# Patient Record
Sex: FEMALE | Race: WHITE | Employment: FULL TIME | URBAN - METROPOLITAN AREA
[De-identification: names, ages, dates, MRNs, and addresses within clinical notes are randomized per-mention and may not be internally consistent; named-entity substitution may affect disease eponyms.]

---

## 2017-02-03 ENCOUNTER — HOSPITAL ENCOUNTER (EMERGENCY)
Age: 22
Discharge: HOME | End: 2017-02-03
Payer: COMMERCIAL

## 2017-02-03 VITALS
TEMPERATURE: 98.2 F | OXYGEN SATURATION: 100 % | RESPIRATION RATE: 14 BRPM | DIASTOLIC BLOOD PRESSURE: 73 MMHG | HEIGHT: 63 IN | HEART RATE: 101 BPM | SYSTOLIC BLOOD PRESSURE: 112 MMHG | BODY MASS INDEX: 23.92 KG/M2 | WEIGHT: 135 LBS

## 2017-02-03 DIAGNOSIS — R00.2 PALPITATIONS: ICD-10-CM

## 2017-02-03 DIAGNOSIS — J10.1 INFLUENZA A: Primary | ICD-10-CM

## 2017-02-03 LAB
BACTERIA: NORMAL /HPF
BILIRUBIN URINE: ABNORMAL
BLOOD, URINE: NEGATIVE
CLARITY: ABNORMAL
COLOR: ABNORMAL
EPITHELIAL CELLS, UA: NORMAL /HPF
GLUCOSE URINE: NEGATIVE MG/DL
KETONES, URINE: NEGATIVE MG/DL
LEUKOCYTE ESTERASE, URINE: NEGATIVE
MUCUS: PRESENT
NITRITE, URINE: NEGATIVE
PH UA: 5.5 (ref 5–9)
PROTEIN UA: 30 MG/DL
RAPID INFLUENZA  B AGN: NEGATIVE
RAPID INFLUENZA A AGN: POSITIVE
RBC UA: NORMAL /HPF (ref 0–2)
SPECIFIC GRAVITY UA: 1.04 (ref 1–1.03)
URINE REFLEX TO CULTURE: YES
UROBILINOGEN, URINE: 0.2 E.U./DL
WBC UA: NORMAL /HPF (ref 0–5)

## 2017-02-03 PROCEDURE — 81001 URINALYSIS AUTO W/SCOPE: CPT

## 2017-02-03 PROCEDURE — 99284 EMERGENCY DEPT VISIT MOD MDM: CPT

## 2017-02-03 PROCEDURE — 87077 CULTURE AEROBIC IDENTIFY: CPT

## 2017-02-03 PROCEDURE — 93005 ELECTROCARDIOGRAM TRACING: CPT

## 2017-02-03 PROCEDURE — 87086 URINE CULTURE/COLONY COUNT: CPT

## 2017-02-03 PROCEDURE — 86403 PARTICLE AGGLUT ANTBDY SCRN: CPT

## 2017-02-03 ASSESSMENT — ENCOUNTER SYMPTOMS
VOMITING: 0
TROUBLE SWALLOWING: 0
DIARRHEA: 0
COUGH: 0
PHOTOPHOBIA: 0
NAUSEA: 1
BACK PAIN: 0
SHORTNESS OF BREATH: 0
SORE THROAT: 0
ABDOMINAL PAIN: 0

## 2017-02-05 LAB
ORGANISM: ABNORMAL
URINE CULTURE, ROUTINE: ABNORMAL

## 2017-02-09 LAB
EKG ATRIAL RATE: 82 BPM
EKG P AXIS: 60 DEGREES
EKG P-R INTERVAL: 158 MS
EKG Q-T INTERVAL: 368 MS
EKG QRS DURATION: 100 MS
EKG QTC CALCULATION (BAZETT): 429 MS
EKG R AXIS: 70 DEGREES
EKG T AXIS: 27 DEGREES
EKG VENTRICULAR RATE: 82 BPM

## 2017-06-01 ENCOUNTER — HOSPITAL ENCOUNTER (OUTPATIENT)
Age: 22
Setting detail: OBSERVATION
Discharge: HOME | End: 2017-06-02
Attending: OBSTETRICS & GYNECOLOGY | Admitting: OBSTETRICS & GYNECOLOGY
Payer: COMMERCIAL

## 2017-06-01 DIAGNOSIS — O03.4 INCOMPLETE ABORTION: Primary | ICD-10-CM

## 2017-06-01 LAB
ABO/RH: NORMAL
ALBUMIN SERPL-MCNC: 3.9 G/DL (ref 3.9–4.9)
ALP BLD-CCNC: 59 U/L (ref 40–130)
ALT SERPL-CCNC: 9 U/L (ref 0–33)
ANION GAP SERPL CALCULATED.3IONS-SCNC: 14 MEQ/L (ref 7–13)
AST SERPL-CCNC: 15 U/L (ref 0–35)
BASOPHILS ABSOLUTE: 0 K/UL (ref 0–0.2)
BASOPHILS RELATIVE PERCENT: 0.5 %
BILIRUB SERPL-MCNC: 0.2 MG/DL (ref 0–1.2)
BUN BLDV-MCNC: 8 MG/DL (ref 6–20)
CALCIUM SERPL-MCNC: 8 MG/DL (ref 8.6–10.2)
CHLORIDE BLD-SCNC: 103 MEQ/L (ref 98–107)
CO2: 21 MEQ/L (ref 22–29)
CREAT SERPL-MCNC: 0.44 MG/DL (ref 0.5–0.9)
EOSINOPHILS ABSOLUTE: 0.1 K/UL (ref 0–0.7)
EOSINOPHILS RELATIVE PERCENT: 1.3 %
GFR AFRICAN AMERICAN: >60
GFR NON-AFRICAN AMERICAN: >60
GLOBULIN: 2.1 G/DL (ref 2.3–3.5)
GLUCOSE BLD-MCNC: 94 MG/DL (ref 74–109)
GONADOTROPIN, CHORIONIC (HCG) QUANT: 2613 MIU/ML
HCT VFR BLD CALC: 33.3 % (ref 37–47)
HEMOGLOBIN: 10.6 G/DL (ref 12–16)
LYMPHOCYTES ABSOLUTE: 1.2 K/UL (ref 1–4.8)
LYMPHOCYTES RELATIVE PERCENT: 14.7 %
MCH RBC QN AUTO: 25.6 PG (ref 27–31.3)
MCHC RBC AUTO-ENTMCNC: 32 % (ref 33–37)
MCV RBC AUTO: 80.2 FL (ref 82–100)
MONOCYTES ABSOLUTE: 0.5 K/UL (ref 0.2–0.8)
MONOCYTES RELATIVE PERCENT: 5.6 %
NEUTROPHILS ABSOLUTE: 6.3 K/UL (ref 1.4–6.5)
NEUTROPHILS RELATIVE PERCENT: 77.9 %
PDW BLD-RTO: 14.2 % (ref 11.5–14.5)
PLATELET # BLD: 175 K/UL (ref 130–400)
POTASSIUM SERPL-SCNC: 3.7 MEQ/L (ref 3.5–5.1)
RBC # BLD: 4.15 M/UL (ref 4.2–5.4)
SODIUM BLD-SCNC: 138 MEQ/L (ref 132–144)
TOTAL PROTEIN: 6 G/DL (ref 6.4–8.1)
WBC # BLD: 8 K/UL (ref 4.8–10.8)

## 2017-06-01 PROCEDURE — 80053 COMPREHEN METABOLIC PANEL: CPT

## 2017-06-01 PROCEDURE — 86900 BLOOD TYPING SEROLOGIC ABO: CPT

## 2017-06-01 PROCEDURE — 84702 CHORIONIC GONADOTROPIN TEST: CPT

## 2017-06-01 PROCEDURE — 36415 COLL VENOUS BLD VENIPUNCTURE: CPT

## 2017-06-01 PROCEDURE — 86901 BLOOD TYPING SEROLOGIC RH(D): CPT

## 2017-06-01 PROCEDURE — 85025 COMPLETE CBC W/AUTO DIFF WBC: CPT

## 2017-06-01 PROCEDURE — 99284 EMERGENCY DEPT VISIT MOD MDM: CPT

## 2017-06-01 PROCEDURE — 2580000003 HC RX 258: Performed by: PHYSICIAN ASSISTANT

## 2017-06-01 PROCEDURE — G0378 HOSPITAL OBSERVATION PER HR: HCPCS

## 2017-06-01 RX ORDER — NALBUPHINE HCL 10 MG/ML
10 AMPUL (ML) INJECTION EVERY 4 HOURS PRN
Status: DISCONTINUED | OUTPATIENT
Start: 2017-06-01 | End: 2017-06-02 | Stop reason: HOSPADM

## 2017-06-01 RX ORDER — ACETAMINOPHEN 500 MG
1000 TABLET ORAL EVERY 6 HOURS PRN
Status: DISCONTINUED | OUTPATIENT
Start: 2017-06-01 | End: 2017-06-02 | Stop reason: HOSPADM

## 2017-06-01 RX ORDER — OXYCODONE HYDROCHLORIDE AND ACETAMINOPHEN 5; 325 MG/1; MG/1
2 TABLET ORAL EVERY 4 HOURS PRN
Status: DISCONTINUED | OUTPATIENT
Start: 2017-06-01 | End: 2017-06-02 | Stop reason: HOSPADM

## 2017-06-01 RX ORDER — ZOLPIDEM TARTRATE 5 MG/1
5 TABLET ORAL NIGHTLY PRN
Status: DISCONTINUED | OUTPATIENT
Start: 2017-06-02 | End: 2017-06-02 | Stop reason: HOSPADM

## 2017-06-01 RX ORDER — 0.9 % SODIUM CHLORIDE 0.9 %
1000 INTRAVENOUS SOLUTION INTRAVENOUS ONCE
Status: COMPLETED | OUTPATIENT
Start: 2017-06-01 | End: 2017-06-01

## 2017-06-01 RX ORDER — SODIUM CHLORIDE, SODIUM LACTATE, POTASSIUM CHLORIDE, AND CALCIUM CHLORIDE .6; .31; .03; .02 G/100ML; G/100ML; G/100ML; G/100ML
1000 INJECTION, SOLUTION INTRAVENOUS ONCE
Status: DISCONTINUED | OUTPATIENT
Start: 2017-06-01 | End: 2017-06-02 | Stop reason: HOSPADM

## 2017-06-01 RX ORDER — MISOPROSTOL 200 UG/1
400 TABLET ORAL ONCE
Status: COMPLETED | OUTPATIENT
Start: 2017-06-02 | End: 2017-06-02

## 2017-06-01 RX ORDER — MISOPROSTOL 200 UG/1
800 TABLET ORAL ONCE
Status: DISCONTINUED | OUTPATIENT
Start: 2017-06-01 | End: 2017-06-01

## 2017-06-01 RX ORDER — SODIUM CHLORIDE, SODIUM LACTATE, POTASSIUM CHLORIDE, CALCIUM CHLORIDE 600; 310; 30; 20 MG/100ML; MG/100ML; MG/100ML; MG/100ML
INJECTION, SOLUTION INTRAVENOUS CONTINUOUS
Status: DISCONTINUED | OUTPATIENT
Start: 2017-06-01 | End: 2017-06-02 | Stop reason: HOSPADM

## 2017-06-01 RX ADMIN — SODIUM CHLORIDE 1000 ML: 9 INJECTION, SOLUTION INTRAVENOUS at 21:36

## 2017-06-01 ASSESSMENT — ENCOUNTER SYMPTOMS
ABDOMINAL PAIN: 0
BACK PAIN: 0
DIARRHEA: 0
NAUSEA: 0
SHORTNESS OF BREATH: 0
VOMITING: 0
SORE THROAT: 0
TROUBLE SWALLOWING: 0
COUGH: 0
PHOTOPHOBIA: 0

## 2017-06-02 ENCOUNTER — TELEPHONE (OUTPATIENT)
Dept: OBGYN | Age: 22
End: 2017-06-02

## 2017-06-02 VITALS
DIASTOLIC BLOOD PRESSURE: 58 MMHG | TEMPERATURE: 98.2 F | RESPIRATION RATE: 18 BRPM | OXYGEN SATURATION: 98 % | BODY MASS INDEX: 23.91 KG/M2 | WEIGHT: 135 LBS | HEART RATE: 78 BPM | SYSTOLIC BLOOD PRESSURE: 113 MMHG

## 2017-06-02 PROCEDURE — 99282 EMERGENCY DEPT VISIT SF MDM: CPT

## 2017-06-02 PROCEDURE — 99217 PR OBSERVATION CARE DISCHARGE MANAGEMENT: CPT | Performed by: OBSTETRICS & GYNECOLOGY

## 2017-06-02 PROCEDURE — 6370000000 HC RX 637 (ALT 250 FOR IP): Performed by: OBSTETRICS & GYNECOLOGY

## 2017-06-02 RX ADMIN — ACETAMINOPHEN 1000 MG: 500 TABLET ORAL at 00:01

## 2017-06-02 RX ADMIN — MISOPROSTOL 400 MCG: 200 TABLET ORAL at 00:00

## 2017-06-02 ASSESSMENT — PAIN SCALES - GENERAL: PAINLEVEL_OUTOF10: 5

## 2017-06-05 RX ORDER — IBUPROFEN 800 MG/1
800 TABLET ORAL EVERY 8 HOURS PRN
Qty: 60 TABLET | Refills: 0 | Status: SHIPPED | OUTPATIENT
Start: 2017-06-05 | End: 2020-04-06

## 2017-12-03 ENCOUNTER — APPOINTMENT (OUTPATIENT)
Dept: GENERAL RADIOLOGY | Age: 22
End: 2017-12-03
Payer: MEDICAID

## 2017-12-03 ENCOUNTER — HOSPITAL ENCOUNTER (EMERGENCY)
Age: 22
Discharge: HOME | End: 2017-12-04
Payer: MEDICAID

## 2017-12-03 VITALS
HEIGHT: 63 IN | RESPIRATION RATE: 20 BRPM | OXYGEN SATURATION: 100 % | DIASTOLIC BLOOD PRESSURE: 68 MMHG | WEIGHT: 135 LBS | TEMPERATURE: 98.2 F | SYSTOLIC BLOOD PRESSURE: 133 MMHG | BODY MASS INDEX: 23.92 KG/M2 | HEART RATE: 93 BPM

## 2017-12-03 DIAGNOSIS — R00.2 PALPITATIONS: Primary | ICD-10-CM

## 2017-12-03 LAB
ANION GAP SERPL CALCULATED.3IONS-SCNC: 12 MEQ/L (ref 7–13)
BUN BLDV-MCNC: 15 MG/DL (ref 6–20)
CALCIUM SERPL-MCNC: 9.2 MG/DL (ref 8.6–10.2)
CHLORIDE BLD-SCNC: 102 MEQ/L (ref 98–107)
CO2: 23 MEQ/L (ref 22–29)
CREAT SERPL-MCNC: 0.58 MG/DL (ref 0.5–0.9)
EKG ATRIAL RATE: 87 BPM
EKG P AXIS: 64 DEGREES
EKG P-R INTERVAL: 144 MS
EKG Q-T INTERVAL: 372 MS
EKG QRS DURATION: 98 MS
EKG QTC CALCULATION (BAZETT): 447 MS
EKG R AXIS: 74 DEGREES
EKG T AXIS: 38 DEGREES
EKG VENTRICULAR RATE: 87 BPM
GFR AFRICAN AMERICAN: >60
GFR NON-AFRICAN AMERICAN: >60
GLUCOSE BLD-MCNC: 102 MG/DL (ref 74–109)
MAGNESIUM: 2.1 MG/DL (ref 1.7–2.3)
POTASSIUM SERPL-SCNC: 3.8 MEQ/L (ref 3.5–5.1)
SODIUM BLD-SCNC: 137 MEQ/L (ref 132–144)
TROPONIN: <0.01 NG/ML (ref 0–0.01)

## 2017-12-03 PROCEDURE — 80048 BASIC METABOLIC PNL TOTAL CA: CPT

## 2017-12-03 PROCEDURE — 85025 COMPLETE CBC W/AUTO DIFF WBC: CPT

## 2017-12-03 PROCEDURE — 2580000003 HC RX 258: Performed by: PERSONAL EMERGENCY RESPONSE ATTENDANT

## 2017-12-03 PROCEDURE — 99285 EMERGENCY DEPT VISIT HI MDM: CPT

## 2017-12-03 PROCEDURE — 71010 XR CHEST PORTABLE: CPT

## 2017-12-03 PROCEDURE — 36415 COLL VENOUS BLD VENIPUNCTURE: CPT

## 2017-12-03 PROCEDURE — 84484 ASSAY OF TROPONIN QUANT: CPT

## 2017-12-03 PROCEDURE — 83735 ASSAY OF MAGNESIUM: CPT

## 2017-12-03 PROCEDURE — 93005 ELECTROCARDIOGRAM TRACING: CPT

## 2017-12-03 RX ORDER — 0.9 % SODIUM CHLORIDE 0.9 %
500 INTRAVENOUS SOLUTION INTRAVENOUS ONCE
Status: COMPLETED | OUTPATIENT
Start: 2017-12-03 | End: 2017-12-04

## 2017-12-03 RX ORDER — ALPRAZOLAM 0.25 MG/1
0.25 TABLET ORAL NIGHTLY PRN
COMMUNITY

## 2017-12-03 RX ADMIN — SODIUM CHLORIDE 500 ML: 9 INJECTION, SOLUTION INTRAVENOUS at 23:25

## 2017-12-03 ASSESSMENT — ENCOUNTER SYMPTOMS
COUGH: 0
SHORTNESS OF BREATH: 1
ABDOMINAL PAIN: 0
RHINORRHEA: 0
BLOOD IN STOOL: 0
DIARRHEA: 0
NAUSEA: 0
COLOR CHANGE: 0
VOMITING: 0

## 2017-12-03 ASSESSMENT — PAIN DESCRIPTION - PAIN TYPE: TYPE: ACUTE PAIN

## 2017-12-03 ASSESSMENT — PAIN SCALES - GENERAL: PAINLEVEL_OUTOF10: 4

## 2017-12-03 ASSESSMENT — PAIN DESCRIPTION - LOCATION: LOCATION: NECK

## 2017-12-03 ASSESSMENT — PAIN DESCRIPTION - DESCRIPTORS: DESCRIPTORS: ACHING

## 2017-12-04 LAB
ANISOCYTOSIS: ABNORMAL
BANDED NEUTROPHILS RELATIVE PERCENT: 6 % (ref 5–11)
BASOPHILS ABSOLUTE: 0.1 K/UL (ref 0–0.2)
BASOPHILS RELATIVE PERCENT: 1 %
EOSINOPHILS ABSOLUTE: 0.1 K/UL (ref 0–0.7)
EOSINOPHILS RELATIVE PERCENT: 1 %
HCT VFR BLD CALC: 34.3 % (ref 37–47)
HEMOGLOBIN: 10.4 G/DL (ref 12–16)
HYPOCHROMIA: ABNORMAL
LYMPHOCYTES ABSOLUTE: 1.6 K/UL (ref 1–4.8)
LYMPHOCYTES RELATIVE PERCENT: 19 %
MCH RBC QN AUTO: 20.6 PG (ref 27–31.3)
MCHC RBC AUTO-ENTMCNC: 30.3 % (ref 33–37)
MCV RBC AUTO: 68 FL (ref 82–100)
MICROCYTES: ABNORMAL
MONOCYTES ABSOLUTE: 0.6 K/UL (ref 0.2–0.8)
MONOCYTES RELATIVE PERCENT: 6.7 %
NEUTROPHILS ABSOLUTE: 6 K/UL (ref 1.4–6.5)
NEUTROPHILS RELATIVE PERCENT: 66 %
PDW BLD-RTO: 17.5 % (ref 11.5–14.5)
PLATELET # BLD: 234 K/UL (ref 130–400)
PLATELET SLIDE REVIEW: NORMAL
POIKILOCYTES: ABNORMAL
RBC # BLD: 5.04 M/UL (ref 4.2–5.4)
WBC # BLD: 8.4 K/UL (ref 4.8–10.8)

## 2017-12-04 NOTE — ED NOTES
Patient discharge instructions reviewed with patient at this time. Patient verbalizes understanding of the discharge instructions, need for further follow up, and when to return to the ER for worsening symptoms. No distress noted upon discharge home with significant other. Patient stable and ambulatory.      Kurt Price RN  12/04/17 7038

## 2017-12-04 NOTE — ED PROVIDER NOTES
Negative for color change and rash. Neurological: Negative for dizziness, syncope, light-headedness, numbness and headaches. PAST MEDICAL HISTORY     Past Medical History:   Diagnosis Date    Anxiety          SURGICAL HISTORY       Past Surgical History:   Procedure Laterality Date    DILATION AND CURETTAGE OF UTERUS      5/2016         CURRENT MEDICATIONS       Previous Medications    ALBUTEROL SULFATE HFA (PROAIR HFA) 108 (90 BASE) MCG/ACT INHALER    2 times daily as needed    ALPRAZOLAM (XANAX) 0.25 MG TABLET    Take 0.25 mg by mouth nightly as needed for Sleep or Anxiety . IBUPROFEN (ADVIL;MOTRIN) 800 MG TABLET    Take 1 tablet by mouth every 8 hours as needed for Pain       ALLERGIES     Review of patient's allergies indicates no known allergies. FAMILY HISTORY       Family History   Problem Relation Age of Onset    Breast Cancer Neg Hx     Cervical Cancer Neg Hx     Uterine Cancer Neg Hx     Ovarian Cancer Neg Hx           SOCIAL HISTORY       Social History     Social History    Marital status: Single     Spouse name: N/A    Number of children: N/A    Years of education: N/A     Social History Main Topics    Smoking status: Current Every Day Smoker     Packs/day: 1.00     Years: 5.00     Types: Cigarettes    Smokeless tobacco: Never Used    Alcohol use 0.0 oz/week      Comment: 2-3 per week    Drug use: No    Sexual activity: Not Currently     Other Topics Concern    None     Social History Narrative    None         PHYSICAL EXAM         ED Triage Vitals [12/03/17 2249]   BP Temp Temp Source Pulse Resp SpO2 Height Weight   133/68 98.2 °F (36.8 °C) Oral 93 20 100 % 5' 3\" (1.6 m) 135 lb (61.2 kg)       Physical Exam   Constitutional: She is oriented to person, place, and time. She appears well-developed and well-nourished. HENT:   Head: Normocephalic and atraumatic.    Mouth/Throat: Oropharynx is clear and moist.   Eyes: Conjunctivae and EOM are normal. Pupils are equal, 133/68   Pulse: 93   Resp: 20   Temp: 98.2 °F (36.8 °C)   TempSrc: Oral   SpO2: 100%   Weight: 135 lb (61.2 kg)   Height: 5' 3\" (1.6 m)         MDM    Blood work is unremarkable. Chest x-ray shows no acute process. EKG at today's visit show no PVCs or PACs or significant arrhythmias. Patient states her palpitations have improved since arrival she is minimally experiencing them. She does state she was on medications 6+ months ago via  but no longer takes it. She is unsure what the medication is. Patient appears nontoxic and is smiling and laughing with significant other at bedside. Pt appears nontoxic. Vital signs are not concerning at this time. Patient was informed to follow-up with Dr. Karen Muhammad in the next few days. Standard anticipatory guidance given to patient upon discharge. Have given them a specific time frame in which to follow-up and who to follow-up with. I have also advised them that they should return to the emergency department if they get worse, or not getting better or develop any new or concerning symptoms. Patient demonstrates understanding. CRITICAL CARE TIME   Total Critical Care time was 0 minutes, excluding separately reportable procedures. There was a high probability of clinically significant/life threatening deterioration in the patient's condition which required my urgent intervention. Procedures    FINAL IMPRESSION      1. Palpitations          DISPOSITION/PLAN   DISPOSITION Decision to Discharge    PATIENT REFERRED TO:  Corbett Lennox, MD  [address] [place] [address]  [phone]    In 2 days        DISCHARGE MEDICATIONS:  New Prescriptions    No medications on file          (Please note that portions of this note were completed with a voice recognition program.  Efforts were made to edit the dictations but occasionally words are mis-transcribed. )    ISRRAEL Palmer (electronically signed)  Emergency Physician Assistant          Quang Isbell PA  12/04/17 0006

## 2018-04-29 ENCOUNTER — HOSPITAL ENCOUNTER (EMERGENCY)
Age: 23
Discharge: HOME | End: 2018-04-29
Payer: COMMERCIAL

## 2018-04-29 VITALS
WEIGHT: 150 LBS | DIASTOLIC BLOOD PRESSURE: 89 MMHG | HEART RATE: 106 BPM | HEIGHT: 63 IN | OXYGEN SATURATION: 100 % | RESPIRATION RATE: 20 BRPM | TEMPERATURE: 99 F | BODY MASS INDEX: 26.58 KG/M2 | SYSTOLIC BLOOD PRESSURE: 145 MMHG

## 2018-04-29 DIAGNOSIS — R11.0 NAUSEA: ICD-10-CM

## 2018-04-29 DIAGNOSIS — N91.2 AMENORRHEA, UNSPECIFIED: Primary | ICD-10-CM

## 2018-04-29 LAB
ALBUMIN SERPL-MCNC: 4.5 G/DL (ref 3.9–4.9)
ALP BLD-CCNC: 83 U/L (ref 40–130)
ALT SERPL-CCNC: 11 U/L (ref 0–33)
ANION GAP SERPL CALCULATED.3IONS-SCNC: 13 MEQ/L (ref 7–13)
ANISOCYTOSIS: ABNORMAL
AST SERPL-CCNC: 17 U/L (ref 0–35)
BASOPHILS ABSOLUTE: 0.1 K/UL (ref 0–0.2)
BASOPHILS RELATIVE PERCENT: 1.1 %
BILIRUB SERPL-MCNC: 0.5 MG/DL (ref 0–1.2)
BILIRUBIN URINE: NEGATIVE
BLOOD, URINE: NEGATIVE
BUN BLDV-MCNC: 11 MG/DL (ref 6–20)
CALCIUM SERPL-MCNC: 8.3 MG/DL (ref 8.6–10.2)
CHLORIDE BLD-SCNC: 103 MEQ/L (ref 98–107)
CHP ED QC CHECK: YES
CLARITY: ABNORMAL
CO2: 22 MEQ/L (ref 22–29)
COLOR: YELLOW
CREAT SERPL-MCNC: 0.64 MG/DL (ref 0.5–0.9)
EOSINOPHILS ABSOLUTE: 0.1 K/UL (ref 0–0.7)
EOSINOPHILS RELATIVE PERCENT: 2.8 %
GFR AFRICAN AMERICAN: >60
GFR NON-AFRICAN AMERICAN: >60
GLOBULIN: 2.6 G/DL (ref 2.3–3.5)
GLUCOSE BLD-MCNC: 77 MG/DL (ref 74–109)
GLUCOSE URINE: NEGATIVE MG/DL
GONADOTROPIN, CHORIONIC (HCG) QUANT: <0.1 MIU/ML
HCT VFR BLD CALC: 34.1 % (ref 37–47)
HEMOGLOBIN: 10.7 G/DL (ref 12–16)
KETONES, URINE: NEGATIVE MG/DL
LEUKOCYTE ESTERASE, URINE: NEGATIVE
LYMPHOCYTES ABSOLUTE: 1 K/UL (ref 1–4.8)
LYMPHOCYTES RELATIVE PERCENT: 19.9 %
MCH RBC QN AUTO: 21 PG (ref 27–31.3)
MCHC RBC AUTO-ENTMCNC: 31.4 % (ref 33–37)
MCV RBC AUTO: 66.7 FL (ref 82–100)
MICROCYTES: ABNORMAL
MONOCYTES ABSOLUTE: 0.4 K/UL (ref 0.2–0.8)
MONOCYTES RELATIVE PERCENT: 7.9 %
NEUTROPHILS ABSOLUTE: 3.5 K/UL (ref 1.4–6.5)
NEUTROPHILS RELATIVE PERCENT: 68.3 %
NITRITE, URINE: NEGATIVE
PDW BLD-RTO: 16.9 % (ref 11.5–14.5)
PH UA: 5 (ref 5–9)
PLATELET # BLD: 287 K/UL (ref 130–400)
PLATELET SLIDE REVIEW: NORMAL
POIKILOCYTES: ABNORMAL
POTASSIUM SERPL-SCNC: 3.9 MEQ/L (ref 3.5–5.1)
PREGNANCY TEST URINE, POC: NEGATIVE
PROTEIN UA: NEGATIVE MG/DL
RBC # BLD: 5.11 M/UL (ref 4.2–5.4)
SODIUM BLD-SCNC: 138 MEQ/L (ref 132–144)
SPECIFIC GRAVITY UA: 1.02 (ref 1–1.03)
TOTAL PROTEIN: 7.1 G/DL (ref 6.4–8.1)
TSH SERPL DL<=0.05 MIU/L-ACNC: 0.87 UIU/ML (ref 0.27–4.2)
URINE REFLEX TO CULTURE: ABNORMAL
UROBILINOGEN, URINE: 0.2 E.U./DL
WBC # BLD: 5.2 K/UL (ref 4.8–10.8)

## 2018-04-29 PROCEDURE — 81003 URINALYSIS AUTO W/O SCOPE: CPT

## 2018-04-29 PROCEDURE — 36415 COLL VENOUS BLD VENIPUNCTURE: CPT

## 2018-04-29 PROCEDURE — 85025 COMPLETE CBC W/AUTO DIFF WBC: CPT

## 2018-04-29 PROCEDURE — 80053 COMPREHEN METABOLIC PANEL: CPT

## 2018-04-29 PROCEDURE — 84443 ASSAY THYROID STIM HORMONE: CPT

## 2018-04-29 PROCEDURE — 99283 EMERGENCY DEPT VISIT LOW MDM: CPT

## 2018-04-29 PROCEDURE — 84702 CHORIONIC GONADOTROPIN TEST: CPT

## 2018-04-29 RX ORDER — ONDANSETRON 4 MG/1
4 TABLET, ORALLY DISINTEGRATING ORAL EVERY 8 HOURS PRN
Qty: 10 TABLET | Refills: 0 | Status: SHIPPED | OUTPATIENT
Start: 2018-04-29

## 2018-04-29 ASSESSMENT — ENCOUNTER SYMPTOMS
COLOR CHANGE: 0
SHORTNESS OF BREATH: 0
NAUSEA: 1
EYE PAIN: 0
VOMITING: 1
ALLERGIC/IMMUNOLOGIC NEGATIVE: 1
DIARRHEA: 0
APNEA: 0
ABDOMINAL PAIN: 0
TROUBLE SWALLOWING: 0

## 2018-08-06 ENCOUNTER — HOSPITAL ENCOUNTER (EMERGENCY)
Age: 23
Discharge: HOME | End: 2018-08-06
Attending: EMERGENCY MEDICINE

## 2018-08-06 VITALS
DIASTOLIC BLOOD PRESSURE: 89 MMHG | HEART RATE: 84 BPM | HEIGHT: 63 IN | RESPIRATION RATE: 18 BRPM | WEIGHT: 145 LBS | BODY MASS INDEX: 25.69 KG/M2 | SYSTOLIC BLOOD PRESSURE: 128 MMHG | TEMPERATURE: 99.1 F | OXYGEN SATURATION: 97 %

## 2018-08-06 DIAGNOSIS — L30.9 DERMATITIS: ICD-10-CM

## 2018-08-06 DIAGNOSIS — Z71.6 ENCOUNTER FOR SMOKING CESSATION COUNSELING: ICD-10-CM

## 2018-08-06 DIAGNOSIS — R11.0 NAUSEA: ICD-10-CM

## 2018-08-06 DIAGNOSIS — J40 BRONCHITIS: Primary | ICD-10-CM

## 2018-08-06 LAB
ALBUMIN SERPL-MCNC: 4.7 G/DL (ref 3.9–4.9)
ALP BLD-CCNC: 91 U/L (ref 40–130)
ALT SERPL-CCNC: 10 U/L (ref 0–33)
ANION GAP SERPL CALCULATED.3IONS-SCNC: 15 MEQ/L (ref 7–13)
ANISOCYTOSIS: ABNORMAL
AST SERPL-CCNC: 17 U/L (ref 0–35)
BACTERIA: NORMAL /HPF
BASOPHILS ABSOLUTE: 0 K/UL (ref 0–0.2)
BASOPHILS RELATIVE PERCENT: 0.7 %
BILIRUB SERPL-MCNC: 0.8 MG/DL (ref 0–1.2)
BILIRUBIN URINE: NEGATIVE
BLOOD, URINE: NEGATIVE
BUN BLDV-MCNC: 6 MG/DL (ref 6–20)
CALCIUM SERPL-MCNC: 9.3 MG/DL (ref 8.6–10.2)
CHLORIDE BLD-SCNC: 100 MEQ/L (ref 98–107)
CHP ED QC CHECK: NORMAL
CLARITY: ABNORMAL
CO2: 22 MEQ/L (ref 22–29)
COLOR: YELLOW
CREAT SERPL-MCNC: 0.6 MG/DL (ref 0.5–0.9)
EOSINOPHILS ABSOLUTE: 0 K/UL (ref 0–0.7)
EOSINOPHILS RELATIVE PERCENT: 0.6 %
EPITHELIAL CELLS, UA: NORMAL /HPF
GFR AFRICAN AMERICAN: >60
GFR NON-AFRICAN AMERICAN: >60
GLOBULIN: 3.2 G/DL (ref 2.3–3.5)
GLUCOSE BLD-MCNC: 90 MG/DL (ref 74–109)
GLUCOSE URINE: NEGATIVE MG/DL
HCG QUALITATIVE: NEGATIVE
HCT VFR BLD CALC: 38 % (ref 37–47)
HEMOGLOBIN: 12.3 G/DL (ref 12–16)
HYPOCHROMIA: ABNORMAL
KETONES, URINE: NEGATIVE MG/DL
LEUKOCYTE ESTERASE, URINE: ABNORMAL
LYMPHOCYTES ABSOLUTE: 1.4 K/UL (ref 1–4.8)
LYMPHOCYTES RELATIVE PERCENT: 21.4 %
MCH RBC QN AUTO: 23.1 PG (ref 27–31.3)
MCHC RBC AUTO-ENTMCNC: 32.4 % (ref 33–37)
MCV RBC AUTO: 71.2 FL (ref 82–100)
MICROCYTES: ABNORMAL
MONOCYTES ABSOLUTE: 0.5 K/UL (ref 0.2–0.8)
MONOCYTES RELATIVE PERCENT: 7.9 %
NEUTROPHILS ABSOLUTE: 4.6 K/UL (ref 1.4–6.5)
NEUTROPHILS RELATIVE PERCENT: 69.4 %
NITRITE, URINE: NEGATIVE
PDW BLD-RTO: 19.1 % (ref 11.5–14.5)
PH UA: 5.5 (ref 5–9)
PLATELET # BLD: 321 K/UL (ref 130–400)
POIKILOCYTES: ABNORMAL
POTASSIUM SERPL-SCNC: 4 MEQ/L (ref 3.5–5.1)
PREGNANCY TEST URINE, POC: NEGATIVE
PROTEIN UA: NEGATIVE MG/DL
RBC # BLD: 5.34 M/UL (ref 4.2–5.4)
RBC UA: NORMAL /HPF (ref 0–2)
SODIUM BLD-SCNC: 137 MEQ/L (ref 132–144)
SPECIFIC GRAVITY UA: 1.01 (ref 1–1.03)
TARGET CELLS: ABNORMAL
TOTAL PROTEIN: 7.9 G/DL (ref 6.4–8.1)
URINE REFLEX TO CULTURE: YES
UROBILINOGEN, URINE: 0.2 E.U./DL
WBC # BLD: 6.6 K/UL (ref 4.8–10.8)
WBC UA: NORMAL /HPF (ref 0–5)

## 2018-08-06 PROCEDURE — 81001 URINALYSIS AUTO W/SCOPE: CPT

## 2018-08-06 PROCEDURE — 84703 CHORIONIC GONADOTROPIN ASSAY: CPT

## 2018-08-06 PROCEDURE — 85025 COMPLETE CBC W/AUTO DIFF WBC: CPT

## 2018-08-06 PROCEDURE — 87086 URINE CULTURE/COLONY COUNT: CPT

## 2018-08-06 PROCEDURE — 99283 EMERGENCY DEPT VISIT LOW MDM: CPT

## 2018-08-06 PROCEDURE — 80053 COMPREHEN METABOLIC PANEL: CPT

## 2018-08-06 PROCEDURE — 6370000000 HC RX 637 (ALT 250 FOR IP): Performed by: PHYSICIAN ASSISTANT

## 2018-08-06 PROCEDURE — 36415 COLL VENOUS BLD VENIPUNCTURE: CPT

## 2018-08-06 RX ORDER — AMOXICILLIN 500 MG/1
500 CAPSULE ORAL ONCE
Status: COMPLETED | OUTPATIENT
Start: 2018-08-06 | End: 2018-08-06

## 2018-08-06 RX ORDER — AMOXICILLIN 500 MG/1
500 CAPSULE ORAL 3 TIMES DAILY
Qty: 30 CAPSULE | Refills: 0 | Status: SHIPPED | OUTPATIENT
Start: 2018-08-06 | End: 2018-08-16

## 2018-08-06 RX ORDER — PROMETHAZINE HYDROCHLORIDE 25 MG/1
25 TABLET ORAL EVERY 6 HOURS PRN
Qty: 20 TABLET | Refills: 0 | Status: SHIPPED | OUTPATIENT
Start: 2018-08-06 | End: 2018-08-13

## 2018-08-06 RX ADMIN — AMOXICILLIN 500 MG: 500 CAPSULE ORAL at 20:38

## 2018-08-06 ASSESSMENT — PAIN DESCRIPTION - FREQUENCY: FREQUENCY: INTERMITTENT

## 2018-08-06 ASSESSMENT — ENCOUNTER SYMPTOMS
ANAL BLEEDING: 0
NAUSEA: 1
ABDOMINAL PAIN: 1
COUGH: 1
ABDOMINAL DISTENTION: 0
VOICE CHANGE: 0
BACK PAIN: 0
PHOTOPHOBIA: 0
APNEA: 0
EYE DISCHARGE: 0

## 2018-08-06 ASSESSMENT — PAIN DESCRIPTION - PAIN TYPE: TYPE: ACUTE PAIN

## 2018-08-06 ASSESSMENT — PAIN DESCRIPTION - LOCATION: LOCATION: ABDOMEN

## 2018-08-06 ASSESSMENT — PAIN SCALES - GENERAL: PAINLEVEL_OUTOF10: 5

## 2018-08-06 NOTE — ED NOTES
Third call to lab. Two attempts by this RN to straight draw this patient and no blood achieved. Scott Howard, NICKI  08/06/18 0606

## 2018-08-07 NOTE — ED PROVIDER NOTES
abdominal distention and anal bleeding. Endocrine: Negative for cold intolerance, heat intolerance and polyphagia. Genitourinary: Negative for genital sores. Musculoskeletal: Negative for back pain, joint swelling and neck pain. Skin: Negative for pallor. Allergic/Immunologic: Negative for immunocompromised state. Neurological: Negative for seizures and facial asymmetry. Hematological: Does not bruise/bleed easily. Psychiatric/Behavioral: Negative for behavioral problems, self-injury and sleep disturbance. All other systems reviewed and are negative. Except as noted above the remainder of the review of systems was reviewed and negative. PAST MEDICAL HISTORY     Past Medical History:   Diagnosis Date    Anxiety          SURGICAL HISTORY       Past Surgical History:   Procedure Laterality Date    DILATION AND CURETTAGE OF UTERUS      5/2016         CURRENT MEDICATIONS       Previous Medications    ALBUTEROL SULFATE HFA (PROAIR HFA) 108 (90 BASE) MCG/ACT INHALER    2 times daily as needed    ALPRAZOLAM (XANAX) 0.25 MG TABLET    Take 0.25 mg by mouth nightly as needed for Sleep or Anxiety . IBUPROFEN (ADVIL;MOTRIN) 800 MG TABLET    Take 1 tablet by mouth every 8 hours as needed for Pain    ONDANSETRON (ZOFRAN ODT) 4 MG DISINTEGRATING TABLET    Take 1 tablet by mouth every 8 hours as needed for Nausea       ALLERGIES     Patient has no known allergies.     FAMILY HISTORY       Family History   Problem Relation Age of Onset    Breast Cancer Neg Hx     Cervical Cancer Neg Hx     Uterine Cancer Neg Hx     Ovarian Cancer Neg Hx           SOCIAL HISTORY       Social History     Social History    Marital status:      Spouse name: N/A    Number of children: N/A    Years of education: N/A     Social History Main Topics    Smoking status: Current Some Day Smoker     Packs/day: 1.00     Years: 5.00     Types: Cigarettes    Smokeless tobacco: Never Used    Alcohol use 0.0 oz/week Abnormal; Notable for the following:        Result Value    Clarity, UA CLOUDY (*)     Leukocyte Esterase, Urine SMALL (*)     All other components within normal limits   CBC WITH AUTO DIFFERENTIAL - Abnormal; Notable for the following:     MCV 71.2 (*)     MCH 23.1 (*)     MCHC 32.4 (*)     RDW 19.1 (*)     All other components within normal limits   POCT URINE PREGNANCY - Normal   URINE CULTURE   MICROSCOPIC URINALYSIS   COMPREHENSIVE METABOLIC PANEL   HCG, SERUM, QUALITATIVE       All other labs were within normal range or not returned as of this dictation. EMERGENCY DEPARTMENT COURSE and DIFFERENTIAL DIAGNOSIS/MDM:   Vitals:    Vitals:    08/06/18 1819 08/06/18 1931   BP: 128/89    Pulse: 86 84   Resp: 18 18   Temp: 99.1 °F (37.3 °C)    TempSrc: Oral    SpO2: 94% 97%   Weight: 145 lb (65.8 kg)    Height: 5' 3\" (1.6 m)             MDM  Number of Diagnoses or Management Options  Bronchitis:   Dermatitis:   Encounter for smoking cessation counseling:   Nausea:   Diagnosis management comments: Labs pending nursing has contacted lab labs need to be drawn stat. Discharge pending laboratory workup ×1 hour. As with patient we can review pregnancy test she may wait for follow-up with primary care physician she'll need to follow-up for the bouncer labs including a urine culture as well. Patient to return to ER if any symptoms worsen or new symptoms develop. Amount and/or Complexity of Data Reviewed  Clinical lab tests: reviewed and ordered  Discuss the patient with other providers: yes    Attestation I discussed this patient with the PA I concur with his history and physical above patient will be discharged with the medications and plan above. CRITICAL CARE TIME        CONSULTS:  None    PROCEDURES:  Unless otherwise noted below, none     Procedures    FINAL IMPRESSION      1. Bronchitis    2. Nausea    3. Encounter for smoking cessation counseling    4.  Dermatitis          DISPOSITION/PLAN   DISPOSITION

## 2018-08-08 LAB — URINE CULTURE, ROUTINE: NORMAL

## 2018-08-27 ENCOUNTER — HOSPITAL ENCOUNTER (EMERGENCY)
Age: 23
Discharge: HOME | End: 2018-08-27
Attending: EMERGENCY MEDICINE

## 2018-08-27 ENCOUNTER — APPOINTMENT (OUTPATIENT)
Dept: GENERAL RADIOLOGY | Age: 23
End: 2018-08-27

## 2018-08-27 VITALS
TEMPERATURE: 98.4 F | SYSTOLIC BLOOD PRESSURE: 120 MMHG | RESPIRATION RATE: 20 BRPM | OXYGEN SATURATION: 95 % | HEART RATE: 92 BPM | BODY MASS INDEX: 23.92 KG/M2 | WEIGHT: 135 LBS | DIASTOLIC BLOOD PRESSURE: 75 MMHG | HEIGHT: 63 IN

## 2018-08-27 DIAGNOSIS — R06.00 DYSPNEA, UNSPECIFIED TYPE: Primary | ICD-10-CM

## 2018-08-27 PROCEDURE — 99284 EMERGENCY DEPT VISIT MOD MDM: CPT

## 2018-08-27 PROCEDURE — 71046 X-RAY EXAM CHEST 2 VIEWS: CPT

## 2018-08-27 RX ORDER — PREDNISONE 20 MG/1
40 TABLET ORAL DAILY
Qty: 10 TABLET | Refills: 0 | Status: SHIPPED | OUTPATIENT
Start: 2018-08-27 | End: 2018-09-01

## 2018-08-27 ASSESSMENT — ENCOUNTER SYMPTOMS
COUGH: 0
SORE THROAT: 0
SHORTNESS OF BREATH: 1
ABDOMINAL DISTENTION: 0
WHEEZING: 0
CHEST TIGHTNESS: 0
EYE DISCHARGE: 0
ABDOMINAL PAIN: 0
VOMITING: 0
PHOTOPHOBIA: 0

## 2019-04-03 ENCOUNTER — OFFICE VISIT (OUTPATIENT)
Dept: OBGYN CLINIC | Age: 24
End: 2019-04-03

## 2019-04-03 VITALS
SYSTOLIC BLOOD PRESSURE: 98 MMHG | BODY MASS INDEX: 28.17 KG/M2 | HEIGHT: 63 IN | DIASTOLIC BLOOD PRESSURE: 66 MMHG | WEIGHT: 159 LBS | HEART RATE: 84 BPM

## 2019-04-03 DIAGNOSIS — N91.2 AMENORRHEA: ICD-10-CM

## 2019-04-03 DIAGNOSIS — Z34.82 ENCOUNTER FOR SUPERVISION OF OTHER NORMAL PREGNANCY IN SECOND TRIMESTER: ICD-10-CM

## 2019-04-03 DIAGNOSIS — N91.2 AMENORRHEA: Primary | ICD-10-CM

## 2019-04-03 LAB
ABO/RH: NORMAL
AMPHETAMINE SCREEN, URINE: NORMAL
ANTIBODY SCREEN: NORMAL
BARBITURATE SCREEN URINE: NORMAL
BASOPHILS ABSOLUTE: 0 K/UL (ref 0–0.2)
BASOPHILS RELATIVE PERCENT: 0.5 %
BENZODIAZEPINE SCREEN, URINE: NORMAL
BILIRUBIN URINE: NEGATIVE
BLOOD, URINE: NEGATIVE
CANNABINOID SCREEN URINE: NORMAL
CLARITY: CLEAR
COCAINE METABOLITE SCREEN URINE: NORMAL
COLOR: YELLOW
EOSINOPHILS ABSOLUTE: 0.1 K/UL (ref 0–0.7)
EOSINOPHILS RELATIVE PERCENT: 1.1 %
GLUCOSE URINE: NEGATIVE MG/DL
GONADOTROPIN, CHORIONIC (HCG) QUANT: NORMAL MIU/ML
HCT VFR BLD CALC: 35.7 % (ref 37–47)
HEMOGLOBIN: 11.9 G/DL (ref 12–16)
HEPATITIS B SURFACE ANTIGEN INTERPRETATION: NORMAL
KETONES, URINE: NEGATIVE MG/DL
LEUKOCYTE ESTERASE, URINE: NEGATIVE
LYMPHOCYTES ABSOLUTE: 1.2 K/UL (ref 1–4.8)
LYMPHOCYTES RELATIVE PERCENT: 19.3 %
Lab: NORMAL
MCH RBC QN AUTO: 26.9 PG (ref 27–31.3)
MCHC RBC AUTO-ENTMCNC: 33.4 % (ref 33–37)
MCV RBC AUTO: 80.6 FL (ref 82–100)
MONOCYTES ABSOLUTE: 0.4 K/UL (ref 0.2–0.8)
MONOCYTES RELATIVE PERCENT: 6.3 %
NEUTROPHILS ABSOLUTE: 4.4 K/UL (ref 1.4–6.5)
NEUTROPHILS RELATIVE PERCENT: 72.8 %
NITRITE, URINE: NEGATIVE
OPIATE SCREEN URINE: NORMAL
PDW BLD-RTO: 15.7 % (ref 11.5–14.5)
PH UA: 6 (ref 5–9)
PHENCYCLIDINE SCREEN URINE: NORMAL
PLATELET # BLD: 229 K/UL (ref 130–400)
PROTEIN UA: NEGATIVE MG/DL
RBC # BLD: 4.43 M/UL (ref 4.2–5.4)
RUBELLA ANTIBODY IGG: 40.4 IU/ML
SPECIFIC GRAVITY UA: 1.01 (ref 1–1.03)
UROBILINOGEN, URINE: 0.2 E.U./DL
WBC # BLD: 6 K/UL (ref 4.8–10.8)

## 2019-04-03 PROCEDURE — 99203 OFFICE O/P NEW LOW 30 MIN: CPT | Performed by: OBSTETRICS & GYNECOLOGY

## 2019-04-03 PROCEDURE — 81025 URINE PREGNANCY TEST: CPT | Performed by: OBSTETRICS & GYNECOLOGY

## 2019-04-03 NOTE — PROGRESS NOTES
Initial OB visit      Alexa De Jesus is a 21y.o. year old female  @ L 2018, 16.5 wks , CHARLIE 2019 confirmed by a 6 wk US at [address] [place] [address]. Complaints : denies. POB: FTSVD @ 38 wks , female [id number].              SAB x 1     PGYN: chlamydia treated this year . PMH: denies     PSH: denies     MEDS: PNV     Drug Allergies: NKDA    SOCHX: denies x 3     FH: Mother or Father , DM No , HTN No, Other No    BP 98/66 (Site: Left Upper Arm, Position: Sitting, Cuff Size: Medium Adult)   Pulse 84   Ht 5' 3\" (1.6 m)   Wt 159 lb (72.1 kg)   LMP 2018 (Approximate)   BMI 28.17 kg/m²   Past Medical History:   Diagnosis Date    Anxiety      Past Surgical History:   Procedure Laterality Date    DILATION AND CURETTAGE OF UTERUS      2016         Review of Systems  Constitutional: negative  Genitourinary:see above  Integument/breast: negative  Behavioral/Psych: negative  Endocrine: negative     All other systems reviewed and are negative. Physical Exam:  BP 98/66 (Site: Left Upper Arm, Position: Sitting, Cuff Size: Medium Adult)   Pulse 84   Ht 5' 3\" (1.6 m)   Wt 159 lb (72.1 kg)   LMP 2018 (Approximate)   BMI 28.17 kg/m²   Skin: Warm, dry, no lesions or rashes  Extremities: Without clubbing, cyanosis and edema. Palms and nails are normal. Ambulates without difficulty  Neurological: No gross sensory or motor deficits. Abdomen: Soft, non-tender without masses or organomegaly  bowel sounds normoactive  External Genitalia: Normal anatomy, no lesions or masses  Pubic Hair Distribution: Normal pattern and distribution  Pelvic Floor: Normal pelvic support, no significant cystocele or rectocele  Perineum: No fissures, lesions or leukoplakia  Urethra: Normal  Vagina: Moist, pink, supple, no lesions, no abnormal discharge  Cervix: Firm, nontender, no lesions  Uterus: firm, mobile, nontender, no masses or irregularities    FHT : 148 bpm     HOF : subumbilical .     Assessment:   1.  Amenorrhea 2. Encounter for supervision of other normal pregnancy in second trimester        Plan:   Orders Placed This Encounter   Procedures    Urine Culture     Standing Status:   Future     Number of Occurrences:   1     Standing Expiration Date:   4/2/2020     Order Specific Question:   Specify (ex-cath, midstream, cysto, etc)? Answer:   midstream    US OB 14 PLUS WEEKS SINGLE OR FIRST GESTATION     Standing Status:   Future     Standing Expiration Date:   4/3/2020     Order Specific Question:   Reason for exam:     Answer:   4 weeks. for fetal anatomy .     CBC Auto Differential     Standing Status:   Future     Number of Occurrences:   1     Standing Expiration Date:   4/2/2020    HCG, Quantitative, Pregnancy     Standing Status:   Future     Number of Occurrences:   1     Standing Expiration Date:   4/2/2020    Hemoglobinopathy Eval (Electrophoresis)     Standing Status:   Future     Number of Occurrences:   1     Standing Expiration Date:   4/2/2020    Hepatitis B Surface Antigen     Standing Status:   Future     Number of Occurrences:   1     Standing Expiration Date:   4/2/2020    RPR Reflex to Titer and TPPA     Standing Status:   Future     Number of Occurrences:   1     Standing Expiration Date:   4/2/2020    Rubella antibody, IgG     Standing Status:   Future     Number of Occurrences:   1     Standing Expiration Date:   4/2/2020    Urinalysis     Standing Status:   Future     Number of Occurrences:   1     Standing Expiration Date:   4/2/2020    Urine Drug Screen     Standing Status:   Future     Number of Occurrences:   1     Standing Expiration Date:   4/3/2020    Varicella Zoster Antibody, IgG     Standing Status:   Future     Number of Occurrences:   1     Standing Expiration Date:   4/2/2020    POCT urine pregnancy    Type and screen     Standing Status:   Future     Number of Occurrences:   1     Standing Expiration Date:   4/2/2020      No orders of the defined types were placed in this encounter. Plan:   Marj Malave MD    [facility]  4/3/2019  Patient's last menstrual period was 2018 (approximate). INITIAL OBSTETRICAL VISIT EVALUATION:  The patient was seen full history and physical was completed/reviewed. Cytology was collected for patients over 24years of age. Cultures were collected. The patient was counseled on office policies and she was counseled on termination of pregnancy in the state of [place]. The patient was counseled on Toxoplasmosis, HIV, Tobacco Abuse, Group Beta Strep Infections, Cystic Fibrosis,  Labor precautions and Sickle Cell disease. The patient was counseled on the risks of tobacco abuse. Both maternal and fetal. She was instructed to stop smoking if currently using tobacco. Morbidity, mortality, and cessation programs were reviewed. The risks include but are not limited to increased risks of  labor,  delivery, premature rupture of membranes, intrauterine growth restriction, intrauterine fetal demise and abruptio placenta. Secondary smoke risks were also reviewed. Increases in cancer, respiratory problems, and sudden infant death syndrome were reviewed as well. The patient was informed of a 2-4% risk of congenital anomalies in the general population. She was also informed that karyotyping is the only way to evaluate the fetus for genetic problems and genetic lethal anomalies. Chorionic villous sampling, amniocentesis and Maternal Genetic Blood Sampling-(NIPT Testing) were also discussed with morbidity rates in detail. She requested any of the options. Route of delivery and counseling on vaginal, operative vaginal, and  sections were completed with the risks of each to both the patient as well as her baby. The possibility of a blood transfusion was discussed as well. The patient was not opposed to receiving a transfusion if needed.     Nuchal translucency and MSAFP single marker testing was reviewed in detail with attention to timing of testing and their windows. For patients beyond the gestational age for Nuchal translucency evaluation Quad testing was recommended. Timing for the Quad test was reviewed. Benefits of the above testing was reviewed. A second trimester amniocentesis was also made available to the patient. Risks, Benefits and non-invasive alternative testing was reviewed. The literature regarding a questionable link to pitocin augmentation and induction of labor, the assistance of labor contractions and the initiation of contractions to help delivery, have been reviewed with the patient regarding the increased potential of having a  with Attention Deficit Hyperactivity Disorder and or Autism. These two disorders and the ramifications of their impact on a child and the family caring for that child has been reviewed with the patient in detail. She was given the risks, benefits and alternatives of the use of this medication. She has agreed to its use in the delivery of her unborn child if needed at the time of delivery, Yes. The patient was questioned in detail regarding any genetic misnomer history, chromosomal abnormalities, or learning disabilities in  herself, the father of the baby or their families. SHE DENIED ANY HISTORY AS STATED ABOVE: Yes    Upon completion of the visit all questions were answered and the patients follow-up and testing schedule were reviewed. Prenatal vitamins were given. Patient was seen with total face to face time of 30 minutes. More than 50% of this visit was counseling and education regarding The primary encounter diagnosis was Amenorrhea. A diagnosis of Encounter for supervision of other normal pregnancy in second trimester was also pertinent to this visit. and Amenorrhea (LMP approximately 18. Has been seeing [facility]. US done 3/8/19)   as well as  counseling on preventative health maintenance follow-up. Initial labs drawn. Prenatal vitamins.   Problem list reviewed and updated. Counseled about QUAD/ NIPT  Role of ultrasound in pregnancy discussed  Follow-up in 2 weeks  Early 1 hr OGTT - not indicated  Hep C screen indicated : no  FLU- not indicated   TDAP- in third trimester       Jeancarlos Braun M.D., F.NAPOLEON.C.O. G

## 2019-04-04 LAB
HEMOGLOBIN A-1 QUANTITATION: 96.9 % (ref 95–97.9)
HEMOGLOBIN A2 QUANTITATION: 2.8 % (ref 2–3.5)
HEMOGLOBIN C QUANTITATION: 0 % (ref 0–0)
HEMOGLOBIN E QUANTITATION: 0 % (ref 0–0)
HEMOGLOBIN ELECTROPHORESIS: NORMAL
HEMOGLOBIN EVALUATION: NORMAL
HEMOGLOBIN F QUANTITATION: 0.3 % (ref 0–2.1)
HEMOGLOBIN OTHER: 0 % (ref 0–0)
HEMOGLOBIN S QUANTITATION: 0 % (ref 0–0)
RPR: NORMAL
SICKLE CELL: NORMAL
VZV IGG SER QL IA: 32 IV

## 2019-04-05 LAB
HIV 1,2 COMBO ANTIGEN/ANTIBODY: NEGATIVE
URINE CULTURE, ROUTINE: NORMAL

## 2019-04-08 LAB
C. TRACHOMATIS DNA ,URINE: NEGATIVE
N. GONORRHOEAE DNA, URINE: NEGATIVE

## 2019-04-11 LAB
EER NON INVASIVE PRENATAL ANEUPLOIDY: NORMAL
FETAL FRACTION: 13.7
FETAL GENDER: NORMAL
GESTATIONAL AGE (DAYS): 0
GESTATIONAL AGE(WEEKS): 17
Lab: NORMAL
MATERNAL WEIGHT: 158
MONOSOMY X: NORMAL
REPORT FETUS GENDER: YES
TRIPLOIDY (VANISHING TWIN): NORMAL
TRISOMY 13 RISK: NORMAL
TRISOMY 18 RISK ASSESSMENT: NORMAL
TRISOMY 21 RISK: NORMAL

## 2019-04-13 LAB
CARRIER SCREEN, 4 CONDITIONS: NORMAL
EER CARRIER SCREEN, 4 CONDITIONS: NORMAL

## 2019-04-14 PROBLEM — N91.2 AMENORRHEA: Status: ACTIVE | Noted: 2019-04-14

## 2019-04-14 PROBLEM — Z34.90 SUPERVISION OF NORMAL PREGNANCY: Status: ACTIVE | Noted: 2019-04-14

## 2020-02-29 ENCOUNTER — HOSPITAL ENCOUNTER (EMERGENCY)
Age: 25
Discharge: ANOTHER HOSPITAL | End: 2020-02-29
Payer: COMMERCIAL

## 2020-02-29 ENCOUNTER — APPOINTMENT (OUTPATIENT)
Dept: GENERAL RADIOLOGY | Age: 25
End: 2020-02-29
Payer: COMMERCIAL

## 2020-02-29 VITALS
OXYGEN SATURATION: 97 % | BODY MASS INDEX: 28.17 KG/M2 | DIASTOLIC BLOOD PRESSURE: 73 MMHG | TEMPERATURE: 98.9 F | HEIGHT: 63 IN | WEIGHT: 159 LBS | SYSTOLIC BLOOD PRESSURE: 119 MMHG | RESPIRATION RATE: 20 BRPM | HEART RATE: 91 BPM

## 2020-02-29 LAB
ETHANOL PERCENT: 0.16 G/DL
ETHANOL: 187 MG/DL (ref 0–0.08)

## 2020-02-29 PROCEDURE — 6360000002 HC RX W HCPCS: Performed by: NURSE PRACTITIONER

## 2020-02-29 PROCEDURE — 70150 X-RAY EXAM OF FACIAL BONES: CPT

## 2020-02-29 PROCEDURE — 96374 THER/PROPH/DIAG INJ IV PUSH: CPT

## 2020-02-29 PROCEDURE — 99285 EMERGENCY DEPT VISIT HI MDM: CPT

## 2020-02-29 PROCEDURE — 36415 COLL VENOUS BLD VENIPUNCTURE: CPT

## 2020-02-29 PROCEDURE — G0480 DRUG TEST DEF 1-7 CLASSES: HCPCS

## 2020-02-29 PROCEDURE — 6830039000 HC L3 TRAUMA ALERT

## 2020-02-29 RX ORDER — LORAZEPAM 2 MG/ML
2 INJECTION INTRAMUSCULAR ONCE
Status: COMPLETED | OUTPATIENT
Start: 2020-02-29 | End: 2020-02-29

## 2020-02-29 RX ORDER — FENTANYL CITRATE 50 UG/ML
50 INJECTION, SOLUTION INTRAMUSCULAR; INTRAVENOUS ONCE
Status: DISCONTINUED | OUTPATIENT
Start: 2020-02-29 | End: 2020-02-29 | Stop reason: HOSPADM

## 2020-02-29 RX ADMIN — LORAZEPAM 2 MG: 2 INJECTION, SOLUTION INTRAMUSCULAR; INTRAVENOUS at 02:34

## 2020-02-29 ASSESSMENT — PAIN DESCRIPTION - DESCRIPTORS: DESCRIPTORS: ACHING

## 2020-02-29 ASSESSMENT — ENCOUNTER SYMPTOMS
BACK PAIN: 0
TROUBLE SWALLOWING: 0
EYE REDNESS: 0
CONSTIPATION: 0
DIARRHEA: 0
COUGH: 0
SHORTNESS OF BREATH: 0
COLOR CHANGE: 0
WHEEZING: 0
EYE DISCHARGE: 0
NAUSEA: 0
RHINORRHEA: 0
VOMITING: 0
EYE PAIN: 0
BLOOD IN STOOL: 0
SORE THROAT: 0
ABDOMINAL PAIN: 0

## 2020-02-29 ASSESSMENT — PAIN DESCRIPTION - LOCATION: LOCATION: FACE

## 2020-02-29 ASSESSMENT — PAIN DESCRIPTION - FREQUENCY: FREQUENCY: CONTINUOUS

## 2020-02-29 ASSESSMENT — PAIN DESCRIPTION - ORIENTATION: ORIENTATION: LEFT

## 2020-02-29 ASSESSMENT — PAIN SCALES - GENERAL: PAINLEVEL_OUTOF10: 10

## 2020-02-29 ASSESSMENT — PAIN DESCRIPTION - PAIN TYPE: TYPE: ACUTE PAIN

## 2020-02-29 ASSESSMENT — PAIN DESCRIPTION - ONSET: ONSET: ON-GOING
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
## 2020-02-29 NOTE — ED NOTES
Labs obtained by this RN, labeled and sent to lab via tube system.       Roger Brown RN  02/29/20 1210

## 2020-02-29 NOTE — ED NOTES
RN to bedside. Pt is resting peacefully. Resps are even and unlabored, skin p/w/d, and no acute distress at this time. Will continue to monitor.      Shivam Bailey RN  02/29/20 9065

## 2020-04-06 ENCOUNTER — APPOINTMENT (OUTPATIENT)
Dept: GENERAL RADIOLOGY | Age: 25
End: 2020-04-06
Payer: COMMERCIAL

## 2020-04-06 ENCOUNTER — HOSPITAL ENCOUNTER (EMERGENCY)
Age: 25
Discharge: HOME | End: 2020-04-06
Attending: STUDENT IN AN ORGANIZED HEALTH CARE EDUCATION/TRAINING PROGRAM
Payer: COMMERCIAL

## 2020-04-06 ENCOUNTER — APPOINTMENT (OUTPATIENT)
Dept: CT IMAGING | Age: 25
End: 2020-04-06
Payer: COMMERCIAL

## 2020-04-06 VITALS
RESPIRATION RATE: 16 BRPM | HEIGHT: 63 IN | HEART RATE: 84 BPM | DIASTOLIC BLOOD PRESSURE: 80 MMHG | TEMPERATURE: 98.6 F | WEIGHT: 145 LBS | OXYGEN SATURATION: 98 % | BODY MASS INDEX: 25.69 KG/M2 | SYSTOLIC BLOOD PRESSURE: 136 MMHG

## 2020-04-06 LAB
ALBUMIN SERPL-MCNC: 4.7 G/DL (ref 3.5–4.6)
ALP BLD-CCNC: 86 U/L (ref 40–130)
ALT SERPL-CCNC: 10 U/L (ref 0–33)
ANION GAP SERPL CALCULATED.3IONS-SCNC: 18 MEQ/L (ref 9–15)
ANISOCYTOSIS: ABNORMAL
APTT: 29.3 SEC (ref 24.4–36.8)
AST SERPL-CCNC: 24 U/L (ref 0–35)
BACTERIA: ABNORMAL /HPF
BASOPHILS ABSOLUTE: 0 K/UL (ref 0–0.2)
BASOPHILS RELATIVE PERCENT: 0.9 %
BILIRUB SERPL-MCNC: 0.8 MG/DL (ref 0.2–0.7)
BILIRUBIN URINE: NEGATIVE
BLOOD, URINE: ABNORMAL
BUN BLDV-MCNC: 8 MG/DL (ref 6–20)
CALCIUM SERPL-MCNC: 9.5 MG/DL (ref 8.5–9.9)
CHLORIDE BLD-SCNC: 99 MEQ/L (ref 95–107)
CLARITY: CLEAR
CO2: 21 MEQ/L (ref 20–31)
COLOR: YELLOW
CREAT SERPL-MCNC: 0.66 MG/DL (ref 0.5–0.9)
EKG ATRIAL RATE: 74 BPM
EKG P AXIS: 68 DEGREES
EKG P-R INTERVAL: 166 MS
EKG Q-T INTERVAL: 406 MS
EKG QRS DURATION: 96 MS
EKG QTC CALCULATION (BAZETT): 450 MS
EKG R AXIS: 82 DEGREES
EKG T AXIS: 59 DEGREES
EKG VENTRICULAR RATE: 74 BPM
EOSINOPHILS ABSOLUTE: 0.1 K/UL (ref 0–0.7)
EOSINOPHILS RELATIVE PERCENT: 1.4 %
EPITHELIAL CELLS, UA: ABNORMAL /HPF (ref 0–5)
ETHANOL PERCENT: NORMAL G/DL
ETHANOL: <10 MG/DL (ref 0–0.08)
GFR AFRICAN AMERICAN: >60
GFR NON-AFRICAN AMERICAN: >60
GLOBULIN: 3.2 G/DL (ref 2.3–3.5)
GLUCOSE BLD-MCNC: 92 MG/DL (ref 70–99)
GLUCOSE URINE: NEGATIVE MG/DL
HCG, URINE, POC: NEGATIVE
HCT VFR BLD CALC: 38.2 % (ref 37–47)
HEMOGLOBIN: 12.3 G/DL (ref 12–16)
HYALINE CASTS: ABNORMAL /HPF (ref 0–5)
HYPOCHROMIA: ABNORMAL
INR BLD: 1
KETONES, URINE: NEGATIVE MG/DL
LEUKOCYTE ESTERASE, URINE: ABNORMAL
LYMPHOCYTES ABSOLUTE: 0.8 K/UL (ref 1–4.8)
LYMPHOCYTES RELATIVE PERCENT: 16.3 %
Lab: NORMAL
MCH RBC QN AUTO: 24 PG (ref 27–31.3)
MCHC RBC AUTO-ENTMCNC: 32.3 % (ref 33–37)
MCV RBC AUTO: 74.2 FL (ref 82–100)
MICROCYTES: ABNORMAL
MONOCYTES ABSOLUTE: 0.5 K/UL (ref 0.2–0.8)
MONOCYTES RELATIVE PERCENT: 10 %
NEGATIVE QC PASS/FAIL: NORMAL
NEUTROPHILS ABSOLUTE: 3.7 K/UL (ref 1.4–6.5)
NEUTROPHILS RELATIVE PERCENT: 71.4 %
NITRITE, URINE: NEGATIVE
PDW BLD-RTO: 19.1 % (ref 11.5–14.5)
PH UA: 6.5 (ref 5–9)
PLATELET # BLD: 297 K/UL (ref 130–400)
PLATELET SLIDE REVIEW: ADEQUATE
POSITIVE QC PASS/FAIL: NORMAL
POTASSIUM SERPL-SCNC: 3.8 MEQ/L (ref 3.4–4.9)
PROTEIN UA: NEGATIVE MG/DL
PROTHROMBIN TIME: 13.1 SEC (ref 12.3–14.9)
RBC # BLD: 5.15 M/UL (ref 4.2–5.4)
RBC UA: ABNORMAL /HPF (ref 0–5)
SODIUM BLD-SCNC: 138 MEQ/L (ref 135–144)
SPECIFIC GRAVITY UA: 1.01 (ref 1–1.03)
TOTAL PROTEIN: 7.9 G/DL (ref 6.3–8)
URINE REFLEX TO CULTURE: YES
UROBILINOGEN, URINE: 0.2 E.U./DL
WBC # BLD: 5.2 K/UL (ref 4.8–10.8)
WBC UA: ABNORMAL /HPF (ref 0–5)

## 2020-04-06 PROCEDURE — 6360000004 HC RX CONTRAST MEDICATION: Performed by: PHYSICIAN ASSISTANT

## 2020-04-06 PROCEDURE — 85025 COMPLETE CBC W/AUTO DIFF WBC: CPT

## 2020-04-06 PROCEDURE — 99285 EMERGENCY DEPT VISIT HI MDM: CPT | Performed by: SURGERY

## 2020-04-06 PROCEDURE — 71260 CT THORAX DX C+: CPT

## 2020-04-06 PROCEDURE — 70450 CT HEAD/BRAIN W/O DYE: CPT

## 2020-04-06 PROCEDURE — 81001 URINALYSIS AUTO W/SCOPE: CPT

## 2020-04-06 PROCEDURE — 80053 COMPREHEN METABOLIC PANEL: CPT

## 2020-04-06 PROCEDURE — 36415 COLL VENOUS BLD VENIPUNCTURE: CPT

## 2020-04-06 PROCEDURE — 73030 X-RAY EXAM OF SHOULDER: CPT

## 2020-04-06 PROCEDURE — 73060 X-RAY EXAM OF HUMERUS: CPT

## 2020-04-06 PROCEDURE — 73080 X-RAY EXAM OF ELBOW: CPT

## 2020-04-06 PROCEDURE — 6370000000 HC RX 637 (ALT 250 FOR IP): Performed by: PHYSICIAN ASSISTANT

## 2020-04-06 PROCEDURE — 93005 ELECTROCARDIOGRAM TRACING: CPT

## 2020-04-06 PROCEDURE — 72125 CT NECK SPINE W/O DYE: CPT

## 2020-04-06 PROCEDURE — 85610 PROTHROMBIN TIME: CPT

## 2020-04-06 PROCEDURE — G0480 DRUG TEST DEF 1-7 CLASSES: HCPCS

## 2020-04-06 PROCEDURE — 99284 EMERGENCY DEPT VISIT MOD MDM: CPT

## 2020-04-06 PROCEDURE — 87086 URINE CULTURE/COLONY COUNT: CPT

## 2020-04-06 PROCEDURE — 74177 CT ABD & PELVIS W/CONTRAST: CPT

## 2020-04-06 PROCEDURE — 85730 THROMBOPLASTIN TIME PARTIAL: CPT

## 2020-04-06 RX ORDER — CAPSAICIN 0.025 %
CREAM (GRAM) TOPICAL
Qty: 1 TUBE | Refills: 1 | Status: SHIPPED | OUTPATIENT
Start: 2020-04-06 | End: 2020-05-06

## 2020-04-06 RX ORDER — IBUPROFEN 800 MG/1
800 TABLET ORAL EVERY 8 HOURS PRN
Qty: 15 TABLET | Refills: 0 | Status: SHIPPED | OUTPATIENT
Start: 2020-04-06

## 2020-04-06 RX ORDER — ACETAMINOPHEN 325 MG/1
650 TABLET ORAL ONCE
Status: COMPLETED | OUTPATIENT
Start: 2020-04-06 | End: 2020-04-06

## 2020-04-06 RX ORDER — CHLORZOXAZONE 500 MG/1
500 TABLET ORAL 3 TIMES DAILY PRN
Qty: 15 TABLET | Refills: 0 | Status: SHIPPED | OUTPATIENT
Start: 2020-04-06 | End: 2020-04-16

## 2020-04-06 RX ADMIN — IOPAMIDOL 100 ML: 612 INJECTION, SOLUTION INTRAVENOUS at 12:01

## 2020-04-06 RX ADMIN — ACETAMINOPHEN 650 MG: 325 TABLET ORAL at 12:47

## 2020-04-06 ASSESSMENT — ENCOUNTER SYMPTOMS
EYES NEGATIVE: 1
RESPIRATORY NEGATIVE: 1
ABDOMINAL PAIN: 1

## 2020-04-06 ASSESSMENT — PAIN DESCRIPTION - DESCRIPTORS: DESCRIPTORS: ACHING

## 2020-04-06 ASSESSMENT — PAIN DESCRIPTION - FREQUENCY: FREQUENCY: CONTINUOUS

## 2020-04-06 ASSESSMENT — PAIN SCALES - GENERAL: PAINLEVEL_OUTOF10: 5

## 2020-04-06 ASSESSMENT — PAIN DESCRIPTION - PAIN TYPE: TYPE: ACUTE PAIN

## 2020-04-06 ASSESSMENT — PAIN DESCRIPTION - LOCATION: LOCATION: RIB CAGE;SHOULDER

## 2020-04-06 ASSESSMENT — PAIN DESCRIPTION - ORIENTATION: ORIENTATION: RIGHT

## 2020-04-06 NOTE — ED PROVIDER NOTES
Family History   Problem Relation Age of Onset    Breast Cancer Neg Hx     Cervical Cancer Neg Hx     Uterine Cancer Neg Hx     Ovarian Cancer Neg Hx           SOCIAL HISTORY       Social History     Socioeconomic History    Marital status:      Spouse name: Not on file    Number of children: Not on file    Years of education: Not on file    Highest education level: Not on file   Occupational History    Not on file   Social Needs    Financial resource strain: Not on file    Food insecurity     Worry: Not on file     Inability: Not on file   [employer] needs     Medical: Not on file     Non-medical: Not on file   Tobacco Use    Smoking status: Current Some Day Smoker     Packs/day: 1.00     Years: 5.00     Pack years: 5.00     Types: Cigarettes    Smokeless tobacco: Never Used   Substance and Sexual Activity    Alcohol use:  Yes     Alcohol/week: 0.0 standard drinks     Comment: 3-4 drinks per day    Drug use: No    Sexual activity: Not Currently   Lifestyle    Physical activity     Days per week: Not on file     Minutes per session: Not on file    Stress: Not on file   Relationships    Social connections     Talks on phone: Not on file     Gets together: Not on file     Attends Hinduism service: Not on file     Active member of club or organization: Not on file     Attends meetings of clubs or organizations: Not on file     Relationship status: Not on file    Intimate partner violence     Fear of current or ex partner: Not on file     Emotionally abused: Not on file     Physically abused: Not on file     Forced sexual activity: Not on file   Other Topics Concern    Not on file   Social History Narrative    Not on file       SCREENINGS    [place] Coma Scale  Eye Opening: Spontaneous  Best Verbal Response: Oriented  Best Motor Response: Obeys commands  [place] Coma Scale Score: 15 @FLOW(33610187)@      PHYSICAL EXAM    (up to 7 for level 4, 8 or more for level 5)     ED Triage Vitals [04/06/20 0958]   BP Temp Temp Source Pulse Resp SpO2 Height Weight   130/84 98.4 °F (36.9 °C) Oral 87 16 97 % 5' 3\" (1.6 m) 145 lb (65.8 kg)       Physical Exam  Constitutional:       General: She is not in acute distress. Appearance: She is well-developed. HENT:      Head: Normocephalic and atraumatic. Eyes:      Conjunctiva/sclera: Conjunctivae normal.      Pupils: Pupils are equal, round, and reactive to light. Neck:      Musculoskeletal: Normal range of motion and neck supple. Spinous process tenderness and muscular tenderness present. Cardiovascular:      Rate and Rhythm: Normal rate and regular rhythm. Heart sounds: No murmur. Pulmonary:      Effort: No respiratory distress. Breath sounds: Normal breath sounds. No wheezing or rales. Chest:       Abdominal:      General: There is no distension. Palpations: Abdomen is soft. Tenderness: There is abdominal tenderness in the right upper quadrant. Musculoskeletal: Normal range of motion. Skin:     General: Skin is warm and dry. Findings: No erythema or rash. Neurological:      Mental Status: She is alert and oriented to person, place, and time. Cranial Nerves: No cranial nerve deficit. Psychiatric:         Judgment: Judgment normal.     EKG: NSR rate 74; no st elevation or t wave inversion. TX interval 166 ms. All other labs were within normal range or not returned as of this dictation. EMERGENCY DEPARTMENT COURSE and DIFFERENTIALDIAGNOSIS/MDM:   Vitals:    Vitals:    04/06/20 0958 04/06/20 1248   BP: 130/84 136/80   Pulse: 87 84   Resp: 16    Temp: 98.4 °F (36.9 °C) 98.6 °F (37 °C)   TempSrc: Oral    SpO2: 97% 98%   Weight: 145 lb (65.8 kg)    Height: 5' 3\" (1.6 m)           Patient seen and evaluated by Dr. Lucero Macdonald  And myself. Patient drove herself to the emergency room and she is driving herself home. Patient has small amount of blood in her urine; however, she has been on her menses.

## 2020-04-06 NOTE — DISCHARGE INSTR - COC
· Bowel: {YES / EO:48855}  · Bladder: {YES / VH:79308}  Urinary Catheter: {Urinary Catheter:399605204}   Colostomy/Ileostomy/Ileal Conduit: {YES / UQ:24696}       Date of Last BM: ***  No intake or output data in the 24 hours ending 20 1126  No intake/output data recorded.     Safety Concerns:     [address] [facility] Safety Concerns:465462431}    Impairments/Disabilities:      [address] [place] [address] Impairments/Disabilities:271137897}    Nutrition Therapy:  Current Nutrition Therapy:   [facility] Diet List:061013199}    Routes of Feeding: {CHP DME Other Feedings:865829706}  Liquids: {Slp liquid thickness:50703}  Daily Fluid Restriction: {CHP DME Yes amt example:018782693}  Last Modified Barium Swallow with Video (Video Swallowing Test): {Done Not Done ZYB}    Treatments at the Time of Hospital Discharge:   Respiratory Treatments: ***  Oxygen Therapy:  {Therapy; copd oxygen:50581}  Ventilator:    {[facility] Vent ROKR:320925647}    Rehab Therapies: {THERAPEUTIC INTERVENTION:6869936898}  Weight Bearing Status/Restrictions: [address] [place] [address] Weight Bearin}  Other Medical Equipment (for information only, NOT a DME order):  {EQUIPMENT:671742944}  Other Treatments: ***    Patient's personal belongings (please select all that are sent with patient):  {[facility] DME Belongings:850834026}    RN SIGNATURE:  {Esignature:967799733}    CASE MANAGEMENT/SOCIAL WORK SECTION    Inpatient Status Date: ***    Readmission Risk Assessment Score:  Readmission Risk              Risk of Unplanned Readmission:        0           Discharging to Facility/ Agency   · Name:   · Address:  · Phone:  · Fax:    Dialysis Facility (if applicable)   · Name:  · Address:  · Dialysis Schedule:  · Phone:  · Fax:    / signature: {Esignature:697995176}    PHYSICIAN SECTION    Prognosis: {Prognosis:8185834473}    Condition at Discharge: [address] Patient Condition:291888649}    Rehab Potential (if transferring to Rehab): {Prognosis:1917422560}    Recommended Labs or Other Treatments After Discharge: ***    Physician Certification: I certify the above information and transfer of Elmira Solorio  is necessary for the continuing treatment of the diagnosis listed and that she requires {Admit to Appropriate Level of Care:12288} for {GREATER/LESS:059532047} 30 days.      Update Admission H&P: {CHP DME Changes in YXEUB:467558088}    PHYSICIAN SIGNATURE:  {Esignature:536405922}

## 2020-04-07 LAB
GFR AFRICAN AMERICAN: >60
GFR NON-AFRICAN AMERICAN: >60
PERFORMED ON: NORMAL
POC CREATININE: 0.7 MG/DL (ref 0.6–1.1)
POC SAMPLE TYPE: NORMAL
URINE CULTURE, ROUTINE: NORMAL

## 2021-06-22 ENCOUNTER — HOSPITAL ENCOUNTER (EMERGENCY)
Age: 26
Discharge: HOME | End: 2021-06-22
Payer: COMMERCIAL

## 2021-06-22 VITALS
HEIGHT: 63 IN | TEMPERATURE: 98.3 F | DIASTOLIC BLOOD PRESSURE: 86 MMHG | HEART RATE: 96 BPM | BODY MASS INDEX: 27.46 KG/M2 | RESPIRATION RATE: 16 BRPM | OXYGEN SATURATION: 97 % | SYSTOLIC BLOOD PRESSURE: 129 MMHG | WEIGHT: 155 LBS

## 2021-06-22 DIAGNOSIS — H60.392 INFECTIVE OTITIS EXTERNA OF LEFT EAR: Primary | ICD-10-CM

## 2021-06-22 PROCEDURE — 6370000000 HC RX 637 (ALT 250 FOR IP): Performed by: STUDENT IN AN ORGANIZED HEALTH CARE EDUCATION/TRAINING PROGRAM

## 2021-06-22 PROCEDURE — 99283 EMERGENCY DEPT VISIT LOW MDM: CPT

## 2021-06-22 RX ORDER — ONDANSETRON 4 MG/1
4 TABLET, ORALLY DISINTEGRATING ORAL ONCE
Status: COMPLETED | OUTPATIENT
Start: 2021-06-22 | End: 2021-06-22

## 2021-06-22 RX ORDER — OXYCODONE HYDROCHLORIDE AND ACETAMINOPHEN 5; 325 MG/1; MG/1
1 TABLET ORAL ONCE
Status: COMPLETED | OUTPATIENT
Start: 2021-06-22 | End: 2021-06-22

## 2021-06-22 RX ADMIN — ONDANSETRON 4 MG: 4 TABLET, ORALLY DISINTEGRATING ORAL at 01:22

## 2021-06-22 RX ADMIN — OXYCODONE HYDROCHLORIDE AND ACETAMINOPHEN 1 TABLET: 5; 325 TABLET ORAL at 01:22

## 2021-06-22 RX ADMIN — CIPROFLOXACIN HYDROCHLORIDE AND HYDROCORTISONE 3 DROP: 2; 10 SUSPENSION AURICULAR (OTIC) at 01:22

## 2021-06-22 ASSESSMENT — ENCOUNTER SYMPTOMS
SINUS PAIN: 0
WHEEZING: 0
SORE THROAT: 0
TROUBLE SWALLOWING: 0
NAUSEA: 0
ABDOMINAL PAIN: 0
VOMITING: 0
SINUS PRESSURE: 0
PHOTOPHOBIA: 0
COUGH: 0
RHINORRHEA: 0
VOICE CHANGE: 0
SHORTNESS OF BREATH: 0

## 2021-06-22 ASSESSMENT — PAIN DESCRIPTION - LOCATION: LOCATION: EAR

## 2021-06-22 ASSESSMENT — PAIN DESCRIPTION - PAIN TYPE: TYPE: ACUTE PAIN

## 2021-06-22 ASSESSMENT — PAIN SCALES - GENERAL: PAINLEVEL_OUTOF10: 10

## 2021-06-22 ASSESSMENT — PAIN DESCRIPTION - ORIENTATION: ORIENTATION: LEFT

## 2021-06-22 NOTE — ED PROVIDER NOTES
are negative. Except as noted above the remainder of the review of systems was reviewed and negative. PAST MEDICAL HISTORY     Past Medical History:   Diagnosis Date    Anxiety          SURGICAL HISTORY       Past Surgical History:   Procedure Laterality Date    DILATION AND CURETTAGE OF UTERUS      5/2016         CURRENT MEDICATIONS       Previous Medications    ALBUTEROL SULFATE HFA (PROAIR HFA) 108 (90 BASE) MCG/ACT INHALER    2 times daily as needed    ALPRAZOLAM (XANAX) 0.25 MG TABLET    Take 0.25 mg by mouth nightly as needed for Sleep or Anxiety . IBUPROFEN (IBU) 800 MG TABLET    Take 1 tablet by mouth every 8 hours as needed for Pain    ONDANSETRON (ZOFRAN ODT) 4 MG DISINTEGRATING TABLET    Take 1 tablet by mouth every 8 hours as needed for Nausea       ALLERGIES     Patient has no known allergies. FAMILY HISTORY       Family History   Problem Relation Age of Onset    Breast Cancer Neg Hx     Cervical Cancer Neg Hx     Uterine Cancer Neg Hx     Ovarian Cancer Neg Hx           SOCIAL HISTORY       Social History     Socioeconomic History    Marital status:      Spouse name: None    Number of children: None    Years of education: None    Highest education level: None   Occupational History    None   Tobacco Use    Smoking status: Current Some Day Smoker     Packs/day: 1.00     Years: 5.00     Pack years: 5.00     Types: Cigarettes    Smokeless tobacco: Never Used   Substance and Sexual Activity    Alcohol use:  Yes     Alcohol/week: 0.0 standard drinks     Comment: 3-4 drinks per day    Drug use: No    Sexual activity: Not Currently   Other Topics Concern    None   Social History Narrative    None     Social Determinants of Health     Financial Resource Strain:     Difficulty of Paying Living Expenses:    Food Insecurity:     Worried About Running Out of Food in the Last Year:     Ran Out of Food in the Last Year:    Transportation Needs:     Lack of Transportation (Medical):  Lack of Transportation (Non-Medical):    Physical Activity:     Days of Exercise per Week:     Minutes of Exercise per Session:    Stress:     Feeling of Stress :    Social Connections:     Frequency of Communication with Friends and Family:     Frequency of Social Gatherings with Friends and Family:     Attends Yazidism Services:     Active Member of Clubs or Organizations:     Attends Club or Organization Meetings:     Marital Status:    Intimate Partner Violence:     Fear of Current or Ex-Partner:     Emotionally Abused:     Physically Abused:     Sexually Abused:        SCREENINGS                        PHYSICAL EXAM    (up to 7 for level 4, 8 or more for level 5)     ED Triage Vitals [06/22/21 0051]   BP Temp Temp Source Pulse Resp SpO2 Height Weight   129/86 98.3 °F (36.8 °C) Oral 96 16 97 % 5' 3\" (1.6 m) 155 lb (70.3 kg)       Physical Exam  Constitutional:       General: She is not in acute distress. Appearance: She is well-developed. She is not ill-appearing, toxic-appearing or diaphoretic. HENT:      Head: Normocephalic and atraumatic. Jaw: There is normal jaw occlusion. Right Ear: Tympanic membrane, ear canal and external ear normal.      Left Ear: Tympanic membrane normal. No decreased hearing noted. Drainage (clear) and tenderness (pain with manipulation of auricle) present. No swelling. No middle ear effusion. There is no impacted cerumen. No foreign body. No mastoid tenderness. No hemotympanum. Ears:      Comments: Diffuse ear canal edema and erythema   White, thick opaque material in ear canal      Nose: Nose normal.      Mouth/Throat:      Mouth: Mucous membranes are moist.   Eyes:      Pupils: Pupils are equal, round, and reactive to light. Cardiovascular:      Rate and Rhythm: Normal rate and regular rhythm. Heart sounds: No murmur heard. No friction rub. No gallop.     Pulmonary:      Effort: Pulmonary effort is normal.      Breath sounds: Normal breath sounds. Abdominal:      General: There is no distension. Tenderness: There is no abdominal tenderness. Musculoskeletal:         General: No swelling. Cervical back: Normal range of motion. Skin:     General: Skin is warm and dry. Neurological:      Mental Status: She is alert and oriented to person, place, and time. DIAGNOSTIC RESULTS     EKG: All EKG's are interpreted by the Emergency Department Physician who either signs or Co-signs this chart in the absence of a cardiologist.        RADIOLOGY:   Non-plain film images such as CT, Ultrasound and MRI are read by the radiologist. Plain radiographic images are visualized and preliminarily interpreted by the emergency physician with the below findings:        Interpretation per the Radiologist below, if available at the time of this note:    No orders to display         ED BEDSIDE ULTRASOUND:   Performed by ED Physician - none    LABS:  Labs Reviewed - No data to display    All other labs were within normal range or not returned as of this dictation. EMERGENCY DEPARTMENT COURSE and DIFFERENTIAL DIAGNOSIS/MDM:   Vitals:    Vitals:    06/22/21 0051   BP: 129/86   Pulse: 96   Resp: 16   Temp: 98.3 °F (36.8 °C)   TempSrc: Oral   SpO2: 97%   Weight: 155 lb (70.3 kg)   Height: 5' 3\" (1.6 m)       MDM    Patient is a 22 old female who presents to the ED for evaluation of left ear pain. She is afebrile and hemodynamically stable. She was given p.o. Percocet p.o. Zofran and cipro drops in the ED   Exam findings consistent with otitis externa. No evidence of tympanic membrane perforation. Patient to use drops two times daily for 10 days. F/u with her ENT at [facility] in 1 day. Return to the ED for worsening sx, given warning signs for which she should return. She is non toxic appearing with stable vitals, stable for discharge. Pt understands and agrees to plan.      REASSESSMENT          CRITICAL CARE TIME   Total Critical Care time was 0 minutes, excluding separately reportable procedures. There was a high probability of clinically significant/life threatening deterioration in the patient's condition which required my urgent intervention. CONSULTS:  None    PROCEDURES:  Unless otherwise noted below, none     Procedures        FINAL IMPRESSION      1. Infective otitis externa of left ear          DISPOSITION/PLAN   DISPOSITION        PATIENT REFERRED TO:  [facility]) ED  [address] [address]  [phone]  Go to   As needed, If symptoms worsen      DISCHARGE MEDICATIONS:  New Prescriptions    No medications on file     Controlled Substances Monitoring:     No flowsheet data found.     (Please note that portions of this note were completed with a voice recognition program.  Efforts were made to edit the dictations but occasionally words are mis-transcribed.)    Diana Olivera PA-C (electronically signed)             Diaan Olivera PA-C  06/22/21 6938

## 2021-07-08 ENCOUNTER — NURSE TRIAGE (OUTPATIENT)
Dept: OTHER | Facility: CLINIC | Age: 26
End: 2021-07-08

## 2021-07-08 NOTE — TELEPHONE ENCOUNTER
Reason for Disposition   Earache    Answer Assessment - Initial Assessment Questions  1. DESCRIPTION: \"What type of hearing problem are you having? Describe it for me. \" (e.g., complete hearing loss, partial loss)  Partial loss. 2. LOCATION: \"One or both ears? \" If one, ask: \"Which ear?\"  L.        3. SEVERITY: \"Can you hear anything? \" If so, ask: \"What can you hear? \" (e.g., ticking watch, whisper, talking)  Yes, she can hear writer over the phone, but has difficulty hearing if people are her left side. 4. ONSET: \"When did this begin? \" \"Did it start suddenly or come on gradually? \"      6/22, gradually getting worse. 5. PATTERN: \"Does this come and go, or has it been constant since it started? \"  Constant        6. PAIN: \"Is there any pain in your ear(s)? \"  (Scale 1-10; or mild, moderate, severe  L ear 6/10 and the ear is painful to touch. 7. CAUSE: \"What do you think is causing this hearing problem? \"  Pt was seen in the ED for concern of ear infection and it has progressed since ED visit. 8. OTHER SYMPTOMS: \"Do you have any other symptoms? \" (e.g., dizziness, ringing in ears)      Ear Drainage. Feeling unbalanced, not dizziness. 9. PREGNANCY: \"Is there any chance you are pregnant? \" \"When was your last menstrual period? \"      Denies, LMP 7/6    Protocols used: HEARING LOSS OR CHANGE-ADULT-[place]    Patient called [facility] with red flag complaint to establish care with any [facility] provider. Brief description of triage: hearing loss, see above. Triage indicates for patient to go to office today. If there are no available appointments, please go to [facility]/ER today. Care advice provided, patient verbalizes understanding; denies any other questions or concerns; instructed to call back for any new or worsening symptoms. Writer provided warm transfer to [employer] for appointment scheduling. Attention Provider: Thank you for allowing me to participate in the care of your patient. The patient was connected to triage in response to information provided to the ECC. Please do not respond through this encounter as the response is not directed to a shared pool.

## 2021-08-26 ENCOUNTER — HOSPITAL ENCOUNTER (EMERGENCY)
Age: 26
Discharge: HOME | End: 2021-08-26
Attending: STUDENT IN AN ORGANIZED HEALTH CARE EDUCATION/TRAINING PROGRAM
Payer: COMMERCIAL

## 2021-08-26 VITALS
BODY MASS INDEX: 27.6 KG/M2 | SYSTOLIC BLOOD PRESSURE: 133 MMHG | WEIGHT: 150 LBS | HEIGHT: 62 IN | TEMPERATURE: 98.6 F | HEART RATE: 90 BPM | RESPIRATION RATE: 18 BRPM | DIASTOLIC BLOOD PRESSURE: 91 MMHG | OXYGEN SATURATION: 99 %

## 2021-08-26 DIAGNOSIS — O20.0 THREATENED MISCARRIAGE IN EARLY PREGNANCY: Primary | ICD-10-CM

## 2021-08-26 DIAGNOSIS — N30.01 ACUTE CYSTITIS WITH HEMATURIA: ICD-10-CM

## 2021-08-26 LAB
ABO/RH: NORMAL
ALBUMIN SERPL-MCNC: 4.1 G/DL (ref 3.5–4.6)
ALP BLD-CCNC: 75 U/L (ref 40–130)
ALT SERPL-CCNC: 10 U/L (ref 0–33)
ANION GAP SERPL CALCULATED.3IONS-SCNC: 10 MEQ/L (ref 9–15)
APTT: 28.9 SEC (ref 24.4–36.8)
AST SERPL-CCNC: 18 U/L (ref 0–35)
BACTERIA: ABNORMAL /HPF
BASOPHILS ABSOLUTE: 0 K/UL (ref 0–0.2)
BASOPHILS RELATIVE PERCENT: 0.7 %
BILIRUB SERPL-MCNC: <0.2 MG/DL (ref 0.2–0.7)
BILIRUBIN URINE: NEGATIVE
BLOOD, URINE: ABNORMAL
BUN BLDV-MCNC: 6 MG/DL (ref 6–20)
CALCIUM SERPL-MCNC: 8.4 MG/DL (ref 8.5–9.9)
CHLORIDE BLD-SCNC: 106 MEQ/L (ref 95–107)
CLARITY: ABNORMAL
CO2: 23 MEQ/L (ref 20–31)
COLOR: ABNORMAL
CREAT SERPL-MCNC: 0.55 MG/DL (ref 0.5–0.9)
EOSINOPHILS ABSOLUTE: 0.1 K/UL (ref 0–0.7)
EOSINOPHILS RELATIVE PERCENT: 2.1 %
EPITHELIAL CELLS, UA: ABNORMAL /HPF
GFR AFRICAN AMERICAN: >60
GFR NON-AFRICAN AMERICAN: >60
GLOBULIN: 2.5 G/DL (ref 2.3–3.5)
GLUCOSE BLD-MCNC: 95 MG/DL (ref 70–99)
GLUCOSE URINE: NEGATIVE MG/DL
GONADOTROPIN, CHORIONIC (HCG) QUANT: 7.6 MIU/ML
HCG, URINE, POC: NEGATIVE
HCT VFR BLD CALC: 38.7 % (ref 37–47)
HEMOGLOBIN: 13 G/DL (ref 12–16)
INR BLD: 0.9
KETONES, URINE: NEGATIVE MG/DL
LEUKOCYTE ESTERASE, URINE: ABNORMAL
LYMPHOCYTES ABSOLUTE: 1.1 K/UL (ref 1–4.8)
LYMPHOCYTES RELATIVE PERCENT: 19.5 %
Lab: NORMAL
MCH RBC QN AUTO: 29 PG (ref 27–31.3)
MCHC RBC AUTO-ENTMCNC: 33.7 % (ref 33–37)
MCV RBC AUTO: 86 FL (ref 82–100)
MONOCYTES ABSOLUTE: 0.4 K/UL (ref 0.2–0.8)
MONOCYTES RELATIVE PERCENT: 8 %
NEGATIVE QC PASS/FAIL: NORMAL
NEUTROPHILS ABSOLUTE: 3.9 K/UL (ref 1.4–6.5)
NEUTROPHILS RELATIVE PERCENT: 69.7 %
NITRITE, URINE: NEGATIVE
PDW BLD-RTO: 14.4 % (ref 11.5–14.5)
PH UA: 6 (ref 5–9)
PLATELET # BLD: 258 K/UL (ref 130–400)
POSITIVE QC PASS/FAIL: NORMAL
POTASSIUM SERPL-SCNC: 3.6 MEQ/L (ref 3.4–4.9)
PROTEIN UA: 30 MG/DL
PROTHROMBIN TIME: 12 SEC (ref 12.3–14.9)
RBC # BLD: 4.5 M/UL (ref 4.2–5.4)
RBC UA: ABNORMAL /HPF (ref 0–2)
SODIUM BLD-SCNC: 139 MEQ/L (ref 135–144)
SPECIFIC GRAVITY UA: 1 (ref 1–1.03)
TOTAL PROTEIN: 6.6 G/DL (ref 6.3–8)
URINE REFLEX TO CULTURE: ABNORMAL
UROBILINOGEN, URINE: 0.2 E.U./DL
WBC # BLD: 5.6 K/UL (ref 4.8–10.8)
WBC UA: ABNORMAL /HPF (ref 0–5)

## 2021-08-26 PROCEDURE — 85610 PROTHROMBIN TIME: CPT

## 2021-08-26 PROCEDURE — 99284 EMERGENCY DEPT VISIT MOD MDM: CPT

## 2021-08-26 PROCEDURE — 84702 CHORIONIC GONADOTROPIN TEST: CPT

## 2021-08-26 PROCEDURE — 86900 BLOOD TYPING SEROLOGIC ABO: CPT

## 2021-08-26 PROCEDURE — 85025 COMPLETE CBC W/AUTO DIFF WBC: CPT

## 2021-08-26 PROCEDURE — 81001 URINALYSIS AUTO W/SCOPE: CPT

## 2021-08-26 PROCEDURE — 36415 COLL VENOUS BLD VENIPUNCTURE: CPT

## 2021-08-26 PROCEDURE — 86901 BLOOD TYPING SEROLOGIC RH(D): CPT

## 2021-08-26 PROCEDURE — 85730 THROMBOPLASTIN TIME PARTIAL: CPT

## 2021-08-26 PROCEDURE — 80053 COMPREHEN METABOLIC PANEL: CPT

## 2021-08-26 RX ORDER — ACETAMINOPHEN 500 MG
1000 TABLET ORAL EVERY 6 HOURS PRN
Qty: 30 TABLET | Refills: 3 | Status: SHIPPED | OUTPATIENT
Start: 2021-08-26

## 2021-08-26 RX ORDER — CEPHALEXIN 500 MG/1
500 CAPSULE ORAL 2 TIMES DAILY
Qty: 14 CAPSULE | Refills: 0 | Status: SHIPPED | OUTPATIENT
Start: 2021-08-26 | End: 2021-09-02

## 2021-08-26 ASSESSMENT — PAIN DESCRIPTION - FREQUENCY: FREQUENCY: CONTINUOUS

## 2021-08-26 ASSESSMENT — ENCOUNTER SYMPTOMS
CHEST TIGHTNESS: 0
SHORTNESS OF BREATH: 0
COUGH: 0
TROUBLE SWALLOWING: 0
ABDOMINAL PAIN: 0
VOMITING: 0
BACK PAIN: 0
DIARRHEA: 0
SINUS PRESSURE: 0

## 2021-08-26 ASSESSMENT — PAIN DESCRIPTION - LOCATION: LOCATION: BACK;ABDOMEN

## 2021-08-26 ASSESSMENT — PAIN DESCRIPTION - PAIN TYPE: TYPE: ACUTE PAIN

## 2021-08-26 ASSESSMENT — PAIN DESCRIPTION - DESCRIPTORS: DESCRIPTORS: CRAMPING

## 2021-08-26 ASSESSMENT — PAIN SCALES - GENERAL: PAINLEVEL_OUTOF10: 5

## 2021-08-26 NOTE — ED TRIAGE NOTES
Pt is approx five weeks pregnant, c/o ABD/back cramping and vaginal bleeding that started this AM, Pt is A&OX3, clam, ambulatory, afebrile, breathes are equal and unlabored,

## 2021-08-26 NOTE — ED PROVIDER NOTES
[address] [facility] ED  eMERGENCY dEPARTMENT eNCOUnter      Pt Name: Stephanie Bell  MRN: [id number]  [place] 1995  Date of evaluation: 8/26/2021  Provider: Alanis Doran, [address] [place] [address]       Chief Complaint   Patient presents with    Vaginal Bleeding     pt is approx five weeks pregnant, c/o vaginal bleeding and back pain that started this AM         HISTORY OF PRESENT ILLNESS   (Location/Symptom, Timing/Onset,Context/Setting, Quality, Duration, Modifying Factors, Severity)  Note limiting factors. Stephanie Bell is a 22 y.o. female who presents to the emergency department with c/o vaginal bleeding and had positive home pregnancy tests. Patient estimates she is 5 weeks pregnant. She has had no prenatal care. Patient's last menstrual period is 7/27/2021. Patient denies any fever, chills or cough. She states that she has cramps like a normal menses. Patient denies any heavy bleeding. She denies any clots or tissue coming from her vagina. Patient states that she suspects that she may have had a miscarriage. Symptoms started today at 6 AM.    The history is provided by the patient. NursingNotes were reviewed. REVIEW OF SYSTEMS    (2-9 systems for level 4, 10 or more for level 5)     Review of Systems   Constitutional: Negative for activity change, appetite change, chills, fever and unexpected weight change. HENT: Negative for drooling, ear pain, nosebleeds, sinus pressure and trouble swallowing. Respiratory: Negative for cough, chest tightness and shortness of breath. Cardiovascular: Negative for chest pain and leg swelling. Gastrointestinal: Negative for abdominal pain, diarrhea and vomiting. Endocrine: Negative for polydipsia and polyphagia. Genitourinary: Positive for pelvic pain and vaginal bleeding. Negative for dysuria, flank pain and frequency. Musculoskeletal: Negative for back pain and myalgias. Skin: Negative for pallor and rash.    Neurological: Negative for syncope, weakness and headaches. Hematological: Does not bruise/bleed easily. All other systems reviewed and are negative. Except as noted above the remainder of the review of systems was reviewed and negative. PAST MEDICAL HISTORY     Past Medical History:   Diagnosis Date    Anxiety          SURGICALHISTORY       Past Surgical History:   Procedure Laterality Date    DILATION AND CURETTAGE OF UTERUS      5/2016         CURRENT MEDICATIONS       Discharge Medication List as of 8/26/2021  9:44 AM      CONTINUE these medications which have NOT CHANGED    Details   ibuprofen (IBU) 800 MG tablet Take 1 tablet by mouth every 8 hours as needed for Pain, Disp-15 tablet, R-0Print      ondansetron (ZOFRAN ODT) 4 MG disintegrating tablet Take 1 tablet by mouth every 8 hours as needed for Nausea, Disp-10 tablet, R-0Print      ALPRAZolam (XANAX) 0.25 MG tablet Take 0.25 mg by mouth nightly as needed for Sleep or Anxiety . Historical Med      albuterol sulfate HFA (PROAIR HFA) 108 (90 BASE) MCG/ACT inhaler 2 times daily as needed             ALLERGIES     Patient has no known allergies. FAMILY HISTORY       Family History   Problem Relation Age of Onset    Breast Cancer Neg Hx     Cervical Cancer Neg Hx     Uterine Cancer Neg Hx     Ovarian Cancer Neg Hx           SOCIAL HISTORY       Social History     Socioeconomic History    Marital status:      Spouse name: None    Number of children: None    Years of education: None    Highest education level: None   Occupational History    None   Tobacco Use    Smoking status: Current Some Day Smoker     Packs/day: 1.00     Years: 5.00     Pack years: 5.00     Types: Cigarettes    Smokeless tobacco: Never Used   Substance and Sexual Activity    Alcohol use:  Yes     Alcohol/week: 0.0 standard drinks     Comment: 3-4 drinks per day    Drug use: No    Sexual activity: Not Currently   Other Topics Concern    None   Social History Narrative  None     Social Determinants of Health     Financial Resource Strain:     Difficulty of Paying Living Expenses:    Food Insecurity:     Worried About Running Out of Food in the Last Year:     [address] in the Last Year:    Transportation Needs:     Lack of Transportation (Medical):  Lack of Transportation (Non-Medical):    Physical Activity:     Days of Exercise per Week:     Minutes of Exercise per Session:    Stress:     Feeling of Stress :    Social Connections:     Frequency of Communication with Friends and Family:     Frequency of Social Gatherings with Friends and Family:     Attends Worship Services:     Active Member of Clubs or Organizations:     Attends Club or Organization Meetings:     Marital Status:    Intimate Partner Violence:     Fear of Current or Ex-Partner:     Emotionally Abused:     Physically Abused:     Sexually Abused:        SCREENINGS    Tequila Coma Scale  Eye Opening: Spontaneous  Best Verbal Response: Oriented  Best Motor Response: Obeys commands  Clio Coma Scale Score: 15 @FLOW([id number])@      PHYSICAL EXAM    (up to 7 for level 4, 8 or more for level 5)     ED Triage Vitals [08/26/21 0739]   BP Temp Temp Source Pulse Resp SpO2 Height Weight   (!) 133/94 98.6 °F (37 °C) Oral 91 16 100 % 5' 2\" (1.575 m) 150 lb (68 kg)       Physical Exam  Vitals and nursing note reviewed. Constitutional:       General: She is awake. She is not in acute distress. Appearance: Normal appearance. She is well-developed and normal weight. She is not ill-appearing, toxic-appearing or diaphoretic. Comments: No photophobia. No phonophobia. HENT:      Head: Normocephalic and atraumatic. No Harrison's sign. Right Ear: External ear normal.      Left Ear: External ear normal.      Nose: Nose normal. No congestion or rhinorrhea. Mouth/Throat:      Mouth: Mucous membranes are moist.      Pharynx: Oropharynx is clear.  No oropharyngeal exudate or posterior Status: She is alert and oriented to person, place, and time. Mental status is at baseline. Cranial Nerves: No cranial nerve deficit. Sensory: No sensory deficit. Motor: No weakness. Coordination: Coordination normal.      Deep Tendon Reflexes: Reflexes are normal and symmetric. Psychiatric:         Mood and Affect: Mood normal.         Behavior: Behavior normal. Behavior is cooperative. Thought Content:  Thought content normal.         Judgment: Judgment normal.         DIAGNOSTIC RESULTS     EKG: All EKG's are interpreted by the Emergency Department Physician who either signs or Co-signsthis chart in the absence of a cardiologist.        RADIOLOGY:   Ethlyn Hippo such as CT, Ultrasound and MRI are read by the radiologist. Plain radiographic images are visualized and preliminarily interpreted by the emergency physician with the below findings:        Interpretation per the Radiologist below, if available at the time ofthis note:    No orders to display         ED BEDSIDE ULTRASOUND:   Performed by ED Physician - none    LABS:  Labs Reviewed   COMPREHENSIVE METABOLIC PANEL - Abnormal; Notable for the following components:       Result Value    Calcium 8.4 (*)     All other components within normal limits   PROTIME-INR - Abnormal; Notable for the following components:    Protime 12.0 (*)     All other components within normal limits   URINE RT REFLEX TO CULTURE - Abnormal; Notable for the following components:    Color, UA RED (*)     Clarity, UA CLOUDY (*)     Blood, Urine LARGE (*)     Protein, UA 30 (*)     Leukocyte Esterase, Urine MODERATE (*)     All other components within normal limits   MICROSCOPIC URINALYSIS - Abnormal; Notable for the following components:    Bacteria, UA MANY (*)     All other components within normal limits   HCG, QUANTITATIVE, PREGNANCY   CBC WITH AUTO DIFFERENTIAL   APTT   POC PREGNANCY UR-QUAL   ABO/RH       All other labs were within normal range or not returned as of this dictation. EMERGENCY DEPARTMENT COURSE and DIFFERENTIAL DIAGNOSIS/MDM:   Vitals:    Vitals:    08/26/21 0739 08/26/21 0909   BP: (!) 133/94 (!) 133/91   Pulse: 91 90   Resp: 16 18   Temp: 98.6 °F (37 °C)    TempSrc: Oral    SpO2: 100% 99%   Weight: 150 lb (68 kg)    Height: 5' 2\" (1.575 m)            MDM  Patient most likely had a miscarriage. She states that for 5 weeks she has had positive pregnancy test.  hCG level 7.5 and the patient is either an early pregnancy, miscarriage, or had had a miscarriage and is pregnant again. These were discussed with the patient. She could have an ectopic pregnancy. Patient is to follow-up with her OB/GYN for repeat exam and repeat hCG level. Patient's blood type is O+ and she does not require RhoGam.  The patient is comfortable. Urinalysis indicates what may be a urinary tract infection is a written prescription for antibiotic. Patient was invited to return to the ER for worsening symptoms such as passing out, heavier bleeding, vomiting, not able to urinate, or fever. Patient advised for pelvic rest.  No vaginal or anal sex. No jumping on a trampoline. No riding a mechanical bull. No riding on a motorbike. No spin class. No riding on a bicycle. Do not stand or use a vibrating platform. Do not climb unnecessary heights such as a ladder, or unnecessary steps on a staircase. The findings were discussed with the patient. The patient was invited to return  to the ER if worse symptoms. The patient verbalized understanding of the care and they have no further questions. CONSULTS:  None    PROCEDURES:  Unless otherwise noted below, none     Procedures    FINAL IMPRESSION      1. Threatened miscarriage in early pregnancy    2.  Acute cystitis with hematuria          DISPOSITION/PLAN   DISPOSITION Decision To Discharge 08/26/2021 09:59:55 AM      PATIENT REFERRED TO:  [address] [phone]    Call today  To arrange a follow up visit.     [facility] ED  [facility] [address]  [address] [address]  [phone]  Go to   If symptoms worsen      DISCHARGE MEDICATIONS:  Discharge Medication List as of 8/26/2021  9:44 AM      START taking these medications    Details   cephALEXin (KEFLEX) 500 MG capsule Take 1 capsule by mouth 2 times daily for 7 days, Disp-14 capsule, R-0Print      acetaminophen (APAP EXTRA STRENGTH) 500 MG tablet Take 2 tablets by mouth every 6 hours as needed for Pain, Disp-30 tablet, R-3Print                (Please note that portions of this note were completed with a voice recognition program.  Efforts were made to edit the dictations but occasionally words are mis-transcribed.)    Melissa Hodge DO (electronically signed)  Attending Emergency Physician          Melissa Hodge DO  08/26/21 1024

## 2022-01-18 ENCOUNTER — APPOINTMENT (OUTPATIENT)
Dept: GENERAL RADIOLOGY | Age: 27
End: 2022-01-18
Payer: COMMERCIAL

## 2022-01-18 ENCOUNTER — HOSPITAL ENCOUNTER (EMERGENCY)
Age: 27
Discharge: HOME | End: 2022-01-18
Attending: STUDENT IN AN ORGANIZED HEALTH CARE EDUCATION/TRAINING PROGRAM
Payer: COMMERCIAL

## 2022-01-18 VITALS
HEART RATE: 90 BPM | RESPIRATION RATE: 20 BRPM | SYSTOLIC BLOOD PRESSURE: 124 MMHG | OXYGEN SATURATION: 99 % | TEMPERATURE: 97.6 F | WEIGHT: 140 LBS | HEIGHT: 63 IN | DIASTOLIC BLOOD PRESSURE: 77 MMHG | BODY MASS INDEX: 24.8 KG/M2

## 2022-01-18 DIAGNOSIS — F41.0 ANXIETY ATTACK: Primary | ICD-10-CM

## 2022-01-18 DIAGNOSIS — R07.9 CHEST PAIN, UNSPECIFIED TYPE: ICD-10-CM

## 2022-01-18 DIAGNOSIS — F41.1 GAD (GENERALIZED ANXIETY DISORDER): ICD-10-CM

## 2022-01-18 DIAGNOSIS — R06.02 SHORTNESS OF BREATH: ICD-10-CM

## 2022-01-18 LAB
CHP ED QC CHECK: NORMAL
EKG ATRIAL RATE: 89 BPM
EKG P AXIS: -3 DEGREES
EKG P-R INTERVAL: 142 MS
EKG Q-T INTERVAL: 394 MS
EKG QRS DURATION: 98 MS
EKG QTC CALCULATION (BAZETT): 479 MS
EKG R AXIS: 48 DEGREES
EKG T AXIS: 17 DEGREES
EKG VENTRICULAR RATE: 89 BPM
PREGNANCY TEST URINE, POC: NORMAL
SARS-COV-2, NAAT: NOT DETECTED

## 2022-01-18 PROCEDURE — 93005 ELECTROCARDIOGRAM TRACING: CPT | Performed by: STUDENT IN AN ORGANIZED HEALTH CARE EDUCATION/TRAINING PROGRAM

## 2022-01-18 PROCEDURE — 99285 EMERGENCY DEPT VISIT HI MDM: CPT

## 2022-01-18 PROCEDURE — 93010 ELECTROCARDIOGRAM REPORT: CPT | Performed by: INTERNAL MEDICINE

## 2022-01-18 PROCEDURE — 6370000000 HC RX 637 (ALT 250 FOR IP): Performed by: STUDENT IN AN ORGANIZED HEALTH CARE EDUCATION/TRAINING PROGRAM

## 2022-01-18 PROCEDURE — 87635 SARS-COV-2 COVID-19 AMP PRB: CPT

## 2022-01-18 PROCEDURE — 71046 X-RAY EXAM CHEST 2 VIEWS: CPT

## 2022-01-18 RX ORDER — HYDROXYZINE HYDROCHLORIDE 25 MG/1
50 TABLET, FILM COATED ORAL ONCE
Status: COMPLETED | OUTPATIENT
Start: 2022-01-18 | End: 2022-01-18

## 2022-01-18 RX ORDER — MAGNESIUM SULFATE IN WATER 40 MG/ML
2000 INJECTION, SOLUTION INTRAVENOUS ONCE
Status: DISCONTINUED | OUTPATIENT
Start: 2022-01-18 | End: 2022-01-18

## 2022-01-18 RX ORDER — LANOLIN ALCOHOL/MO/W.PET/CERES
800 CREAM (GRAM) TOPICAL ONCE
Status: COMPLETED | OUTPATIENT
Start: 2022-01-18 | End: 2022-01-18

## 2022-01-18 RX ORDER — LORAZEPAM 1 MG/1
1 TABLET ORAL ONCE
Status: COMPLETED | OUTPATIENT
Start: 2022-01-18 | End: 2022-01-18

## 2022-01-18 RX ADMIN — HYDROXYZINE HYDROCHLORIDE 50 MG: 25 TABLET, FILM COATED ORAL at 02:13

## 2022-01-18 RX ADMIN — Medication 800 MG: at 03:29

## 2022-01-18 RX ADMIN — LORAZEPAM 1 MG: 1 TABLET ORAL at 03:49

## 2022-01-18 ASSESSMENT — ENCOUNTER SYMPTOMS
ABDOMINAL PAIN: 0
CHEST TIGHTNESS: 1
COUGH: 0
EYE PAIN: 0
SHORTNESS OF BREATH: 1
RHINORRHEA: 0
VOMITING: 1
SORE THROAT: 0
BACK PAIN: 0
NAUSEA: 1
DIARRHEA: 0

## 2022-01-18 ASSESSMENT — PAIN SCALES - GENERAL: PAINLEVEL_OUTOF10: 0

## 2022-01-18 NOTE — ED PROVIDER NOTES
[address] [facility] ED  eMERGENCY dEPARTMENT eNCOUnter      Pt Name: Francisca Segura  MRN: [id number]  [place] 1995  Date of evaluation: 1/18/2022  Provider: Charles Gonzalez MD      HISTORY OF PRESENT ILLNESS      Chief Complaint   Patient presents with    Anxiety       The history is provided by the Patient and EMS. Francisca Segura is a 32 y.o. female with a PMH clinically significant for HANS, Migraine HA's and CHRISTY presenting to the ED via EMS c/o sudden onset shortness of breath, chest pain and severe anxiety associated with lightheadedness, dizziness, nausea and single episode of spitting up p.o. intake status post waking the patient up from sleeping. EMS reporting patient very anxious upon arrival. Noting no acute ischemic changes on prearrival EKG however. Vital signs stable with no tachycardia, normal blood pressure and satting 98% on room air. No interventions in route. Patient states she has been significantly stressed recently, stating she is constantly anxious. Has had panic attacks in the past that have been similar in regards to chest pain, shortness of breath, lightheadedness and dizziness. States that she felt so short of breath and dizzy that she ended up falling to the ground. Was then given a drink of water by her roommate and spit this up immediately because she was feeling nauseous and dry heaving. Thinks that she might of done this because she was so anxious. States increased stressors at home that have been severe. Stopped taking any medications for anxiety recently because she was doing better. Thinks that she needs to be on them again however. Admits to 1 white claw last night, but denies any other EtOH use. Also denies any illicit substance abuse. Is a regular tobacco smoker. Is not on OCPs. States she took an albuterol inhaler for her asthma which seemed to help her shortness of breath. States shortness of breath is significantly improved, but still mild.  States chest pain which was initially characterized as a rock sitting on her chest, has completely resolved. Still feels very anxious. Denies any associated hemoptysis, abdominal pain, BLE edema/pain. Denies any history of DVT/PE, arrhythmias or CAD. Denies any significant family history of these either. Denies any depression, appetite changes, suicidal ideations, homicidal ideations, AVH. REVIEW OF SYSTEMS       Review of Systems   Constitutional: Positive for fatigue. Negative for chills and fever. HENT: Negative for rhinorrhea and sore throat. Eyes: Negative for pain and visual disturbance. Respiratory: Positive for chest tightness and shortness of breath. Negative for cough. Cardiovascular: Positive for chest pain and palpitations. Negative for leg swelling. Gastrointestinal: Positive for nausea and vomiting. Negative for abdominal pain and diarrhea. Genitourinary: Negative for difficulty urinating and dysuria. Musculoskeletal: Negative for back pain and neck pain. Skin: Negative for rash. Neurological: Positive for dizziness, weakness (generalized) and light-headedness. Negative for syncope, numbness and headaches. Psychiatric/Behavioral: Positive for sleep disturbance. Negative for hallucinations and suicidal ideas. The patient is nervous/anxious and is hyperactive.         PAST MEDICAL HISTORY     Past Medical History:   Diagnosis Date    Anxiety        SURGICAL HISTORY       Past Surgical History:   Procedure Laterality Date    DILATION AND CURETTAGE OF UTERUS      5/2016       FAMILY HISTORY       Family History   Problem Relation Age of Onset    Breast Cancer Neg Hx     Cervical Cancer Neg Hx     Uterine Cancer Neg Hx     Ovarian Cancer Neg Hx        SOCIAL HISTORY       Social History     Socioeconomic History    Marital status:      Spouse name: None    Number of children: None    Years of education: None    Highest education level: None   Occupational History    None Tobacco Use    Smoking status: Current Some Day Smoker     Packs/day: 1.00     Years: 5.00     Pack years: 5.00     Types: Cigarettes    Smokeless tobacco: Never Used   Substance and Sexual Activity    Alcohol use: Yes     Alcohol/week: 0.0 standard drinks     Comment: occ    Drug use: No    Sexual activity: Not Currently   Other Topics Concern    None   Social History Narrative    None     Social Determinants of Health     Financial Resource Strain:     Difficulty of Paying Living Expenses: Not on file   Food Insecurity:     Worried About Running Out of Food in the Last Year: Not on file    Jose of Food in the Last Year: Not on file   Transportation Needs:     Lack of Transportation (Medical): Not on file    Lack of Transportation (Non-Medical):  Not on file   Physical Activity:     Days of Exercise per Week: Not on file    Minutes of Exercise per Session: Not on file   Stress:     Feeling of Stress : Not on file   Social Connections:     Frequency of Communication with Friends and Family: Not on file    Frequency of Social Gatherings with Friends and Family: Not on file    Attends Zoroastrian Services: Not on file    Active Member of [address] [place] [address] or Organizations: Not on file    Attends Club or Organization Meetings: Not on file    Marital Status: Not on file   Intimate Partner Violence:     Fear of Current or Ex-Partner: Not on file    Emotionally Abused: Not on file    Physically Abused: Not on file    Sexually Abused: Not on file   Housing Stability:     Unable to Pay for Housing in the Last Year: Not on file    Number of [place] in the Last Year: Not on file    Unstable Housing in the Last Year: Not on file       CURRENT MEDICATIONS       Previous Medications    ACETAMINOPHEN (APAP EXTRA STRENGTH) 500 MG TABLET    Take 2 tablets by mouth every 6 hours as needed for Pain    ALBUTEROL SULFATE HFA (PROAIR HFA) 108 (90 BASE) MCG/ACT INHALER    2 times daily as needed    ALPRAZOLAM (XANAX) 0.25 MG TABLET    Take 0.25 mg by mouth nightly as needed for Sleep or Anxiety . IBUPROFEN (IBU) 800 MG TABLET    Take 1 tablet by mouth every 8 hours as needed for Pain    ONDANSETRON (ZOFRAN ODT) 4 MG DISINTEGRATING TABLET    Take 1 tablet by mouth every 8 hours as needed for Nausea       ALLERGIES     Patient has no known allergies. PHYSICAL EXAM       ED Triage Vitals   BP Temp Temp src Pulse Resp SpO2 Height Weight   -- -- -- -- -- -- -- --       Physical Exam  Vitals and nursing note reviewed. Constitutional:       General: She is not in acute distress. HENT:      Head: Normocephalic and atraumatic. Mouth/Throat:      Mouth: Mucous membranes are moist.      Pharynx: Oropharynx is clear. Eyes:      Extraocular Movements: Extraocular movements intact. Pupils: Pupils are equal, round, and reactive to light. Cardiovascular:      Rate and Rhythm: Normal rate and regular rhythm. Pulses: Normal pulses. Heart sounds: Normal heart sounds. Pulmonary:      Effort: Pulmonary effort is normal. No respiratory distress. Breath sounds: Normal breath sounds. No wheezing or rales. Comments: No increased work of breathing. Chest:      Chest wall: No tenderness. Abdominal:      General: There is no distension. Palpations: Abdomen is soft. Tenderness: There is no abdominal tenderness. There is no guarding or rebound. Musculoskeletal:         General: No tenderness. Cervical back: Normal range of motion and neck supple. Right lower leg: No edema. Left lower leg: No edema. Skin:     General: Skin is warm and dry. Capillary Refill: Capillary refill takes less than 2 seconds. Neurological:      General: No focal deficit present. Mental Status: She is alert and oriented to person, place, and time. Psychiatric:         Mood and Affect: Mood is anxious. Behavior: Behavior is cooperative. Thought Content:  Thought content is not paranoid or delusional. Thought content does not include homicidal or suicidal ideation. Thought content does not include homicidal or suicidal plan. MDM:   Chart Reviewed: PMH and additional information as noted in HPI obtained from chart review    Vitals:    Vitals:    01/18/22 0205   BP: 124/77   Pulse: 90   Resp: 20   Temp: 97.6 °F (36.4 °C)   TempSrc: Oral   SpO2: 99%   Weight: 140 lb (63.5 kg)   Height: 5' 3\" (1.6 m)       PROCEDURES:  Unless otherwise noted below, none  Procedures    LABS:  Labs Reviewed   POCT URINE PREGNANCY - Normal   COVID-19, RAPID       XR CHEST (2 VW)    (Results Pending)       ED Course as of 01/18/22 0440   Tue Jan 18, 2022   0228 Pregnancy, Urine: NEG [NA]   0248 EKG 12 Lead  EKG showing normal sinus rhythm, rate of 89 bpm.  Normal axis, borderline prolonged QTC. No acute ST-T wave abnormalities. No evidence of acute arrhythmias [NA]   0248 XR CHEST (2 VW)  No evidence of acute cardiopulmonary abnormalities. [NA]   0431 SARS-CoV-2, NAAT: Not Detected [NA]      ED Course User Index  [NA] Savage Quispe MD       32 y.o. female with a PMH clinically significant for HANS, Migraine HA's and CHRISTY presenting to the ED via EMS c/o sudden onset shortness of breath, chest pain and severe anxiety associated with lightheadedness, dizziness, nausea and single episode of spitting up p.o. intake status post waking the patient up from sleeping. Upon initial evaluation, Pt anxious-appearing, but otherwise Afebrile, HDS and in NAD. PE as noted above. Labs, , EKG, and Imaging as noted above. Given findings, clinical presentation most likely consistent w/ panic attack and generalized anxiety disorder in the setting of medication noncompliance and increased stressors at home. Although considered, lower suspicion for DVT/PE given PERC negative. EKG without evidence of arrhythmias or acute ischemic changes. Chest x-ray unremarkable. COVID-negative.   EKG noted borderline prolonged QTC, so administered magnesium in the ED. Offered IV magnesium and fluids, but patient declining IV after multiple failed attempts. Administered Atarax without significant improvement in anxiety. Therefore administered Ativan which brought significant improvement in symptoms. Patient feeling stable for discharge home. Strongly encouraged follow-up with both PCP and psychiatry at the first available appointments. Patient stated understanding and was amenable to the POC. Also agreed to return to the ED if any new or worsening symptoms. Pt was administered   Medications   hydrOXYzine (ATARAX) tablet 50 mg (50 mg Oral Given 1/18/22 9177)   magnesium oxide (MAG-OX) tablet 800 mg (800 mg Oral Given 1/18/22 0329)   LORazepam (ATIVAN) tablet 1 mg (1 mg Oral Given 1/18/22 6107)       Plan: Discharge home in good condition with meds as noted below and instructions to follow up with PCP and Psychiatry. Pt stable and appropriate for further evaluation and management as an outpatient. and Patient understanding and amenable to the POC. CRITICAL CARE TIME   Total CriticalCare time was 0 minutes, excluding separately reportable procedures. There was a high probability of clinically significant/life threatening deterioration in the patient's condition which required my urgent intervention. FINAL IMPRESSION      1. Anxiety attack    2. CHRISTY (generalized anxiety disorder)    3. Chest pain, unspecified type    4.  Shortness of breath          DISPOSITION/PLAN   DISPOSITION Decision To Discharge 01/18/2022 04:38:06 AM      Current Discharge Medication List           MD Carmella Sutherland MD  01/18/22 9744

## 2022-01-18 NOTE — ED NOTES
Pt stable at time of DC. DC instructions provided. Follow-up care reviewed. Pt verbalized understanding.       Denia Polk RN  01/18/22 3141

## 2022-01-18 NOTE — ED NOTES
Pt presents to ED via EMS for c/o shortness of breath and chest pain that woke her from her sleep. States she stood up and collapsed to the ground, roommate brought her water that she drank and then threw up. Pt states she has history of anxiety and panic attacks. Took herself off her anxiety meds. Admits to feeling anxious due to episode and has had increased stress recently.       Dulce Arredondo RN  01/18/22 3207

## 2022-01-18 NOTE — ED NOTES
Bed: 09  Expected date: 1/18/22  Expected time: 1:37 AM  Means of arrival: Ambulance  Comments:  32year old female chest pain  Leg numbness and tingling  96  127/75  96%  [address], RN  01/18/22 0147

## 2022-01-18 NOTE — ED NOTES
Pt noted to be tearful and anxious during evaluation. States she is worried to fall asleep and wake up short of breath again.       Rock Chavis RN  01/18/22 2436

## 2022-02-11 ENCOUNTER — TELEPHONE (OUTPATIENT)
Dept: FAMILY MEDICINE CLINIC | Age: 27
End: 2022-02-11

## 2022-02-11 NOTE — TELEPHONE ENCOUNTER
----- Message from Humberto Wallertyrese sent at 2/10/2022  2:50 PM EST -----  Subject: Appointment Request    Reason for Call: New Patient Request Appointment    QUESTIONS  Type of Appointment? New Patient/New to Provider  Reason for appointment request? No appointments available during search  Additional Information for Provider? new pt to est care. referred by ER   Dr. Jose Doe. and needing a Psychiatrist. Was seen in the ER on   22. pt had an anxiety attack in sleep, chest pain and shortness of   breath.   ---------------------------------------------------------------------------  --------------  CALL BACK INFO  What is the best way for the office to contact you? OK to leave message on   voicemail  Preferred Call Back Phone Number? [phone]  ---------------------------------------------------------------------------  --------------  SCRIPT ANSWERS  Relationship to Patient? Self  Specialty Confirmation? Primary Care  Is this the first appointment to establish care for a ? No  Have you been diagnosed with, awaiting test results for, or told that you   are suspected of having COVID-19 (Coronavirus)? (If patient has tested   negative or was tested as a requirement for work, school, or travel and   not based on symptoms, answer no)? No  Within the past two weeks have you developed any of the following symptoms   (answer no if symptoms have been present longer than 2 weeks or began   more than 2 weeks ago)? Fever or Chills, Cough, Shortness of breath or   difficulty breathing, Loss of taste or smell, Sore throat, Nasal   congestion, Sneezing or runny nose, Fatigue or generalized body aches   (answer no if pain is specific to a body part e.g. back pain), Diarrhea,   Headache? No  Have you had close contact with someone with COVID-19 in the last 14 days?    Yes

## 2022-02-11 NOTE — TELEPHONE ENCOUNTER
I called pt to schedule she would like to establish care at the [facility]. I offered to transfer her and give her the phone number.  She said she would figure it out on her own and hung up

## 2022-03-03 ENCOUNTER — HOSPITAL ENCOUNTER (EMERGENCY)
Age: 27
Discharge: HOME | End: 2022-03-03
Payer: COMMERCIAL

## 2022-03-03 VITALS
OXYGEN SATURATION: 95 % | SYSTOLIC BLOOD PRESSURE: 95 MMHG | BODY MASS INDEX: 24.8 KG/M2 | DIASTOLIC BLOOD PRESSURE: 64 MMHG | TEMPERATURE: 97.4 F | WEIGHT: 140 LBS | HEIGHT: 63 IN | RESPIRATION RATE: 16 BRPM | HEART RATE: 88 BPM

## 2022-03-03 DIAGNOSIS — F41.0 PANIC ATTACK: ICD-10-CM

## 2022-03-03 DIAGNOSIS — F10.920 ACUTE ALCOHOLIC INTOXICATION WITHOUT COMPLICATION (HCC): Primary | ICD-10-CM

## 2022-03-03 PROCEDURE — 99283 EMERGENCY DEPT VISIT LOW MDM: CPT

## 2022-03-03 ASSESSMENT — PAIN DESCRIPTION - PAIN TYPE: TYPE: ACUTE PAIN

## 2022-03-03 ASSESSMENT — PAIN DESCRIPTION - LOCATION: LOCATION: HEAD

## 2022-03-03 ASSESSMENT — PAIN SCALES - GENERAL: PAINLEVEL_OUTOF10: 8

## 2022-03-03 ASSESSMENT — PAIN - FUNCTIONAL ASSESSMENT: PAIN_FUNCTIONAL_ASSESSMENT: 0-10

## 2022-03-03 NOTE — ED NOTES
ISRRAEL Junior in to see pt. Pt now admits to having \"6 or 7\" shots today.  Declines to speak with let's get real.     Annette Fraire RN  03/03/22 7977

## 2022-03-03 NOTE — ED TRIAGE NOTES
Patient resting,sleeps when undisturbed.    Pt to ED via EMS for c/o anxiety. Pt states unable to walk, feels \"woozy. \" Pt appears intox, states she had 2 and a half drinks today. Was released from ETOH detox 2 weeks ago ([facility]). Skin warm and dry. Pt calm, falling asleep during triage. Denies SI/HI. C/o HA.

## 2022-03-04 ASSESSMENT — ENCOUNTER SYMPTOMS
ABDOMINAL PAIN: 0
NAUSEA: 0
WHEEZING: 0
PHOTOPHOBIA: 0
VOMITING: 0
SHORTNESS OF BREATH: 0
COUGH: 0

## 2022-03-04 NOTE — ED PROVIDER NOTES
[address] [facility] ED  EMERGENCY DEPARTMENT ENCOUNTER      Pt Name: Stacey Guerra  MRN: [id number]  [place] 1995  Date of evaluation: 3/3/2022  Provider: [employer], [address] [place] [address]        Chief Complaint   Patient presents with    Anxiety         HISTORY OF PRESENT ILLNESS   (Location/Symptom, Timing/Onset, Context/Setting, Quality, Duration, Modifying Factors, Severity)  Note limiting factors. Stacey Guerra is a 32 y.o. female who presents to the emergency department for evaluation of anxiety. Patient states she was released from [facility] detox center 2 weeks ago and relapsed today. She states she drank approximately 6-7 shots of liquor. states she had a panic attack because she was upset that she relapsed. Now that she is in the emergency department she is feeling better. She states that she just wants to go home. She does not want to speak with lets get real today. She denies any complaints at this time. Denies SI HI AVH drug use. HPI    Nursing Notes were reviewed. REVIEW OF SYSTEMS    (2-9 systems for level 4, 10 or more for level 5)     Review of Systems   Constitutional: Negative for chills and fever. HENT: Negative for congestion. Eyes: Negative for photophobia. Respiratory: Negative for cough, shortness of breath and wheezing. Cardiovascular: Negative for chest pain and palpitations. Gastrointestinal: Negative for abdominal pain, nausea and vomiting. Genitourinary: Negative for dysuria, frequency and hematuria. Musculoskeletal: Negative for myalgias. Allergic/Immunologic: Negative for immunocompromised state. Neurological: Negative for dizziness, weakness and headaches. Psychiatric/Behavioral: Negative for suicidal ideas. The patient is nervous/anxious. All other systems reviewed and are negative. Except as noted above the remainder of the review of systems was reviewed and negative.        PAST MEDICAL HISTORY     Past Medical History:   Diagnosis Date    Anxiety          SURGICAL HISTORY       Past Surgical History:   Procedure Laterality Date    DILATION AND CURETTAGE OF UTERUS      5/2016         CURRENT MEDICATIONS       Discharge Medication List as of 3/3/2022  5:36 PM      CONTINUE these medications which have NOT CHANGED    Details   acetaminophen (APAP EXTRA STRENGTH) 500 MG tablet Take 2 tablets by mouth every 6 hours as needed for Pain, Disp-30 tablet, R-3Print      ibuprofen (IBU) 800 MG tablet Take 1 tablet by mouth every 8 hours as needed for Pain, Disp-15 tablet, R-0Print      ondansetron (ZOFRAN ODT) 4 MG disintegrating tablet Take 1 tablet by mouth every 8 hours as needed for Nausea, Disp-10 tablet, R-0Print      ALPRAZolam (XANAX) 0.25 MG tablet Take 0.25 mg by mouth nightly as needed for Sleep or Anxiety . Historical Med      albuterol sulfate HFA (PROAIR HFA) 108 (90 BASE) MCG/ACT inhaler 2 times daily as needed             ALLERGIES     Patient has no known allergies. FAMILY HISTORY       Family History   Problem Relation Age of Onset    Breast Cancer Neg Hx     Cervical Cancer Neg Hx     Uterine Cancer Neg Hx     Ovarian Cancer Neg Hx           SOCIAL HISTORY       Social History     Socioeconomic History    Marital status:      Spouse name: Not on file    Number of children: Not on file    Years of education: Not on file    Highest education level: Not on file   Occupational History    Not on file   Tobacco Use    Smoking status: Current Some Day Smoker     Packs/day: 1.00     Years: 5.00     Pack years: 5.00     Types: Cigarettes    Smokeless tobacco: Never Used   Substance and Sexual Activity    Alcohol use:  Yes     Alcohol/week: 0.0 standard drinks     Comment: occ    Drug use: No    Sexual activity: Not Currently   Other Topics Concern    Not on file   Social History Narrative    Not on file     Social Determinants of Health     Financial Resource Strain:     Difficulty of Paying Living Expenses: Not on file   Food Insecurity:     Worried About [address] in the Last Year: Not on file    Jose of Food in the Last Year: Not on file   Transportation Needs:     Lack of Transportation (Medical): Not on file    Lack of Transportation (Non-Medical): Not on file   Physical Activity:     Days of Exercise per Week: Not on file    Minutes of Exercise per Session: Not on file   Stress:     Feeling of Stress : Not on file   Social Connections:     Frequency of Communication with Friends and Family: Not on file    Frequency of Social Gatherings with Friends and Family: Not on file    Attends Worship Services: Not on file    Active Member of [address] [place] [address] or Organizations: Not on file    Attends Club or Organization Meetings: Not on file    Marital Status: Not on file   Intimate Partner Violence:     Fear of Current or Ex-Partner: Not on file    Emotionally Abused: Not on file    Physically Abused: Not on file    Sexually Abused: Not on file   Housing Stability:     Unable to Pay for Housing in the Last Year: Not on file    Number of [place] in the Last Year: Not on file    Unstable Housing in the Last Year: Not on file       SCREENINGS         [place] Coma Scale  Eye Opening: Spontaneous  Best Verbal Response: Oriented  Best Motor Response: Obeys commands  [place] Coma Scale Score: 15                     CIWA Assessment  BP: 95/64  Pulse: 88                 PHYSICAL EXAM    (up to 7 for level 4, 8 or more for level 5)     ED Triage Vitals [03/03/22 1725]   BP Temp Temp Source Pulse Resp SpO2 Height Weight   95/64 97.4 °F (36.3 °C) Temporal 88 16 95 % 5' 3\" (1.6 m) 140 lb (63.5 kg)       Physical Exam  Constitutional:       General: She is not in acute distress. Appearance: She is well-developed. She is not ill-appearing, toxic-appearing or diaphoretic. HENT:      Head: Normocephalic and atraumatic.       Nose: Nose normal.      Mouth/Throat:      Mouth: Mucous membranes are moist.   Eyes:      Pupils: Pupils are equal, round, and reactive to light. Cardiovascular:      Rate and Rhythm: Normal rate and regular rhythm. Heart sounds: No murmur heard. No friction rub. No gallop. Pulmonary:      Effort: Pulmonary effort is normal.      Breath sounds: Normal breath sounds. Abdominal:      General: There is no distension. Tenderness: There is no abdominal tenderness. Musculoskeletal:         General: No swelling. Cervical back: Normal range of motion. Skin:     General: Skin is warm and dry. Neurological:      Mental Status: She is alert and oriented to person, place, and time. Psychiatric:         Attention and Perception: Attention normal.         Mood and Affect: Mood normal.         Speech: Speech is slurred. Thought Content: Thought content does not include suicidal ideation. Comments: Patient is clearly intoxicated. Smells of EtOH. She has a steady ambulatory gait in the ED. DIAGNOSTIC RESULTS     EKG: All EKG's are interpreted by the Emergency Department Physician who either signs or Co-signs this chart in the absence of a cardiologist.      RADIOLOGY:   Non-plain film images such as CT, Ultrasound and MRI are read by the radiologist. Plain radiographic images are visualized and preliminarily interpreted by the emergency physician with the below findings:      Interpretation per the Radiologist below, if available at the time of this note:    No orders to display         ED BEDSIDE ULTRASOUND:   Performed by ED Physician - none    LABS:  Labs Reviewed - No data to display    All other labs were within normal range or not returned as of this dictation.     EMERGENCY DEPARTMENT COURSE and DIFFERENTIAL DIAGNOSIS/MDM:   Vitals:    Vitals:    03/03/22 1725   BP: 95/64   Pulse: 88   Resp: 16   Temp: 97.4 °F (36.3 °C)   TempSrc: Temporal   SpO2: 95%   Weight: 140 lb (63.5 kg)   Height: 5' 3\" (1.6 m)     MDM     Patient is a 20-year-old female presents the ED for evaluation of anxiety, alcohol intoxication. Afebrile hemodynamically stable. Patient states that her anxiety has completely improved since arriving to the ED. She has no complaints at this time. She is clearly intoxicated, states she drinks 6-7 shots of liquor today. She was recently released from detox however she states she does not want to go back today. She states she wants to leave the emergency department and go home. Patient had sober ride home from the emergency department from her mother. Follow-up with primary care as needed return to the ED for worsening symptoms given warning signs for which she should return. Patient understands agrees to plan. REASSESSMENT          CRITICAL CARE TIME   Total Critical Care time was 0 minutes, excluding separately reportable procedures. There was a high probability of clinically significant/life threatening deterioration in the patient's condition which required my urgent intervention. CONSULTS:  None    PROCEDURES:  Unless otherwise noted below, none     Procedures        FINAL IMPRESSION      1. Acute alcoholic intoxication without complication ([address]    2. Panic attack          DISPOSITION/PLAN   DISPOSITION Decision To Discharge 03/03/2022 05:55:50 PM      PATIENT REFERRED TO:  [facility]) ED  [address] [address]  [phone]  Go to   As needed, If symptoms worsen    [facility] and [address]  [phone]  Schedule an appointment as soon as possible for a visit in 1 day        DISCHARGE MEDICATIONS:  Discharge Medication List as of 3/3/2022  5:36 PM        Controlled Substances Monitoring:     No flowsheet data found.     (Please note that portions of this note were completed with a voice recognition program.  Efforts were made to edit the dictations but occasionally words are mis-transcribed.)    Live Connolly PA-C (electronically signed) Ruddy Schilling [place]  03/04/22 1450

## 2022-08-08 ENCOUNTER — NURSE TRIAGE (OUTPATIENT)
Dept: OTHER | Facility: CLINIC | Age: 27
End: 2022-08-08

## 2022-08-08 ENCOUNTER — OFFICE VISIT (OUTPATIENT)
Dept: PRIMARY CARE CLINIC | Age: 27
End: 2022-08-08
Payer: COMMERCIAL

## 2022-08-08 VITALS
DIASTOLIC BLOOD PRESSURE: 82 MMHG | BODY MASS INDEX: 29.06 KG/M2 | SYSTOLIC BLOOD PRESSURE: 122 MMHG | TEMPERATURE: 97.1 F | WEIGHT: 164 LBS | OXYGEN SATURATION: 100 % | HEART RATE: 91 BPM | HEIGHT: 63 IN

## 2022-08-08 DIAGNOSIS — Z76.89 ENCOUNTER TO ESTABLISH CARE: ICD-10-CM

## 2022-08-08 DIAGNOSIS — F17.200 SMOKING: ICD-10-CM

## 2022-08-08 DIAGNOSIS — J45.20 MILD INTERMITTENT ASTHMA WITHOUT COMPLICATION: ICD-10-CM

## 2022-08-08 DIAGNOSIS — N92.6 MENSTRUAL SYNDROME: Primary | ICD-10-CM

## 2022-08-08 DIAGNOSIS — R11.2 NON-INTRACTABLE VOMITING WITH NAUSEA, UNSPECIFIED VOMITING TYPE: ICD-10-CM

## 2022-08-08 PROBLEM — N91.2 AMENORRHEA: Status: RESOLVED | Noted: 2019-04-14 | Resolved: 2022-08-08

## 2022-08-08 PROBLEM — Z34.90 SUPERVISION OF NORMAL PREGNANCY: Status: RESOLVED | Noted: 2019-04-14 | Resolved: 2022-08-08

## 2022-08-08 LAB
INFLUENZA A ANTIBODY: NEGATIVE
INFLUENZA B ANTIBODY: NEGATIVE
Lab: NORMAL
PERFORMING INSTRUMENT: NORMAL
QC PASS/FAIL: NORMAL
SARS-COV-2, POC: NORMAL

## 2022-08-08 PROCEDURE — G8419 CALC BMI OUT NRM PARAM NOF/U: HCPCS | Performed by: INTERNAL MEDICINE

## 2022-08-08 PROCEDURE — 4004F PT TOBACCO SCREEN RCVD TLK: CPT | Performed by: INTERNAL MEDICINE

## 2022-08-08 PROCEDURE — 87804 INFLUENZA ASSAY W/OPTIC: CPT | Performed by: INTERNAL MEDICINE

## 2022-08-08 PROCEDURE — 99204 OFFICE O/P NEW MOD 45 MIN: CPT | Performed by: INTERNAL MEDICINE

## 2022-08-08 PROCEDURE — 87426 SARSCOV CORONAVIRUS AG IA: CPT | Performed by: INTERNAL MEDICINE

## 2022-08-08 PROCEDURE — G8427 DOCREV CUR MEDS BY ELIG CLIN: HCPCS | Performed by: INTERNAL MEDICINE

## 2022-08-08 RX ORDER — ONDANSETRON 4 MG/1
4 TABLET, FILM COATED ORAL 3 TIMES DAILY PRN
Qty: 30 TABLET | Refills: 0 | Status: SHIPPED | OUTPATIENT
Start: 2022-08-08

## 2022-08-08 RX ORDER — ALBUTEROL SULFATE 90 UG/1
2 AEROSOL, METERED RESPIRATORY (INHALATION) EVERY 4 HOURS PRN
Qty: 18 G | Refills: 1 | Status: SHIPPED | OUTPATIENT
Start: 2022-08-08

## 2022-08-08 SDOH — ECONOMIC STABILITY: FOOD INSECURITY: WITHIN THE PAST 12 MONTHS, YOU WORRIED THAT YOUR FOOD WOULD RUN OUT BEFORE YOU GOT MONEY TO BUY MORE.: NEVER TRUE

## 2022-08-08 SDOH — ECONOMIC STABILITY: FOOD INSECURITY: WITHIN THE PAST 12 MONTHS, THE FOOD YOU BOUGHT JUST DIDN'T LAST AND YOU DIDN'T HAVE MONEY TO GET MORE.: NEVER TRUE

## 2022-08-08 ASSESSMENT — PATIENT HEALTH QUESTIONNAIRE - PHQ9
2. FEELING DOWN, DEPRESSED OR HOPELESS: 0
SUM OF ALL RESPONSES TO PHQ QUESTIONS 1-9: 0
1. LITTLE INTEREST OR PLEASURE IN DOING THINGS: 0
SUM OF ALL RESPONSES TO PHQ QUESTIONS 1-9: 0
SUM OF ALL RESPONSES TO PHQ9 QUESTIONS 1 & 2: 0

## 2022-08-08 ASSESSMENT — ENCOUNTER SYMPTOMS
CHEST TIGHTNESS: 0
SHORTNESS OF BREATH: 0
WHEEZING: 0
COUGH: 0
VOMITING: 1
NAUSEA: 1

## 2022-08-08 ASSESSMENT — SOCIAL DETERMINANTS OF HEALTH (SDOH): HOW HARD IS IT FOR YOU TO PAY FOR THE VERY BASICS LIKE FOOD, HOUSING, MEDICAL CARE, AND HEATING?: NOT VERY HARD

## 2022-08-08 NOTE — TELEPHONE ENCOUNTER
Received call from [address] [place] [address] AND CLINICS with Red Flag Complaint. Subjective: Caller states \"vomiting since yesterday greater than 7 times, since yesterday with body aches. Not able to eat or drink anything without vomiting. \"     Current Symptoms:  severe vomiting, soreness    Onset: 1 day ago; sudden, rapid, unchanged    Associated Symptoms: reduced activity, reduced appetite, reduced fluid intake, increased wakefulness    Pain Severity: 6/10; cramping; intermittent, moderate    Temperature: no  n/a    What has been tried: rest    LMP: NA Pregnant: NA    Recommended disposition: Go to ED Now. Needs new patient appointment. Care advice provided, patient verbalizes understanding; denies any other questions or concerns; instructed to call back for any new or worsening symptoms. Patient/caller agrees to proceed to [address] [facility] in [facility] Emergency Department     Attention Provider: Thank you for allowing me to participate in the care of your patient. The patient was connected to triage in response to information provided to the ECC/PSC. Please do not respond through this encounter as the response is not directed to a shared pool.           Reason for Disposition   SEVERE vomiting (e.g., 6 or more times/day) (Exception: patient sounds well, is drinking liquids, does not sound dehydrated, and vomiting has lasted less than 24 hours)    Protocols used: Vomiting-ADULT-OH

## 2022-08-08 NOTE — PROGRESS NOTES
Subjective:      Patient ID: Xavi Khan is a 32 y.o. female who presents today for:  Chief Complaint   Patient presents with    [facility] Care    Emesis     Onset 08/07         HPI  Patient is a 33yo Female, presenting today to establish care. Reports a history of recurrent nausea and vomiting for > 24 hours; onset was yesterday morning. States she has been unable to tolerate well orally due to this. Also endorses chills and generalized body aches. No related history of diarrhea or ill contacts. Denies possibility of pregnancy; currently on her period. No associated headaches. PMH otherwise notable for Asthma, managed on INH albuterol. Requesting refill. No recent exacerbations or hospitalization. Past Medical History:   Diagnosis Date    Anxiety      Past Surgical History:   Procedure Laterality Date    DILATION AND CURETTAGE OF UTERUS      5/2016     Family History   Problem Relation Age of Onset    Breast Cancer Neg Hx     Cervical Cancer Neg Hx     Uterine Cancer Neg Hx     Ovarian Cancer Neg Hx      No Known Allergies  Current Outpatient Medications on File Prior to Visit   Medication Sig Dispense Refill    acetaminophen (APAP EXTRA STRENGTH) 500 MG tablet Take 2 tablets by mouth every 6 hours as needed for Pain 30 tablet 3    ibuprofen (IBU) 800 MG tablet Take 1 tablet by mouth every 8 hours as needed for Pain (Patient not taking: Reported on 8/8/2022) 15 tablet 0    ondansetron (ZOFRAN ODT) 4 MG disintegrating tablet Take 1 tablet by mouth every 8 hours as needed for Nausea (Patient not taking: No sig reported) 10 tablet 0    ALPRAZolam (XANAX) 0.25 MG tablet Take 0.25 mg by mouth nightly as needed for Sleep or Anxiety . (Patient not taking: No sig reported)       No current facility-administered medications on file prior to visit. Review of Systems   Constitutional:  Negative for activity change, chills, diaphoresis, fatigue and fever.    Respiratory:  Negative for cough, chest tightness, shortness of breath and wheezing. Cardiovascular:  Negative for chest pain, palpitations and leg swelling. Gastrointestinal:  Positive for nausea and vomiting. Neurological:  Negative for dizziness, syncope, light-headedness and headaches. Objective:   /82 (Site: Right Upper Arm, Position: Sitting, Cuff Size: Medium Adult)   Pulse 91   Temp 97.1 °F (36.2 °C) (Temporal)   Ht 5' 3\" (1.6 m)   Wt 164 lb (74.4 kg)   SpO2 100%   BMI 29.05 kg/m²     Physical Exam  Constitutional:       General: She is not in acute distress. Appearance: Normal appearance. She is normal weight. She is not diaphoretic. HENT:      Head: Normocephalic and atraumatic. Mouth/Throat:      Mouth: Mucous membranes are dry. Pharynx: Oropharynx is clear. No oropharyngeal exudate or posterior oropharyngeal erythema. Cardiovascular:      Rate and Rhythm: Normal rate and regular rhythm. Pulses: Normal pulses. Heart sounds: Normal heart sounds. No murmur heard. No friction rub. Pulmonary:      Effort: Pulmonary effort is normal. No respiratory distress. Breath sounds: Normal breath sounds. No wheezing or rhonchi. Chest:      Chest wall: No tenderness. Abdominal:      General: Abdomen is flat. Bowel sounds are normal. There is no distension. Palpations: Abdomen is soft. Tenderness: There is no abdominal tenderness. Musculoskeletal:         General: No tenderness. Normal range of motion. Right lower leg: No edema. Left lower leg: No edema. Neurological:      Mental Status: She is alert. Assessment:      Donald Mcgee was seen today for emesis and to establish care. Diagnoses and all orders for this visit:    Non-intractable vomiting with nausea, unspecified vomiting type        -     N/V for > 24 hrs; associated chills and generalized body aches. -     POCT COVID-19, Antigen negative  -     POCT Influenza A/B negative  -     ondansetron (ZOFRAN) 4 MG tablet;  Take 1 tablet by mouth 3 times daily as needed for Nausea or Vomiting  -     Basic Metabolic Panel; Future  -     Ensure adequate hydration  -     Monitor for progression. Close f/u in 1 week. Menstrual syndrome        -     Clinically suggestive given symptoms indicated above. -     ondansetron (ZOFRAN) 4 MG tablet; Take 1 tablet by mouth 3 times daily as needed for Nausea or Vomiting        -     Can take Tylenol 650mg q6 prn for chills, body aches    Mild intermittent asthma without complication        -     Not in exacerbation  -     albuterol sulfate HFA (PROVENTIL;VENTOLIN;PROAIR) 108 (90 Base) MCG/ACT inhaler; Inhale 2 puffs into the lungs every 4 hours as needed for Wheezing or Shortness of Breath    Encounter to establish care         -    Medical and Social history reviewed         -    Medication list reconciled. -    Counseled on general lifestyle modifications/risk factor reduction. Smoking        -    Counseled on cessation. Health Maintenance   Topic Date Due    COVID-19 Vaccine (1) Never done    Varicella vaccine (1 of 2 - 2-dose childhood series) Never done    Pneumococcal 0-64 years Vaccine (1 - PCV) Never done    HPV vaccine (1 - 2-dose series) Never done    Depression Screen  Never done    Hepatitis C screen  Never done    DTaP/Tdap/Td vaccine (2 - Tdap) 09/21/2014    Pap smear  Never done    Flu vaccine (1) 09/01/2022    HIV screen  Completed    Hepatitis A vaccine  Aged Out    Hepatitis B vaccine  Aged Out    Hib vaccine  Aged Out    Meningococcal (ACWY) vaccine  Aged Out            Plan:    As above. Orders Placed This Encounter   Procedures    Basic Metabolic Panel     Standing Status:   Future     Standing Expiration Date:   8/8/2023    POCT COVID-19, Antigen     Order Specific Question:   Is this test for diagnosis or screening? Answer:   Diagnosis of ill patient     Order Specific Question:   Symptomatic for COVID-19 as defined by CDC?      Answer:   Yes     Order Specific Question:   Date of Symptom Onset     Answer:   8/6/2022     Order Specific Question:   Hospitalized for COVID-19? Answer:   No     Order Specific Question:   Admitted to ICU for COVID-19? Answer:   No     Order Specific Question:   Employed in healthcare setting? Answer:   No     Order Specific Question:   Resident in a congregate (group) care setting? Answer:   No     Order Specific Question:   Pregnant? Answer:   No     Order Specific Question:   Previously tested for COVID-19? Answer:   No    POCT Influenza A/B     Orders Placed This Encounter   Medications    albuterol sulfate HFA (PROVENTIL;VENTOLIN;PROAIR) 108 (90 Base) MCG/ACT inhaler     Sig: Inhale 2 puffs into the lungs every 4 hours as needed for Wheezing or Shortness of Breath     Dispense:  18 g     Refill:  1    ondansetron (ZOFRAN) 4 MG tablet     Sig: Take 1 tablet by mouth 3 times daily as needed for Nausea or Vomiting     Dispense:  30 tablet     Refill:  0     Return for F/u in 1 week.     Michel Harris MD

## 2022-10-29 ENCOUNTER — APPOINTMENT (OUTPATIENT)
Dept: ULTRASOUND IMAGING | Age: 27
End: 2022-10-29
Payer: COMMERCIAL

## 2022-10-29 ENCOUNTER — HOSPITAL ENCOUNTER (EMERGENCY)
Age: 27
Discharge: HOME | End: 2022-10-29
Payer: COMMERCIAL

## 2022-10-29 VITALS
WEIGHT: 160 LBS | HEIGHT: 63 IN | SYSTOLIC BLOOD PRESSURE: 135 MMHG | RESPIRATION RATE: 14 BRPM | TEMPERATURE: 99.4 F | DIASTOLIC BLOOD PRESSURE: 81 MMHG | HEART RATE: 87 BPM | BODY MASS INDEX: 28.35 KG/M2 | OXYGEN SATURATION: 97 %

## 2022-10-29 DIAGNOSIS — O26.891 ABDOMINAL PAIN DURING PREGNANCY IN FIRST TRIMESTER: Primary | ICD-10-CM

## 2022-10-29 DIAGNOSIS — R10.9 ABDOMINAL PAIN DURING PREGNANCY IN FIRST TRIMESTER: Primary | ICD-10-CM

## 2022-10-29 LAB
ABO/RH: NORMAL
ALBUMIN SERPL-MCNC: 4.5 G/DL (ref 3.5–4.6)
ALP BLD-CCNC: 59 U/L (ref 40–130)
ALT SERPL-CCNC: 17 U/L (ref 0–33)
ANION GAP SERPL CALCULATED.3IONS-SCNC: 8 MEQ/L (ref 9–15)
AST SERPL-CCNC: 18 U/L (ref 0–35)
BACTERIA: ABNORMAL /HPF
BASOPHILS ABSOLUTE: 0.1 K/UL (ref 0–0.2)
BASOPHILS RELATIVE PERCENT: 0.8 %
BILIRUB SERPL-MCNC: <0.2 MG/DL (ref 0.2–0.7)
BILIRUBIN URINE: NEGATIVE
BLOOD, URINE: ABNORMAL
BUN BLDV-MCNC: 10 MG/DL (ref 6–20)
CALCIUM SERPL-MCNC: 9.4 MG/DL (ref 8.5–9.9)
CHLORIDE BLD-SCNC: 104 MEQ/L (ref 95–107)
CHP ED QC CHECK: YES
CLARITY: CLEAR
CO2: 23 MEQ/L (ref 20–31)
COLOR: YELLOW
CREAT SERPL-MCNC: 0.62 MG/DL (ref 0.5–0.9)
EOSINOPHILS ABSOLUTE: 0.2 K/UL (ref 0–0.7)
EOSINOPHILS RELATIVE PERCENT: 2.4 %
EPITHELIAL CELLS, UA: ABNORMAL /HPF (ref 0–5)
GFR SERPL CREATININE-BSD FRML MDRD: >60 ML/MIN/{1.73_M2}
GLOBULIN: 2.6 G/DL (ref 2.3–3.5)
GLUCOSE BLD-MCNC: 88 MG/DL (ref 70–99)
GLUCOSE URINE: NEGATIVE MG/DL
GONADOTROPIN, CHORIONIC (HCG) QUANT: 13.7 MIU/ML
HCT VFR BLD CALC: 37.8 % (ref 37–47)
HEMOGLOBIN: 13.1 G/DL (ref 12–16)
HYALINE CASTS: ABNORMAL /HPF (ref 0–5)
KETONES, URINE: NEGATIVE MG/DL
LEUKOCYTE ESTERASE, URINE: NEGATIVE
LIPASE: 66 U/L (ref 12–95)
LYMPHOCYTES ABSOLUTE: 1.5 K/UL (ref 1–4.8)
LYMPHOCYTES RELATIVE PERCENT: 23.2 %
MCH RBC QN AUTO: 28.4 PG (ref 27–31.3)
MCHC RBC AUTO-ENTMCNC: 34.6 % (ref 33–37)
MCV RBC AUTO: 81.9 FL (ref 79.4–94.8)
MONOCYTES ABSOLUTE: 0.6 K/UL (ref 0.2–0.8)
MONOCYTES RELATIVE PERCENT: 9 %
NEUTROPHILS ABSOLUTE: 4.2 K/UL (ref 1.4–6.5)
NEUTROPHILS RELATIVE PERCENT: 64.6 %
NITRITE, URINE: NEGATIVE
PDW BLD-RTO: 13.9 % (ref 11.5–14.5)
PH UA: 5.5 (ref 5–9)
PLATELET # BLD: 310 K/UL (ref 130–400)
POTASSIUM SERPL-SCNC: 4.1 MEQ/L (ref 3.4–4.9)
PREGNANCY TEST URINE, POC: POSITIVE
PROTEIN UA: NEGATIVE MG/DL
RBC # BLD: 4.62 M/UL (ref 4.2–5.4)
RBC UA: ABNORMAL /HPF (ref 0–5)
SODIUM BLD-SCNC: 135 MEQ/L (ref 135–144)
SPECIFIC GRAVITY UA: 1.02 (ref 1–1.03)
TOTAL PROTEIN: 7.1 G/DL (ref 6.3–8)
URINE REFLEX TO CULTURE: ABNORMAL
UROBILINOGEN, URINE: 0.2 E.U./DL
WBC # BLD: 6.4 K/UL (ref 4.8–10.8)
WBC UA: ABNORMAL /HPF (ref 0–5)

## 2022-10-29 PROCEDURE — 36415 COLL VENOUS BLD VENIPUNCTURE: CPT

## 2022-10-29 PROCEDURE — 93975 VASCULAR STUDY: CPT

## 2022-10-29 PROCEDURE — 84702 CHORIONIC GONADOTROPIN TEST: CPT

## 2022-10-29 PROCEDURE — 76817 TRANSVAGINAL US OBSTETRIC: CPT

## 2022-10-29 PROCEDURE — 83690 ASSAY OF LIPASE: CPT

## 2022-10-29 PROCEDURE — 76801 OB US < 14 WKS SINGLE FETUS: CPT

## 2022-10-29 PROCEDURE — 80053 COMPREHEN METABOLIC PANEL: CPT

## 2022-10-29 PROCEDURE — 99284 EMERGENCY DEPT VISIT MOD MDM: CPT

## 2022-10-29 PROCEDURE — 86900 BLOOD TYPING SEROLOGIC ABO: CPT

## 2022-10-29 PROCEDURE — 86901 BLOOD TYPING SEROLOGIC RH(D): CPT

## 2022-10-29 PROCEDURE — 81001 URINALYSIS AUTO W/SCOPE: CPT

## 2022-10-29 PROCEDURE — 85025 COMPLETE CBC W/AUTO DIFF WBC: CPT

## 2022-10-29 ASSESSMENT — PAIN SCALES - GENERAL: PAINLEVEL_OUTOF10: 7

## 2022-10-29 ASSESSMENT — PAIN DESCRIPTION - DESCRIPTORS: DESCRIPTORS: CRAMPING

## 2022-10-29 ASSESSMENT — PAIN - FUNCTIONAL ASSESSMENT
PAIN_FUNCTIONAL_ASSESSMENT: NONE - DENIES PAIN
PAIN_FUNCTIONAL_ASSESSMENT: 0-10
PAIN_FUNCTIONAL_ASSESSMENT: NONE - DENIES PAIN

## 2022-10-29 ASSESSMENT — PAIN DESCRIPTION - LOCATION: LOCATION: ABDOMEN

## 2022-10-29 NOTE — ED NOTES
D/C instructions given. Verbalized understanding. Denies any complaints.  Amb out with steady gait in no distress with visitor     Héctor Holguin RN  10/29/22 2608

## 2022-10-29 NOTE — ED NOTES
Returned. Denies pain at this time. States she feels a lot better.      Héctor Holguin RN  10/29/22 6069

## 2022-10-30 ASSESSMENT — ENCOUNTER SYMPTOMS
SORE THROAT: 0
SHORTNESS OF BREATH: 0
ABDOMINAL PAIN: 1
RESPIRATORY NEGATIVE: 1
ABDOMINAL DISTENTION: 0
VOMITING: 0
EYE DISCHARGE: 0
COLOR CHANGE: 0
RHINORRHEA: 0
CONSTIPATION: 0
EYES NEGATIVE: 1
NAUSEA: 0

## 2022-10-30 NOTE — ED PROVIDER NOTES
[facility] ED  eMERGENCY dEPARTMENT eNCOUnter      Pt Name: Monica Gonzalez  MRN: [id number]  [place] 1995  Date of evaluation: 10/29/2022  Provider: Jamie June PA-C    CHIEF COMPLAINT       Chief Complaint   Patient presents with    Pregnancy Problem         HISTORY OF PRESENT ILLNESS   (Location/Symptom, Timing/Onset,Context/Setting, Quality, Duration, Modifying Factors, Severity)  Note limiting factors. Monica Gonzalez is a 32 y.o. female who presents to the emergency department with complaint of low abdominal cramping which patient states started yesterday for her. Patient states she just found out she was pregnant yesterday I did a pregnancy test with her OB/GYN. She denies any vaginal bleeding or discharge. . Past medical history per patient anxiety. HPI    NursingNotes were reviewed. REVIEW OF SYSTEMS    (2-9 systems for level 4, 10 or more for level 5)     Review of Systems   Constitutional: Negative. Negative for activity change and appetite change. HENT: Negative. Negative for congestion, ear discharge, ear pain, nosebleeds, rhinorrhea and sore throat. Eyes: Negative. Negative for discharge. Respiratory: Negative. Negative for shortness of breath. Cardiovascular: Negative. Negative for chest pain, palpitations and leg swelling. Gastrointestinal:  Positive for abdominal pain. Negative for abdominal distention, constipation, nausea and vomiting. Endocrine: Negative. Genitourinary:  Positive for hematuria. Negative for difficulty urinating and dysuria. Musculoskeletal: Negative. Negative for arthralgias. Skin:  Negative for color change. Neurological: Negative. Negative for dizziness, tremors, syncope, weakness, numbness and headaches. Psychiatric/Behavioral: Negative. Negative for agitation and confusion. Except as noted above the remainder of the review of systems was reviewed and negative.        PAST MEDICAL HISTORY     Past Medical History:   Diagnosis Date    Anxiety          SURGICALHISTORY       Past Surgical History:   Procedure Laterality Date    DILATION AND CURETTAGE OF UTERUS      5/2016         CURRENT MEDICATIONS       Discharge Medication List as of 10/29/2022  5:54 PM        CONTINUE these medications which have NOT CHANGED    Details   albuterol sulfate HFA (PROVENTIL;VENTOLIN;PROAIR) 108 (90 Base) MCG/ACT inhaler Inhale 2 puffs into the lungs every 4 hours as needed for Wheezing or Shortness of Breath, Disp-18 g, R-1Normal      ondansetron (ZOFRAN) 4 MG tablet Take 1 tablet by mouth 3 times daily as needed for Nausea or Vomiting, Disp-30 tablet, R-0Normal      acetaminophen (APAP EXTRA STRENGTH) 500 MG tablet Take 2 tablets by mouth every 6 hours as needed for Pain, Disp-30 tablet, R-3Print      ibuprofen (IBU) 800 MG tablet Take 1 tablet by mouth every 8 hours as needed for Pain, Disp-15 tablet, R-0Print      ondansetron (ZOFRAN ODT) 4 MG disintegrating tablet Take 1 tablet by mouth every 8 hours as needed for Nausea, Disp-10 tablet, R-0Print      ALPRAZolam (XANAX) 0.25 MG tablet Take 0.25 mg by mouth nightly as needed for Sleep or Anxiety . Historical Med             ALLERGIES     Patient has no known allergies. FAMILY HISTORY       Family History   Problem Relation Age of Onset    Breast Cancer Neg Hx     Cervical Cancer Neg Hx     Uterine Cancer Neg Hx     Ovarian Cancer Neg Hx           SOCIAL HISTORY       Social History     Socioeconomic History    Marital status:      Spouse name: None    Number of children: None    Years of education: None    Highest education level: None   Tobacco Use    Smoking status: Some Days     Packs/day: 1.00     Years: 5.00     Pack years: 5.00     Types: Cigarettes    Smokeless tobacco: Never   Substance and Sexual Activity    Alcohol use:  Yes     Alcohol/week: 0.0 standard drinks     Comment: occ    Drug use: No    Sexual activity: Not Currently     Social Determinants of Health     Financial Resource Strain: Low Risk     Difficulty of Paying Living Expenses: Not very hard   Food Insecurity: No Food Insecurity    Worried About Running Out of Food in the Last Year: Never true    Ran Out of Food in the Last Year: Never true       SCREENINGS    [place] Coma Scale  Eye Opening: Spontaneous  Best Verbal Response: Oriented  Best Motor Response: Obeys commands  [place] Coma Scale Score: 15 @FLOW([id number])@      PHYSICAL EXAM    (up to 7 for level 4, 8 or more for level 5)     ED Triage Vitals [10/29/22 1503]   BP Temp Temp Source Heart Rate Resp SpO2 Height Weight   133/88 99.4 °F (37.4 °C) Oral 98 20 96 % 5' 3\" (1.6 m) 160 lb (72.6 kg)       Physical Exam  Vitals and nursing note reviewed. Constitutional:       General: She is not in acute distress. Appearance: She is well-developed. She is not ill-appearing, toxic-appearing or diaphoretic. HENT:      Head: Normocephalic. Nose: No congestion. Mouth/Throat:      Mouth: Mucous membranes are moist.      Pharynx: No oropharyngeal exudate or posterior oropharyngeal erythema. Eyes:      Extraocular Movements: Extraocular movements intact. Conjunctiva/sclera: Conjunctivae normal.      Pupils: Pupils are equal, round, and reactive to light. Neck:      Vascular: No JVD. Trachea: No tracheal deviation. Cardiovascular:      Rate and Rhythm: Normal rate. Pulses: Normal pulses. Heart sounds: Normal heart sounds. No murmur heard. No friction rub. No gallop. Pulmonary:      Effort: Pulmonary effort is normal. No tachypnea, accessory muscle usage, respiratory distress or retractions. Breath sounds: No stridor. No wheezing, rhonchi or rales. Chest:      Chest wall: No tenderness. Abdominal:      General: Abdomen is flat. Bowel sounds are normal. There is no distension or abdominal bruit. Palpations: There is no shifting dullness, fluid wave, hepatomegaly, splenomegaly, mass or pulsatile mass. Tenderness: There is no abdominal tenderness. There is no right CVA tenderness, left CVA tenderness, guarding or rebound. Negative signs include Smith's sign, Rovsing's sign and McBurney's sign. Comments: Abdomen soft nondistended nontender no guarding mass rebound, no CVA tenderness. Musculoskeletal:         General: No deformity. Cervical back: Normal range of motion and neck supple. No rigidity. Skin:     General: Skin is warm and dry. Capillary Refill: Capillary refill takes less than 2 seconds. Coloration: Skin is not jaundiced. Neurological:      General: No focal deficit present. Mental Status: She is alert and oriented to person, place, and time. Mental status is at baseline. Cranial Nerves: No cranial nerve deficit. Sensory: No sensory deficit. Motor: No weakness. Coordination: Coordination normal.   Psychiatric:         Mood and Affect: Mood normal.       DIAGNOSTIC RESULTS     EKG: All EKG's are interpreted by the Emergency Department Physician who either signs or Co-signsthis chart in the absence of a cardiologist.        RADIOLOGY:   Yoko Achilles such as CT, Ultrasound and MRI are read by the radiologist. Plain radiographic images are visualized and preliminarily interpreted by the emergency physician with the below findings:      Interpretation per the Radiologist below, if available at the time ofthis note:    US OB TRANSVAGINAL   Final Result   There is no evidence for intrauterine gestation on this exam.  Endometrial   stripe is somewhat thickened at 14.3 mm. There is an isoechoic nonspecific lesion right ovary could represent mildly   complicated cyst.      No free fluid. However, correlation with beta HCG is recommended. When beta HCG is greater   than 3000 and uterus is empty ectopic is 70 times more likely than an   intrauterine gestation.       Serial beta HCG recommended and beta HCG should increase by 1.5-2 times over 2 days. Repeat ultrasound may be needed. If beta HCG is declining, beta HCG   should be followed to zero in all pregnancies of unknown location and in   pregnancies of early intrauterine pregnancy failure. US OB LESS THAN 14 WEEKS SINGLE OR FIRST GESTATION   Final Result   There is no evidence for intrauterine gestation on this exam.  Endometrial   stripe is somewhat thickened at 14.3 mm. There is an isoechoic nonspecific lesion right ovary could represent mildly   complicated cyst.      No free fluid. However, correlation with beta HCG is recommended. When beta HCG is greater   than 3000 and uterus is empty ectopic is 70 times more likely than an   intrauterine gestation. Serial beta HCG recommended and beta HCG should increase by 1.5-2 times over   2 days. Repeat ultrasound may be needed. If beta HCG is declining, beta HCG   should be followed to zero in all pregnancies of unknown location and in   pregnancies of early intrauterine pregnancy failure. US DUP ABD PEL RETRO SCROT COMPLETE   Final Result   There is no evidence for intrauterine gestation on this exam.  Endometrial   stripe is somewhat thickened at 14.3 mm. There is an isoechoic nonspecific lesion right ovary could represent mildly   complicated cyst.      No free fluid. However, correlation with beta HCG is recommended. When beta HCG is greater   than 3000 and uterus is empty ectopic is 70 times more likely than an   intrauterine gestation. Serial beta HCG recommended and beta HCG should increase by 1.5-2 times over   2 days. Repeat ultrasound may be needed. If beta HCG is declining, beta HCG   should be followed to zero in all pregnancies of unknown location and in   pregnancies of early intrauterine pregnancy failure.                ED BEDSIDE ULTRASOUND:   Performed by ED Physician - none    LABS:  Labs Reviewed   COMPREHENSIVE METABOLIC PANEL - Abnormal; Notable for the following components: Result Value    Anion Gap 8 (*)     All other components within normal limits   URINALYSIS WITH REFLEX TO CULTURE - Abnormal; Notable for the following components:    Blood, Urine TRACE (*)     All other components within normal limits   MICROSCOPIC URINALYSIS - Abnormal; Notable for the following components:    Bacteria, UA RARE (*)     RBC, UA 3-5 (*)     All other components within normal limits   POCT URINE PREGNANCY - Normal   HCG, QUANTITATIVE, PREGNANCY   LIPASE   CBC WITH AUTO DIFFERENTIAL   ABO/RH       All other labs were within normal range or not returned as of this dictation. EMERGENCY DEPARTMENT COURSE and DIFFERENTIAL DIAGNOSIS/MDM:   Vitals:    Vitals:    10/29/22 1503 10/29/22 1705   BP: 133/88 135/81   Pulse: 98 87   Resp: 20 14   Temp: 99.4 °F (37.4 °C)    TempSrc: Oral    SpO2: 96% 97%   Weight: 160 lb (72.6 kg)    Height: 5' 3\" (1.6 m)             MDM  Number of Diagnoses or Management Options  Abdominal pain during pregnancy in first trimester  Diagnosis management comments: Patient presented ED with complaint of low abdominal cramping which started yesterday for her, patient states she just found out that she was pregnant by a hCG quant by her OB/GYN, she states a level at that time was 15. Denies any vaginal bleeding or discharge, she denies any acute injury. States no previous complications with her pregnancies. She appears in no acute distress. Ultrasound was completed ED on today's visit. Which shows no evidence for intrauterine pregnancy endometrial stripe somewhat thickened at 14.3 mm beta-hCG today 13.7 I believe this is just an early pregnancy and is patient, she was advised to follow-up with her OB/GYN in next 3 to 4 days, for repeat quant levels, patient was also advised if she has any worsening or change symptoms to return to the emerge department for reevaluation.         CRITICAL CARE TIME   Total Critical Care time was 0 minutes, excluding separately reportableprocedures. There was a high probability of clinicallysignificant/life threatening deterioration in the patient's condition which required my urgent intervention. CONSULTS:  None    PROCEDURES:  Unless otherwise noted below, none     Procedures    FINAL IMPRESSION      1.  Abdominal pain during pregnancy in first trimester          DISPOSITION/PLAN   DISPOSITION Decision To Discharge 10/29/2022 05:51:14 PM      PATIENT REFERRED TO:  Liz Culp  [address] [phone]    In 1 week      DISCHARGE MEDICATIONS:  Discharge Medication List as of 10/29/2022  5:54 PM             (Please note that portions of this note were completed with a voice recognition program.  Efforts were made to edit the dictations but occasionally words are mis-transcribed.)    Noa Ortiz PA-C (electronically signed)  Attending Emergency Physician         Noa Ortiz PA-C  10/30/22 5973

## 2022-12-25 ENCOUNTER — HOSPITAL ENCOUNTER (EMERGENCY)
Age: 27
Discharge: LEFT BEFORE DISCHARGE | End: 2022-12-25

## 2022-12-25 VITALS
SYSTOLIC BLOOD PRESSURE: 115 MMHG | WEIGHT: 160 LBS | TEMPERATURE: 98.3 F | BODY MASS INDEX: 28.35 KG/M2 | OXYGEN SATURATION: 100 % | HEIGHT: 63 IN | HEART RATE: 74 BPM | DIASTOLIC BLOOD PRESSURE: 75 MMHG | RESPIRATION RATE: 18 BRPM

## 2022-12-25 ASSESSMENT — PAIN - FUNCTIONAL ASSESSMENT: PAIN_FUNCTIONAL_ASSESSMENT: NONE - DENIES PAIN

## 2022-12-25 NOTE — ED NOTES
Pt states she used to take medication for her anxiety years ago but stopped because she felt she was too dependant on medication.   Pt states she is feeling normal again except for a little tingling that is still in her chest.      Brit Rivero RN  12/25/22 3480

## 2022-12-25 NOTE — ED NOTES
Pt left AMA because she hasn't been treated for stroke like symptoms, while I was talking to her I was also looking for S/S of a stroke and there was none. Pt walked out talking clearly while complaining and had perfect gait.      Herman Diez RN  12/25/22 4677

## 2023-07-06 ENCOUNTER — HOSPITAL ENCOUNTER (EMERGENCY)
Age: 28
Discharge: HOME | End: 2023-07-06
Payer: COMMERCIAL

## 2023-07-06 VITALS
RESPIRATION RATE: 18 BRPM | TEMPERATURE: 98.8 F | DIASTOLIC BLOOD PRESSURE: 95 MMHG | WEIGHT: 160 LBS | OXYGEN SATURATION: 98 % | HEIGHT: 63 IN | HEART RATE: 104 BPM | SYSTOLIC BLOOD PRESSURE: 137 MMHG | BODY MASS INDEX: 28.35 KG/M2

## 2023-07-06 DIAGNOSIS — F41.1 ANXIETY STATE: Primary | ICD-10-CM

## 2023-07-06 DIAGNOSIS — F10.11 ALCOHOL ABUSE, IN REMISSION: ICD-10-CM

## 2023-07-06 PROCEDURE — 99283 EMERGENCY DEPT VISIT LOW MDM: CPT

## 2023-07-06 PROCEDURE — 6370000000 HC RX 637 (ALT 250 FOR IP)

## 2023-07-06 RX ORDER — LORAZEPAM 0.5 MG/1
1 TABLET ORAL ONCE
Status: COMPLETED | OUTPATIENT
Start: 2023-07-06 | End: 2023-07-06

## 2023-07-06 RX ORDER — SERTRALINE HYDROCHLORIDE 100 MG/1
100 TABLET, FILM COATED ORAL DAILY
Qty: 30 TABLET | Refills: 2 | COMMUNITY
Start: 2023-06-22 | End: 2023-09-20

## 2023-07-06 RX ORDER — CHLORDIAZEPOXIDE HYDROCHLORIDE 25 MG/1
25 CAPSULE, GELATIN COATED ORAL 3 TIMES DAILY PRN
Qty: 21 CAPSULE | Refills: 0 | Status: SHIPPED | OUTPATIENT
Start: 2023-07-06 | End: 2023-07-13

## 2023-07-06 RX ORDER — HYDROXYZINE HYDROCHLORIDE 25 MG/1
1 TABLET, FILM COATED ORAL 3 TIMES DAILY PRN
COMMUNITY
Start: 2023-06-22

## 2023-07-06 RX ADMIN — LORAZEPAM 1 MG: 0.5 TABLET ORAL at 16:20

## 2023-07-06 ASSESSMENT — ENCOUNTER SYMPTOMS
PHOTOPHOBIA: 0
SHORTNESS OF BREATH: 0
NAUSEA: 0
COUGH: 0
BACK PAIN: 0
ABDOMINAL PAIN: 0
DIARRHEA: 0
VOMITING: 0

## 2023-07-06 ASSESSMENT — PAIN - FUNCTIONAL ASSESSMENT: PAIN_FUNCTIONAL_ASSESSMENT: NONE - DENIES PAIN

## 2023-07-06 NOTE — ED TRIAGE NOTES
The patient came to the ED for panic attack and anxiety. Pt states she took her hydralazine PTA. Pt states she usually drinks vodka daily but has not drank in 3 days. A&Ox4. Respirations even and unlabored.

## 2023-08-22 ENCOUNTER — HOSPITAL ENCOUNTER (EMERGENCY)
Age: 28
Discharge: HOME | End: 2023-08-22
Attending: EMERGENCY MEDICINE
Payer: COMMERCIAL

## 2023-08-22 ENCOUNTER — APPOINTMENT (OUTPATIENT)
Dept: CT IMAGING | Age: 28
End: 2023-08-22
Payer: COMMERCIAL

## 2023-08-22 ENCOUNTER — APPOINTMENT (OUTPATIENT)
Dept: ULTRASOUND IMAGING | Age: 28
End: 2023-08-22
Payer: COMMERCIAL

## 2023-08-22 VITALS
TEMPERATURE: 98 F | HEIGHT: 63 IN | DIASTOLIC BLOOD PRESSURE: 91 MMHG | OXYGEN SATURATION: 99 % | HEART RATE: 90 BPM | BODY MASS INDEX: 24.8 KG/M2 | RESPIRATION RATE: 16 BRPM | SYSTOLIC BLOOD PRESSURE: 130 MMHG | WEIGHT: 140 LBS

## 2023-08-22 DIAGNOSIS — N30.00 ACUTE CYSTITIS WITHOUT HEMATURIA: Primary | ICD-10-CM

## 2023-08-22 DIAGNOSIS — K85.90 ACUTE PANCREATITIS, UNSPECIFIED COMPLICATION STATUS, UNSPECIFIED PANCREATITIS TYPE: ICD-10-CM

## 2023-08-22 LAB
ALBUMIN SERPL-MCNC: 4.4 G/DL (ref 3.5–4.6)
ALP SERPL-CCNC: 92 U/L (ref 40–130)
ALT SERPL-CCNC: 23 U/L (ref 0–33)
ANION GAP SERPL CALCULATED.3IONS-SCNC: 16 MEQ/L (ref 9–15)
AST SERPL-CCNC: 55 U/L (ref 0–35)
BACTERIA URNS QL MICRO: ABNORMAL /HPF
BASOPHILS # BLD: 0.1 K/UL (ref 0–0.2)
BASOPHILS NFR BLD: 0.7 %
BILIRUB DIRECT SERPL-MCNC: 0.3 MG/DL (ref 0–0.4)
BILIRUB SERPL-MCNC: 1 MG/DL (ref 0.2–0.7)
BILIRUB UR QL STRIP: NEGATIVE
BUN SERPL-MCNC: 4 MG/DL (ref 6–20)
CALCIUM SERPL-MCNC: 8.8 MG/DL (ref 8.5–9.9)
CHLORIDE SERPL-SCNC: 98 MEQ/L (ref 95–107)
CLARITY UR: ABNORMAL
CO2 SERPL-SCNC: 22 MEQ/L (ref 20–31)
COLOR UR: ABNORMAL
CREAT SERPL-MCNC: 0.59 MG/DL (ref 0.5–0.9)
EOSINOPHIL # BLD: 0.1 K/UL (ref 0–0.7)
EOSINOPHIL NFR BLD: 1 %
EPI CELLS #/AREA URNS AUTO: ABNORMAL /HPF (ref 0–5)
ERYTHROCYTE [DISTWIDTH] IN BLOOD BY AUTOMATED COUNT: 15.2 % (ref 11.5–14.5)
GLOBULIN SER CALC-MCNC: 3 G/DL (ref 2.3–3.5)
GLUCOSE SERPL-MCNC: 111 MG/DL (ref 70–99)
GLUCOSE UR STRIP-MCNC: NEGATIVE MG/DL
HCT VFR BLD AUTO: 39.7 % (ref 37–47)
HGB BLD-MCNC: 13.6 G/DL (ref 12–16)
HGB UR QL STRIP: NEGATIVE
HYALINE CASTS #/AREA URNS AUTO: ABNORMAL /HPF (ref 0–5)
KETONES UR STRIP-MCNC: NEGATIVE MG/DL
LEUKOCYTE ESTERASE UR QL STRIP: ABNORMAL
LIPASE SERPL-CCNC: 177 U/L (ref 12–95)
LYMPHOCYTES # BLD: 1.5 K/UL (ref 1–4.8)
LYMPHOCYTES NFR BLD: 17.7 %
MCH RBC QN AUTO: 30 PG (ref 27–31.3)
MCHC RBC AUTO-ENTMCNC: 34.1 % (ref 33–37)
MCV RBC AUTO: 88 FL (ref 79.4–94.8)
MONOCYTES # BLD: 0.3 K/UL (ref 0.2–0.8)
MONOCYTES NFR BLD: 4.1 %
NEUTROPHILS # BLD: 6.5 K/UL (ref 1.4–6.5)
NEUTS SEG NFR BLD: 76.5 %
NITRITE UR QL STRIP: NEGATIVE
PH UR STRIP: 6 [PH] (ref 5–9)
PLATELET # BLD AUTO: 232 K/UL (ref 130–400)
POC CREATININE WHOLE BLOOD: 0.7
POTASSIUM SERPL-SCNC: 3.4 MEQ/L (ref 3.4–4.9)
PROT SERPL-MCNC: 7.4 G/DL (ref 6.3–8)
PROT UR STRIP-MCNC: NEGATIVE MG/DL
RBC # BLD AUTO: 4.51 M/UL (ref 4.2–5.4)
RBC #/AREA URNS AUTO: ABNORMAL /HPF (ref 0–5)
SODIUM SERPL-SCNC: 136 MEQ/L (ref 135–144)
SP GR UR STRIP: 1.01 (ref 1–1.03)
URINE REFLEX TO CULTURE: ABNORMAL
UROBILINOGEN UR STRIP-ACNC: 0.2 E.U./DL
WBC # BLD AUTO: 8.4 K/UL (ref 4.8–10.8)
WBC #/AREA URNS AUTO: ABNORMAL /HPF (ref 0–5)

## 2023-08-22 PROCEDURE — 99285 EMERGENCY DEPT VISIT HI MDM: CPT

## 2023-08-22 PROCEDURE — 96375 TX/PRO/DX INJ NEW DRUG ADDON: CPT

## 2023-08-22 PROCEDURE — 96374 THER/PROPH/DIAG INJ IV PUSH: CPT

## 2023-08-22 PROCEDURE — 36415 COLL VENOUS BLD VENIPUNCTURE: CPT

## 2023-08-22 PROCEDURE — 85025 COMPLETE CBC W/AUTO DIFF WBC: CPT

## 2023-08-22 PROCEDURE — 83690 ASSAY OF LIPASE: CPT

## 2023-08-22 PROCEDURE — 6360000002 HC RX W HCPCS: Performed by: EMERGENCY MEDICINE

## 2023-08-22 PROCEDURE — 6360000004 HC RX CONTRAST MEDICATION: Performed by: EMERGENCY MEDICINE

## 2023-08-22 PROCEDURE — 74177 CT ABD & PELVIS W/CONTRAST: CPT

## 2023-08-22 PROCEDURE — 2580000003 HC RX 258: Performed by: EMERGENCY MEDICINE

## 2023-08-22 PROCEDURE — 81001 URINALYSIS AUTO W/SCOPE: CPT

## 2023-08-22 PROCEDURE — 80053 COMPREHEN METABOLIC PANEL: CPT

## 2023-08-22 PROCEDURE — 76705 ECHO EXAM OF ABDOMEN: CPT

## 2023-08-22 PROCEDURE — 2500000003 HC RX 250 WO HCPCS: Performed by: EMERGENCY MEDICINE

## 2023-08-22 PROCEDURE — A4216 STERILE WATER/SALINE, 10 ML: HCPCS | Performed by: EMERGENCY MEDICINE

## 2023-08-22 PROCEDURE — 82248 BILIRUBIN DIRECT: CPT

## 2023-08-22 RX ORDER — MORPHINE SULFATE 4 MG/ML
4 INJECTION, SOLUTION INTRAMUSCULAR; INTRAVENOUS ONCE
Status: COMPLETED | OUTPATIENT
Start: 2023-08-22 | End: 2023-08-22

## 2023-08-22 RX ORDER — METOCLOPRAMIDE HYDROCHLORIDE 5 MG/ML
10 INJECTION INTRAMUSCULAR; INTRAVENOUS ONCE
Status: COMPLETED | OUTPATIENT
Start: 2023-08-22 | End: 2023-08-22

## 2023-08-22 RX ORDER — PROMETHAZINE HYDROCHLORIDE 25 MG/1
25 SUPPOSITORY RECTAL 2 TIMES DAILY PRN
Qty: 15 SUPPOSITORY | Refills: 0 | Status: SHIPPED | OUTPATIENT
Start: 2023-08-22 | End: 2023-08-29

## 2023-08-22 RX ORDER — ONDANSETRON 2 MG/ML
4 INJECTION INTRAMUSCULAR; INTRAVENOUS ONCE
Status: COMPLETED | OUTPATIENT
Start: 2023-08-22 | End: 2023-08-22

## 2023-08-22 RX ORDER — OXYCODONE HYDROCHLORIDE AND ACETAMINOPHEN 5; 325 MG/1; MG/1
1 TABLET ORAL ONCE
Status: DISCONTINUED | OUTPATIENT
Start: 2023-08-22 | End: 2023-08-22 | Stop reason: HOSPADM

## 2023-08-22 RX ORDER — NITROFURANTOIN 25; 75 MG/1; MG/1
100 CAPSULE ORAL 2 TIMES DAILY
Qty: 10 CAPSULE | Refills: 0 | Status: SHIPPED | OUTPATIENT
Start: 2023-08-22 | End: 2023-08-27

## 2023-08-22 RX ORDER — KETOROLAC TROMETHAMINE 15 MG/ML
15 INJECTION, SOLUTION INTRAMUSCULAR; INTRAVENOUS ONCE
Status: COMPLETED | OUTPATIENT
Start: 2023-08-22 | End: 2023-08-22

## 2023-08-22 RX ORDER — OXYCODONE HYDROCHLORIDE AND ACETAMINOPHEN 5; 325 MG/1; MG/1
1 TABLET ORAL EVERY 8 HOURS PRN
Qty: 12 TABLET | Refills: 0 | Status: SHIPPED | OUTPATIENT
Start: 2023-08-22 | End: 2023-08-26

## 2023-08-22 RX ORDER — ONDANSETRON 4 MG/1
4 TABLET, ORALLY DISINTEGRATING ORAL EVERY 8 HOURS PRN
Qty: 15 TABLET | Refills: 0 | Status: SHIPPED | OUTPATIENT
Start: 2023-08-22

## 2023-08-22 RX ORDER — 0.9 % SODIUM CHLORIDE 0.9 %
1000 INTRAVENOUS SOLUTION INTRAVENOUS ONCE
Status: COMPLETED | OUTPATIENT
Start: 2023-08-22 | End: 2023-08-22

## 2023-08-22 RX ADMIN — SODIUM CHLORIDE 1000 ML: 9 INJECTION, SOLUTION INTRAVENOUS at 20:35

## 2023-08-22 RX ADMIN — METOCLOPRAMIDE HYDROCHLORIDE 10 MG: 5 INJECTION INTRAMUSCULAR; INTRAVENOUS at 21:01

## 2023-08-22 RX ADMIN — ONDANSETRON 4 MG: 2 INJECTION INTRAMUSCULAR; INTRAVENOUS at 19:56

## 2023-08-22 RX ADMIN — IOPAMIDOL 50 ML: 612 INJECTION, SOLUTION INTRAVENOUS at 20:35

## 2023-08-22 RX ADMIN — KETOROLAC TROMETHAMINE 15 MG: 15 INJECTION, SOLUTION INTRAMUSCULAR; INTRAVENOUS at 21:01

## 2023-08-22 RX ADMIN — FAMOTIDINE 20 MG: 10 INJECTION, SOLUTION INTRAVENOUS at 21:01

## 2023-08-22 RX ADMIN — MORPHINE SULFATE 4 MG: 4 INJECTION INTRAVENOUS at 21:00

## 2023-08-22 ASSESSMENT — PAIN DESCRIPTION - DESCRIPTORS: DESCRIPTORS: SHARP

## 2023-08-22 ASSESSMENT — ENCOUNTER SYMPTOMS
VOMITING: 1
PHOTOPHOBIA: 0
ABDOMINAL PAIN: 1
SHORTNESS OF BREATH: 0

## 2023-08-22 ASSESSMENT — PAIN DESCRIPTION - PAIN TYPE: TYPE: ACUTE PAIN

## 2023-08-22 ASSESSMENT — PAIN DESCRIPTION - LOCATION
LOCATION: ABDOMEN
LOCATION: FLANK

## 2023-08-22 ASSESSMENT — PAIN SCALES - GENERAL
PAINLEVEL_OUTOF10: 8
PAINLEVEL_OUTOF10: 6

## 2023-08-22 ASSESSMENT — PAIN DESCRIPTION - FREQUENCY: FREQUENCY: CONTINUOUS

## 2023-08-22 ASSESSMENT — LIFESTYLE VARIABLES: HOW MANY STANDARD DRINKS CONTAINING ALCOHOL DO YOU HAVE ON A TYPICAL DAY: PATIENT DOES NOT DRINK

## 2023-08-22 ASSESSMENT — PAIN DESCRIPTION - ONSET: ONSET: ON-GOING

## 2023-08-22 ASSESSMENT — PAIN - FUNCTIONAL ASSESSMENT: PAIN_FUNCTIONAL_ASSESSMENT: 0-10

## 2023-08-22 NOTE — ED PROVIDER NOTES
Contrast? None    (Results Pending)     Per statrad normal appendix    ED BEDSIDE ULTRASOUND:   Performed by ED Physician - none    LABS:  Labs Reviewed   URINALYSIS WITH REFLEX TO CULTURE - Abnormal; Notable for the following components:       Result Value    Color, UA ORANGE (*)     Clarity, UA CLOUDY (*)     Leukocyte Esterase, Urine TRACE (*)     All other components within normal limits   MICROSCOPIC URINALYSIS - Abnormal; Notable for the following components:    Bacteria, UA FEW (*)     WBC, UA 6-9 (*)     All other components within normal limits   CBC WITH AUTO DIFFERENTIAL - Abnormal; Notable for the following components:    RDW 15.2 (*)     All other components within normal limits   COMPREHENSIVE METABOLIC PANEL - Abnormal; Notable for the following components:    Anion Gap 16 (*)     Glucose 111 (*)     BUN 4 (*)     Total Bilirubin 1.0 (*)     AST 55 (*)     All other components within normal limits   LIPASE - Abnormal; Notable for the following components:    Lipase 177 (*)     All other components within normal limits   POCT CREATININE - Normal   BILIRUBIN, DIRECT   POC PREGNANCY UR-QUAL       All other labs were within normal range or not returned as of this dictation. EMERGENCY DEPARTMENT COURSE and DIFFERENTIAL DIAGNOSIS/MDM:   Vitals:    Vitals:    08/22/23 1825 08/22/23 2107 08/22/23 2108 08/22/23 2110   BP: (!) 148/102 (!) 144/106     Pulse:    90   Resp:       Temp:       TempSrc:       SpO2:  98% 99%    Weight:       Height:             No leukocytosis. Labs consistent with possible dehydration. No transaminitis however bilirubin and AST are minimally elevated. Lipase elevation. Possible UTI. Direct bili WNL, doubt obstructive process  Medical Decision Making  Amount and/or Complexity of Data Reviewed  Labs: ordered. Radiology: ordered. Risk  Prescription drug management. Patient presents emergency department with flank pain, abdominal discomfort.   IV placed, labs sent, given IV

## 2023-08-22 NOTE — ED TRIAGE NOTES
Patient present to the ED from home with flank pain that started on yesterday, patient report N/V also. Patient is A/O x 4 during triage. Patient is A/O x 4 during triage.

## 2023-08-23 LAB
PERFORMED ON: NORMAL
POC CREATININE: 0.7 MG/DL (ref 0.6–1.2)
POC SAMPLE TYPE: NORMAL

## 2023-08-23 NOTE — ED NOTES
Patient D/C instructions have been reviewed, patient is to follow up with PCP and Gastro   Patient voiced understanding, no questions or concerns noted at this time.         Danyelle Chu  08/22/23 8546

## 2023-10-08 ENCOUNTER — APPOINTMENT (OUTPATIENT)
Dept: GENERAL RADIOLOGY | Age: 28
DRG: 282 | End: 2023-10-08
Payer: COMMERCIAL

## 2023-10-08 ENCOUNTER — APPOINTMENT (OUTPATIENT)
Dept: CT IMAGING | Age: 28
DRG: 282 | End: 2023-10-08
Payer: COMMERCIAL

## 2023-10-08 ENCOUNTER — HOSPITAL ENCOUNTER (INPATIENT)
Age: 28
LOS: 1 days | Discharge: HOME | DRG: 282 | End: 2023-10-09
Attending: STUDENT IN AN ORGANIZED HEALTH CARE EDUCATION/TRAINING PROGRAM | Admitting: STUDENT IN AN ORGANIZED HEALTH CARE EDUCATION/TRAINING PROGRAM
Payer: COMMERCIAL

## 2023-10-08 DIAGNOSIS — R18.8 OTHER ASCITES: ICD-10-CM

## 2023-10-08 DIAGNOSIS — K85.90 ACUTE PANCREATITIS, UNSPECIFIED COMPLICATION STATUS, UNSPECIFIED PANCREATITIS TYPE: Primary | ICD-10-CM

## 2023-10-08 DIAGNOSIS — J90 PLEURAL EFFUSION, BILATERAL: ICD-10-CM

## 2023-10-08 LAB
ALBUMIN SERPL-MCNC: 3.6 G/DL (ref 3.5–4.6)
ALP SERPL-CCNC: 106 U/L (ref 40–130)
ALT SERPL-CCNC: 22 U/L (ref 0–33)
ANION GAP SERPL CALCULATED.3IONS-SCNC: 16 MEQ/L (ref 9–15)
AST SERPL-CCNC: 42 U/L (ref 0–35)
BACTERIA URNS QL MICRO: NEGATIVE /HPF
BASOPHILS # BLD: 0 K/UL (ref 0–0.2)
BASOPHILS NFR BLD: 0.2 %
BILIRUB SERPL-MCNC: 0.5 MG/DL (ref 0.2–0.7)
BILIRUB UR QL STRIP: NEGATIVE
BUN SERPL-MCNC: 4 MG/DL (ref 6–20)
CALCIUM SERPL-MCNC: 8.1 MG/DL (ref 8.5–9.9)
CHLORIDE SERPL-SCNC: 96 MEQ/L (ref 95–107)
CLARITY UR: CLEAR
CO2 SERPL-SCNC: 21 MEQ/L (ref 20–31)
COLOR UR: YELLOW
CREAT SERPL-MCNC: 0.45 MG/DL (ref 0.5–0.9)
EOSINOPHIL # BLD: 0.2 K/UL (ref 0–0.7)
EOSINOPHIL NFR BLD: 1.7 %
EPI CELLS #/AREA URNS AUTO: ABNORMAL /HPF (ref 0–5)
ERYTHROCYTE [DISTWIDTH] IN BLOOD BY AUTOMATED COUNT: 15.8 % (ref 11.5–14.5)
ETHANOL PERCENT: NORMAL G/DL
ETHANOLAMINE SERPL-MCNC: <10 MG/DL (ref 0–0.08)
GLOBULIN SER CALC-MCNC: 2.9 G/DL (ref 2.3–3.5)
GLUCOSE SERPL-MCNC: 77 MG/DL (ref 70–99)
GLUCOSE UR STRIP-MCNC: NEGATIVE MG/DL
HCG, URINE, POC: NEGATIVE
HCT VFR BLD AUTO: 39.1 % (ref 37–47)
HGB BLD-MCNC: 12.9 G/DL (ref 12–16)
HGB UR QL STRIP: ABNORMAL
HYALINE CASTS #/AREA URNS AUTO: ABNORMAL /HPF (ref 0–5)
KETONES UR STRIP-MCNC: 15 MG/DL
LACTATE BLDV-SCNC: 1 MMOL/L (ref 0.5–2.2)
LEUKOCYTE ESTERASE UR QL STRIP: NEGATIVE
LIPASE SERPL-CCNC: 833 U/L (ref 12–95)
LYMPHOCYTES # BLD: 1.2 K/UL (ref 1–4.8)
LYMPHOCYTES NFR BLD: 8.6 %
Lab: NORMAL
MAGNESIUM SERPL-MCNC: 1.9 MG/DL (ref 1.7–2.4)
MCH RBC QN AUTO: 30.7 PG (ref 27–31.3)
MCHC RBC AUTO-ENTMCNC: 33 % (ref 33–37)
MCV RBC AUTO: 93.1 FL (ref 79.4–94.8)
MONOCYTES # BLD: 0.6 K/UL (ref 0.2–0.8)
MONOCYTES NFR BLD: 4.2 %
NEGATIVE QC PASS/FAIL: NORMAL
NEUTROPHILS # BLD: 11.3 K/UL (ref 1.4–6.5)
NEUTS SEG NFR BLD: 84.8 %
NITRITE UR QL STRIP: NEGATIVE
PH UR STRIP: 6.5 [PH] (ref 5–9)
PLATELET # BLD AUTO: 200 K/UL (ref 130–400)
POC CREATININE WHOLE BLOOD: 0.4
POSITIVE QC PASS/FAIL: NORMAL
POTASSIUM SERPL-SCNC: 3.2 MEQ/L (ref 3.4–4.9)
PROT SERPL-MCNC: 6.5 G/DL (ref 6.3–8)
PROT UR STRIP-MCNC: NEGATIVE MG/DL
RBC # BLD AUTO: 4.2 M/UL (ref 4.2–5.4)
RBC #/AREA URNS AUTO: ABNORMAL /HPF (ref 0–5)
SODIUM SERPL-SCNC: 133 MEQ/L (ref 135–144)
SP GR UR STRIP: 1 (ref 1–1.03)
TRIGL SERPL-MCNC: 85 MG/DL (ref 0–150)
TROPONIN, HIGH SENSITIVITY: <6 NG/L (ref 0–19)
TROPONIN, HIGH SENSITIVITY: <6 NG/L (ref 0–19)
URINE REFLEX TO CULTURE: ABNORMAL
UROBILINOGEN UR STRIP-ACNC: 0.2 E.U./DL
WBC # BLD AUTO: 13.3 K/UL (ref 4.8–10.8)
WBC #/AREA URNS AUTO: ABNORMAL /HPF (ref 0–5)

## 2023-10-08 PROCEDURE — 2580000003 HC RX 258: Performed by: STUDENT IN AN ORGANIZED HEALTH CARE EDUCATION/TRAINING PROGRAM

## 2023-10-08 PROCEDURE — A4216 STERILE WATER/SALINE, 10 ML: HCPCS | Performed by: STUDENT IN AN ORGANIZED HEALTH CARE EDUCATION/TRAINING PROGRAM

## 2023-10-08 PROCEDURE — 83605 ASSAY OF LACTIC ACID: CPT

## 2023-10-08 PROCEDURE — 36415 COLL VENOUS BLD VENIPUNCTURE: CPT

## 2023-10-08 PROCEDURE — 83735 ASSAY OF MAGNESIUM: CPT

## 2023-10-08 PROCEDURE — 74177 CT ABD & PELVIS W/CONTRAST: CPT

## 2023-10-08 PROCEDURE — 2060000000 HC ICU INTERMEDIATE R&B

## 2023-10-08 PROCEDURE — 6370000000 HC RX 637 (ALT 250 FOR IP): Performed by: STUDENT IN AN ORGANIZED HEALTH CARE EDUCATION/TRAINING PROGRAM

## 2023-10-08 PROCEDURE — 93005 ELECTROCARDIOGRAM TRACING: CPT | Performed by: STUDENT IN AN ORGANIZED HEALTH CARE EDUCATION/TRAINING PROGRAM

## 2023-10-08 PROCEDURE — 82077 ASSAY SPEC XCP UR&BREATH IA: CPT

## 2023-10-08 PROCEDURE — 6360000002 HC RX W HCPCS: Performed by: STUDENT IN AN ORGANIZED HEALTH CARE EDUCATION/TRAINING PROGRAM

## 2023-10-08 PROCEDURE — 96365 THER/PROPH/DIAG IV INF INIT: CPT

## 2023-10-08 PROCEDURE — 81001 URINALYSIS AUTO W/SCOPE: CPT

## 2023-10-08 PROCEDURE — 80053 COMPREHEN METABOLIC PANEL: CPT

## 2023-10-08 PROCEDURE — 84478 ASSAY OF TRIGLYCERIDES: CPT

## 2023-10-08 PROCEDURE — 96361 HYDRATE IV INFUSION ADD-ON: CPT

## 2023-10-08 PROCEDURE — 2500000003 HC RX 250 WO HCPCS: Performed by: STUDENT IN AN ORGANIZED HEALTH CARE EDUCATION/TRAINING PROGRAM

## 2023-10-08 PROCEDURE — 83690 ASSAY OF LIPASE: CPT

## 2023-10-08 PROCEDURE — 96375 TX/PRO/DX INJ NEW DRUG ADDON: CPT

## 2023-10-08 PROCEDURE — 96376 TX/PRO/DX INJ SAME DRUG ADON: CPT

## 2023-10-08 PROCEDURE — 85025 COMPLETE CBC W/AUTO DIFF WBC: CPT

## 2023-10-08 PROCEDURE — 96366 THER/PROPH/DIAG IV INF ADDON: CPT

## 2023-10-08 PROCEDURE — 99285 EMERGENCY DEPT VISIT HI MDM: CPT

## 2023-10-08 PROCEDURE — 6360000004 HC RX CONTRAST MEDICATION: Performed by: STUDENT IN AN ORGANIZED HEALTH CARE EDUCATION/TRAINING PROGRAM

## 2023-10-08 PROCEDURE — 84484 ASSAY OF TROPONIN QUANT: CPT

## 2023-10-08 PROCEDURE — 71045 X-RAY EXAM CHEST 1 VIEW: CPT

## 2023-10-08 PROCEDURE — C9113 INJ PANTOPRAZOLE SODIUM, VIA: HCPCS | Performed by: STUDENT IN AN ORGANIZED HEALTH CARE EDUCATION/TRAINING PROGRAM

## 2023-10-08 RX ORDER — MORPHINE SULFATE 4 MG/ML
4 INJECTION, SOLUTION INTRAMUSCULAR; INTRAVENOUS
Status: DISCONTINUED | OUTPATIENT
Start: 2023-10-08 | End: 2023-10-09

## 2023-10-08 RX ORDER — SODIUM CHLORIDE 0.9 % (FLUSH) 0.9 %
5-40 SYRINGE (ML) INJECTION EVERY 12 HOURS SCHEDULED
Status: DISCONTINUED | OUTPATIENT
Start: 2023-10-08 | End: 2023-10-09 | Stop reason: HOSPADM

## 2023-10-08 RX ORDER — SERTRALINE HYDROCHLORIDE 100 MG/1
100 TABLET, FILM COATED ORAL DAILY
Status: DISCONTINUED | OUTPATIENT
Start: 2023-10-08 | End: 2023-10-09 | Stop reason: HOSPADM

## 2023-10-08 RX ORDER — 0.9 % SODIUM CHLORIDE 0.9 %
1000 INTRAVENOUS SOLUTION INTRAVENOUS ONCE
Status: COMPLETED | OUTPATIENT
Start: 2023-10-08 | End: 2023-10-08

## 2023-10-08 RX ORDER — ONDANSETRON 2 MG/ML
4 INJECTION INTRAMUSCULAR; INTRAVENOUS EVERY 6 HOURS PRN
Status: DISCONTINUED | OUTPATIENT
Start: 2023-10-08 | End: 2023-10-09 | Stop reason: HOSPADM

## 2023-10-08 RX ORDER — SERTRALINE HYDROCHLORIDE 100 MG/1
100 TABLET, FILM COATED ORAL DAILY
Status: ON HOLD | COMMUNITY
End: 2023-10-09 | Stop reason: HOSPADM

## 2023-10-08 RX ORDER — ONDANSETRON 2 MG/ML
4 INJECTION INTRAMUSCULAR; INTRAVENOUS ONCE
Status: COMPLETED | OUTPATIENT
Start: 2023-10-08 | End: 2023-10-08

## 2023-10-08 RX ORDER — POTASSIUM CHLORIDE 7.45 MG/ML
10 INJECTION INTRAVENOUS
Status: COMPLETED | OUTPATIENT
Start: 2023-10-08 | End: 2023-10-08

## 2023-10-08 RX ORDER — HYDROMORPHONE HYDROCHLORIDE 1 MG/ML
1 INJECTION, SOLUTION INTRAMUSCULAR; INTRAVENOUS; SUBCUTANEOUS ONCE
Status: COMPLETED | OUTPATIENT
Start: 2023-10-08 | End: 2023-10-08

## 2023-10-08 RX ORDER — NICOTINE 21 MG/24HR
1 PATCH, TRANSDERMAL 24 HOURS TRANSDERMAL DAILY
Status: DISCONTINUED | OUTPATIENT
Start: 2023-10-08 | End: 2023-10-09 | Stop reason: HOSPADM

## 2023-10-08 RX ORDER — MORPHINE SULFATE 2 MG/ML
2 INJECTION, SOLUTION INTRAMUSCULAR; INTRAVENOUS ONCE
Status: COMPLETED | OUTPATIENT
Start: 2023-10-08 | End: 2023-10-08

## 2023-10-08 RX ORDER — FOLIC ACID 1 MG/1
1 TABLET ORAL DAILY
Status: DISCONTINUED | OUTPATIENT
Start: 2023-10-08 | End: 2023-10-09 | Stop reason: HOSPADM

## 2023-10-08 RX ORDER — ONDANSETRON 4 MG/1
4 TABLET, ORALLY DISINTEGRATING ORAL EVERY 8 HOURS PRN
Status: DISCONTINUED | OUTPATIENT
Start: 2023-10-08 | End: 2023-10-09 | Stop reason: HOSPADM

## 2023-10-08 RX ORDER — HYDROXYZINE HYDROCHLORIDE 25 MG/1
25 TABLET, FILM COATED ORAL 3 TIMES DAILY PRN
Status: DISCONTINUED | OUTPATIENT
Start: 2023-10-08 | End: 2023-10-09 | Stop reason: HOSPADM

## 2023-10-08 RX ORDER — SODIUM CHLORIDE 9 MG/ML
INJECTION, SOLUTION INTRAVENOUS PRN
Status: DISCONTINUED | OUTPATIENT
Start: 2023-10-08 | End: 2023-10-09 | Stop reason: HOSPADM

## 2023-10-08 RX ORDER — SODIUM CHLORIDE 0.9 % (FLUSH) 0.9 %
5-40 SYRINGE (ML) INJECTION PRN
Status: DISCONTINUED | OUTPATIENT
Start: 2023-10-08 | End: 2023-10-09 | Stop reason: HOSPADM

## 2023-10-08 RX ORDER — ENOXAPARIN SODIUM 100 MG/ML
40 INJECTION SUBCUTANEOUS DAILY
Status: DISCONTINUED | OUTPATIENT
Start: 2023-10-08 | End: 2023-10-09 | Stop reason: HOSPADM

## 2023-10-08 RX ORDER — MORPHINE SULFATE 4 MG/ML
4 INJECTION, SOLUTION INTRAMUSCULAR; INTRAVENOUS ONCE
Status: COMPLETED | OUTPATIENT
Start: 2023-10-08 | End: 2023-10-08

## 2023-10-08 RX ORDER — POTASSIUM CHLORIDE 7.45 MG/ML
10 INJECTION INTRAVENOUS PRN
Status: DISCONTINUED | OUTPATIENT
Start: 2023-10-08 | End: 2023-10-09 | Stop reason: HOSPADM

## 2023-10-08 RX ORDER — SODIUM CHLORIDE, SODIUM LACTATE, POTASSIUM CHLORIDE, CALCIUM CHLORIDE 600; 310; 30; 20 MG/100ML; MG/100ML; MG/100ML; MG/100ML
INJECTION, SOLUTION INTRAVENOUS CONTINUOUS
Status: ACTIVE | OUTPATIENT
Start: 2023-10-08 | End: 2023-10-09

## 2023-10-08 RX ORDER — SODIUM CHLORIDE, SODIUM LACTATE, POTASSIUM CHLORIDE, CALCIUM CHLORIDE 600; 310; 30; 20 MG/100ML; MG/100ML; MG/100ML; MG/100ML
INJECTION, SOLUTION INTRAVENOUS CONTINUOUS
Status: DISCONTINUED | OUTPATIENT
Start: 2023-10-09 | End: 2023-10-09 | Stop reason: HOSPADM

## 2023-10-08 RX ORDER — MORPHINE SULFATE 2 MG/ML
2 INJECTION, SOLUTION INTRAMUSCULAR; INTRAVENOUS
Status: DISCONTINUED | OUTPATIENT
Start: 2023-10-08 | End: 2023-10-09

## 2023-10-08 RX ORDER — POTASSIUM CHLORIDE 20 MEQ/1
40 TABLET, EXTENDED RELEASE ORAL PRN
Status: DISCONTINUED | OUTPATIENT
Start: 2023-10-08 | End: 2023-10-09 | Stop reason: HOSPADM

## 2023-10-08 RX ORDER — LANOLIN ALCOHOL/MO/W.PET/CERES
100 CREAM (GRAM) TOPICAL DAILY
Status: DISCONTINUED | OUTPATIENT
Start: 2023-10-08 | End: 2023-10-09 | Stop reason: HOSPADM

## 2023-10-08 RX ADMIN — ONDANSETRON 4 MG: 4 TABLET, ORALLY DISINTEGRATING ORAL at 15:44

## 2023-10-08 RX ADMIN — IOPAMIDOL 50 ML: 612 INJECTION, SOLUTION INTRAVENOUS at 10:24

## 2023-10-08 RX ADMIN — SODIUM CHLORIDE 1000 ML: 9 INJECTION, SOLUTION INTRAVENOUS at 09:37

## 2023-10-08 RX ADMIN — POTASSIUM CHLORIDE 10 MEQ: 7.46 INJECTION, SOLUTION INTRAVENOUS at 10:07

## 2023-10-08 RX ADMIN — SODIUM CHLORIDE, PRESERVATIVE FREE 10 ML: 5 INJECTION INTRAVENOUS at 21:21

## 2023-10-08 RX ADMIN — HYDROXYZINE HYDROCHLORIDE 25 MG: 25 TABLET, FILM COATED ORAL at 15:45

## 2023-10-08 RX ADMIN — Medication 100 MG: at 15:44

## 2023-10-08 RX ADMIN — MORPHINE SULFATE 2 MG: 2 INJECTION, SOLUTION INTRAMUSCULAR; INTRAVENOUS at 09:34

## 2023-10-08 RX ADMIN — PANTOPRAZOLE SODIUM 40 MG: 40 INJECTION, POWDER, FOR SOLUTION INTRAVENOUS at 12:56

## 2023-10-08 RX ADMIN — FOLIC ACID 1 MG: 1 TABLET ORAL at 14:11

## 2023-10-08 RX ADMIN — MORPHINE SULFATE 4 MG: 4 INJECTION, SOLUTION INTRAMUSCULAR; INTRAVENOUS at 21:21

## 2023-10-08 RX ADMIN — HYDROMORPHONE HYDROCHLORIDE 1 MG: 1 INJECTION, SOLUTION INTRAMUSCULAR; INTRAVENOUS; SUBCUTANEOUS at 11:52

## 2023-10-08 RX ADMIN — MORPHINE SULFATE 4 MG: 4 INJECTION, SOLUTION INTRAMUSCULAR; INTRAVENOUS at 08:49

## 2023-10-08 RX ADMIN — FAMOTIDINE 20 MG: 10 INJECTION, SOLUTION INTRAVENOUS at 10:48

## 2023-10-08 RX ADMIN — MORPHINE SULFATE 4 MG: 4 INJECTION, SOLUTION INTRAMUSCULAR; INTRAVENOUS at 15:43

## 2023-10-08 RX ADMIN — SERTRALINE 100 MG: 100 TABLET, FILM COATED ORAL at 15:44

## 2023-10-08 RX ADMIN — ONDANSETRON 4 MG: 2 INJECTION INTRAMUSCULAR; INTRAVENOUS at 08:49

## 2023-10-08 RX ADMIN — POTASSIUM CHLORIDE 10 MEQ: 7.46 INJECTION, SOLUTION INTRAVENOUS at 11:59

## 2023-10-08 RX ADMIN — MORPHINE SULFATE 4 MG: 4 INJECTION, SOLUTION INTRAMUSCULAR; INTRAVENOUS at 18:31

## 2023-10-08 ASSESSMENT — PAIN SCALES - GENERAL
PAINLEVEL_OUTOF10: 10
PAINLEVEL_OUTOF10: 10
PAINLEVEL_OUTOF10: 7
PAINLEVEL_OUTOF10: 10

## 2023-10-08 ASSESSMENT — PAIN DESCRIPTION - LOCATION
LOCATION: ABDOMEN
LOCATION: ABDOMEN;BACK
LOCATION: ABDOMEN;BACK
LOCATION: ABDOMEN

## 2023-10-08 ASSESSMENT — ENCOUNTER SYMPTOMS
COUGH: 0
CONSTIPATION: 0
VOMITING: 0
DIARRHEA: 0
BACK PAIN: 1
NAUSEA: 1
WHEEZING: 0
BLOOD IN STOOL: 0
ABDOMINAL PAIN: 1
ABDOMINAL DISTENTION: 1
CHEST TIGHTNESS: 0
ANAL BLEEDING: 0
CONSTIPATION: 1
PHOTOPHOBIA: 0
SHORTNESS OF BREATH: 0

## 2023-10-08 ASSESSMENT — PAIN DESCRIPTION - ORIENTATION
ORIENTATION: RIGHT;LEFT
ORIENTATION: OUTER
ORIENTATION: RIGHT;LEFT
ORIENTATION: RIGHT;LEFT

## 2023-10-08 ASSESSMENT — PAIN DESCRIPTION - DESCRIPTORS
DESCRIPTORS: BURNING
DESCRIPTORS: BURNING;STABBING
DESCRIPTORS: DISCOMFORT;BURNING
DESCRIPTORS: BURNING
DESCRIPTORS: ACHING

## 2023-10-08 ASSESSMENT — LIFESTYLE VARIABLES
HOW OFTEN DO YOU HAVE A DRINK CONTAINING ALCOHOL: 4 OR MORE TIMES A WEEK
HOW MANY STANDARD DRINKS CONTAINING ALCOHOL DO YOU HAVE ON A TYPICAL DAY: 7 TO 9
HOW OFTEN DO YOU HAVE A DRINK CONTAINING ALCOHOL: 4 OR MORE TIMES A WEEK

## 2023-10-08 ASSESSMENT — PAIN - FUNCTIONAL ASSESSMENT
PAIN_FUNCTIONAL_ASSESSMENT: ACTIVITIES ARE NOT PREVENTED
PAIN_FUNCTIONAL_ASSESSMENT: ACTIVITIES ARE NOT PREVENTED
PAIN_FUNCTIONAL_ASSESSMENT: 0-10
PAIN_FUNCTIONAL_ASSESSMENT: ACTIVITIES ARE NOT PREVENTED
PAIN_FUNCTIONAL_ASSESSMENT: ACTIVITIES ARE NOT PREVENTED

## 2023-10-08 ASSESSMENT — PAIN DESCRIPTION - PAIN TYPE
TYPE: ACUTE PAIN

## 2023-10-08 NOTE — ED NOTES
Pt is doing much better after being medicated with Dilaudid, she is resting better.      Molly Peña RN  10/08/23 9276

## 2023-10-08 NOTE — ED NOTES
Pt was c/o of burning in her right arm from the potassium so I slowed the infusion to 75 mL/hr, pt is no longer complaining.      Jennings Hamman, RN  10/08/23 9814

## 2023-10-08 NOTE — ED NOTES
Pt was c/o burning in her abd, ISRRAEL Chery was advised and she ordered Protonix and Pepcid.      Miko Price RN  10/08/23 4208

## 2023-10-08 NOTE — ED NOTES
[facility]) paged @ [phone] @      [place], [place]  10/08/23 1234

## 2023-10-08 NOTE — ED PROVIDER NOTES
PVCs. RADIOLOGY:   Non-plain film images such as CT, Ultrasound and MRI are read by the radiologist. Plain radiographic images are visualized and preliminarily interpreted by the emergency physician with the below findings:        Interpretation per the Radiologist below, if available at the time of this note:    CT ABDOMEN PELVIS W IV CONTRAST Additional Contrast? None   Final Result   1. Small bilateral pleural effusions, left greater than right. 2.  Bilateral lower lobe opacities and to a less degree lingular opacities. Rule out atelectasis versus pneumonia. 3.  Acute interstitial edematous pancreatitis. 4.  Fatty infiltration of the liver again noted, when compared to the   previous study performed 08/22/2023.      5.  Small amount of abdominal and pelvic ascites. XR CHEST PORTABLE   Final Result   1. Mild bibasilar airspace disease. Differential includes pneumonia versus   atelectasis.                ED BEDSIDE ULTRASOUND:   Performed by ED Physician - none    LABS:  Labs Reviewed   CBC WITH AUTO DIFFERENTIAL - Abnormal; Notable for the following components:       Result Value    WBC 13.3 (*)     RDW 15.8 (*)     Neutrophils Absolute 11.3 (*)     All other components within normal limits   COMPREHENSIVE METABOLIC PANEL - Abnormal; Notable for the following components:    Sodium 133 (*)     Potassium 3.2 (*)     Anion Gap 16 (*)     BUN 4 (*)     Creatinine 0.45 (*)     Calcium 8.1 (*)     AST 42 (*)     All other components within normal limits   LIPASE - Abnormal; Notable for the following components:    Lipase 833 (*)     All other components within normal limits   URINALYSIS WITH REFLEX TO CULTURE - Abnormal; Notable for the following components:    Ketones, Urine 15 (*)     Blood, Urine TRACE (*)     All other components within normal limits   MICROSCOPIC URINALYSIS - Abnormal; Notable for the following components:    RBC, UA 10-20 (*)     All other components within normal

## 2023-10-08 NOTE — ED NOTES
Patient on bedside monitor. Patient medicated for pain as ordered. Second bag of potassium hung and infusing. Patient provided with ice pack to place over IV line due to complaints of the potassium burning.       Danyelle Major  10/08/23 7689

## 2023-10-08 NOTE — ED TRIAGE NOTES
Patient arrived to ER by private vehicle from home. Patient c/o abdominal pain, flank pain, swelling of abdomen, nausea, constipation. Patient states drinks 1 pint vodka daily, went to detox 5 days ago (last use prior to going to detox)  Patient states went to [facility] ER Friday and diagnosed with pancreatitis, admitted for 1 day.

## 2023-10-08 NOTE — CARE COORDINATION
Case Management Assessment  Initial Evaluation    Date/Time of Evaluation: 10/8/2023 7:55 PM  Assessment Completed by: Charles Nieves RN    If patient is discharged prior to next notation, then this note serves as note for discharge by case management. Patient Name: Ramses Vergara                   YOB: 1995  Diagnosis: Acute recurrent pancreatitis [K85.90]  Other ascites [R18.8]  Pleural effusion, bilateral [J90]  Acute pancreatitis, unspecified complication status, unspecified pancreatitis type [K85.90]                   Date / Time: 10/8/2023  7:58 AM    Patient Admission Status: Inpatient   Readmission Risk (Low < 19, Mod (19-27), High > 27): No data recorded  Current PCP: Jeremy Xie, DO  PCP verified by CM? (P) Yes    Chart Reviewed: Yes      History Provided by: (P) Patient  Patient Orientation: (P) Alert and Oriented, Person, Place, Situation, Self    Patient Cognition: (P) Alert    Hospitalization in the last 30 days (Readmission):  No    If yes, Readmission Assessment in  Navigator will be completed.     Advance Directives:      Code Status: Full Code   Patient's Primary Decision Maker is: (P) Legal Next of Kin (landy Quintanilla)      Discharge Planning:    Patient lives with: (P) Children Type of Home: (P) House  Primary Care Giver: (P) Self  Patient Support Systems include: (P) Children   Current Financial resources: (P) Medicaid  Current community resources: (P) None  Current services prior to admission: (P) None            Current DME:              Type of Home Care services:  (P) None    ADLS  Prior functional level: (P) Independent in ADLs/IADLs  Current functional level: (P) Independent in ADLs/IADLs    PT AM-PAC:   /24  OT AM-PAC:   /24    Family can provide assistance at DC: (P) Yes  Would you like Case Management to discuss the discharge plan with any other family members/significant others, and if so, who? (P) Yes (landy Quintanilla if needed.)  Plans to Return to Present Housing:

## 2023-10-08 NOTE — ACP (ADVANCE CARE PLANNING)
patient/agent/surrogate to review completed ACP document and update if needed with changes in condition, patient preferences or care setting    [x] This note routed to one or more involved healthcare providers

## 2023-10-09 VITALS
RESPIRATION RATE: 18 BRPM | DIASTOLIC BLOOD PRESSURE: 117 MMHG | HEART RATE: 105 BPM | WEIGHT: 167.4 LBS | TEMPERATURE: 97.7 F | OXYGEN SATURATION: 99 % | SYSTOLIC BLOOD PRESSURE: 155 MMHG | BODY MASS INDEX: 29.66 KG/M2 | HEIGHT: 63 IN

## 2023-10-09 LAB
PERFORMED ON: ABNORMAL
POC CREATININE: 0.4 MG/DL (ref 0.6–1.2)
POC SAMPLE TYPE: ABNORMAL
TRIGL SERPL-MCNC: 91 MG/DL (ref 0–150)

## 2023-10-09 PROCEDURE — 6360000002 HC RX W HCPCS: Performed by: STUDENT IN AN ORGANIZED HEALTH CARE EDUCATION/TRAINING PROGRAM

## 2023-10-09 PROCEDURE — 36415 COLL VENOUS BLD VENIPUNCTURE: CPT

## 2023-10-09 PROCEDURE — 2580000003 HC RX 258: Performed by: STUDENT IN AN ORGANIZED HEALTH CARE EDUCATION/TRAINING PROGRAM

## 2023-10-09 PROCEDURE — 84478 ASSAY OF TRIGLYCERIDES: CPT

## 2023-10-09 PROCEDURE — 6370000000 HC RX 637 (ALT 250 FOR IP): Performed by: STUDENT IN AN ORGANIZED HEALTH CARE EDUCATION/TRAINING PROGRAM

## 2023-10-09 RX ORDER — OXYCODONE HYDROCHLORIDE 5 MG/1
5 CAPSULE ORAL EVERY 6 HOURS PRN
Status: DISCONTINUED | OUTPATIENT
Start: 2023-10-09 | End: 2023-10-09 | Stop reason: HOSPADM

## 2023-10-09 RX ADMIN — HYDROXYZINE HYDROCHLORIDE 25 MG: 25 TABLET, FILM COATED ORAL at 01:10

## 2023-10-09 RX ADMIN — MORPHINE SULFATE 4 MG: 4 INJECTION, SOLUTION INTRAMUSCULAR; INTRAVENOUS at 01:10

## 2023-10-09 RX ADMIN — Medication 100 MG: at 08:06

## 2023-10-09 RX ADMIN — MORPHINE SULFATE 4 MG: 4 INJECTION, SOLUTION INTRAMUSCULAR; INTRAVENOUS at 05:26

## 2023-10-09 RX ADMIN — SODIUM CHLORIDE, POTASSIUM CHLORIDE, SODIUM LACTATE AND CALCIUM CHLORIDE: 600; 310; 30; 20 INJECTION, SOLUTION INTRAVENOUS at 08:14

## 2023-10-09 RX ADMIN — SODIUM CHLORIDE, POTASSIUM CHLORIDE, SODIUM LACTATE AND CALCIUM CHLORIDE: 600; 310; 30; 20 INJECTION, SOLUTION INTRAVENOUS at 01:17

## 2023-10-09 RX ADMIN — MORPHINE SULFATE 4 MG: 4 INJECTION, SOLUTION INTRAMUSCULAR; INTRAVENOUS at 08:06

## 2023-10-09 RX ADMIN — FOLIC ACID 1 MG: 1 TABLET ORAL at 08:06

## 2023-10-09 RX ADMIN — MORPHINE SULFATE 2 MG: 2 INJECTION, SOLUTION INTRAMUSCULAR; INTRAVENOUS at 10:35

## 2023-10-09 RX ADMIN — SERTRALINE 100 MG: 100 TABLET, FILM COATED ORAL at 08:06

## 2023-10-09 ASSESSMENT — PAIN SCALES - GENERAL
PAINLEVEL_OUTOF10: 10
PAINLEVEL_OUTOF10: 10

## 2023-10-09 ASSESSMENT — PAIN DESCRIPTION - LOCATION
LOCATION: ABDOMEN;FLANK
LOCATION: FLANK;ABDOMEN

## 2023-10-09 ASSESSMENT — PAIN DESCRIPTION - DESCRIPTORS
DESCRIPTORS: ACHING
DESCRIPTORS: ACHING

## 2023-10-09 NOTE — DISCHARGE INSTRUCTIONS
Follow up with primary care physician in the next 7 days or sooner if needed. If you do not have a Primary care physician, please schedule an appointment with one. Please ask prior to discharge about a list of local providers. Please return to ER or call 911 if you develop any significant signs or symptoms. I may not have addressed all of your medical illnesses or the abnormal blood work or imaging therefore please ask your PCP to obtain [facility] record to follow up on all of the abnormal labs, imaging and findings that I have and have not addressed during your hospitalization. Discharging you from the hospital does not mean that your medical care ends here and now. You may still need additional work up, investigation, monitoring, and treatment to be handled from this point on by outside providers including your PCP, Specialists and other healthcare providers. For medication questions, contact your retail pharmacy and your PCP. Your medical team at [facility] ([facility]) appreciates the opportunity to work with you to get well!     Brenna Burnett, DO  1:26 PM

## 2023-10-09 NOTE — DISCHARGE INSTR - DIET
Good nutrition is important when healing from an illness, injury, or surgery. Follow any nutrition recommendations given to you during your hospital stay. If you were given an oral nutrition supplement while in the hospital, continue to take this supplement at home. You can take it with meals, in-between meals, and/or before bedtime. These supplements can be purchased at most local grocery stores, pharmacies, and chain [employer]-stores. If you have any questions about your diet or nutrition, call the hospital and ask for the dietitian.     Resume normal diet at tolerrated

## 2023-10-09 NOTE — CARE COORDINATION
CM WENT TO SPEAK WITH PATIENT AND PATIENT STATES SHE IS LEAVING. DENIED NEEDS AND WANTED HER NURSE TO GET HER PAPERS AS HER RIDE IS PRESENT IN THE ROOM. CALL TO RN WITH NO ANSWER.  UPDATE GIVEN TO MD.

## 2023-10-10 LAB
EKG ATRIAL RATE: 99 BPM
EKG P AXIS: 43 DEGREES
EKG P-R INTERVAL: 174 MS
EKG Q-T INTERVAL: 372 MS
EKG QRS DURATION: 96 MS
EKG QTC CALCULATION (BAZETT): 477 MS
EKG R AXIS: 57 DEGREES
EKG T AXIS: 44 DEGREES
EKG VENTRICULAR RATE: 99 BPM

## 2023-10-10 PROCEDURE — 93010 ELECTROCARDIOGRAM REPORT: CPT | Performed by: INTERNAL MEDICINE

## 2023-11-22 ENCOUNTER — APPOINTMENT (OUTPATIENT)
Dept: CT IMAGING | Age: 28
DRG: 282 | End: 2023-11-22
Payer: COMMERCIAL

## 2023-11-22 ENCOUNTER — HOSPITAL ENCOUNTER (INPATIENT)
Age: 28
LOS: 1 days | Discharge: HOME | DRG: 282 | End: 2023-11-24
Attending: INTERNAL MEDICINE | Admitting: INTERNAL MEDICINE
Payer: COMMERCIAL

## 2023-11-22 DIAGNOSIS — K85.90 ACUTE PANCREATITIS, UNSPECIFIED COMPLICATION STATUS, UNSPECIFIED PANCREATITIS TYPE: Primary | ICD-10-CM

## 2023-11-22 DIAGNOSIS — K85.90 ACUTE PANCREATITIS WITHOUT INFECTION OR NECROSIS, UNSPECIFIED PANCREATITIS TYPE: ICD-10-CM

## 2023-11-22 LAB
ALBUMIN SERPL-MCNC: 4.4 G/DL (ref 3.5–4.6)
ALP SERPL-CCNC: 76 U/L (ref 40–130)
ALT SERPL-CCNC: 18 U/L (ref 0–33)
AMPHET UR QL SCN: NORMAL
ANION GAP SERPL CALCULATED.3IONS-SCNC: 16 MEQ/L (ref 9–15)
AST SERPL-CCNC: 30 U/L (ref 0–35)
BARBITURATES UR QL SCN: NORMAL
BASOPHILS # BLD: 0 K/UL (ref 0–0.2)
BASOPHILS NFR BLD: 0.3 %
BENZODIAZ UR QL SCN: NORMAL
BILIRUB SERPL-MCNC: 0.6 MG/DL (ref 0.2–0.7)
BILIRUB UR QL STRIP: NEGATIVE
BUN SERPL-MCNC: 12 MG/DL (ref 6–20)
CALCIUM SERPL-MCNC: 8.9 MG/DL (ref 8.5–9.9)
CANNABINOIDS UR QL SCN: NORMAL
CHLORIDE SERPL-SCNC: 102 MEQ/L (ref 95–107)
CLARITY UR: ABNORMAL
CO2 SERPL-SCNC: 21 MEQ/L (ref 20–31)
COCAINE UR QL SCN: NORMAL
COLOR UR: ABNORMAL
CREAT SERPL-MCNC: 0.57 MG/DL (ref 0.5–0.9)
DRUG SCREEN COMMENT UR-IMP: NORMAL
EOSINOPHIL # BLD: 0 K/UL (ref 0–0.7)
EOSINOPHIL NFR BLD: 0.1 %
ERYTHROCYTE [DISTWIDTH] IN BLOOD BY AUTOMATED COUNT: 14.3 % (ref 11.5–14.5)
ETHANOL PERCENT: NORMAL G/DL
ETHANOLAMINE SERPL-MCNC: <10 MG/DL (ref 0–0.08)
FENTANYL SCREEN, URINE: NORMAL
GLOBULIN SER CALC-MCNC: 3.1 G/DL (ref 2.3–3.5)
GLUCOSE SERPL-MCNC: 128 MG/DL (ref 70–99)
GLUCOSE UR STRIP-MCNC: NEGATIVE MG/DL
HCG, URINE, POC: NEGATIVE
HCT VFR BLD AUTO: 43.4 % (ref 37–47)
HGB BLD-MCNC: 14.4 G/DL (ref 12–16)
HGB UR QL STRIP: NEGATIVE
KETONES UR STRIP-MCNC: NEGATIVE MG/DL
LACTATE BLDV-SCNC: 2.5 MMOL/L (ref 0.5–2.2)
LEUKOCYTE ESTERASE UR QL STRIP: NEGATIVE
LIPASE SERPL-CCNC: 392 U/L (ref 12–95)
LYMPHOCYTES # BLD: 1.3 K/UL (ref 1–4.8)
LYMPHOCYTES NFR BLD: 14.8 %
Lab: NORMAL
MCH RBC QN AUTO: 27.7 PG (ref 27–31.3)
MCHC RBC AUTO-ENTMCNC: 33.2 % (ref 33–37)
MCV RBC AUTO: 83.5 FL (ref 79.4–94.8)
METHADONE UR QL SCN: NORMAL
MONOCYTES # BLD: 0.4 K/UL (ref 0.2–0.8)
MONOCYTES NFR BLD: 4.7 %
NEGATIVE QC PASS/FAIL: NORMAL
NEUTROPHILS # BLD: 7 K/UL (ref 1.4–6.5)
NEUTS SEG NFR BLD: 80 %
NITRITE UR QL STRIP: NEGATIVE
OPIATES UR QL SCN: NORMAL
OXYCODONE UR QL SCN: NORMAL
PCP UR QL SCN: NORMAL
PH UR STRIP: 5.5 [PH] (ref 5–9)
PLATELET # BLD AUTO: 338 K/UL (ref 130–400)
POC CREATININE WHOLE BLOOD: 0.5
POSITIVE QC PASS/FAIL: NORMAL
POTASSIUM SERPL-SCNC: 4.1 MEQ/L (ref 3.4–4.9)
PROPOXYPH UR QL SCN: NORMAL
PROT SERPL-MCNC: 7.5 G/DL (ref 6.3–8)
PROT UR STRIP-MCNC: ABNORMAL MG/DL
RBC # BLD AUTO: 5.2 M/UL (ref 4.2–5.4)
SODIUM SERPL-SCNC: 139 MEQ/L (ref 135–144)
SP GR UR STRIP: 1.02 (ref 1–1.03)
URINE REFLEX TO CULTURE: ABNORMAL
UROBILINOGEN UR STRIP-ACNC: 0.2 E.U./DL
WBC # BLD AUTO: 8.8 K/UL (ref 4.8–10.8)

## 2023-11-22 PROCEDURE — 82077 ASSAY SPEC XCP UR&BREATH IA: CPT

## 2023-11-22 PROCEDURE — 6360000002 HC RX W HCPCS: Performed by: INTERNAL MEDICINE

## 2023-11-22 PROCEDURE — G0378 HOSPITAL OBSERVATION PER HR: HCPCS

## 2023-11-22 PROCEDURE — 96376 TX/PRO/DX INJ SAME DRUG ADON: CPT

## 2023-11-22 PROCEDURE — 74177 CT ABD & PELVIS W/CONTRAST: CPT

## 2023-11-22 PROCEDURE — 6360000002 HC RX W HCPCS: Performed by: STUDENT IN AN ORGANIZED HEALTH CARE EDUCATION/TRAINING PROGRAM

## 2023-11-22 PROCEDURE — 80307 DRUG TEST PRSMV CHEM ANLYZR: CPT

## 2023-11-22 PROCEDURE — 96375 TX/PRO/DX INJ NEW DRUG ADDON: CPT

## 2023-11-22 PROCEDURE — 6370000000 HC RX 637 (ALT 250 FOR IP): Performed by: INTERNAL MEDICINE

## 2023-11-22 PROCEDURE — 83690 ASSAY OF LIPASE: CPT

## 2023-11-22 PROCEDURE — 81003 URINALYSIS AUTO W/O SCOPE: CPT

## 2023-11-22 PROCEDURE — 6360000002 HC RX W HCPCS

## 2023-11-22 PROCEDURE — 2580000003 HC RX 258: Performed by: INTERNAL MEDICINE

## 2023-11-22 PROCEDURE — 85025 COMPLETE CBC W/AUTO DIFF WBC: CPT

## 2023-11-22 PROCEDURE — 36415 COLL VENOUS BLD VENIPUNCTURE: CPT

## 2023-11-22 PROCEDURE — 96361 HYDRATE IV INFUSION ADD-ON: CPT

## 2023-11-22 PROCEDURE — 96374 THER/PROPH/DIAG INJ IV PUSH: CPT

## 2023-11-22 PROCEDURE — 99285 EMERGENCY DEPT VISIT HI MDM: CPT

## 2023-11-22 PROCEDURE — 6360000004 HC RX CONTRAST MEDICATION: Performed by: STUDENT IN AN ORGANIZED HEALTH CARE EDUCATION/TRAINING PROGRAM

## 2023-11-22 PROCEDURE — 80053 COMPREHEN METABOLIC PANEL: CPT

## 2023-11-22 PROCEDURE — 83605 ASSAY OF LACTIC ACID: CPT

## 2023-11-22 PROCEDURE — 2580000003 HC RX 258: Performed by: STUDENT IN AN ORGANIZED HEALTH CARE EDUCATION/TRAINING PROGRAM

## 2023-11-22 RX ORDER — KETOROLAC TROMETHAMINE 30 MG/ML
30 INJECTION, SOLUTION INTRAMUSCULAR; INTRAVENOUS EVERY 6 HOURS PRN
Status: DISCONTINUED | OUTPATIENT
Start: 2023-11-22 | End: 2023-11-24 | Stop reason: HOSPADM

## 2023-11-22 RX ORDER — POLYETHYLENE GLYCOL 3350 17 G/17G
17 POWDER, FOR SOLUTION ORAL DAILY PRN
Status: DISCONTINUED | OUTPATIENT
Start: 2023-11-22 | End: 2023-11-24 | Stop reason: HOSPADM

## 2023-11-22 RX ORDER — SODIUM CHLORIDE, SODIUM LACTATE, POTASSIUM CHLORIDE, CALCIUM CHLORIDE 600; 310; 30; 20 MG/100ML; MG/100ML; MG/100ML; MG/100ML
INJECTION, SOLUTION INTRAVENOUS CONTINUOUS
Status: DISPENSED | OUTPATIENT
Start: 2023-11-22 | End: 2023-11-24

## 2023-11-22 RX ORDER — HYDROMORPHONE HYDROCHLORIDE 1 MG/ML
1 INJECTION, SOLUTION INTRAMUSCULAR; INTRAVENOUS; SUBCUTANEOUS
Status: DISCONTINUED | OUTPATIENT
Start: 2023-11-22 | End: 2023-11-24 | Stop reason: HOSPADM

## 2023-11-22 RX ORDER — POTASSIUM CHLORIDE 7.45 MG/ML
10 INJECTION INTRAVENOUS PRN
Status: DISCONTINUED | OUTPATIENT
Start: 2023-11-22 | End: 2023-11-24 | Stop reason: HOSPADM

## 2023-11-22 RX ORDER — 0.9 % SODIUM CHLORIDE 0.9 %
1000 INTRAVENOUS SOLUTION INTRAVENOUS ONCE
Status: COMPLETED | OUTPATIENT
Start: 2023-11-22 | End: 2023-11-22

## 2023-11-22 RX ORDER — POTASSIUM CHLORIDE 20 MEQ/1
40 TABLET, EXTENDED RELEASE ORAL PRN
Status: DISCONTINUED | OUTPATIENT
Start: 2023-11-22 | End: 2023-11-24 | Stop reason: HOSPADM

## 2023-11-22 RX ORDER — ACETAMINOPHEN 650 MG/1
650 SUPPOSITORY RECTAL EVERY 6 HOURS PRN
Status: DISCONTINUED | OUTPATIENT
Start: 2023-11-22 | End: 2023-11-24 | Stop reason: HOSPADM

## 2023-11-22 RX ORDER — MORPHINE SULFATE 2 MG/ML
4 INJECTION, SOLUTION INTRAMUSCULAR; INTRAVENOUS ONCE
Status: COMPLETED | OUTPATIENT
Start: 2023-11-22 | End: 2023-11-22

## 2023-11-22 RX ORDER — ONDANSETRON 2 MG/ML
4 INJECTION INTRAMUSCULAR; INTRAVENOUS ONCE
Status: COMPLETED | OUTPATIENT
Start: 2023-11-22 | End: 2023-11-22

## 2023-11-22 RX ORDER — LOSARTAN POTASSIUM 25 MG/1
25 TABLET ORAL DAILY
COMMUNITY

## 2023-11-22 RX ORDER — SODIUM CHLORIDE 0.9 % (FLUSH) 0.9 %
5-40 SYRINGE (ML) INJECTION EVERY 12 HOURS SCHEDULED
Status: DISCONTINUED | OUTPATIENT
Start: 2023-11-22 | End: 2023-11-24 | Stop reason: HOSPADM

## 2023-11-22 RX ORDER — SODIUM CHLORIDE 9 MG/ML
INJECTION, SOLUTION INTRAVENOUS PRN
Status: DISCONTINUED | OUTPATIENT
Start: 2023-11-22 | End: 2023-11-24 | Stop reason: HOSPADM

## 2023-11-22 RX ORDER — SODIUM CHLORIDE 0.9 % (FLUSH) 0.9 %
5-40 SYRINGE (ML) INJECTION PRN
Status: DISCONTINUED | OUTPATIENT
Start: 2023-11-22 | End: 2023-11-24 | Stop reason: HOSPADM

## 2023-11-22 RX ORDER — MAGNESIUM SULFATE IN WATER 40 MG/ML
2000 INJECTION, SOLUTION INTRAVENOUS PRN
Status: DISCONTINUED | OUTPATIENT
Start: 2023-11-22 | End: 2023-11-24 | Stop reason: HOSPADM

## 2023-11-22 RX ORDER — ACETAMINOPHEN 325 MG/1
650 TABLET ORAL EVERY 6 HOURS PRN
Status: DISCONTINUED | OUTPATIENT
Start: 2023-11-22 | End: 2023-11-24 | Stop reason: HOSPADM

## 2023-11-22 RX ORDER — HYDROMORPHONE HYDROCHLORIDE 1 MG/ML
0.5 INJECTION, SOLUTION INTRAMUSCULAR; INTRAVENOUS; SUBCUTANEOUS ONCE
Status: COMPLETED | OUTPATIENT
Start: 2023-11-22 | End: 2023-11-22

## 2023-11-22 RX ORDER — ONDANSETRON 2 MG/ML
4 INJECTION INTRAMUSCULAR; INTRAVENOUS EVERY 6 HOURS PRN
Status: DISCONTINUED | OUTPATIENT
Start: 2023-11-22 | End: 2023-11-24 | Stop reason: HOSPADM

## 2023-11-22 RX ORDER — KETOROLAC TROMETHAMINE 30 MG/ML
INJECTION, SOLUTION INTRAMUSCULAR; INTRAVENOUS
Status: COMPLETED
Start: 2023-11-22 | End: 2023-11-22

## 2023-11-22 RX ORDER — ONDANSETRON 4 MG/1
4 TABLET, ORALLY DISINTEGRATING ORAL EVERY 8 HOURS PRN
Status: DISCONTINUED | OUTPATIENT
Start: 2023-11-22 | End: 2023-11-24 | Stop reason: HOSPADM

## 2023-11-22 RX ADMIN — HYDROMORPHONE HYDROCHLORIDE 1 MG: 1 INJECTION, SOLUTION INTRAMUSCULAR; INTRAVENOUS; SUBCUTANEOUS at 17:29

## 2023-11-22 RX ADMIN — SODIUM CHLORIDE, POTASSIUM CHLORIDE, SODIUM LACTATE AND CALCIUM CHLORIDE: 600; 310; 30; 20 INJECTION, SOLUTION INTRAVENOUS at 20:57

## 2023-11-22 RX ADMIN — HYDROMORPHONE HYDROCHLORIDE 0.5 MG: 1 INJECTION, SOLUTION INTRAMUSCULAR; INTRAVENOUS; SUBCUTANEOUS at 13:36

## 2023-11-22 RX ADMIN — KETOROLAC TROMETHAMINE 30 MG: 30 INJECTION, SOLUTION INTRAMUSCULAR; INTRAVENOUS at 22:47

## 2023-11-22 RX ADMIN — ONDANSETRON 4 MG: 2 INJECTION INTRAMUSCULAR; INTRAVENOUS at 16:36

## 2023-11-22 RX ADMIN — MORPHINE SULFATE 4 MG: 2 INJECTION, SOLUTION INTRAMUSCULAR; INTRAVENOUS at 11:59

## 2023-11-22 RX ADMIN — ONDANSETRON 4 MG: 2 INJECTION INTRAMUSCULAR; INTRAVENOUS at 13:36

## 2023-11-22 RX ADMIN — SODIUM CHLORIDE 1000 ML: 9 INJECTION, SOLUTION INTRAVENOUS at 11:58

## 2023-11-22 RX ADMIN — SODIUM CHLORIDE, POTASSIUM CHLORIDE, SODIUM LACTATE AND CALCIUM CHLORIDE: 600; 310; 30; 20 INJECTION, SOLUTION INTRAVENOUS at 16:31

## 2023-11-22 RX ADMIN — HYDROMORPHONE HYDROCHLORIDE 1 MG: 1 INJECTION, SOLUTION INTRAMUSCULAR; INTRAVENOUS; SUBCUTANEOUS at 20:53

## 2023-11-22 RX ADMIN — ONDANSETRON 4 MG: 2 INJECTION INTRAMUSCULAR; INTRAVENOUS at 11:58

## 2023-11-22 RX ADMIN — IOPAMIDOL 75 ML: 612 INJECTION, SOLUTION INTRAVENOUS at 12:57

## 2023-11-22 RX ADMIN — ACETAMINOPHEN 650 MG: 325 TABLET ORAL at 16:50

## 2023-11-22 ASSESSMENT — ENCOUNTER SYMPTOMS
CONSTIPATION: 0
RECTAL PAIN: 0
ABDOMINAL PAIN: 1
ABDOMINAL DISTENTION: 0
PHOTOPHOBIA: 0
BLOOD IN STOOL: 0
VOMITING: 1
COUGH: 0
SHORTNESS OF BREATH: 0
NAUSEA: 1
DIARRHEA: 0
WHEEZING: 0

## 2023-11-22 ASSESSMENT — PAIN SCALES - GENERAL
PAINLEVEL_OUTOF10: 8
PAINLEVEL_OUTOF10: 9
PAINLEVEL_OUTOF10: 9
PAINLEVEL_OUTOF10: 8
PAINLEVEL_OUTOF10: 5
PAINLEVEL_OUTOF10: 8
PAINLEVEL_OUTOF10: 10

## 2023-11-22 ASSESSMENT — PAIN DESCRIPTION - LOCATION
LOCATION: ABDOMEN

## 2023-11-22 ASSESSMENT — PAIN DESCRIPTION - DESCRIPTORS
DESCRIPTORS: ACHING
DESCRIPTORS: ACHING

## 2023-11-22 ASSESSMENT — PAIN DESCRIPTION - ORIENTATION: ORIENTATION: RIGHT

## 2023-11-22 ASSESSMENT — PAIN - FUNCTIONAL ASSESSMENT: PAIN_FUNCTIONAL_ASSESSMENT: 0-10

## 2023-11-22 NOTE — CARE COORDINATION
Case Management Assessment  Initial Evaluation    Date/Time of Evaluation: 11/22/2023 2:56 PM  Assessment Completed by: Cathy Resendez RN, BSN    If patient is discharged prior to next notation, then this note serves as note for discharge by case management. Patient Name: Ramses Vergara                   YOB: 1995  Diagnosis: Acute pancreatitis without infection or necrosis [K85.90]                   Date / Time: 11/22/2023 11:07 AM    Patient Admission Status: Observation   Readmission Risk (Low < 19, Mod (19-27), High > 27): No data recorded  Current PCP: Jeremy Xie, DO  PCP verified by CM? Yes (10/19/23)    Chart Reviewed: Yes      History Provided by: Patient  Patient Orientation: Alert and Oriented, Place, Person, Situation, Self    Patient Cognition: Alert    Hospitalization in the last 30 days (Readmission):  No    If yes, Readmission Assessment in  Navigator will be completed. Advance Directives:      Code Status: Prior   Patient's Primary Decision Maker is: Legal Next of Kin    Primary Decision Maker: Rosendo Morochomy Mario Tello - [phone]    Secondary Decision Maker: Padmini Hanna - [phone]    Discharge Planning:    Patient lives with: Friends, Family Members Type of Home: House  Primary Care Giver: Self  Patient Support Systems include: Parent, Friends/Neighbors   Current Financial resources: Medicaid  Current community resources: [facility], Other (Comment) (6 WEEKS AGO ETOH DETOX X 3 DAYS)  Current services prior to admission: None            Current DME:  N/A            Type of Home Care services:  None    ADLS  Prior functional level: Independent in ADLs/IADLs  Current functional level: Independent in ADLs/IADLs        Family can provide assistance at DC: Yes  Would you like Case Management to discuss the discharge plan with any other family members/significant others, and if so, who?  Yes (HER MOM)  Plans to Return to Present Housing: plan. Freedom of choice list with basic dialogue that supports the patient's individualized plan of care/goals and shares the quality data associated with the providers was provided to: Patient         The Patient and/or Patient Representative Agree with the Discharge Plan?  Yes ([address] BEEN SOBER ALMOST 7 WEEKS HAD 3 DAY INPT STAY IN [place])    Janette Campa RN, BSN  Case Management Department

## 2023-11-22 NOTE — ED PROVIDER NOTES
[facility]  EMERGENCY DEPARTMENT ENCOUNTER      Pt Name: Deisy Zambrano  MRN: [id number]  [facility] 1995  Date of evaluation: 11/22/2023  Provider: Matilde Schaumann, PA-C    CHIEF COMPLAINT       Chief Complaint   Patient presents with    Abdominal Pain     Pancreatitis flare up that started this am         HISTORY OF PRESENT ILLNESS   (Location/Symptom, Timing/Onset, Context/Setting, Quality, Duration, Modifying Factors, Severity)  Note limiting factors. Deisy Zambrano is a 29 y.o. female who per chart review has a past medical history of hypertension pancreatitis anxiety presents to the emergency department for evaluation of abdominal pain nausea and vomiting. Symptoms started last night. Abdominal pain is right upper quadrant with radiation to the back. It is constant and severe rated 9 out of 10. History of alcohol induced pancreatitis and current symptoms feel similar. Was admitted in October for similar. She states she has been sober from alcohol for several months. She is not sure what has currently caused this flareup. She states she is unable to tolerate oral intake. She denies diarrhea but states her stools are more frequent and soft, yellow in color. She has not tried anything for her symptoms at home. Feels that her abdomen is swollen in the upper region. She denies drug use. Denies fever chills chest pain shortness of breath flank pain urinary symptoms pelvic pain or vaginal discharge constipation blood in the stool urine or emesis headache dizziness or lightheadedness upper respiratory symptoms. States she feels dehydrated from lack of oral intake. HPI    Nursing Notes were reviewed. REVIEW OF SYSTEMS    (2-9 systems for level 4, 10 or more for level 5)     Review of Systems   Constitutional:  Negative for chills, fatigue and fever. HENT:  Negative for congestion. Eyes:  Negative for photophobia.    Respiratory:  Negative for cough, shortness of breath and and nearly resolved since prior study. There is residual fluid identified inferior to the uncinate process anterior to the right pararenal fascia in between caudal margin of the liver and second and third portion of the duodenum. There is peripancreatic fat stranding which has decreased from prior study this may attribute to an acute episode of pancreatitis superimposed upon chronic findings. Patient reassessed with continued pain and nausea. She was given IV Dilaudid and IV Zofran. Did trial p.o. intake with ice chips and patient did not tolerate, having returned abdominal pain. She will be admitted to the medical floor for acute on chronic pancreatitis. Spoke with Dr. Wellington Romano who accepts patient to the floor. Patient is stable for admission. REASSESSMENT          CRITICAL CARE TIME   Total Critical Care time was 0 minutes, excluding separately reportable procedures. There was a high probability of clinically significant/life threatening deterioration in the patient's condition which required my urgent intervention. CONSULTS:  None    PROCEDURES:  Unless otherwise noted below, none     Procedures        FINAL IMPRESSION      1. Acute pancreatitis, unspecified complication status, unspecified pancreatitis type          DISPOSITION/PLAN   DISPOSITION Admitted 11/22/2023 02:36:35 PM      PATIENT REFERRED TO:  No follow-up provider specified.     DISCHARGE MEDICATIONS:  Current Discharge Medication List        Controlled Substances Monitoring:          No data to display                (Please note that portions of this note were completed with a voice recognition program.  Efforts were made to edit the dictations but occasionally words are mis-transcribed.)    Joseph Gill PA-C (electronically signed)           [facility], [place]  11/22/23 2032

## 2023-11-22 NOTE — ED NOTES
Report called to Niraj Eugene at this time     Kylie Vidales, [address]  11/22/23 [phone]

## 2023-11-22 NOTE — ED TRIAGE NOTES
Pt comes to er with c/o abdominal pain d/t pancreatitis flare up. Pt used to drink alcohol but no longer does.  Pt also c/o vomiting with this flare up

## 2023-11-22 NOTE — H&P
Hospital Medicine  History and Physical    Patient:  Joaquín Vasquez  MRN: [id number]    CHIEF COMPLAINT:    Chief Complaint   Patient presents with    Abdominal Pain     Pancreatitis flare up that started this am       History Obtained From:  Patient, EMR  Primary Care Physician: Wyatt Dior DO    HISTORY OF PRESENT ILLNESS:   The patient is a 29 y.o. female with PMH of alcohol use disorder complicated by acute pancreatitis in 10/2023, asthma, anxiety who presented with the above CC. Patient was in her usual state of health until last night when she started experiencing abdominal pain radiating through to her back associated with multiple episodes of vomiting, CT of abd/pelvis showed acute pancreatitis, IVFs, IV Morphine/Dilaudid and Zofran given in ED, admitted for further management. Past Medical History:      Diagnosis Date    Anxiety     HTN (hypertension)     Pancreatitis        Past Surgical History:      Procedure Laterality Date    DILATION AND CURETTAGE OF UTERUS      5/2016       Medications Prior to Admission:    Prior to Admission medications    Medication Sig Start Date End Date Taking? Authorizing Provider   losartan (COZAAR) 25 MG tablet Take 1 tablet by mouth daily   Yes Provider, MD Niko   hydrOXYzine HCl (ATARAX) 25 MG tablet Take 1 tablet by mouth 3 times daily as needed 6/22/23   ProviderNiko MD   sertraline (ZOLOFT) 100 MG tablet Take 1 tablet by mouth daily 6/22/23   Provider, MD Niko   albuterol sulfate HFA (PROVENTIL;VENTOLIN;PROAIR) 108 (90 Base) MCG/ACT inhaler Inhale 2 puffs into the lungs every 4 hours as needed for Wheezing or Shortness of Breath 8/8/22   Bianca Babb MD       Allergies:  Patient has no known allergies. Social History:   TOBACCO:   reports that she has been smoking cigarettes. She has a 2.50 pack-year smoking history. She has never used smokeless tobacco.  ETOH:   reports that she does not currently use alcohol.       Family History: Residual fluid identified inferior to the uncinate process, anterior to the right pararenal fascia, and between caudal margin of the liver and 2nd and 3rd portion of duodenum. Peripancreatic fat stranding also identified but decrease since prior study. Differential includes marked improvement and near resolution of prior pancreatitis versus acute episode of pancreatitis superimposed upon chronic findings. Clinical correlation required.            Assessment and Plan     28 y/o female with history of alcohol use disorder complicated by acute pancreatitis in 10/2023, asthma, anxiety who presented with:    Acute pancreatitis  - patient denies ETOH use in the last 2 months  - ETOH level was negative  - IV hydration  - pain control with IV Dilaudid/Toradol as needed  - clear liquids, will advance as tolerated    Hx of anxiety  - resume home meds when able to take PO    Hx of asthma  - takes Albuterol INH as needed        Juju Stephenson MD, MD  Admitting Hospitalist

## 2023-11-22 NOTE — ACP (ADVANCE CARE PLANNING)
Advance Care Planning   Healthcare Decision Maker:    Primary Decision Maker: Emma Caryn [facility] - [phone]    Secondary Decision Maker: Galo Corbett - [phone]    Click here to complete Healthcare Decision Makers including selection of the Healthcare Decision Maker Relationship (ie \"Primary\"). Confirmed with pt.     Electronically signed by Nella Hernandez RN, BSN on 11/22/2023 at 2:56 PM

## 2023-11-22 NOTE — ED NOTES
Medication given, patient states pain is worse now and goes into her back      Era NICKI Bro  11/22/23 [id number]

## 2023-11-23 LAB
ALBUMIN SERPL-MCNC: 3.4 G/DL (ref 3.5–4.6)
ANION GAP SERPL CALCULATED.3IONS-SCNC: 13 MEQ/L (ref 9–15)
BUN SERPL-MCNC: 9 MG/DL (ref 6–20)
CALCIUM SERPL-MCNC: 8.2 MG/DL (ref 8.5–9.9)
CHLORIDE SERPL-SCNC: 102 MEQ/L (ref 95–107)
CHOLEST SERPL-MCNC: 75 MG/DL (ref 0–199)
CO2 SERPL-SCNC: 21 MEQ/L (ref 20–31)
CREAT SERPL-MCNC: 0.52 MG/DL (ref 0.5–0.9)
CRP SERPL HS-MCNC: 12.8 MG/L (ref 0–5)
ERYTHROCYTE [DISTWIDTH] IN BLOOD BY AUTOMATED COUNT: 14.2 % (ref 11.5–14.5)
GLUCOSE SERPL-MCNC: 100 MG/DL (ref 70–99)
HCT VFR BLD AUTO: 39.7 % (ref 37–47)
HDLC SERPL-MCNC: 28 MG/DL (ref 40–59)
HGB BLD-MCNC: 13.1 G/DL (ref 12–16)
LACTATE BLDV-SCNC: 1.7 MMOL/L (ref 0.5–2.2)
LDH SERPL-CCNC: 209 U/L (ref 135–214)
LDLC SERPL CALC-MCNC: 12 MG/DL (ref 0–129)
LIPASE SERPL-CCNC: 1179 U/L (ref 12–95)
MAGNESIUM SERPL-MCNC: 1.3 MG/DL (ref 1.7–2.4)
MCH RBC QN AUTO: 27.8 PG (ref 27–31.3)
MCHC RBC AUTO-ENTMCNC: 33 % (ref 33–37)
MCV RBC AUTO: 84.3 FL (ref 79.4–94.8)
PERFORMED ON: ABNORMAL
PHOSPHATE SERPL-MCNC: 3.6 MG/DL (ref 2.3–4.8)
PLATELET # BLD AUTO: 265 K/UL (ref 130–400)
POC CREATININE: 0.5 MG/DL (ref 0.6–1.2)
POC SAMPLE TYPE: ABNORMAL
POTASSIUM SERPL-SCNC: 3.6 MEQ/L (ref 3.4–4.9)
RBC # BLD AUTO: 4.71 M/UL (ref 4.2–5.4)
SODIUM SERPL-SCNC: 136 MEQ/L (ref 135–144)
TRIGL SERPL-MCNC: 175 MG/DL (ref 0–150)
WBC # BLD AUTO: 10.4 K/UL (ref 4.8–10.8)

## 2023-11-23 PROCEDURE — 80061 LIPID PANEL: CPT

## 2023-11-23 PROCEDURE — 2580000003 HC RX 258: Performed by: INTERNAL MEDICINE

## 2023-11-23 PROCEDURE — 6370000000 HC RX 637 (ALT 250 FOR IP): Performed by: INTERNAL MEDICINE

## 2023-11-23 PROCEDURE — 86140 C-REACTIVE PROTEIN: CPT

## 2023-11-23 PROCEDURE — 80069 RENAL FUNCTION PANEL: CPT

## 2023-11-23 PROCEDURE — 85027 COMPLETE CBC AUTOMATED: CPT

## 2023-11-23 PROCEDURE — 83615 LACTATE (LD) (LDH) ENZYME: CPT

## 2023-11-23 PROCEDURE — 36415 COLL VENOUS BLD VENIPUNCTURE: CPT

## 2023-11-23 PROCEDURE — 96376 TX/PRO/DX INJ SAME DRUG ADON: CPT

## 2023-11-23 PROCEDURE — 83690 ASSAY OF LIPASE: CPT

## 2023-11-23 PROCEDURE — 83605 ASSAY OF LACTIC ACID: CPT

## 2023-11-23 PROCEDURE — 1210000000 HC MED SURG R&B

## 2023-11-23 PROCEDURE — G0378 HOSPITAL OBSERVATION PER HR: HCPCS

## 2023-11-23 PROCEDURE — 6360000002 HC RX W HCPCS

## 2023-11-23 PROCEDURE — 99253 IP/OBS CNSLTJ NEW/EST LOW 45: CPT | Performed by: SPECIALIST

## 2023-11-23 PROCEDURE — 6360000002 HC RX W HCPCS: Performed by: INTERNAL MEDICINE

## 2023-11-23 PROCEDURE — 83735 ASSAY OF MAGNESIUM: CPT

## 2023-11-23 RX ORDER — OXYCODONE HYDROCHLORIDE AND ACETAMINOPHEN 5; 325 MG/1; MG/1
1 TABLET ORAL EVERY 4 HOURS PRN
Status: DISCONTINUED | OUTPATIENT
Start: 2023-11-23 | End: 2023-11-24 | Stop reason: HOSPADM

## 2023-11-23 RX ORDER — KETOROLAC TROMETHAMINE 30 MG/ML
INJECTION, SOLUTION INTRAMUSCULAR; INTRAVENOUS
Status: COMPLETED
Start: 2023-11-23 | End: 2023-11-23

## 2023-11-23 RX ORDER — MAGNESIUM SULFATE IN WATER 40 MG/ML
4000 INJECTION, SOLUTION INTRAVENOUS ONCE
Status: COMPLETED | OUTPATIENT
Start: 2023-11-23 | End: 2023-11-23

## 2023-11-23 RX ORDER — IBUPROFEN 600 MG/1
600 TABLET ORAL EVERY 6 HOURS PRN
Status: DISCONTINUED | OUTPATIENT
Start: 2023-11-23 | End: 2023-11-24 | Stop reason: HOSPADM

## 2023-11-23 RX ADMIN — SODIUM CHLORIDE, POTASSIUM CHLORIDE, SODIUM LACTATE AND CALCIUM CHLORIDE: 600; 310; 30; 20 INJECTION, SOLUTION INTRAVENOUS at 06:32

## 2023-11-23 RX ADMIN — HYDROMORPHONE HYDROCHLORIDE 1 MG: 1 INJECTION, SOLUTION INTRAMUSCULAR; INTRAVENOUS; SUBCUTANEOUS at 00:21

## 2023-11-23 RX ADMIN — KETOROLAC TROMETHAMINE 30 MG: 30 INJECTION, SOLUTION INTRAMUSCULAR; INTRAVENOUS at 06:30

## 2023-11-23 RX ADMIN — HYDROMORPHONE HYDROCHLORIDE 1 MG: 1 INJECTION, SOLUTION INTRAMUSCULAR; INTRAVENOUS; SUBCUTANEOUS at 03:51

## 2023-11-23 RX ADMIN — SODIUM CHLORIDE, POTASSIUM CHLORIDE, SODIUM LACTATE AND CALCIUM CHLORIDE: 600; 310; 30; 20 INJECTION, SOLUTION INTRAVENOUS at 01:39

## 2023-11-23 RX ADMIN — HYDROMORPHONE HYDROCHLORIDE 1 MG: 1 INJECTION, SOLUTION INTRAMUSCULAR; INTRAVENOUS; SUBCUTANEOUS at 15:46

## 2023-11-23 RX ADMIN — MAGNESIUM SULFATE HEPTAHYDRATE 4000 MG: 40 INJECTION, SOLUTION INTRAVENOUS at 15:52

## 2023-11-23 RX ADMIN — HYDROMORPHONE HYDROCHLORIDE 1 MG: 1 INJECTION, SOLUTION INTRAMUSCULAR; INTRAVENOUS; SUBCUTANEOUS at 23:21

## 2023-11-23 RX ADMIN — OXYCODONE AND ACETAMINOPHEN 1 TABLET: 5; 325 TABLET ORAL at 11:54

## 2023-11-23 RX ADMIN — POLYETHYLENE GLYCOL 3350 17 G: 17 POWDER, FOR SOLUTION ORAL at 18:42

## 2023-11-23 RX ADMIN — Medication 10 ML: at 19:57

## 2023-11-23 RX ADMIN — HYDROMORPHONE HYDROCHLORIDE 1 MG: 1 INJECTION, SOLUTION INTRAMUSCULAR; INTRAVENOUS; SUBCUTANEOUS at 19:56

## 2023-11-23 RX ADMIN — SODIUM CHLORIDE, POTASSIUM CHLORIDE, SODIUM LACTATE AND CALCIUM CHLORIDE: 600; 310; 30; 20 INJECTION, SOLUTION INTRAVENOUS at 22:15

## 2023-11-23 RX ADMIN — ONDANSETRON 4 MG: 2 INJECTION INTRAMUSCULAR; INTRAVENOUS at 03:50

## 2023-11-23 ASSESSMENT — PAIN SCALES - GENERAL
PAINLEVEL_OUTOF10: 7
PAINLEVEL_OUTOF10: 8
PAINLEVEL_OUTOF10: 7
PAINLEVEL_OUTOF10: 7
PAINLEVEL_OUTOF10: 8
PAINLEVEL_OUTOF10: 8
PAINLEVEL_OUTOF10: 7

## 2023-11-23 ASSESSMENT — PAIN DESCRIPTION - DESCRIPTORS
DESCRIPTORS: ACHING
DESCRIPTORS: ACHING

## 2023-11-23 ASSESSMENT — PAIN DESCRIPTION - LOCATION
LOCATION: ABDOMEN
LOCATION: ABDOMEN

## 2023-11-23 ASSESSMENT — PAIN DESCRIPTION - ORIENTATION: ORIENTATION: LEFT

## 2023-11-23 NOTE — PROGRESS NOTES
Shift assessment complete, see flowsheets. Pt A&Ox4, meds given per mar. Pt complaining of pain, pain meds given per mar. No other needs verbalized at this time, call light within reach. Electronically signed by Howard Camarena RN on 11/22/2023 at 11:07 PM    Pt asking for more pain meds, Pao Salazar contacted face to face. No new orders.

## 2023-11-23 NOTE — PROGRESS NOTES
Shift assessment completed and medications given per MAR. Patient is resting in bed with a friend sitting at her bedside.  Patient is able to make her needs known and there are no

## 2023-11-23 NOTE — CONSULTS
Consults    Patient Name: Mark Woods Date: 2023 11:07 AM  MR #: [id number]  : 1995    Attending Physician: Lobito Love MD  Reason for consult: Pancreatitis. History of Presenting Illness:      Jose Marroquin is a 29 y.o. female on hospital day 0 with a history of abdominal pain associated nausea and vomiting which started yesterday, started in the upper back and then was radiating to the epigastric and both subcostal area, and was associate with nausea and vomiting and vomited clear to yellow fluid, no history of hematemesis or melena. ,  She was admitted with acute pancreatitis in October of this year. Was felt that this could be related to alcohol abuse and patient has been abstaining from alcohol since then. No history of trauma to the abdomen. No family history of pancreatitis. Patient had an abdominal ultrasound in 2023 which showed no gallstones, she smokes few cigarettes a day.   History Obtained From:  patient      History:      Past Medical History:   Diagnosis Date    Anxiety     HTN (hypertension)     Pancreatitis      Past Surgical History:   Procedure Laterality Date    DILATION AND CURETTAGE OF UTERUS      2016       Family History  Family History   Problem Relation Age of Onset    Breast Cancer Neg Hx     Cervical Cancer Neg Hx     Uterine Cancer Neg Hx     Ovarian Cancer Neg Hx      [] Unable to obtain due to ventilated and/ or neurologic status    Social History     Socioeconomic History    Marital status:      Spouse name: Not on file    Number of children: Not on file    Years of education: Not on file    Highest education level: Not on file   Occupational History    Not on file   Tobacco Use    Smoking status: Every Day     Packs/day: 0.50     Years: 5.00     Additional pack years: 0.00     Total pack years: 2.50     Types: Cigarettes    Smokeless tobacco: Never   Vaping Use    Vaping Use: Never used   Substance and Sexual Activity

## 2023-11-24 ENCOUNTER — APPOINTMENT (OUTPATIENT)
Dept: ULTRASOUND IMAGING | Age: 28
DRG: 282 | End: 2023-11-24
Payer: COMMERCIAL

## 2023-11-24 VITALS
OXYGEN SATURATION: 96 % | BODY MASS INDEX: 25.69 KG/M2 | RESPIRATION RATE: 16 BRPM | WEIGHT: 145 LBS | HEART RATE: 86 BPM | DIASTOLIC BLOOD PRESSURE: 86 MMHG | SYSTOLIC BLOOD PRESSURE: 134 MMHG | TEMPERATURE: 98.6 F | HEIGHT: 63 IN

## 2023-11-24 LAB
ALBUMIN SERPL-MCNC: 3 G/DL (ref 3.5–4.6)
ANION GAP SERPL CALCULATED.3IONS-SCNC: 9 MEQ/L (ref 9–15)
BUN SERPL-MCNC: 3 MG/DL (ref 6–20)
CALCIUM SERPL-MCNC: 7.6 MG/DL (ref 8.5–9.9)
CHLORIDE SERPL-SCNC: 104 MEQ/L (ref 95–107)
CO2 SERPL-SCNC: 24 MEQ/L (ref 20–31)
CREAT SERPL-MCNC: 0.39 MG/DL (ref 0.5–0.9)
CRP SERPL HS-MCNC: 109.6 MG/L (ref 0–5)
ERYTHROCYTE [DISTWIDTH] IN BLOOD BY AUTOMATED COUNT: 14.4 % (ref 11.5–14.5)
GLUCOSE SERPL-MCNC: 84 MG/DL (ref 70–99)
HCT VFR BLD AUTO: 36.6 % (ref 37–47)
HGB BLD-MCNC: 12 G/DL (ref 12–16)
LIPASE SERPL-CCNC: 498 U/L (ref 12–95)
MAGNESIUM SERPL-MCNC: 1.9 MG/DL (ref 1.7–2.4)
MCH RBC QN AUTO: 27.6 PG (ref 27–31.3)
MCHC RBC AUTO-ENTMCNC: 32.8 % (ref 33–37)
MCV RBC AUTO: 84.1 FL (ref 79.4–94.8)
PHOSPHATE SERPL-MCNC: 3.9 MG/DL (ref 2.3–4.8)
PLATELET # BLD AUTO: 214 K/UL (ref 130–400)
POTASSIUM SERPL-SCNC: 3.2 MEQ/L (ref 3.4–4.9)
RBC # BLD AUTO: 4.35 M/UL (ref 4.2–5.4)
SODIUM SERPL-SCNC: 137 MEQ/L (ref 135–144)
WBC # BLD AUTO: 5.7 K/UL (ref 4.8–10.8)

## 2023-11-24 PROCEDURE — 6370000000 HC RX 637 (ALT 250 FOR IP): Performed by: INTERNAL MEDICINE

## 2023-11-24 PROCEDURE — 83735 ASSAY OF MAGNESIUM: CPT

## 2023-11-24 PROCEDURE — 85027 COMPLETE CBC AUTOMATED: CPT

## 2023-11-24 PROCEDURE — 80069 RENAL FUNCTION PANEL: CPT

## 2023-11-24 PROCEDURE — 6360000002 HC RX W HCPCS: Performed by: INTERNAL MEDICINE

## 2023-11-24 PROCEDURE — 76705 ECHO EXAM OF ABDOMEN: CPT

## 2023-11-24 PROCEDURE — 86140 C-REACTIVE PROTEIN: CPT

## 2023-11-24 PROCEDURE — 99232 SBSQ HOSP IP/OBS MODERATE 35: CPT | Performed by: SPECIALIST

## 2023-11-24 PROCEDURE — 36415 COLL VENOUS BLD VENIPUNCTURE: CPT

## 2023-11-24 PROCEDURE — 2580000003 HC RX 258: Performed by: INTERNAL MEDICINE

## 2023-11-24 PROCEDURE — 83690 ASSAY OF LIPASE: CPT

## 2023-11-24 RX ORDER — ONDANSETRON 4 MG/1
4 TABLET, ORALLY DISINTEGRATING ORAL 3 TIMES DAILY PRN
Qty: 21 TABLET | Refills: 0 | Status: SHIPPED | OUTPATIENT
Start: 2023-11-24

## 2023-11-24 RX ORDER — OXYCODONE HYDROCHLORIDE AND ACETAMINOPHEN 5; 325 MG/1; MG/1
1 TABLET ORAL EVERY 6 HOURS PRN
Qty: 10 TABLET | Refills: 0 | Status: SHIPPED | OUTPATIENT
Start: 2023-11-24 | End: 2023-11-27

## 2023-11-24 RX ADMIN — HYDROMORPHONE HYDROCHLORIDE 1 MG: 1 INJECTION, SOLUTION INTRAMUSCULAR; INTRAVENOUS; SUBCUTANEOUS at 02:50

## 2023-11-24 RX ADMIN — SODIUM CHLORIDE, POTASSIUM CHLORIDE, SODIUM LACTATE AND CALCIUM CHLORIDE: 600; 310; 30; 20 INJECTION, SOLUTION INTRAVENOUS at 02:45

## 2023-11-24 RX ADMIN — OXYCODONE AND ACETAMINOPHEN 1 TABLET: 5; 325 TABLET ORAL at 16:31

## 2023-11-24 RX ADMIN — POTASSIUM CHLORIDE 40 MEQ: 1500 TABLET, EXTENDED RELEASE ORAL at 08:44

## 2023-11-24 RX ADMIN — KETOROLAC TROMETHAMINE 30 MG: 30 INJECTION, SOLUTION INTRAMUSCULAR; INTRAVENOUS at 08:45

## 2023-11-24 RX ADMIN — HYDROMORPHONE HYDROCHLORIDE 1 MG: 1 INJECTION, SOLUTION INTRAMUSCULAR; INTRAVENOUS; SUBCUTANEOUS at 06:38

## 2023-11-24 RX ADMIN — Medication 8 ML: at 10:25

## 2023-11-24 RX ADMIN — HYDROMORPHONE HYDROCHLORIDE 1 MG: 1 INJECTION, SOLUTION INTRAMUSCULAR; INTRAVENOUS; SUBCUTANEOUS at 09:38

## 2023-11-24 RX ADMIN — HYDROMORPHONE HYDROCHLORIDE 1 MG: 1 INJECTION, SOLUTION INTRAMUSCULAR; INTRAVENOUS; SUBCUTANEOUS at 12:37

## 2023-11-24 ASSESSMENT — PAIN SCALES - GENERAL
PAINLEVEL_OUTOF10: 7
PAINLEVEL_OUTOF10: 6
PAINLEVEL_OUTOF10: 7
PAINLEVEL_OUTOF10: 6
PAINLEVEL_OUTOF10: 7
PAINLEVEL_OUTOF10: 8
PAINLEVEL_OUTOF10: 6
PAINLEVEL_OUTOF10: 9

## 2023-11-24 ASSESSMENT — PAIN DESCRIPTION - LOCATION
LOCATION: ABDOMEN

## 2023-11-24 NOTE — FLOWSHEET NOTE
Am nursing  assessment completed. Pt : awake and resting. Alert and oriented. Diet:clear liquid  RESP:               Lung sounds:          Oxygen: room air  Complaints of: prn toradol per request and complaints of abd discomfort  IV:              patent/ flushed/ capped, no signs of infiltration noted, dressing clean/dry/intact. TELE:                 Dressings:                           Precautions:              Falls: 20       Tello: 23  Chart and meds reviewed. Noted Labs: na 137 k 3.2 bun 3 cr 0.39 lipase 498   Plan for today:    Bed wheels locked and in lowest position. Call light and bedside table within reach. NOTES:  [id number] prn dilaudid per request and complaints of abdominal discomfort. Electronically signed by Yina Jordan RN on 11/24/2023 at 12:02 PM    1631 prn percocet per request and complaints of abdominal discomfort. Electronically signed by Yina Jordan RN on 11/24/2023 at 5:17 PM    [id number] tolerating diet. Iv removed. Care plan completed for discharge. Appropriate education addressed in discharge instructions. Instructions completed and reviewed with patient. Verbalize understanding of instructions and follow up. Personal belongings gathered. No complaints. Transport wheelchair called. Family member present. Pt to follow up with GI.  Electronically signed by Yina Jordan RN on 11/24/2023 at 5:18 PM

## 2023-11-24 NOTE — PROGRESS NOTES
Shift assessment complete, see flowsheets. Pt A&Ox4, meds given per mar. No other needs verbalized at this time, call light within reach.      Electronically signed by Gregorio Robles RN on 11/23/2023 at 10:43 PM

## 2023-11-24 NOTE — DISCHARGE INSTR - DIET
Good nutrition is important when healing from an illness, injury, or surgery. Follow any nutrition recommendations given to you during your hospital stay. If you were given an oral nutrition supplement while in the hospital, continue to take this supplement at home. You can take it with meals, in-between meals, and/or before bedtime. These supplements can be purchased at most local grocery stores, pharmacies, and chain [employer]-stores. If you have any questions about your diet or nutrition, call the hospital and ask for the dietitian. Drink clear liquids and eat bland foods until you feel better. [place] foods include rice, dry toast, and crackers. They also include bananas and applesauce. Eat a low-fat diet until your doctor says your pancreas is healed. Do not drink alcohol. Tell your doctor if you need help to quit. Counseling, support groups, and sometimes medicines can help you stay sober.

## 2023-11-24 NOTE — DISCHARGE SUMMARY
Hospital Medicine Discharge Summary    Zoë Alvarado  :  1995  MRN:  [id number]    Admit date:  2023  Discharge date:  2023    Admitting Physician:  Hank Forman MD  Primary Care Physician:  Lisa Santos,       Discharge Diagnoses:    Principal Problem:    Acute pancreatitis  Resolved Problems:    * No resolved hospital problems. *      Hospital Course:   Zoë Alvarado is a 29 y.o. female that was admitted and treated at [facility] for the following medical issues:     Acute pancreatitis  - patient denies ETOH use in the last 2 months  - ETOH level was negative  - CT of abd/pelvis and RUQ u/s did not show gallstones  - improved with IVFs and IV analgesia  - followed by GI      Patient was seen by the following consultants while admitted to [facility]:   Consults:  IP CONSULT TO GI    Significant Diagnostic Studies:    CT ABDOMEN PELVIS W IV CONTRAST Additional Contrast? None    Result Date: 2023  EXAMINATION: CT OF THE ABDOMEN AND PELVIS WITH CONTRAST 2023 12:56 pm TECHNIQUE: CT of the abdomen and pelvis was performed with the administration of intravenous contrast. Multiplanar reformatted images are provided for review. Automated exposure control, iterative reconstruction, and/or weight based adjustment of the mA/kV was utilized to reduce the radiation dose to as low as reasonably achievable. COMPARISON: CT abdomen pelvis 2023, 2023. HISTORY: ORDERING SYSTEM PROVIDED HISTORY: RUQ abd pain, nv; hx pancreatitis TECHNOLOGIST PROVIDED HISTORY: Additional Contrast?->None Reason for exam:->RUQ abd pain, nv; hx pancreatitis Decision Support Exception - unselect if not a suspected or confirmed emergency medical condition->Emergency Medical Condition (MA) What reading provider will be dictating this exam?->CRC FINDINGS: Lower Chest: Lung bases are clear.  Organs: Liver is normal in size and contour and decreased in oxyCODONE-acetaminophen 5-325 MG per tablet  Commonly known as: PERCOCET  Take 1 tablet by mouth every 6 hours as needed for Pain for up to 3 days. Max Daily Amount: 4 tablets            CONTINUE taking these medications      albuterol sulfate  (90 Base) MCG/ACT inhaler  Commonly known as: PROVENTIL;VENTOLIN;PROAIR  Inhale 2 puffs into the lungs every 4 hours as needed for Wheezing or Shortness of Breath     hydrOXYzine HCl 25 MG tablet  Commonly known as: ATARAX     losartan 25 MG tablet  Commonly known as: COZAAR     sertraline 100 MG tablet  Commonly known as: ZOLOFT               Where to Get Your Medications        These medications were sent to [address] [place] [address], [address] [facility]  [address], [address] [place] [address],Fourth Floor [facility]      Phone: [phone]   ondansetron 4 MG disintegrating tablet       You can get these medications from any pharmacy    Bring a paper prescription for each of these medications  oxyCODONE-acetaminophen 5-325 MG per tablet         Disposition:   Discharged to Home. Any [facility] needs that were indicated and/or required as been addressed and set up by Social Work. Condition at discharge: Pt was medically stable at the time of discharge. Significant improvement in clinical condition compared to initial condition at presentation to hospital    Activity: activity as tolerated, fall precautions. Total time taken for discharging this patient: 40 minutes. Greater than 70% of time was spent focused exclusively on this patient. Time was taken to review chart, discuss plans with consultants, reconciling medications, discussing plan answering questions with patient. Signed:  Caroline Dunbar MD  11/24/2023, 1:47 PM  ----------------------------------------------------------------------------------------------------------------------    Gina Vallejo,     Please return to ER or call 911 if you develop any significant signs or symptoms.

## 2024-02-17 ENCOUNTER — APPOINTMENT (OUTPATIENT)
Dept: RADIOLOGY | Facility: HOSPITAL | Age: 29
End: 2024-02-17
Payer: COMMERCIAL

## 2024-02-17 ENCOUNTER — APPOINTMENT (OUTPATIENT)
Dept: CARDIOLOGY | Facility: HOSPITAL | Age: 29
End: 2024-02-17
Payer: COMMERCIAL

## 2024-02-17 ENCOUNTER — HOSPITAL ENCOUNTER (EMERGENCY)
Facility: HOSPITAL | Age: 29
Discharge: HOME | End: 2024-02-17
Attending: STUDENT IN AN ORGANIZED HEALTH CARE EDUCATION/TRAINING PROGRAM
Payer: COMMERCIAL

## 2024-02-17 VITALS
WEIGHT: 173 LBS | DIASTOLIC BLOOD PRESSURE: 89 MMHG | OXYGEN SATURATION: 100 % | HEIGHT: 63 IN | HEART RATE: 89 BPM | RESPIRATION RATE: 19 BRPM | BODY MASS INDEX: 30.65 KG/M2 | SYSTOLIC BLOOD PRESSURE: 128 MMHG

## 2024-02-17 DIAGNOSIS — F19.920 DRUG INTOXICATION WITHOUT COMPLICATION (MULTI): ICD-10-CM
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
## 2024-02-17 LAB
ABO GROUP (TYPE) IN BLOOD: NORMAL
ALBUMIN SERPL BCP-MCNC: 5 G/DL (ref 3.4–5)
ALP SERPL-CCNC: 75 U/L (ref 33–110)
ALT SERPL W P-5'-P-CCNC: 17 U/L (ref 7–45)
ANION GAP SERPL CALC-SCNC: 14 MMOL/L (ref 10–20)
ANTIBODY SCREEN: NORMAL
AST SERPL W P-5'-P-CCNC: 23 U/L (ref 9–39)
B-HCG SERPL-ACNC: <2 MIU/ML
BASOPHILS # BLD AUTO: 0.03 X10*3/UL (ref 0–0.1)
BASOPHILS NFR BLD AUTO: 0.5 %
BILIRUB SERPL-MCNC: 0.2 MG/DL (ref 0–1.2)
BUN SERPL-MCNC: 13 MG/DL (ref 6–23)
CALCIUM SERPL-MCNC: 9.5 MG/DL (ref 8.6–10.3)
CARDIAC TROPONIN I PNL SERPL HS: <3 NG/L (ref 0–13)
CHLORIDE SERPL-SCNC: 108 MMOL/L (ref 98–107)
CO2 SERPL-SCNC: 22 MMOL/L (ref 21–32)
CREAT SERPL-MCNC: 0.73 MG/DL (ref 0.5–1.05)
EGFRCR SERPLBLD CKD-EPI 2021: >90 ML/MIN/1.73M*2
EOSINOPHIL # BLD AUTO: 0.12 X10*3/UL (ref 0–0.7)
EOSINOPHIL NFR BLD AUTO: 2.1 %
ERYTHROCYTE [DISTWIDTH] IN BLOOD BY AUTOMATED COUNT: 14.6 % (ref 11.5–14.5)
ETHANOL SERPL-MCNC: 302 MG/DL
GLUCOSE SERPL-MCNC: 107 MG/DL (ref 74–99)
HCT VFR BLD AUTO: 41.9 % (ref 36–46)
HGB BLD-MCNC: 14.3 G/DL (ref 12–16)
HOLD SPECIMEN: NORMAL
IMM GRANULOCYTES # BLD AUTO: 0.06 X10*3/UL (ref 0–0.7)
IMM GRANULOCYTES NFR BLD AUTO: 1.1 % (ref 0–0.9)
INR PPP: 0.9 (ref 0.9–1.1)
LACTATE SERPL-SCNC: 2 MMOL/L (ref 0.4–2)
LYMPHOCYTES # BLD AUTO: 2.27 X10*3/UL (ref 1.2–4.8)
LYMPHOCYTES NFR BLD AUTO: 40.4 %
MCH RBC QN AUTO: 28.4 PG (ref 26–34)
MCHC RBC AUTO-ENTMCNC: 34.1 G/DL (ref 32–36)
MCV RBC AUTO: 83 FL (ref 80–100)
MONOCYTES # BLD AUTO: 0.45 X10*3/UL (ref 0.1–1)
MONOCYTES NFR BLD AUTO: 8 %
NEUTROPHILS # BLD AUTO: 2.69 X10*3/UL (ref 1.2–7.7)
NEUTROPHILS NFR BLD AUTO: 47.9 %
NRBC BLD-RTO: 0 /100 WBCS (ref 0–0)
PLATELET # BLD AUTO: 326 X10*3/UL (ref 150–450)
POTASSIUM SERPL-SCNC: 4 MMOL/L (ref 3.5–5.3)
PROT SERPL-MCNC: 8 G/DL (ref 6.4–8.2)
PROTHROMBIN TIME: 10.5 SECONDS (ref 9.8–12.8)
RBC # BLD AUTO: 5.03 X10*6/UL (ref 4–5.2)
RH FACTOR (ANTIGEN D): NORMAL
SODIUM SERPL-SCNC: 140 MMOL/L (ref 136–145)
WBC # BLD AUTO: 5.6 X10*3/UL (ref 4.4–11.3)

## 2024-02-17 PROCEDURE — 72125 CT NECK SPINE W/O DYE: CPT

## 2024-02-17 PROCEDURE — 36415 COLL VENOUS BLD VENIPUNCTURE: CPT | Performed by: STUDENT IN AN ORGANIZED HEALTH CARE EDUCATION/TRAINING PROGRAM

## 2024-02-17 PROCEDURE — 84484 ASSAY OF TROPONIN QUANT: CPT | Performed by: STUDENT IN AN ORGANIZED HEALTH CARE EDUCATION/TRAINING PROGRAM

## 2024-02-17 PROCEDURE — 99285 EMERGENCY DEPT VISIT HI MDM: CPT | Mod: 25

## 2024-02-17 PROCEDURE — 72131 CT LUMBAR SPINE W/O DYE: CPT | Performed by: RADIOLOGY

## 2024-02-17 PROCEDURE — 93005 ELECTROCARDIOGRAM TRACING: CPT

## 2024-02-17 PROCEDURE — 85025 COMPLETE CBC W/AUTO DIFF WBC: CPT | Performed by: STUDENT IN AN ORGANIZED HEALTH CARE EDUCATION/TRAINING PROGRAM

## 2024-02-17 PROCEDURE — 76377 3D RENDER W/INTRP POSTPROCES: CPT | Performed by: RADIOLOGY

## 2024-02-17 PROCEDURE — 99291 CRITICAL CARE FIRST HOUR: CPT | Performed by: STUDENT IN AN ORGANIZED HEALTH CARE EDUCATION/TRAINING PROGRAM

## 2024-02-17 PROCEDURE — 71260 CT THORAX DX C+: CPT | Performed by: RADIOLOGY

## 2024-02-17 PROCEDURE — 70486 CT MAXILLOFACIAL W/O DYE: CPT | Performed by: RADIOLOGY

## 2024-02-17 PROCEDURE — 74177 CT ABD & PELVIS W/CONTRAST: CPT | Performed by: RADIOLOGY

## 2024-02-17 PROCEDURE — 70450 CT HEAD/BRAIN W/O DYE: CPT | Performed by: RADIOLOGY

## 2024-02-17 PROCEDURE — 76377 3D RENDER W/INTRP POSTPROCES: CPT

## 2024-02-17 PROCEDURE — 74177 CT ABD & PELVIS W/CONTRAST: CPT

## 2024-02-17 PROCEDURE — 2550000001 HC RX 255 CONTRASTS: Performed by: STUDENT IN AN ORGANIZED HEALTH CARE EDUCATION/TRAINING PROGRAM

## 2024-02-17 PROCEDURE — 85610 PROTHROMBIN TIME: CPT | Performed by: STUDENT IN AN ORGANIZED HEALTH CARE EDUCATION/TRAINING PROGRAM

## 2024-02-17 PROCEDURE — 70486 CT MAXILLOFACIAL W/O DYE: CPT

## 2024-02-17 PROCEDURE — 70450 CT HEAD/BRAIN W/O DYE: CPT

## 2024-02-17 PROCEDURE — 72128 CT CHEST SPINE W/O DYE: CPT | Performed by: RADIOLOGY

## 2024-02-17 PROCEDURE — 83605 ASSAY OF LACTIC ACID: CPT | Performed by: STUDENT IN AN ORGANIZED HEALTH CARE EDUCATION/TRAINING PROGRAM

## 2024-02-17 PROCEDURE — 80053 COMPREHEN METABOLIC PANEL: CPT | Performed by: STUDENT IN AN ORGANIZED HEALTH CARE EDUCATION/TRAINING PROGRAM

## 2024-02-17 PROCEDURE — 72128 CT CHEST SPINE W/O DYE: CPT | Mod: RCN

## 2024-02-17 PROCEDURE — 86901 BLOOD TYPING SEROLOGIC RH(D): CPT | Performed by: STUDENT IN AN ORGANIZED HEALTH CARE EDUCATION/TRAINING PROGRAM

## 2024-02-17 PROCEDURE — 72131 CT LUMBAR SPINE W/O DYE: CPT | Mod: RCN

## 2024-02-17 PROCEDURE — 2500000004 HC RX 250 GENERAL PHARMACY W/ HCPCS (ALT 636 FOR OP/ED): Performed by: EMERGENCY MEDICINE

## 2024-02-17 PROCEDURE — 99283 EMERGENCY DEPT VISIT LOW MDM: CPT

## 2024-02-17 PROCEDURE — 82077 ASSAY SPEC XCP UR&BREATH IA: CPT | Performed by: STUDENT IN AN ORGANIZED HEALTH CARE EDUCATION/TRAINING PROGRAM

## 2024-02-17 PROCEDURE — 84702 CHORIONIC GONADOTROPIN TEST: CPT | Performed by: STUDENT IN AN ORGANIZED HEALTH CARE EDUCATION/TRAINING PROGRAM

## 2024-02-17 PROCEDURE — 72125 CT NECK SPINE W/O DYE: CPT | Performed by: RADIOLOGY

## 2024-02-17 RX ORDER — FENTANYL CITRATE 50 UG/ML
50 INJECTION, SOLUTION INTRAMUSCULAR; INTRAVENOUS ONCE
Status: DISCONTINUED | OUTPATIENT
Start: 2024-02-17 | End: 2024-02-17 | Stop reason: HOSPADM

## 2024-02-17 RX ORDER — ONDANSETRON HYDROCHLORIDE 2 MG/ML
4 INJECTION, SOLUTION INTRAVENOUS ONCE
Status: DISCONTINUED | OUTPATIENT
Start: 2024-02-17 | End: 2024-02-17 | Stop reason: HOSPADM

## 2024-02-17 RX ADMIN — IOHEXOL 100 ML: 350 INJECTION, SOLUTION INTRAVENOUS at 08:00

## 2024-02-17 RX ADMIN — SODIUM CHLORIDE 1000 ML: 9 INJECTION, SOLUTION INTRAVENOUS at 08:59

## 2024-02-17 ASSESSMENT — COLUMBIA-SUICIDE SEVERITY RATING SCALE - C-SSRS
6. HAVE YOU EVER DONE ANYTHING, STARTED TO DO ANYTHING, OR PREPARED TO DO ANYTHING TO END YOUR LIFE?: NO
2. HAVE YOU ACTUALLY HAD ANY THOUGHTS OF KILLING YOURSELF?: NO
1. IN THE PAST MONTH, HAVE YOU WISHED YOU WERE DEAD OR WISHED YOU COULD GO TO SLEEP AND NOT WAKE UP?: NO

## 2024-02-17 ASSESSMENT — ENCOUNTER SYMPTOMS
NEUROLOGICAL NEGATIVE: 1
RESPIRATORY NEGATIVE: 1
CONSTITUTIONAL NEGATIVE: 1
HEMATOLOGIC/LYMPHATIC NEGATIVE: 1
GASTROINTESTINAL NEGATIVE: 1
MUSCULOSKELETAL NEGATIVE: 1
CARDIOVASCULAR NEGATIVE: 1
EYES NEGATIVE: 1
PSYCHIATRIC NEGATIVE: 1

## 2024-02-17 ASSESSMENT — LIFESTYLE VARIABLES
EVER FELT BAD OR GUILTY ABOUT YOUR DRINKING: NO
HAVE PEOPLE ANNOYED YOU BY CRITICIZING YOUR DRINKING: YES
HAVE YOU EVER FELT YOU SHOULD CUT DOWN ON YOUR DRINKING: NO
EVER HAD A DRINK FIRST THING IN THE MORNING TO STEADY YOUR NERVES TO GET RID OF A HANGOVER: NO

## 2024-02-17 ASSESSMENT — PAIN SCALES - GENERAL
PAINLEVEL_OUTOF10: 0 - NO PAIN

## 2024-02-17 ASSESSMENT — PAIN - FUNCTIONAL ASSESSMENT
PAIN_FUNCTIONAL_ASSESSMENT: 0-10

## 2024-02-17 NOTE — DISCHARGE INSTRUCTIONS
Follow up with your primary care doctor. Return to the ER if symptoms worsen or change. You can take tylenol or ibuprofen as directed for further relief at home. You had incidental findings of enlarged thyroid and soft tissue density in the left face. You should follow up with your doctor about these findings for further work up.
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
## 2024-02-18 LAB
ATRIAL RATE: 102 BPM
P AXIS: 55 DEGREES
P OFFSET: 195 MS
P ONSET: 136 MS
PR INTERVAL: 170 MS
Q ONSET: 221 MS
QRS COUNT: 17 BEATS
QRS DURATION: 92 MS
QT INTERVAL: 380 MS
QTC CALCULATION(BAZETT): 495 MS
QTC FREDERICIA: 453 MS
R AXIS: 65 DEGREES
T AXIS: 46 DEGREES
T OFFSET: 411 MS
VENTRICULAR RATE: 102 BPM

## 2024-04-04 ENCOUNTER — TELEPHONE (OUTPATIENT)
Dept: OBSTETRICS AND GYNECOLOGY | Facility: CLINIC | Age: 29
End: 2024-04-04

## 2024-04-04 NOTE — TELEPHONE ENCOUNTER
New patient, CHRISTIANO called to schedule her initial ob visit and has some questions.  LMP 3/7/2024  3-MAB's.        1) Patient has a h/o 3 piror miscarriages and is very nervous. Denies currently spotting, bleeding, pain or cramping.  Patient advised that we are unable to order any early labs or ultrasounds until she is established with our practice.  She lives in [place] and is really only available to be seen on  due to work.      2) Patient had high BP, previously taking IC Losartan potassium 25 mg but took herself off medication about 2-3 weeks ago.  Patient realizes that she needs to be on something but didn't like the Losartan.     3) Patient has a history of anxiety/depression, currently on hydroxyzine PRN for anxiety and sertraline for depression but hasn't taken since + UPT    4) Patient has asthma, currently uses and inhaler.    NOB scheduled for 2024 with Dr Mancilla.    Would you be willing to see patient within the next 1-3 weeks for a new GYN visit to establish and discuss her medication and potentially order labs/scan?   Or do you advise patient take her medication as prescribed until her NOB?    Please advise

## 2024-04-11 ENCOUNTER — APPOINTMENT (OUTPATIENT)
Dept: OBSTETRICS AND GYNECOLOGY | Facility: CLINIC | Age: 29
End: 2024-04-11
Payer: COMMERCIAL

## 2024-05-17 ENCOUNTER — LAB (OUTPATIENT)
Dept: LAB | Facility: LAB | Age: 29
End: 2024-05-17
Payer: COMMERCIAL

## 2024-05-17 ENCOUNTER — INITIAL PRENATAL (OUTPATIENT)
Dept: OBSTETRICS AND GYNECOLOGY | Facility: CLINIC | Age: 29
End: 2024-05-17
Payer: COMMERCIAL

## 2024-05-17 VITALS — DIASTOLIC BLOOD PRESSURE: 88 MMHG | WEIGHT: 182.5 LBS | SYSTOLIC BLOOD PRESSURE: 142 MMHG | BODY MASS INDEX: 32.33 KG/M2

## 2024-05-17 DIAGNOSIS — Z34.91 PRENATAL CARE IN FIRST TRIMESTER (HHS-HCC): Primary | ICD-10-CM

## 2024-05-17 DIAGNOSIS — F41.9 ANXIETY: ICD-10-CM

## 2024-05-17 DIAGNOSIS — Z34.91 PRENATAL CARE IN FIRST TRIMESTER (HHS-HCC): ICD-10-CM

## 2024-05-17 PROBLEM — O10.919 CHRONIC HYPERTENSION IN PREGNANCY (HHS-HCC): Status: ACTIVE | Noted: 2024-05-17

## 2024-05-17 LAB
ABO GROUP (TYPE) IN BLOOD: NORMAL
ALBUMIN SERPL BCP-MCNC: 4.4 G/DL (ref 3.4–5)
ALP SERPL-CCNC: 46 U/L (ref 33–110)
ALT SERPL W P-5'-P-CCNC: 11 U/L (ref 7–45)
ANION GAP SERPL CALC-SCNC: 11 MMOL/L (ref 10–20)
ANTIBODY SCREEN: NORMAL
AST SERPL W P-5'-P-CCNC: 16 U/L (ref 9–39)
BILIRUB SERPL-MCNC: 0.3 MG/DL (ref 0–1.2)
BUN SERPL-MCNC: 7 MG/DL (ref 6–23)
CALCIUM SERPL-MCNC: 9.3 MG/DL (ref 8.6–10.3)
CHLORIDE SERPL-SCNC: 105 MMOL/L (ref 98–107)
CO2 SERPL-SCNC: 24 MMOL/L (ref 21–32)
CREAT SERPL-MCNC: 0.52 MG/DL (ref 0.5–1.05)
CREAT UR-MCNC: 128.2 MG/DL (ref 20–320)
EGFRCR SERPLBLD CKD-EPI 2021: >90 ML/MIN/1.73M*2
ERYTHROCYTE [DISTWIDTH] IN BLOOD BY AUTOMATED COUNT: 12.5 % (ref 11.5–14.5)
EST. AVERAGE GLUCOSE BLD GHB EST-MCNC: 105 MG/DL
GLUCOSE SERPL-MCNC: 82 MG/DL (ref 74–99)
HBA1C MFR BLD: 5.3 %
HBV SURFACE AG SERPL QL IA: NONREACTIVE
HCT VFR BLD AUTO: 39 % (ref 36–46)
HCV AB SER QL: NONREACTIVE
HGB BLD-MCNC: 13 G/DL (ref 12–16)
HIV 1+2 AB+HIV1 P24 AG SERPL QL IA: NONREACTIVE
MCH RBC QN AUTO: 27.5 PG (ref 26–34)
MCHC RBC AUTO-ENTMCNC: 33.3 G/DL (ref 32–36)
MCV RBC AUTO: 83 FL (ref 80–100)
NRBC BLD-RTO: 0 /100 WBCS (ref 0–0)
PLATELET # BLD AUTO: 366 X10*3/UL (ref 150–450)
POTASSIUM SERPL-SCNC: 4 MMOL/L (ref 3.5–5.3)
PROT SERPL-MCNC: 6.9 G/DL (ref 6.4–8.2)
PROT UR-ACNC: 9 MG/DL (ref 5–24)
PROT/CREAT UR: 0.07 MG/MG CREAT (ref 0–0.17)
RBC # BLD AUTO: 4.72 X10*6/UL (ref 4–5.2)
REFLEX ADDED, ANEMIA PANEL: NORMAL
RH FACTOR (ANTIGEN D): NORMAL
RUBV IGG SERPL IA-ACNC: 0.7 IA
RUBV IGG SERPL QL IA: NEGATIVE
SODIUM SERPL-SCNC: 136 MMOL/L (ref 136–145)
TREPONEMA PALLIDUM IGG+IGM AB [PRESENCE] IN SERUM OR PLASMA BY IMMUNOASSAY: NONREACTIVE
URATE SERPL-MCNC: 3.6 MG/DL (ref 2.3–6.7)
WBC # BLD AUTO: 6.8 X10*3/UL (ref 4.4–11.3)

## 2024-05-17 PROCEDURE — 86780 TREPONEMA PALLIDUM: CPT

## 2024-05-17 PROCEDURE — 86901 BLOOD TYPING SEROLOGIC RH(D): CPT

## 2024-05-17 PROCEDURE — 82570 ASSAY OF URINE CREATININE: CPT

## 2024-05-17 PROCEDURE — 80053 COMPREHEN METABOLIC PANEL: CPT

## 2024-05-17 PROCEDURE — 86317 IMMUNOASSAY INFECTIOUS AGENT: CPT

## 2024-05-17 PROCEDURE — 99213 OFFICE O/P EST LOW 20 MIN: CPT | Performed by: OBSTETRICS & GYNECOLOGY

## 2024-05-17 PROCEDURE — 84156 ASSAY OF PROTEIN URINE: CPT

## 2024-05-17 PROCEDURE — 86803 HEPATITIS C AB TEST: CPT

## 2024-05-17 PROCEDURE — 83021 HEMOGLOBIN CHROMOTOGRAPHY: CPT

## 2024-05-17 PROCEDURE — 83036 HEMOGLOBIN GLYCOSYLATED A1C: CPT

## 2024-05-17 PROCEDURE — 83020 HEMOGLOBIN ELECTROPHORESIS: CPT | Performed by: OBSTETRICS & GYNECOLOGY

## 2024-05-17 PROCEDURE — 87086 URINE CULTURE/COLONY COUNT: CPT

## 2024-05-17 PROCEDURE — 85027 COMPLETE CBC AUTOMATED: CPT

## 2024-05-17 PROCEDURE — 86850 RBC ANTIBODY SCREEN: CPT

## 2024-05-17 PROCEDURE — 87389 HIV-1 AG W/HIV-1&-2 AB AG IA: CPT

## 2024-05-17 PROCEDURE — 87340 HEPATITIS B SURFACE AG IA: CPT

## 2024-05-17 PROCEDURE — 86900 BLOOD TYPING SEROLOGIC ABO: CPT

## 2024-05-17 PROCEDURE — 36415 COLL VENOUS BLD VENIPUNCTURE: CPT

## 2024-05-17 PROCEDURE — 84550 ASSAY OF BLOOD/URIC ACID: CPT

## 2024-05-17 RX ORDER — HYDROXYZINE PAMOATE 25 MG/1
25 CAPSULE ORAL 3 TIMES DAILY PRN
COMMUNITY
Start: 2023-10-05

## 2024-05-17 ASSESSMENT — EDINBURGH POSTNATAL DEPRESSION SCALE (EPDS)
I HAVE FELT SCARED OR PANICKY FOR NO GOOD REASON: YES, QUITE A LOT
I HAVE FELT SAD OR MISERABLE: NOT VERY OFTEN
I HAVE BEEN SO UNHAPPY THAT I HAVE BEEN CRYING: NO, NEVER
THE THOUGHT OF HARMING MYSELF HAS OCCURRED TO ME: NEVER
TOTAL SCORE: 11
I HAVE BEEN ANXIOUS OR WORRIED FOR NO GOOD REASON: YES, VERY OFTEN
THINGS HAVE BEEN GETTING ON TOP OF ME: YES, SOMETIMES I HAVEN'T BEEN COPING AS WELL AS USUAL
I HAVE BEEN SO UNHAPPY THAT I HAVE HAD DIFFICULTY SLEEPING: NOT AT ALL
I HAVE BLAMED MYSELF UNNECESSARILY WHEN THINGS WENT WRONG: YES, SOME OF THE TIME
I HAVE BEEN ABLE TO LAUGH AND SEE THE FUNNY SIDE OF THINGS: AS MUCH AS I ALWAYS COULD
I HAVE LOOKED FORWARD WITH ENJOYMENT TO THINGS: AS MUCH AS I EVER DID

## 2024-05-17 NOTE — PROGRESS NOTES
paSubjective   Christiano Prieto is a 28 y.o. No obstetric history on file. at Unknown with a working estimated date of delivery of Not found. who presents for an initial prenatal visit. This pregnancy is unplanned.  Occupation: Sales, [employer]. Works from home.  Partner: Yes  Children with other partner: Yes        Born healthy: Yes(2)  Feels safe at home: Yes  Previous  section No  Patient currently experiencing: nausea/vomitting, desires anti-emetics  Taking prenatal vitamin: Yes  Dating:     Pregravid BMI: Could not be calculated  BMI over 30 $$$$. Hemoglobin A1C ordered. Limiting weight gain to 11-20lbs through healthy eating habits and exercise reviewed  Normal weight, BMI 18.5-<24.9, discussed recommended weight gain of 25-35#  Overweight BMI 25-29.9, discussed recommended weight gain of 15-25#.     Preeclampsia risk:  History of hypertension:  No  ASA prophylaxis: patient has preeclampsia risk factors including ( Nulliparity, obesity BMI>30, mother or sister with PEC/eclampsia/HELLP, AA race, age > 35, previous LBW or SGA infant, previous stillbirth, pregnancy interval > 10 yrs)  and will start 162mg ASA daily at 12-16 weeks, will talk about next visit with her    Ob HX: vaginal delivery x2  Hx of depression and anxiety    OB History   No obstetric history on file.        Prior pregnancy complications:  none  Medical Problems        Objective      TWG: Not found.   Expected Total Weight Gain: Could not be calculated   Pregravid BMI: Could not be calculated     Problem List       No episode was linked to this visit.             Assessment /Plan     Genetic testing: The patient was counseled regarding prenatal genetic testing options and was provided literature in the obstetric folder. First line screening options, including cfDNA and first trimester ultrasound for nuchal translucency, were discussed and offered.  Benefits, drawbacks, and limitations were explained respectively.  It was clearly  stated that only diagnostic testing via CVS or amniocentesis provides definitive information regarding a genetic diagnosis in the fetus (risk of complications with CVS 1/200, risk of fetal loss with CVS 1/400; risk of complications with amniocentesis 1/400 and a risk of fetal loss with amniocentesis 1/1000). It was discussed with the patient that the false positive rates for NIPT is higher for women under 35 years of age. It was encouraged that patient's with high risk personal/family history be referred to genetics for additional screening and counselling. This patient has decided to pursue NIPTs    Education provided r/t nutrition, prenatal vitamins, folic acid supplementation, dietary guidelines, exercise, smoking, alcohol, caffeine and drug use. Healthy weight gain in pregnancy discussed.     Physical activity -patient encouraged to be physically active throughout pregnancy to promote healthy weight gain.     Warning s/s discussed and SAB precautions reviewed.   RTC 4wks/prn -    NOB plan:   PIH labs and new prenatal labs  NT US ordered  NIPTS ordered  Talked about C.HTN and checking BP at home  Talked about anxiety and Pt to not take vistaril anymore, referred to Lynda Dorado for treatment.  Will talk about baby ASA next time  US showed IUP at 10.1wks with due date of 12/12/24

## 2024-05-19 LAB — BACTERIA UR CULT: NORMAL

## 2024-05-20 LAB
HEMOGLOBIN A2: 2.8 % (ref 2–3.5)
HEMOGLOBIN A: 96.9 % (ref 95.8–98)
HEMOGLOBIN F: 0.3 % (ref 0–2)
HEMOGLOBIN IDENTIFICATION INTERPRETATION: NORMAL
PATH REVIEW-HGB IDENTIFICATION: NORMAL

## 2024-05-28 LAB — SCAN RESULT: NORMAL

## 2024-05-29 ENCOUNTER — TELEPHONE (OUTPATIENT)
Dept: OBSTETRICS AND GYNECOLOGY | Facility: CLINIC | Age: 29
End: 2024-05-29
Payer: COMMERCIAL

## 2024-05-29 DIAGNOSIS — O10.919 CHRONIC HYPERTENSION AFFECTING PREGNANCY (HHS-HCC): ICD-10-CM

## 2024-05-29 DIAGNOSIS — R11.2 NAUSEA AND VOMITING, UNSPECIFIED VOMITING TYPE: Primary | ICD-10-CM

## 2024-05-29 RX ORDER — NEBULIZER AND COMPRESSOR
1 EACH MISCELLANEOUS 2 TIMES DAILY
Qty: 1 EACH | Refills: 0 | Status: SHIPPED | OUTPATIENT
Start: 2024-05-29

## 2024-05-29 RX ORDER — ONDANSETRON 4 MG/1
4 TABLET, ORALLY DISINTEGRATING ORAL EVERY 6 HOURS PRN
Qty: 30 TABLET | Refills: 0 | Status: SHIPPED | OUTPATIENT
Start: 2024-05-29

## 2024-05-29 NOTE — TELEPHONE ENCOUNTER
Patient with hx of cHTN called OB line this morning at 11-6 stating she feels that her BP is high due to her symptoms. Does not have BP cuff at home.    States she believes that her BP is high due and caused by her anxiety. Her sx's today are anxiety, chest tightening, SOB, feeling feverish but no temperature, swelling, blurry vision and HA unresolved with Tylenol. I advised that if she is having chest tightening/SOB she needs to seek emergency care for assessment. Patient vocalized understanding.    Patient states she was previously prescribed BP medication, IC Losartan Potassium 25 mg tablet PO daily. She would like to be prescribed this medication again. I advised that I would need to send Dr Mancilla a message relaying her concerns and advised that we can not prescribe anything until approved by the physician. Patient made aware that she may need to come in for a BP check.     Patient also stated she was supposed to be prescribed nausea medication at her first OB visit, nothing was ever sent to the pharmacy.     Patient also stated that she is almost out of her anxiety medication and only has 3 pills left and is requesting a refill if possible. She is currently prescribed hydrOXYzine pamoate (Vistaril) 25 mg capsule. Has not been able to get a hold of Lynda Trent's office to schedule an appointment with her. I reached out to her office today and spoke with Tennille one of the receptionists. She said that she will be reaching out to the patient today to get her scheduled as there is an intake questionnaire that needs to be reviewed with the patient while scheduling, patient made aware that they will be calling today to schedule.    Please advise.

## 2024-06-06 ENCOUNTER — TELEPHONE (OUTPATIENT)
Dept: OBSTETRICS AND GYNECOLOGY | Facility: CLINIC | Age: 29
End: 2024-06-06

## 2024-06-06 NOTE — TELEPHONE ENCOUNTER
"13.0 wk ob called ob line stating that she is really struggling with having anxiety/panic attacks, especially within the last few days due to losing her grandmother.  Patient is scheduled to see Renee Hooper in July but is going to reach out to her, to see if she is able to get in to see her sooner as her anxiety is really bad right now.  Patient is aware that she shouldn't be taking her hydroxyzine, as its not safe in pregnancy, but  felt she didn't have any choice and has had to take it at least once a day to keep her anxiety down.  Patient has also been very concerned with her BP, as she had to be on BP medication in the past.  She feels that her BP is elevated, because she can feel it, and this is just adding to her anxiety.    Patient made aware of prior message from Dr Mancilla from 5/29 (see below), which was sent to her via [employer].  Patient was unaware that she had this message.  She will reach out to [facility] about her BP monitor and send in her log after 5-7 days for review to see in medication is needed.  Patient was sent BP log via [employer] with instructions and was assured that I will make Dr Alba aware.      **Christiano,     Dr Mancilla got back to me. See below for that she sent back to me regarding your current issues:     \"I agree with what you told her.  Those symptoms need to be checked out.  I wouldn't order a BP medication for her yet and the one that she was on is not safe in pregnancy.  I won't order the vistaril for her since it is not safe in first trimester of pregnancy and I told her that at her first OB appt.  I am glad that she is using the referral I gave her for Lynda Dorado.  Thanks for helping her get in.  I sent in a BP cuff prescription and a zofran prescription for her.  I need her to start taking her BP's and send/bring them in.  Then we can put her on a medication. \"     I see you are scheduled to see Lynda Trent in July, that's great! Please keep that appointment. Please " keep a log of your blood pressures as well and bring them to each appointment. You should also send in your blood pressure readings to this message thread each week so we can send those to Dr Mancilla to manage. If you have any issues, concerns or questions please feel free to reach out.     Thank you!**

## 2024-06-12 ENCOUNTER — HOSPITAL ENCOUNTER (OUTPATIENT)
Dept: RADIOLOGY | Facility: CLINIC | Age: 29
Discharge: HOME | End: 2024-06-12
Payer: COMMERCIAL

## 2024-06-12 DIAGNOSIS — Z34.91 PRENATAL CARE IN FIRST TRIMESTER (HHS-HCC): ICD-10-CM

## 2024-06-12 DIAGNOSIS — Z36.82 NUCHAL TRANSLUCENCY OF FETUS ON PRENATAL ULTRASOUND (HHS-HCC): ICD-10-CM

## 2024-06-12 PROCEDURE — 76801 OB US < 14 WKS SINGLE FETUS: CPT | Performed by: OBSTETRICS & GYNECOLOGY

## 2024-06-12 PROCEDURE — 76813 OB US NUCHAL MEAS 1 GEST: CPT | Performed by: OBSTETRICS & GYNECOLOGY

## 2024-06-12 PROCEDURE — 76813 OB US NUCHAL MEAS 1 GEST: CPT

## 2024-06-12 PROCEDURE — 76801 OB US < 14 WKS SINGLE FETUS: CPT

## 2024-06-17 PROBLEM — K85.90 ACUTE RECURRENT PANCREATITIS (HHS-HCC): Status: ACTIVE | Noted: 2023-10-08

## 2024-06-17 PROBLEM — R10.9 ABDOMINAL PAIN: Status: ACTIVE | Noted: 2024-06-17

## 2024-06-17 PROBLEM — K85.20 ALCOHOL INDUCED ACUTE PANCREATITIS WITHOUT NECROSIS OR INFECTION (HHS-HCC): Status: ACTIVE | Noted: 2023-10-06

## 2024-06-18 NOTE — PROGRESS NOTES
Christiano Prieto is a 28 y.o. at 15w1d presents for routine prenatal check.  Pt complains of headaches.  Pt still taking psych med but going to see Lynda Dorado 7/3.        Plan:  Pt is to F/U in 4wks  Told to take baby ASA daily  Pt has psych appt 7/3  Pap done  Talked about what to take for HAs    Samreen Mancilla, DO

## 2024-06-21 ENCOUNTER — APPOINTMENT (OUTPATIENT)
Dept: OBSTETRICS AND GYNECOLOGY | Facility: CLINIC | Age: 29
End: 2024-06-21
Payer: COMMERCIAL

## 2024-06-21 VITALS
DIASTOLIC BLOOD PRESSURE: 66 MMHG | SYSTOLIC BLOOD PRESSURE: 120 MMHG | BODY MASS INDEX: 32.97 KG/M2 | WEIGHT: 186.13 LBS

## 2024-06-21 DIAGNOSIS — Z3A.15 15 WEEKS GESTATION OF PREGNANCY (HHS-HCC): Primary | ICD-10-CM

## 2024-06-21 PROCEDURE — 99213 OFFICE O/P EST LOW 20 MIN: CPT | Performed by: OBSTETRICS & GYNECOLOGY

## 2024-07-03 ENCOUNTER — APPOINTMENT (OUTPATIENT)
Dept: BEHAVIORAL HEALTH | Facility: CLINIC | Age: 29
End: 2024-07-03
Payer: COMMERCIAL

## 2024-07-10 PROBLEM — H53.149 VISUAL DISCOMFORT, UNSPECIFIED: Status: ACTIVE | Noted: 2018-02-25

## 2024-07-10 PROBLEM — H72.92 PERFORATION OF LEFT TYMPANIC MEMBRANE: Status: ACTIVE | Noted: 2020-06-12

## 2024-07-10 PROBLEM — H90.12 CONDUCTIVE HEARING LOSS OF LEFT EAR WITH UNRESTRICTED HEARING OF RIGHT EAR: Status: ACTIVE | Noted: 2020-06-12

## 2024-07-10 PROBLEM — H60.90 OTITIS EXTERNA: Status: ACTIVE | Noted: 2024-07-10

## 2024-07-10 PROBLEM — O47.00 PRETERM UTERINE CONTRACTIONS (HHS-HCC): Status: ACTIVE | Noted: 2019-08-11

## 2024-07-10 PROBLEM — R06.02 SHORTNESS OF BREATH: Status: ACTIVE | Noted: 2024-07-10

## 2024-07-10 PROBLEM — F41.1 GAD (GENERALIZED ANXIETY DISORDER): Status: ACTIVE | Noted: 2018-06-26

## 2024-07-10 PROBLEM — H92.09 EAR PAIN: Status: ACTIVE | Noted: 2024-07-10

## 2024-07-10 PROBLEM — K85.90 PANCREATITIS (HHS-HCC): Status: ACTIVE | Noted: 2024-07-10

## 2024-07-10 PROBLEM — R11.0 NAUSEA: Status: ACTIVE | Noted: 2018-02-25

## 2024-07-10 PROBLEM — R51.9 HEADACHE: Status: ACTIVE | Noted: 2018-02-25

## 2024-07-10 PROBLEM — D50.9 IRON DEFICIENCY ANEMIA: Status: ACTIVE | Noted: 2018-06-26

## 2024-07-10 RX ORDER — HYDROCODONE BITARTRATE AND ACETAMINOPHEN 5; 325 MG/1; MG/1
TABLET ORAL
COMMUNITY
Start: 2023-11-27 | End: 2024-07-16 | Stop reason: WASHOUT

## 2024-07-11 ENCOUNTER — APPOINTMENT (OUTPATIENT)
Dept: BEHAVIORAL HEALTH | Facility: CLINIC | Age: 29
End: 2024-07-11
Payer: COMMERCIAL

## 2024-07-11 DIAGNOSIS — F41.9 ANXIETY: ICD-10-CM

## 2024-07-11 PROCEDURE — 99205 OFFICE O/P NEW HI 60 MIN: CPT | Performed by: CLINICAL NURSE SPECIALIST

## 2024-07-11 NOTE — PROGRESS NOTES
Chief Complaint:  Referred by her OB for evaluation   Mother of children ages 10 daughter and 3 yo son   History of Present Illness:  28 SF who presents at 18wga for unplanned pregnancy w/ EDC 24 . FOB is fiance. No medical complications. U/S WNL . Plan to deliver at [facility] . Hx anxiety/panic for > 10 yrs w/ severe episodes end up in ED for few time per year. Symptoms include lose feeling in hands, cannot move, feel like heart attack, last time was 6-7 months ago and occurs  few times per year.  Currently taking Hydroxyzine 25 mg 4-5 tabs per week, some day takes at most takes 2 tabs, causes her to fall asleep. Only things that helps Escitalopram, Sertraline , Alprazolam, or Lorazepam  given in ED. [place] to manage on own via deep breathing, lies down, positive self talk,     Since onset of pregnancy, anxiety worse in first trimester, described as stronger, more frequent palpitations. Mood is happiest as she has ever been  most of time. Denies depressed, frustrated irritable mood. Denies major stressors. Stays home a lot since stopping ETOH use in 2024.  Works from home, avoid social situations, avoids ETOH  triggers, being around others adds to anxiety. Over past year, does not leave home, other than grocery store ie not bothered by [employer], shopping with her children  Sleep 'a lot ' for average of 8-9 hours per night , fatigued during pregnancy.     Review of Systems:  PTSD- Denies   Psychosis- Denies   [place]- Denies   Substance Use- Was abusing ETOH daily to few time per week, more depressed ie wine a bottle /er day . Decided on own to stop, has stopped on her own in past for longest one yr. Admitted to [facility] .      OB/GYN History:  Pregnancy # 1 Delivered daughter on 2013 via  at 40wga. No PPD FOB #1   Pregnancy # 2 2017 at 7 wga FOB # 2   Pregnancy # 3 Delivered son 2019 via  No PPD FOB # 2   Pregnancy #4  2022 at 6wga FOB # 2   Pregnancy # 5 target w/ FOB  # 3   Past Psychiatric History:  Out patient Treatment   -   EDVIN IOP [facility]  - Age 18 diagnoses w/ anxiety, had panic attack, dyspnea, first treated w/ Escitalopram  does not recall effective     In patient Admission   -  EDVIN for Detoxification [facility]  SI History: Denies SI, SIB, gestures attempts      Current Medications- Hydroxyzine   Previous Medications- See HPI Was Sertraline 50 mg to 100mg while sober 9-10 months, not effective    Past Medical History:   Diagnose   Routine Medication   Allergies Seasonal , no allergies medication      Family History:  Maternal : Mother had anxiety Hydroxyzine effective   Paternal : None   No suicides in [place]      Psychosocial:  [place]/R [place]   Parent  and  at pt age of 3 yo   Patient older of one half brother    Educated for Associate Degree    in 2018 for 2 months     Works from home,loves her job,     Father  when pt was 20 yo     ASSESSMENT   IMP: 28 SF who presents at 18wga for unplanned pregnancy w/ EDC 24 . FOB is fiance. No medical complications. U/S WNL . Plan to deliver at [facility] . Hx anxiety/panic for > 10 yrs w/ severe episodes end up in ED for few time per year. Symptoms include lose feeling in hands, cannot move, feel like heart attack, last time was 6-7 months ago and occurs  few times per year.  Currently taking Hydroxyzine 25 mg 4-5 tabs per week, some days takes at most takes 2 tabs, causes her to fall asleep. Only things that helps more is Escitalopram, but does not want to start that until after delivery.     Low Risk for Harm due to past EDVIN. Protected by pregnancy, employment, .young child in home, help seeking medication.     Educated on s/sx, risk course and treatment for PMADS.   Reviewed r/b/a for use of Hydroxyzine during pregnancy and informed of limited reproductive data. Nevertheless, given that she uses only several times per week and is in her second trimester with normal ultrasounds, her  risks for adverse events are low. Benefits of treatment outweigh risks. She was aware of this and is in agreement. Also discussed referral for in therapy or   IOP and pt vanessa consider.       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

## 2024-07-16 LAB
CYTOLOGY CMNT CVX/VAG CYTO-IMP: NORMAL
HPV HR 12 DNA GENITAL QL NAA+PROBE: NEGATIVE
HPV HR GENOTYPES PNL CVX NAA+PROBE: NEGATIVE
HPV16 DNA SPEC QL NAA+PROBE: NEGATIVE
HPV18 DNA SPEC QL NAA+PROBE: NEGATIVE
LAB AP HPV GENOTYPE QUESTION: YES
LAB AP HPV HR: NORMAL
LABORATORY COMMENT REPORT: NORMAL
PATH REPORT.TOTAL CANCER: NORMAL

## 2024-07-16 RX ORDER — HYDROXYZINE HYDROCHLORIDE 25 MG/1
25 TABLET, FILM COATED ORAL
Qty: 16 TABLET | Refills: 1 | Status: SHIPPED | OUTPATIENT
Start: 2024-07-16 | End: 2024-09-14

## 2024-07-25 ENCOUNTER — TELEPHONE (OUTPATIENT)
Dept: OBSTETRICS AND GYNECOLOGY | Facility: CLINIC | Age: 29
End: 2024-07-25
Payer: COMMERCIAL

## 2024-07-25 NOTE — TELEPHONE ENCOUNTER
I called pt to try to move appt to later in the day to an open spot Dr. Mancilla had. Pt stated she actually was going to call and reschedule this appt. She wanted to push it out about two weeks due to work. She is currently 20 weeks. I offered to transfer her to OB line to discuss, and she said she is currently at work and will call back on her lunch break

## 2024-07-26 ENCOUNTER — APPOINTMENT (OUTPATIENT)
Dept: OBSTETRICS AND GYNECOLOGY | Facility: CLINIC | Age: 29
End: 2024-07-26
Payer: COMMERCIAL

## 2024-08-08 ENCOUNTER — APPOINTMENT (OUTPATIENT)
Dept: OBSTETRICS AND GYNECOLOGY | Facility: CLINIC | Age: 29
End: 2024-08-08
Payer: COMMERCIAL

## 2024-08-09 ENCOUNTER — HOSPITAL ENCOUNTER (OUTPATIENT)
Dept: RADIOLOGY | Facility: CLINIC | Age: 29
Discharge: HOME | End: 2024-08-09
Payer: COMMERCIAL

## 2024-08-09 DIAGNOSIS — Z34.91 PRENATAL CARE IN FIRST TRIMESTER (HHS-HCC): ICD-10-CM

## 2024-08-09 PROCEDURE — 76811 OB US DETAILED SNGL FETUS: CPT

## 2024-08-09 PROCEDURE — 76811 OB US DETAILED SNGL FETUS: CPT | Performed by: OBSTETRICS & GYNECOLOGY

## 2024-08-20 ENCOUNTER — APPOINTMENT (OUTPATIENT)
Dept: BEHAVIORAL HEALTH | Facility: HOSPITAL | Age: 29
End: 2024-08-20
Payer: COMMERCIAL

## 2024-08-20 DIAGNOSIS — F41.9 ANXIETY: ICD-10-CM

## 2024-08-20 PROBLEM — F17.200 NICOTINE DEPENDENCE, UNSPECIFIED, UNCOMPLICATED: Status: ACTIVE | Noted: 2018-02-25

## 2024-08-20 PROBLEM — G43.909 MIGRAINE, UNSPECIFIED, NOT INTRACTABLE, WITHOUT STATUS MIGRAINOSUS: Status: ACTIVE | Noted: 2018-02-25

## 2024-08-20 PROCEDURE — 99214 OFFICE O/P EST MOD 30 MIN: CPT | Mod: GT,U1 | Performed by: CLINICAL NURSE SPECIALIST

## 2024-08-20 PROCEDURE — 99214 OFFICE O/P EST MOD 30 MIN: CPT | Performed by: CLINICAL NURSE SPECIALIST

## 2024-08-20 RX ORDER — HYDROXYZINE HYDROCHLORIDE 25 MG/1
50 TABLET, FILM COATED ORAL
Qty: 40 TABLET | Refills: 0 | Status: SHIPPED | OUTPATIENT
Start: 2024-08-20 | End: 2024-09-19

## 2024-08-20 RX ORDER — SERTRALINE HYDROCHLORIDE 50 MG/1
100 TABLET, FILM COATED ORAL DAILY
Qty: 180 TABLET | Refills: 0 | Status: SHIPPED | OUTPATIENT
Start: 2024-08-20 | End: 2025-08-20

## 2024-08-20 NOTE — PROGRESS NOTES
.    Subjective   Patient ID: Christiano Prieto is a 28 y.o. female who presents for PDO, TOM in Pregnancy.   HPI  Review of Systems   All other systems reviewed and are negative.     Psych Review of Symptoms  Objective   Physical Exam  Psychiatric:         Attention and Perception: She is inattentive.         Mood and Affect: Mood is anxious. Affect is labile.         Speech: Speech normal.         Behavior: Behavior normal. Behavior is cooperative.         Thought Content: Thought content normal.         Cognition and Memory: Cognition normal.         Judgment: Judgment normal.     Assessment/Plan   IMP: 28 SF who presents at 24 wga for unplanned pregnancy w/ EDC 24 . FOB is fiance. No medical complications. U/S WNL . Plan to deliver at [facility] . Hx anxiety/panic for > 10 yrs w/ severe episodes end up in ED for few time per year. Symptoms include lose feeling in hands, cannot move, feel like heart attack, last time was 6-7 months ago and occurs  few times per year.  Currently taking Hydroxyzine 25 mg 4-5 tabs per week, some days takes at most takes 2 tabs, causes her to fall asleep. Only things that helps more is Escitalopram, but does not want to start that until after delivery.     Low Risk for Harm due to past EDVIN. Protected by pregnancy, employment, .young child in home, help seeking medication.     INTERIM: : Continues w/ high anxiety in pregnancy, now at 24wga, no complications. Taking Atarax 1-2 per days for heightened panic/anxiety due to dilution effect of medication in later stages of pregnancy. Encouraged trial of Sertraline to target baseline w/ aim to minimize se of PRNS. Pt agreeable, also strongly, encouraged ind therapy due to hr high risk for PMADS.    Educated on s/sx, risk course and treatment for PMADS.   Reviewed r/b/a for use of Hydroxyzine during pregnancy and informed of limited reproductive data. Nevertheless, given that she uses only several times per week and is  in her second trimester with normal ultrasounds, her risks for adverse events are low. Benefits of treatment outweigh risks. She was aware of this and is in agreement. Also discussed referral for ind therapy or   IOP and pt vanessa consider.       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

## 2024-08-23 ENCOUNTER — HOSPITAL ENCOUNTER (OUTPATIENT)
Dept: RADIOLOGY | Facility: CLINIC | Age: 29
Discharge: HOME | End: 2024-08-23
Payer: COMMERCIAL

## 2024-08-23 DIAGNOSIS — O99.212 OBESITY AFFECTING PREGNANCY IN SECOND TRIMESTER (HHS-HCC): ICD-10-CM

## 2024-08-23 DIAGNOSIS — Z34.91 PRENATAL CARE IN FIRST TRIMESTER (HHS-HCC): ICD-10-CM

## 2024-08-23 PROCEDURE — 76816 OB US FOLLOW-UP PER FETUS: CPT

## 2024-09-01 ENCOUNTER — HOSPITAL ENCOUNTER (EMERGENCY)
Age: 29
Discharge: HOME | End: 2024-09-02
Payer: COMMERCIAL

## 2024-09-01 DIAGNOSIS — R42 LIGHTHEADEDNESS: Primary | ICD-10-CM

## 2024-09-01 LAB
ALBUMIN SERPL-MCNC: 4.3 G/DL (ref 3.5–4.6)
ALP SERPL-CCNC: 76 U/L (ref 40–130)
ALT SERPL-CCNC: 8 U/L (ref 0–33)
ANION GAP SERPL CALCULATED.3IONS-SCNC: 13 MEQ/L (ref 9–15)
AST SERPL-CCNC: 14 U/L (ref 0–35)
BASOPHILS # BLD: 0 K/UL (ref 0–0.2)
BASOPHILS NFR BLD: 0.2 %
BILIRUB SERPL-MCNC: <0.2 MG/DL (ref 0.2–0.7)
BILIRUB UR QL STRIP: NEGATIVE
BUN SERPL-MCNC: 5 MG/DL (ref 6–20)
CALCIUM SERPL-MCNC: 9.2 MG/DL (ref 8.5–9.9)
CHLORIDE SERPL-SCNC: 102 MEQ/L (ref 95–107)
CLARITY UR: CLEAR
CO2 SERPL-SCNC: 22 MEQ/L (ref 20–31)
COLOR UR: YELLOW
CREAT SERPL-MCNC: 0.45 MG/DL (ref 0.5–0.9)
EOSINOPHIL # BLD: 0.1 K/UL (ref 0–0.7)
EOSINOPHIL NFR BLD: 1 %
ERYTHROCYTE [DISTWIDTH] IN BLOOD BY AUTOMATED COUNT: 13.7 % (ref 11.5–14.5)
GLOBULIN SER CALC-MCNC: 3.1 G/DL (ref 2.3–3.5)
GLUCOSE SERPL-MCNC: 90 MG/DL (ref 70–99)
GLUCOSE UR STRIP-MCNC: NEGATIVE MG/DL
HCT VFR BLD AUTO: 35.3 % (ref 37–47)
HGB BLD-MCNC: 12.1 G/DL (ref 12–16)
HGB UR QL STRIP: NEGATIVE
KETONES UR STRIP-MCNC: NEGATIVE MG/DL
LEUKOCYTE ESTERASE UR QL STRIP: NEGATIVE
LYMPHOCYTES # BLD: 1.7 K/UL (ref 1–4.8)
LYMPHOCYTES NFR BLD: 19.5 %
MAGNESIUM SERPL-MCNC: 1.8 MG/DL (ref 1.7–2.4)
MCH RBC QN AUTO: 27.8 PG (ref 27–31.3)
MCHC RBC AUTO-ENTMCNC: 34.3 % (ref 33–37)
MCV RBC AUTO: 81.1 FL (ref 79.4–94.8)
MONOCYTES # BLD: 0.6 K/UL (ref 0.2–0.8)
MONOCYTES NFR BLD: 7.2 %
NEUTROPHILS # BLD: 6.1 K/UL (ref 1.4–6.5)
NEUTS SEG NFR BLD: 71.5 %
NITRITE UR QL STRIP: NEGATIVE
PH UR STRIP: 6 [PH] (ref 5–9)
PLATELET # BLD AUTO: 307 K/UL (ref 130–400)
POTASSIUM SERPL-SCNC: 3.4 MEQ/L (ref 3.4–4.9)
PROT SERPL-MCNC: 7.4 G/DL (ref 6.3–8)
PROT UR STRIP-MCNC: NEGATIVE MG/DL
RBC # BLD AUTO: 4.35 M/UL (ref 4.2–5.4)
SODIUM SERPL-SCNC: 137 MEQ/L (ref 135–144)
SP GR UR STRIP: 1 (ref 1–1.03)
URINE REFLEX TO CULTURE: NORMAL
UROBILINOGEN UR STRIP-ACNC: 0.2 E.U./DL
WBC # BLD AUTO: 8.6 K/UL (ref 4.8–10.8)

## 2024-09-01 PROCEDURE — 83735 ASSAY OF MAGNESIUM: CPT

## 2024-09-01 PROCEDURE — 81003 URINALYSIS AUTO W/O SCOPE: CPT

## 2024-09-01 PROCEDURE — 99284 EMERGENCY DEPT VISIT MOD MDM: CPT

## 2024-09-01 PROCEDURE — 85025 COMPLETE CBC W/AUTO DIFF WBC: CPT

## 2024-09-01 PROCEDURE — 93005 ELECTROCARDIOGRAM TRACING: CPT | Performed by: PHYSICIAN ASSISTANT

## 2024-09-01 PROCEDURE — 80053 COMPREHEN METABOLIC PANEL: CPT

## 2024-09-01 PROCEDURE — 36415 COLL VENOUS BLD VENIPUNCTURE: CPT

## 2024-09-01 PROCEDURE — 96360 HYDRATION IV INFUSION INIT: CPT

## 2024-09-01 PROCEDURE — 2580000003 HC RX 258: Performed by: PHYSICIAN ASSISTANT

## 2024-09-01 RX ORDER — 0.9 % SODIUM CHLORIDE 0.9 %
500 INTRAVENOUS SOLUTION INTRAVENOUS ONCE
Status: COMPLETED | OUTPATIENT
Start: 2024-09-01 | End: 2024-09-02

## 2024-09-01 RX ADMIN — SODIUM CHLORIDE 500 ML: 9 INJECTION, SOLUTION INTRAVENOUS at 23:07

## 2024-09-01 ASSESSMENT — LIFESTYLE VARIABLES
HOW MANY STANDARD DRINKS CONTAINING ALCOHOL DO YOU HAVE ON A TYPICAL DAY: PATIENT DOES NOT DRINK
HOW OFTEN DO YOU HAVE A DRINK CONTAINING ALCOHOL: NEVER

## 2024-09-01 ASSESSMENT — PAIN - FUNCTIONAL ASSESSMENT: PAIN_FUNCTIONAL_ASSESSMENT: NONE - DENIES PAIN

## 2024-09-02 VITALS
WEIGHT: 170 LBS | HEIGHT: 63 IN | SYSTOLIC BLOOD PRESSURE: 111 MMHG | BODY MASS INDEX: 30.12 KG/M2 | OXYGEN SATURATION: 99 % | TEMPERATURE: 98.6 F | RESPIRATION RATE: 18 BRPM | DIASTOLIC BLOOD PRESSURE: 73 MMHG | HEART RATE: 99 BPM

## 2024-09-02 NOTE — DISCHARGE INSTRUCTIONS
Monitor blood pressure and heart rates.  Follow-up with OB/GYN.  Return to ER if any symptoms worsen or new symptoms develop.

## 2024-09-02 NOTE — ED TRIAGE NOTES
Patient arrived [facility] with complaint of sitting and went to stand up and became dizzy. Patient attempted to drive to her OB in [facility] when she became very anxious and pulled over and called 911. Denies any abd/pregnancy related complaints.

## 2024-09-02 NOTE — ED PROVIDER NOTES
[facility] ED  EMERGENCY DEPARTMENT ENCOUNTER      Pt Name: Tunde Nails  MRN: [id number]  Birthdate 1995  Date of evaluation: 9/1/2024  Provider: Adelfo Meraz PA-C  11:30 PM EDT    CHIEF COMPLAINT       Chief Complaint   Patient presents with    Dizziness     X1 hour after standing up. 26 weeks pregnant. Anxiety           HISTORY OF PRESENT ILLNESS   (Location/Symptom, Timing/Onset, Context/Setting, Quality, Duration, Modifying Factors, Severity)  Note limiting factors.   Tunde Nails is a 28 y.o. female who presents to the emergency department patient had lightheadedness after standing up.  Patient does not take blood pressure medication currently concerned about low blood pressure and feeling faint.  Denies any fever chills cough chest pain.  Patient is pregnant denies any urinary bleeding rectal bleeding she did have some nausea.  Symptoms mild to moderate severity nothing improves symptoms standing worsens    HPI    Nursing Notes were reviewed.    REVIEW OF SYSTEMS    (2-9 systems for level 4, 10 or more for level 5)     Review of Systems    Except as noted above the remainder of the review of systems was reviewed and negative.       PAST MEDICAL HISTORY     Past Medical History:   Diagnosis Date    Anxiety     HTN (hypertension)     Pancreatitis          SURGICAL HISTORY       Past Surgical History:   Procedure Laterality Date    DILATION AND CURETTAGE OF UTERUS      5/2016         CURRENT MEDICATIONS       Previous Medications    ALBUTEROL SULFATE HFA (PROVENTIL;VENTOLIN;PROAIR) 108 (90 BASE) MCG/ACT INHALER    Inhale 2 puffs into the lungs every 4 hours as needed for Wheezing or Shortness of Breath    HYDROXYZINE HCL (ATARAX) 25 MG TABLET    Take 1 tablet by mouth 3 times daily as needed    LOSARTAN (COZAAR) 25 MG TABLET    Take 1 tablet by mouth daily    ONDANSETRON (ZOFRAN-ODT) 4 MG DISINTEGRATING TABLET    Take 1 tablet by mouth 3 times daily as needed for Nausea or Vomiting

## 2024-09-03 LAB
EKG ATRIAL RATE: 95 BPM
EKG P AXIS: 19 DEGREES
EKG P-R INTERVAL: 152 MS
EKG Q-T INTERVAL: 370 MS
EKG QRS DURATION: 88 MS
EKG QTC CALCULATION (BAZETT): 464 MS
EKG R AXIS: 31 DEGREES
EKG T AXIS: 4 DEGREES
EKG VENTRICULAR RATE: 95 BPM

## 2024-09-03 PROCEDURE — 93010 ELECTROCARDIOGRAM REPORT: CPT | Performed by: INTERNAL MEDICINE

## 2024-09-05 ENCOUNTER — ROUTINE PRENATAL (OUTPATIENT)
Dept: OBSTETRICS AND GYNECOLOGY | Facility: CLINIC | Age: 29
End: 2024-09-05
Payer: COMMERCIAL

## 2024-09-05 VITALS — BODY MASS INDEX: 33.9 KG/M2 | SYSTOLIC BLOOD PRESSURE: 121 MMHG | DIASTOLIC BLOOD PRESSURE: 77 MMHG | WEIGHT: 191.4 LBS

## 2024-09-05 DIAGNOSIS — Z3A.26 26 WEEKS GESTATION OF PREGNANCY (HHS-HCC): Primary | ICD-10-CM

## 2024-09-05 PROBLEM — O09.32: Status: ACTIVE | Noted: 2024-09-05

## 2024-09-05 PROCEDURE — 99213 OFFICE O/P EST LOW 20 MIN: CPT | Performed by: OBSTETRICS & GYNECOLOGY

## 2024-09-05 ASSESSMENT — EDINBURGH POSTNATAL DEPRESSION SCALE (EPDS)
I HAVE BEEN ABLE TO LAUGH AND SEE THE FUNNY SIDE OF THINGS: AS MUCH AS I ALWAYS COULD
I HAVE BEEN SO UNHAPPY THAT I HAVE HAD DIFFICULTY SLEEPING: NOT AT ALL
I HAVE FELT SAD OR MISERABLE: NO, NOT AT ALL
I HAVE BEEN SO UNHAPPY THAT I HAVE BEEN CRYING: NO, NEVER
THE THOUGHT OF HARMING MYSELF HAS OCCURRED TO ME: NEVER
TOTAL SCORE: 9
THINGS HAVE BEEN GETTING ON TOP OF ME: YES, MOST OF THE TIME I HAVEN'T BEEN ABLE TO COPE AT ALL
I HAVE BLAMED MYSELF UNNECESSARILY WHEN THINGS WENT WRONG: NO, NEVER
I HAVE LOOKED FORWARD WITH ENJOYMENT TO THINGS: AS MUCH AS I EVER DID
I HAVE BEEN ANXIOUS OR WORRIED FOR NO GOOD REASON: YES, VERY OFTEN
I HAVE FELT SCARED OR PANICKY FOR NO GOOD REASON: YES, QUITE A LOT

## 2024-09-05 NOTE — PROGRESS NOTES
Christiano Prieto is a 28 y.o. at 26w0d presents for routine prenatal check.  Pt complains of pain in RUQ. Pt states she has been to the e.r. with dizziness and a panic attack.    There were no vitals filed for this visit.   There is no height or weight on file to calculate BMI.     Plan:  Pt is to F/U in 4wks  Rib pain  Talked about anxiety and depression, talked about counseling  1 glucola ordered.

## 2024-09-27 ENCOUNTER — TELEPHONE (OUTPATIENT)
Dept: BEHAVIORAL HEALTH | Facility: CLINIC | Age: 29
End: 2024-09-27
Payer: COMMERCIAL

## 2024-09-27 DIAGNOSIS — F41.9 ANXIETY: ICD-10-CM

## 2024-09-27 RX ORDER — HYDROXYZINE HYDROCHLORIDE 25 MG/1
50 TABLET, FILM COATED ORAL
Qty: 40 TABLET | Refills: 0 | Status: SHIPPED | OUTPATIENT
Start: 2024-09-28 | End: 2024-09-27

## 2024-09-27 RX ORDER — HYDROXYZINE HYDROCHLORIDE 25 MG/1
25 TABLET, FILM COATED ORAL 2 TIMES DAILY PRN
Qty: 6 TABLET | Refills: 0 | Status: SHIPPED | OUTPATIENT
Start: 2024-09-27 | End: 2024-09-30 | Stop reason: SDUPTHER

## 2024-09-27 NOTE — PROGRESS NOTES
Rx refill for hydroxizine. Patient states that she ran out because has been taking 50 mg BID instead of 5 days a week as ordered by Dr. Trent. I scheduled her with Erica PRABHAKAR for 9/30/2024. I also notified Erica how the patient has been taking the medication. Erica stated that Dr. Ricketts said that this is too much hydroxyzine being patient is 7 months pregnant. I called back patient and told her the importance of med compliance and taking medication as directed for the safety of her baby. Patient was angry and argumentative. Erica was notified and sent in a short script of hydroxyzine 25 mg BID prn quantity of 6 to cover her until her appointment on Monday. Patient notified that provider sent this in to her pharmacy.

## 2024-09-27 NOTE — PROGRESS NOTES
"RN Followed up on patients complaint for safety and wellness.  Patient explained that when she called for a refill and explaining that her anxiety was worsening and she was out of her medication \" The RN that she spoke with made her feel horrible, and said that she was not taking the medication correctly and was very dismissive with her.  Patient then messaged TREY Trent stating that she would no longer be coming to our facility for therapy because of the way the  previous nurse spoke and treated her.  This RN was able to explain that our mission is to treat all patients with respect and kindness.  At the end of our conversation patient was agreeable to rescheduling her appointment with Erica Kamara for 9/30/24 @ 1pm virtually.    "

## 2024-09-30 ENCOUNTER — HOSPITAL ENCOUNTER (EMERGENCY)
Facility: HOSPITAL | Age: 29
Discharge: HOME | End: 2024-10-01
Attending: STUDENT IN AN ORGANIZED HEALTH CARE EDUCATION/TRAINING PROGRAM
Payer: COMMERCIAL

## 2024-09-30 ENCOUNTER — APPOINTMENT (OUTPATIENT)
Dept: BEHAVIORAL HEALTH | Facility: CLINIC | Age: 29
End: 2024-09-30
Payer: COMMERCIAL

## 2024-09-30 DIAGNOSIS — R42 DIZZINESS: Primary | ICD-10-CM

## 2024-09-30 DIAGNOSIS — R07.9 CHEST PAIN, UNSPECIFIED TYPE: ICD-10-CM

## 2024-09-30 DIAGNOSIS — F41.1 GENERALIZED ANXIETY DISORDER: ICD-10-CM

## 2024-09-30 PROCEDURE — 85610 PROTHROMBIN TIME: CPT | Performed by: STUDENT IN AN ORGANIZED HEALTH CARE EDUCATION/TRAINING PROGRAM

## 2024-09-30 PROCEDURE — 81003 URINALYSIS AUTO W/O SCOPE: CPT | Performed by: STUDENT IN AN ORGANIZED HEALTH CARE EDUCATION/TRAINING PROGRAM

## 2024-09-30 PROCEDURE — 84460 ALANINE AMINO (ALT) (SGPT): CPT | Performed by: STUDENT IN AN ORGANIZED HEALTH CARE EDUCATION/TRAINING PROGRAM

## 2024-09-30 PROCEDURE — 85379 FIBRIN DEGRADATION QUANT: CPT | Performed by: STUDENT IN AN ORGANIZED HEALTH CARE EDUCATION/TRAINING PROGRAM

## 2024-09-30 PROCEDURE — 83615 LACTATE (LD) (LDH) ENZYME: CPT | Performed by: STUDENT IN AN ORGANIZED HEALTH CARE EDUCATION/TRAINING PROGRAM

## 2024-09-30 PROCEDURE — 83880 ASSAY OF NATRIURETIC PEPTIDE: CPT | Performed by: STUDENT IN AN ORGANIZED HEALTH CARE EDUCATION/TRAINING PROGRAM

## 2024-09-30 PROCEDURE — 99283 EMERGENCY DEPT VISIT LOW MDM: CPT | Mod: 25

## 2024-09-30 PROCEDURE — 84484 ASSAY OF TROPONIN QUANT: CPT | Performed by: STUDENT IN AN ORGANIZED HEALTH CARE EDUCATION/TRAINING PROGRAM

## 2024-09-30 PROCEDURE — 83735 ASSAY OF MAGNESIUM: CPT | Performed by: STUDENT IN AN ORGANIZED HEALTH CARE EDUCATION/TRAINING PROGRAM

## 2024-09-30 PROCEDURE — 99214 OFFICE O/P EST MOD 30 MIN: CPT | Performed by: NURSE PRACTITIONER

## 2024-09-30 PROCEDURE — 85025 COMPLETE CBC W/AUTO DIFF WBC: CPT | Performed by: STUDENT IN AN ORGANIZED HEALTH CARE EDUCATION/TRAINING PROGRAM

## 2024-09-30 RX ORDER — HYDROXYZINE HYDROCHLORIDE 25 MG/1
25 TABLET, FILM COATED ORAL 2 TIMES DAILY PRN
Qty: 60 TABLET | Refills: 1 | Status: SHIPPED | OUTPATIENT
Start: 2024-09-30 | End: 2024-11-29

## 2024-09-30 ASSESSMENT — COLUMBIA-SUICIDE SEVERITY RATING SCALE - C-SSRS
1. IN THE PAST MONTH, HAVE YOU WISHED YOU WERE DEAD OR WISHED YOU COULD GO TO SLEEP AND NOT WAKE UP?: NO
2. HAVE YOU ACTUALLY HAD ANY THOUGHTS OF KILLING YOURSELF?: NO
6. HAVE YOU EVER DONE ANYTHING, STARTED TO DO ANYTHING, OR PREPARED TO DO ANYTHING TO END YOUR LIFE?: NO

## 2024-09-30 ASSESSMENT — LIFESTYLE VARIABLES
HAVE YOU EVER FELT YOU SHOULD CUT DOWN ON YOUR DRINKING: NO
TOTAL SCORE: 0
HAVE PEOPLE ANNOYED YOU BY CRITICIZING YOUR DRINKING: NO
EVER HAD A DRINK FIRST THING IN THE MORNING TO STEADY YOUR NERVES TO GET RID OF A HANGOVER: NO
EVER FELT BAD OR GUILTY ABOUT YOUR DRINKING: NO

## 2024-09-30 ASSESSMENT — PAIN DESCRIPTION - DESCRIPTORS: DESCRIPTORS: ACHING

## 2024-09-30 ASSESSMENT — PAIN DESCRIPTION - LOCATION: LOCATION: HEAD

## 2024-09-30 ASSESSMENT — PAIN - FUNCTIONAL ASSESSMENT: PAIN_FUNCTIONAL_ASSESSMENT: 0-10

## 2024-09-30 ASSESSMENT — PAIN SCALES - GENERAL: PAINLEVEL_OUTOF10: 5 - MODERATE PAIN

## 2024-09-30 ASSESSMENT — PAIN DESCRIPTION - PAIN TYPE: TYPE: ACUTE PAIN

## 2024-09-30 NOTE — PROGRESS NOTES
"Time in: 1302  Time out: 1316    An interactive audio and video telecommunication system which permits real time communications between the patient (at the originating site) and provider (at the distant site) was utilized to provide this telehealth service.   Verbal consent was requested and obtained from Christiano Prieto on this date, 09/30/24 for a telehealth visit.      The patient verified date of birth, address and phone number.     Preferred Name:  Christiano    Chief Complaint  \"My anxiety is very high with this pregnancy.\"      History of Present Illness:  Christiano Prieto is a 29 y.o. female patient with a chief complaint of medication management presenting to outpatient treatment for a scheduled psych outpatient psychiatric follow-up/bridge appointment for Lynda Trent.    The patient reports, \"I usually have a monthly appointment with Lynda and she tried to start the Zoloft but it didn't work.\"She reports that they agreed that they would try something different after the delivery in regards to the antidepressant. She reports that she is on the hydroxyzine but did not realize that she was not taking it correctly. She reports that her anxiety has been very high during this pregnancy. She reports that she was taking the hydroxyzine 3 times daily prior to this pregnancy. She reports that she understands that taking the medication too much is bad for the baby but her anxiety is very high. She reports that she is taking the 25 mg twice daily every day.  This writer discusses with the patient that this amount is okay regarding the baby's health and possible growth restriction but that higher doses than this could pose a risk of growth restriction.  The patient states that she has not been utilizing more than 25 mg twice daily and that she will continue to do so.  She feels the hydroxyzine manages her anxiety well. She denies any suicidal ideation. She reports that her depressive symptoms are well managed. She " "reports that she tends to overthink everything. She reports that she does not have time to do  IOP due to work and taking care of her other 2 kids. She does not have a current therapist because she did not care for her previous therapist. She reports that she does plan to look into that.           Falls  History of falls: No  Have you fallen in the past 12 months: No      High Blood pressure  No      Depression Screening:   Well managed currently           No data recorded       Record Review: brief      Mental Status Exam:  General appearance: Appears state age, appropriate eye contact, adequate grooming and hygiene  Attitude: Calm, cooperative, and engaged in conversation.  Behavior: Appropriate eye contact.   Motor Activity: No psychomotor agitation or retardation. No abnormal movements, tremors or tics. No evidence of extrapyramidal symptoms or tardive dyskinesia.  Speech: Regular rate, rhythm, volume. Spontaneous, no pressured speech.  Mood: \"Anxious.\"  Affect: Appropriate, full range, mood congruent.  Thought Process: Linear, logical, and goal-directed. No loose associations or gross thought disorganization.  Thought Content: Denied current suicidal ideation or thoughts of harm to self, denied homicidal ideation or thoughts of harm to others. No delusional thinking elicited. No perseverations or obsessions identified.   Perception: Did not endorse auditory or visual hallucinations, did not appear to be responding to hallucinatory stimuli.   Cognition: Alert, oriented x3. Preserved attention span and concentration, recent and remote memory. Adequate fund of knowledge. No deficits in language.   Insight: Good, in regards to understanding mental health condition  Judgement: Good      Review of Systems  Eyes  no discharge, no pain  Ears, Nose, Mouth, Throat  no pain, no rhinorrhea, no dysphagia  Resp  no dyspnea, no cough  GI  no nausea, vomiting, diarrhea    no urgency, no dysuria  Muskuloskeletal  no " pain, no weakness  Integumentary  no rash  Endocrine   no polyurea  Hematologic  no bruising, no bleeding problems  CV  no palpitations, no pain  Pulm  no chest pain  Neuro  no weakness, no coordination problems, no dizziness  Constitutional  energy, appetite normal  Psychiatric  see psychiatric review of systems and HPI        Vitals:  There were no vitals filed for this visit.      OARRS:  Erica Kamara, APRN-CNP on 9/30/2024  1:00 PM  I have personally reviewed the OARRS report for Christiano Anthonytracey. I have considered the risks of abuse, dependence, addiction and diversion            Current Medications  Current Outpatient Medications on File Prior to Visit   Medication Sig Dispense Refill    hydrOXYzine HCL (Atarax) 25 mg tablet Take 1 tablet (25 mg) by mouth 2 times a day as needed for anxiety for up to 3 days. For severe anxiety 6 tablet 0    PRENATAL VIT 10-IRON-FOLIC-DHA ORAL Take 1 tablet by mouth once daily.      Symbicort 160-4.5 mcg/actuation inhaler Inhale 2 puffs 2 times a day. as instructed 10.2 g 1    miscellaneous medical supply (Blood Pressure Cuff) misc 1 Device 2 times a day. 1 each 0    ondansetron ODT (Zofran-ODT) 4 mg disintegrating tablet Take 1 tablet (4 mg) by mouth every 6 hours if needed for nausea or vomiting. (Patient not taking: Reported on 9/30/2024) 30 tablet 0    sertraline (Zoloft) 50 mg tablet Take 2 tablets (100 mg) by mouth once daily. Take 1/2 tab x 4 days, then 1 tab x 4 days, then 2 tabs in am with food (Patient not taking: Reported on 9/30/2024) 180 tablet 0    [DISCONTINUED] hydrOXYzine HCL (Atarax) 25 mg tablet Take 2 tablets (50 mg) by mouth 5 times a week. For severe anxiety 40 tablet 0    [DISCONTINUED] hydrOXYzine HCL (Atarax) 25 mg tablet Take 2 tablets (50 mg) by mouth 5 times a week. For severe anxiety 40 tablet 0     No current facility-administered medications on file prior to visit.         MEDICAL-DECISION MAKING  Moderate: 1 or more chronic illnesses with  exacerbation, progression, or side effects of treatment with Prescription drug management          IMPRESSION  This is a 29-year-old female who is 29 weeks pregnant with her third child who presents for a bridge appointment for Lynda Trent regarding her anxiety and her hydroxyzine needing refilled.  The patient states that she is utilizing the hydroxyzine 25 mg twice daily to manage anxiety.  She confirms that she is not utilizing more than 50 mg total daily.  She understands that higher doses of scheduled hydroxyzine are related to growth restriction.  She feels that the hydroxyzine 25 mg twice daily as needed has been managing her breakthrough anxiety well.  She is no longer taking Zoloft due to having side effects and states that she and Lynda discussed that she would not trial a different antidepressant until after the baby is born.  The patient still is in agreement with this.  The patient reports that she is not able to do  IOP due to her work schedule and her other children.  She does not have a current individual therapist but plans to look for one when she is on maternity leave.  She denies any recent suicidal ideation.  She is help seeking and future oriented.  She will make an appointment to follow up with Lynda in November.  This writer provides the patient with a prescription for hydroxyzine 25 mg by mouth twice daily as needed for anxiety, dispense 60 with 1 refill to ensure that the patient gets to her next appointment with Lynda in November.  The patient is aware that she can reach out to this writer in the interim if there are any issues.        Diagnoses and all orders for this visit:  Generalized anxiety disorder         Diagnoses  Problem List Items Addressed This Visit       Anxiety           Risk Assessment  Acute risk for suicide: Low  Chronic risk for suicide: Low       PLAN  -Continue hydroxyzine 25 mg by mouth twice daily as needed for anxiety; prescription sent for  hydroxyzine 25 mg by mouth twice daily as needed for anxiety, dispense 60 with 1 refill  -Follow-up with Lynda Trent in November, patient to call to schedule appointment  -Encouraged patient to establish with an individual therapist  -Risks/benefits/assessment of medication interventions discussed with pt; pt agreeable to plan. Will continue to monitor for symptoms management and side effects and adjust plan as needed.  -Motivational interviewing to increase coping skills/behavior regulation.  -Safety plan reviewed.  -Call  Psychiatry at [phone] or communicate through [facility] with issues .   -For [facility], [facility] is a 24/7 hotline you can call for assistance at (839) 105-8294. Please call 641 or go to your closest Emergency Room if you feel worse. This includes thoughts of hurting yourself or anyone else, or having other troubles such as hearing voices, seeing visions, or having new and scary thoughts about the people around you.    Review with patient: Treatment plan reviewed with the patient.  Medication risks/benefit reviewed with the patient      Time Spent:    Prep time: 2 minutes  Direct patient time: 14 minutes  Documentation time: 7 minutes  Total time: 23 minutes    VANNA Landers-CNP

## 2024-10-01 ENCOUNTER — APPOINTMENT (OUTPATIENT)
Dept: CARDIOLOGY | Facility: HOSPITAL | Age: 29
End: 2024-10-01
Payer: COMMERCIAL

## 2024-10-01 VITALS
RESPIRATION RATE: 16 BRPM | BODY MASS INDEX: 31.89 KG/M2 | HEIGHT: 63 IN | TEMPERATURE: 97.3 F | DIASTOLIC BLOOD PRESSURE: 75 MMHG | SYSTOLIC BLOOD PRESSURE: 124 MMHG | WEIGHT: 180 LBS | OXYGEN SATURATION: 100 % | HEART RATE: 74 BPM

## 2024-10-01 LAB
ALBUMIN SERPL BCP-MCNC: 3.7 G/DL (ref 3.4–5)
ALP SERPL-CCNC: 65 U/L (ref 33–110)
ALT SERPL W P-5'-P-CCNC: 10 U/L (ref 7–45)
ANION GAP SERPL CALC-SCNC: 13 MMOL/L (ref 10–20)
APPEARANCE UR: CLEAR
AST SERPL W P-5'-P-CCNC: 13 U/L (ref 9–39)
BASOPHILS # BLD AUTO: 0.04 X10*3/UL (ref 0–0.1)
BASOPHILS NFR BLD AUTO: 0.4 %
BILIRUB DIRECT SERPL-MCNC: 0.1 MG/DL (ref 0–0.3)
BILIRUB SERPL-MCNC: 0.2 MG/DL (ref 0–1.2)
BILIRUB UR STRIP.AUTO-MCNC: NEGATIVE MG/DL
BNP SERPL-MCNC: 78 PG/ML (ref 0–99)
BUN SERPL-MCNC: 5 MG/DL (ref 6–23)
CALCIUM SERPL-MCNC: 8.8 MG/DL (ref 8.6–10.3)
CARDIAC TROPONIN I PNL SERPL HS: 3 NG/L (ref 0–13)
CARDIAC TROPONIN I PNL SERPL HS: 3 NG/L (ref 0–13)
CHLORIDE SERPL-SCNC: 106 MMOL/L (ref 98–107)
CO2 SERPL-SCNC: 20 MMOL/L (ref 21–32)
COLOR UR: NORMAL
CREAT SERPL-MCNC: 0.5 MG/DL (ref 0.5–1.05)
D DIMER PPP FEU-MCNC: 510 NG/ML FEU
EGFRCR SERPLBLD CKD-EPI 2021: >90 ML/MIN/1.73M*2
EOSINOPHIL # BLD AUTO: 0.13 X10*3/UL (ref 0–0.7)
EOSINOPHIL NFR BLD AUTO: 1.2 %
ERYTHROCYTE [DISTWIDTH] IN BLOOD BY AUTOMATED COUNT: 13.6 % (ref 11.5–14.5)
GLUCOSE SERPL-MCNC: 103 MG/DL (ref 74–99)
GLUCOSE UR STRIP.AUTO-MCNC: NORMAL MG/DL
HCT VFR BLD AUTO: 31.1 % (ref 36–46)
HGB BLD-MCNC: 10.4 G/DL (ref 12–16)
HOLD SPECIMEN: NORMAL
IMM GRANULOCYTES # BLD AUTO: 0.08 X10*3/UL (ref 0–0.7)
IMM GRANULOCYTES NFR BLD AUTO: 0.8 % (ref 0–0.9)
INR PPP: 1 (ref 0.9–1.1)
KETONES UR STRIP.AUTO-MCNC: NEGATIVE MG/DL
LDH SERPL L TO P-CCNC: 116 U/L (ref 84–246)
LEUKOCYTE ESTERASE UR QL STRIP.AUTO: NEGATIVE
LYMPHOCYTES # BLD AUTO: 1.88 X10*3/UL (ref 1.2–4.8)
LYMPHOCYTES NFR BLD AUTO: 18.1 %
MAGNESIUM SERPL-MCNC: 1.52 MG/DL (ref 1.6–2.4)
MCH RBC QN AUTO: 27.4 PG (ref 26–34)
MCHC RBC AUTO-ENTMCNC: 33.4 G/DL (ref 32–36)
MCV RBC AUTO: 82 FL (ref 80–100)
MONOCYTES # BLD AUTO: 0.75 X10*3/UL (ref 0.1–1)
MONOCYTES NFR BLD AUTO: 7.2 %
NEUTROPHILS # BLD AUTO: 7.53 X10*3/UL (ref 1.2–7.7)
NEUTROPHILS NFR BLD AUTO: 72.3 %
NITRITE UR QL STRIP.AUTO: NEGATIVE
NRBC BLD-RTO: 0 /100 WBCS (ref 0–0)
PH UR STRIP.AUTO: 6.5 [PH]
PLATELET # BLD AUTO: 298 X10*3/UL (ref 150–450)
POTASSIUM SERPL-SCNC: 3.5 MMOL/L (ref 3.5–5.3)
PROT SERPL-MCNC: 6.3 G/DL (ref 6.4–8.2)
PROT UR STRIP.AUTO-MCNC: NEGATIVE MG/DL
PROTHROMBIN TIME: 11.2 SECONDS (ref 9.8–12.8)
RBC # BLD AUTO: 3.8 X10*6/UL (ref 4–5.2)
RBC # UR STRIP.AUTO: NEGATIVE /UL
SODIUM SERPL-SCNC: 135 MMOL/L (ref 136–145)
SP GR UR STRIP.AUTO: 1
UROBILINOGEN UR STRIP.AUTO-MCNC: NORMAL MG/DL
WBC # BLD AUTO: 10.4 X10*3/UL (ref 4.4–11.3)

## 2024-10-01 PROCEDURE — 93005 ELECTROCARDIOGRAM TRACING: CPT

## 2024-10-01 PROCEDURE — 84484 ASSAY OF TROPONIN QUANT: CPT | Performed by: STUDENT IN AN ORGANIZED HEALTH CARE EDUCATION/TRAINING PROGRAM

## 2024-10-01 NOTE — ED PROVIDER NOTES
HPI: The patient is a 29-year-old G3, P2 with no history of preeclampsia who presents to the Emergency Department with a chief complaint of episode of chest pain and elevated blood pressure.  Patient reports that she has had increased anxiety lately and feels like she had an episode where she got lightheaded and noticed her vision was closing in.  She reports did not lose consciousness and checked her blood pressure at which point she says it was elevated.  She then developed some chest pain that was not ripping or tearing and did not radiate.  She reports that this resolved on its own and she feels much better now.  She reports this feels different than previous panic attacks but does endorse a history of frequent panic attacks.  She reports that both of her previous pregnancies were uncomplicated and she had no history of preeclampsia.  Denies any new unilateral leg swelling, history of DVT or PE, hemoptysis, recent travel or trauma as well as denies any abdominal pain.  She denies any vaginal bleeding or discharge as well as denies any dysuria increasing or frequency.  She reports that her baby is moving a normal amount.  No black or bloody stools.  No nausea or vomiting.  No flulike symptoms.     PAST MEDICAL HISTORY:  as per HPI  ALLERGIES:  as per HPI  MEDICATIONS:  as per HPI  FAMILY HISTORY: as per HPI  SURGICAL HISTORY: as per HPI  SOCIAL HISTORY: as per HPI     PHYSICAL EXAM:  VITAL SIGNS: Nursing notes reviewed.  GENERAL:  Alert and interactive  EYES:   Eyes track.  ENT:  Airway patent.  RESPIRATORY:  Nonlabored breathing.  Clear bilaterally.  No chest wall tenderness.  CARDIOVASCULAR:  [Regular rate.] [Regular rhythm.]  Radial pulses equal bilaterally.  Good distal perfusion of the upper and lower extremities.  GASTROINTESTINAL: Gravid, negative Sandoval's.  No upper abdominal tenderness.  No lower abdominal tenderness.  MUSCULOSKELETAL:  No deformity.  No swelling to lower extremities.  No calf  tenderness.  NEUROLOGICAL:  Awake.  Tracks with eyes, PERRL, EOMI, equal sensation in V1-V3, no facial droop, sounds equal in both ears, 5/5 w/ strength shoulder raise, tongue protrudes at midline, soft palate raises symmetrically 5/5 strength in UE and LE, no deficit with light touch, normal finger to nose and heel to shin.  NIH 0  SKIN:  Dry.        MEDICAL DECISION MAKING (MDM):     DIAGNOSTIC STUDIES  Labs: BMP, BMP, CBC, D-dimer, hepatic function panel, LDH, magnesium level, INR, troponin, UA    EKG 0019  Per my interpretation:  Electrocardiogram ECG  RATE:  [Normal]  RHYTHM: [Sinus]  AXIS: [Normal]  INTERVALS: [Normal]  ST-T WAVE CHANGES: [Normal]  ABNORMALITIES/COMPARISON: Unchanged from most recent EKG on February 17, 2024       REVIEW OF OLD RECORDS: Reviewed the psychiatry note from earlier today     SUMMARY:  The patient is admitted to the Emergency Department for evaluation of above. Complete history and physical examination was performed by me.  Patient presents with report of elevated blood pressure reading at home in the setting of lightheadedness and chest pain.  Her initial story and report sounds like she may have been hyperventilating in the setting of an anxiety attack and per chart review it looks like she has been having these more more frequently as well as been taking Atarax more frequently.  Given she is in her third trimester, this does put her at increased risk for a PE so I did send a D-dimer.  D-dimer did come back slightly elevated but based on the USACS clinical guidelines, D-dimer between 5 and 1000 cannot rule out PE in a pregnant patient as long as she does not have a heart rate over 100, sat under 95, unilateral leg swelling or palpable cord, hemoptysis or most likely diagnosis of PE.  Given that none of these are present I think that the risks outweigh the benefits of performing CTA pulmonary angiogram.  Low suspicion for acute arctic syndrome especially given her pain is  resolved.  EKG not consistent with occlusive MI and troponin was not elevated pointing away from occlusive MI as well as from spontaneous coronary artery dissection.  Doubt bleeding ulcer.  Doubt Boerhaave's.  Doubt pneumothorax and doubt pneumonia.  Doubt pericarditis, doubt myocarditis.  She had a slight hyperglycemia, slight hyponatremia and slight hypomagnesemia.  No QTc prolongation to point towards her episode of lightheadedness being due to torsades.  I think arrhythmia is unlikely. EKG shows sinus rhythm with no significant interval abnormalities such as QTC prolongation or WPW. There are no findings to suggest Brugada syndrome. Cardiac monitoring in the emergency department reveals no significant tachycardic or bradycardic dysrhythmia. Hypertrophic cardiomyopathy was considered but there is no clear historical elements pointing towards this. EKG is not suggestive since the QRS voltage is not extremely large and there is no suggestive Q waves.  Patient's dizziness was transient and she denies any other acute neurologic symptoms such as numbness tingling or weakness so my suspicion for TIA and posterior circulation stroke is low.  Also low suspicion for basilar artery insufficiency.  Given no headache or neck pain, doubt cervical artery dissection.  Patient is mildly anemic but this could be due to pregnancy itself due to hemodilution.  She has had no black or bloody stools or signs of GI bleeding or hemorrhage which is reassuring.  No evidence of coagulopathy on exam.  Her blood pressure here was reassuring point away from preeclampsia.  No signs of HELLP.  Fetal heart rate 144 which is reassuring.  Urinalysis did not show any evidence of UTI, evidence of bacteria and did not show any evidence of proteinuria which further points away from preeclampsia.  Patient was reexamined and reported that she has complete resolution of her symptoms.  I discussed the exact etiology of her symptoms are unclear and that  she should not hesitate to return with any new or worsening symptoms but otherwise should plan to follow-up with a regular doctor as well as her OB/GYN.  The work-up and plan were discussed with the patient/caregiver and questions were answered regarding the ED visit.  Educational materials were provided as well as return precautions. The patient/family expressed understanding and agreed to the described plan.  Patient was discharged in stable condition.     DIAGNOSIS:    Chest pain  Dizziness       DISPOSITION:    1) KATIE Nolan MD  10/01/24 0206

## 2024-10-02 LAB
ATRIAL RATE: 89 BPM
P AXIS: 33 DEGREES
P OFFSET: 194 MS
P ONSET: 146 MS
PR INTERVAL: 148 MS
Q ONSET: 220 MS
QRS COUNT: 14 BEATS
QRS DURATION: 92 MS
QT INTERVAL: 374 MS
QTC CALCULATION(BAZETT): 455 MS
QTC FREDERICIA: 426 MS
R AXIS: 53 DEGREES
T AXIS: 22 DEGREES
T OFFSET: 407 MS
VENTRICULAR RATE: 89 BPM

## 2024-10-03 ENCOUNTER — ROUTINE PRENATAL (OUTPATIENT)
Dept: OBSTETRICS AND GYNECOLOGY | Facility: CLINIC | Age: 29
End: 2024-10-03
Payer: COMMERCIAL

## 2024-10-03 ENCOUNTER — PREP FOR PROCEDURE (OUTPATIENT)
Dept: OBSTETRICS AND GYNECOLOGY | Facility: HOSPITAL | Age: 29
End: 2024-10-03

## 2024-10-03 VITALS — BODY MASS INDEX: 33.62 KG/M2 | DIASTOLIC BLOOD PRESSURE: 80 MMHG | SYSTOLIC BLOOD PRESSURE: 134 MMHG | WEIGHT: 189.8 LBS

## 2024-10-03 DIAGNOSIS — Z3A.30 30 WEEKS GESTATION OF PREGNANCY (HHS-HCC): Primary | ICD-10-CM

## 2024-10-03 DIAGNOSIS — Z3A.38 38 WEEKS GESTATION OF PREGNANCY (HHS-HCC): Primary | ICD-10-CM

## 2024-10-03 PROCEDURE — 99213 OFFICE O/P EST LOW 20 MIN: CPT | Performed by: OBSTETRICS & GYNECOLOGY

## 2024-10-03 ASSESSMENT — EDINBURGH POSTNATAL DEPRESSION SCALE (EPDS)
THE THOUGHT OF HARMING MYSELF HAS OCCURRED TO ME: NEVER
I HAVE BEEN ANXIOUS OR WORRIED FOR NO GOOD REASON: YES, SOMETIMES
I HAVE LOOKED FORWARD WITH ENJOYMENT TO THINGS: AS MUCH AS I EVER DID
I HAVE BEEN SO UNHAPPY THAT I HAVE HAD DIFFICULTY SLEEPING: NOT AT ALL
I HAVE FELT SAD OR MISERABLE: YES, QUITE OFTEN
TOTAL SCORE: 8
I HAVE BEEN ABLE TO LAUGH AND SEE THE FUNNY SIDE OF THINGS: AS MUCH AS I ALWAYS COULD
I HAVE BEEN SO UNHAPPY THAT I HAVE BEEN CRYING: NO, NEVER
I HAVE FELT SCARED OR PANICKY FOR NO GOOD REASON: YES, SOMETIMES
I HAVE BLAMED MYSELF UNNECESSARILY WHEN THINGS WENT WRONG: NO, NEVER
THINGS HAVE BEEN GETTING ON TOP OF ME: YES, SOMETIMES I HAVEN'T BEEN COPING AS WELL AS USUAL

## 2024-10-03 NOTE — PROGRESS NOTES
Ob Visit  10/03/24     SUBJECTIVE      HPI: Christiano Prieto is a 29 y.o.  at 30w0d here for RPNV.  She has nocontractions, denies bleeding andLOF. Reports normal fetal movement. Patient reports feeling miserable, went to the e.r. for high blood pressure and panic attack.       OBJECTIVE  Visit Vitals  /80   Wt 86.1 kg (189 lb 12.8 oz)   LMP 2024   BMI 33.62 kg/m²   OB Status Pregnant   Smoking Status Former   BSA 1.96 m²            ASSESSMENT & PLAN    Christiano Prieto is a 29 y.o.  at 30w0d here for the following concerns we addressed today:    Problem List Items Addressed This Visit          Pregnancy    30 weeks gestation of pregnancy ([facility]) - Primary    Overview     Desired provider in labor: [] CNM  [x] Physician  [] Blood Products: [] Yes, accepts [] No, needs counseling  [x] Initial BMI: 30.12   [x] Prenatal Labs: done  [x] Cervical Cancer Screening up to date  [x] Rh status: +  [x] Genetic Screening:  done  [x] NT US: (11-13 wks) done  [x] Baby ASA (if indicated): taking  [x] Pregnancy dated by: LMP    [x] Anatomy US: (19-20 wks)  [] Federal Sterilization consent signed (if indicated):  [] 1hr GCT at 24-28wks:  [] Rhogam (if indicated):   [x] Fetal Surveillance (if indicated): growth US at 30 and 36wks for C.HTN  [] Tdap (27-32 wks, may be given up to 36 wks if initial window missed):   [] RSV (32-36 wks) (Sept. to end of ):   [] Flu Vaccine: declines    [] Breastfeeding:  [] Postpartum Birth control method:   [] GBS at 36 - 37 wks:  [x] 39 weeks discussion of IOL vs. Expectant management: 38wk induction  [x] Mode of delivery ( anticipated ): vag           Relevant Orders    US MAC OB imaging order         RTC in 2 weeks  Needs to get 1 hr glucola, Pt said will do before next visit.  24 for induction  Declines flu  Will think about Tdap  Schedule RSV  BP's in the ER were all mild range, Pt C.HTN.    Samreen E Laurent, DO

## 2024-10-04 ENCOUNTER — APPOINTMENT (OUTPATIENT)
Dept: OBSTETRICS AND GYNECOLOGY | Facility: CLINIC | Age: 29
End: 2024-10-04
Payer: COMMERCIAL

## 2024-10-09 ENCOUNTER — LAB (OUTPATIENT)
Dept: LAB | Facility: LAB | Age: 29
End: 2024-10-09
Payer: COMMERCIAL

## 2024-10-09 ENCOUNTER — TELEPHONE (OUTPATIENT)
Dept: OBSTETRICS AND GYNECOLOGY | Facility: CLINIC | Age: 29
End: 2024-10-09

## 2024-10-09 ENCOUNTER — ROUTINE PRENATAL (OUTPATIENT)
Dept: OBSTETRICS AND GYNECOLOGY | Facility: CLINIC | Age: 29
End: 2024-10-09
Payer: COMMERCIAL

## 2024-10-09 VITALS — DIASTOLIC BLOOD PRESSURE: 78 MMHG | WEIGHT: 192 LBS | BODY MASS INDEX: 34.01 KG/M2 | SYSTOLIC BLOOD PRESSURE: 131 MMHG

## 2024-10-09 DIAGNOSIS — O26.893 PREGNANCY HEADACHE IN THIRD TRIMESTER (HHS-HCC): Primary | ICD-10-CM

## 2024-10-09 DIAGNOSIS — O99.013 ANEMIA AFFECTING PREGNANCY IN THIRD TRIMESTER (HHS-HCC): ICD-10-CM

## 2024-10-09 DIAGNOSIS — R51.9 PREGNANCY HEADACHE IN THIRD TRIMESTER (HHS-HCC): ICD-10-CM

## 2024-10-09 DIAGNOSIS — O99.343 ANXIETY IN PREGNANCY IN THIRD TRIMESTER, ANTEPARTUM (HHS-HCC): ICD-10-CM

## 2024-10-09 DIAGNOSIS — F41.9 ANXIETY IN PREGNANCY IN THIRD TRIMESTER, ANTEPARTUM (HHS-HCC): ICD-10-CM

## 2024-10-09 DIAGNOSIS — Z3A.30 30 WEEKS GESTATION OF PREGNANCY (HHS-HCC): ICD-10-CM

## 2024-10-09 DIAGNOSIS — R51.9 PREGNANCY HEADACHE IN THIRD TRIMESTER (HHS-HCC): Primary | ICD-10-CM

## 2024-10-09 DIAGNOSIS — R03.0 ELEVATED BP WITHOUT DIAGNOSIS OF HYPERTENSION: ICD-10-CM

## 2024-10-09 DIAGNOSIS — O26.893 PREGNANCY HEADACHE IN THIRD TRIMESTER (HHS-HCC): ICD-10-CM

## 2024-10-09 LAB
25(OH)D3 SERPL-MCNC: 30 NG/ML (ref 30–100)
ALBUMIN SERPL BCP-MCNC: 3.7 G/DL (ref 3.4–5)
ALP SERPL-CCNC: 77 U/L (ref 33–110)
ALT SERPL W P-5'-P-CCNC: 11 U/L (ref 7–45)
ANION GAP SERPL CALC-SCNC: 15 MMOL/L (ref 10–20)
AST SERPL W P-5'-P-CCNC: 13 U/L (ref 9–39)
BILIRUB SERPL-MCNC: 0.3 MG/DL (ref 0–1.2)
BILIRUBIN, POC: NEGATIVE
BLOOD URINE, POC: NEGATIVE
BUN SERPL-MCNC: 4 MG/DL (ref 6–23)
CALCIUM SERPL-MCNC: 8.8 MG/DL (ref 8.6–10.6)
CHLORIDE SERPL-SCNC: 106 MMOL/L (ref 98–107)
CO2 SERPL-SCNC: 21 MMOL/L (ref 21–32)
CREAT SERPL-MCNC: 0.45 MG/DL (ref 0.5–1.05)
CREAT UR-MCNC: 23.2 MG/DL (ref 20–320)
EGFRCR SERPLBLD CKD-EPI 2021: >90 ML/MIN/1.73M*2
ERYTHROCYTE [DISTWIDTH] IN BLOOD BY AUTOMATED COUNT: 13.6 % (ref 11.5–14.5)
FERRITIN SERPL-MCNC: 17 NG/ML
GLUCOSE SERPL-MCNC: 81 MG/DL (ref 74–99)
GLUCOSE URINE, POC: NEGATIVE
HCT VFR BLD AUTO: 35.5 % (ref 36–46)
HGB BLD-MCNC: 11.6 G/DL (ref 12–16)
KETONES, POC: NEGATIVE
LDH SERPL L TO P-CCNC: 106 U/L (ref 84–246)
LEUKOCYTE EST, POC: NEGATIVE
MCH RBC QN AUTO: 27 PG (ref 26–34)
MCHC RBC AUTO-ENTMCNC: 32.7 G/DL (ref 32–36)
MCV RBC AUTO: 83 FL (ref 80–100)
NITRITE, POC: NEGATIVE
NRBC BLD-RTO: 0 /100 WBCS (ref 0–0)
PH, POC: 7
PLATELET # BLD AUTO: 327 X10*3/UL (ref 150–450)
POTASSIUM SERPL-SCNC: 4.1 MMOL/L (ref 3.5–5.3)
PROT SERPL-MCNC: 6.5 G/DL (ref 6.4–8.2)
PROT UR-ACNC: <4 MG/DL (ref 5–24)
PROT/CREAT UR: ABNORMAL MG/G{CREAT}
RBC # BLD AUTO: 4.29 X10*6/UL (ref 4–5.2)
SODIUM SERPL-SCNC: 138 MMOL/L (ref 136–145)
SPECIFIC GRAVITY, POC: 1.01
URINE PROTEIN, POC: NEGATIVE
UROBILINOGEN, POC: 0.2
WBC # BLD AUTO: 9.4 X10*3/UL (ref 4.4–11.3)

## 2024-10-09 PROCEDURE — 84156 ASSAY OF PROTEIN URINE: CPT

## 2024-10-09 PROCEDURE — 82570 ASSAY OF URINE CREATININE: CPT

## 2024-10-09 PROCEDURE — 85027 COMPLETE CBC AUTOMATED: CPT

## 2024-10-09 PROCEDURE — 82306 VITAMIN D 25 HYDROXY: CPT

## 2024-10-09 PROCEDURE — 82728 ASSAY OF FERRITIN: CPT

## 2024-10-09 PROCEDURE — 80053 COMPREHEN METABOLIC PANEL: CPT

## 2024-10-09 PROCEDURE — 36415 COLL VENOUS BLD VENIPUNCTURE: CPT

## 2024-10-09 PROCEDURE — 83615 LACTATE (LD) (LDH) ENZYME: CPT

## 2024-10-09 PROCEDURE — 99214 OFFICE O/P EST MOD 30 MIN: CPT | Performed by: OBSTETRICS & GYNECOLOGY

## 2024-10-09 RX ORDER — FERROUS GLUCONATE 324(38)MG
38 TABLET ORAL
Qty: 30 TABLET | Refills: 11 | Status: SHIPPED | OUTPATIENT
Start: 2024-10-09 | End: 2025-10-09

## 2024-10-09 NOTE — TELEPHONE ENCOUNTER
30.6 wk ob called ob line stating that she is currently sitting at her desk at work and started seeing spots so she took her BP, which was 120/96.  Upon speaking with the patient she took her BP again, which was 140/86.  Patient states she feels clammy and anxious.  Patient has terrible anxiety, especially when she isn't feeling right and her BP is elevated, so she took her anxiety medication, Hydroxyzine 50 mg just now and can feel herself becoming calm.  Denies headache or RUQ pain, vision is getting better.  + FM, although feels less than normal.    I had patient sit with me and take several calming breaths to help calm down her anxiety and had her repeat her BP.  Repeat was 138/82.      In regards to fetal movement, patient assured that subtle movement still counts as good fetal movement.  If at any point she is not feeling fetal movement she should drink something ice cold and/or sweet, lay on her left side and wait 10-15 minutes to see if baby perks up.  If still no movement, patient should give our office a call.  At that point the provider will either recommend being seen in office for evaluation/NST or will send patient to OB Triage.    Patient offered to come in and see Dr Montelongo for evaluation/BP check in office for reassurance.  Patient states she really doesn't want to have to go back to OB triage at [facility], if she doesn't have to but her anxiety of her BP is really becoming bothersome.    Patient was again assured that her last reading of 138/82 is not terrible. Patent agreed to come in at 10:10 am to see Dr Montelongo.  She states she feels ok to drive, even after taking her anxiety medication.      Patient normally sees Dr Mancilla who is not in the office on Wednesdays.  Message forwarded to Dr Montelongo to make her aware.

## 2024-10-09 NOTE — PROGRESS NOTES
Ob Visit  10/09/24     SUBJECTIVE      HPI: Christiano Prieto is a 29 y.o.  at 30w6d here because saw floaters in eyes at work and now has headache.  Did get a mild range BP at work.  States has been having lots of panic attacks and her BP goes up with them and comes down after taking hydroxyzine.  Not taking sertraline she states didn't agree with her.  Has tried lexapro in past.  Was seeing Lynda Trent but she has not been in town per patient.  Declines h/o bipolar.  Good fetal movement.  No RUQ pain.      OBJECTIVE  Visit Vitals  /78   Wt 87.1 kg (192 lb)   LMP 2024   BMI 34.01 kg/m²   OB Status Pregnant   Smoking Status Former   BSA 1.97 m²            ASSESSMENT & PLAN    Christiano Prieto is a 29 y.o.  at 30w6d here for the following concerns we addressed today:    Will draw HELLP labs and start iron as was anemic at ER visit.  Vitamin level drawn also.  Strongly encouraged psychiatry followup to manage anxiety.  Pt aware if severe range BP or vision change or headache not relieved with tylenol needs to go to the hospital right away.    Problem List Items Addressed This Visit       30 weeks gestation of pregnancy ([facility])    Overview     Desired provider in labor: [] CNM  [x] Physician  [] Blood Products: [] Yes, accepts [] No, needs counseling  [x] Initial BMI: 30.12   [x] Prenatal Labs: done  [x] Cervical Cancer Screening up to date  [x] Rh status: +  [x] Genetic Screening:  done  [x] NT US: (11-13 wks) done  [x] Baby ASA (if indicated): taking  [x] Pregnancy dated by: LMP    [x] Anatomy US: (19-20 wks)  [] Federal Sterilization consent signed (if indicated):  [] 1hr GCT at 24-28wks:  [] Rhogam (if indicated):   [x] Fetal Surveillance (if indicated): growth US at 30 and 36wks for C.HTN  [] Tdap (27-32 wks, may be given up to 36 wks if initial window missed):   [] RSV (32-36 wks) (Sept. to end of ):   [] Flu Vaccine: declines    [] Breastfeeding:  [] Postpartum Birth control  method:   [] GBS at 36 - 37 wks:  [x] 39 weeks discussion of IOL vs. Expectant management: 38wk induction  [x] Mode of delivery ( anticipated ): vag           Relevant Orders    Protein, Urine Random    POCT urinalysis dipstick manually resulted (Completed)     Other Visit Diagnoses       Pregnancy headache in third trimester ([facility])    -  Primary    Relevant Orders    Protein, Urine Random    Ferritin, Pregnancy    Vitamin D 25-Hydroxy,Total (for eval of Vitamin D levels)    Lactate Dehydrogenase    Comprehensive Metabolic Panel    CBC    POCT urinalysis dipstick manually resulted (Completed)    Elevated BP without diagnosis of hypertension        Relevant Orders    Protein, Urine Random    POCT urinalysis dipstick manually resulted (Completed)    Anemia affecting pregnancy in third trimester ([facility])        Relevant Medications    ferrous gluconate 324 (38 Fe) mg tablet    Anxiety in pregnancy in third trimester, antepartum ([facility])        Relevant Orders    Referral to Psychiatry              RTC in 2 weeks      Kathrin Montelongo MD

## 2024-10-11 ENCOUNTER — HOSPITAL ENCOUNTER (OUTPATIENT)
Dept: RADIOLOGY | Facility: CLINIC | Age: 29
Discharge: HOME | End: 2024-10-11
Payer: COMMERCIAL

## 2024-10-11 DIAGNOSIS — O99.210 OBESITY AFFECTING PREGNANCY (HHS-HCC): ICD-10-CM

## 2024-10-11 DIAGNOSIS — Z34.91 PRENATAL CARE IN FIRST TRIMESTER (HHS-HCC): ICD-10-CM

## 2024-10-11 PROCEDURE — 76816 OB US FOLLOW-UP PER FETUS: CPT

## 2024-10-15 ENCOUNTER — TELEPHONE (OUTPATIENT)
Dept: OBSTETRICS AND GYNECOLOGY | Facility: CLINIC | Age: 29
End: 2024-10-15

## 2024-10-15 ENCOUNTER — LAB (OUTPATIENT)
Dept: LAB | Facility: LAB | Age: 29
End: 2024-10-15
Payer: COMMERCIAL

## 2024-10-15 DIAGNOSIS — Z3A.26 26 WEEKS GESTATION OF PREGNANCY (HHS-HCC): ICD-10-CM

## 2024-10-15 LAB
ERYTHROCYTE [DISTWIDTH] IN BLOOD BY AUTOMATED COUNT: 13.6 % (ref 11.5–14.5)
GLUCOSE 1H P 50 G GLC PO SERPL-MCNC: 77 MG/DL
HCT VFR BLD AUTO: 34.7 % (ref 36–46)
HGB BLD-MCNC: 11.4 G/DL (ref 12–16)
MCH RBC QN AUTO: 27.7 PG (ref 26–34)
MCHC RBC AUTO-ENTMCNC: 32.9 G/DL (ref 32–36)
MCV RBC AUTO: 84 FL (ref 80–100)
NRBC BLD-RTO: 0 /100 WBCS (ref 0–0)
PLATELET # BLD AUTO: 285 X10*3/UL (ref 150–450)
RBC # BLD AUTO: 4.12 X10*6/UL (ref 4–5.2)
REFLEX ADDED, ANEMIA PANEL: NORMAL
WBC # BLD AUTO: 10.5 X10*3/UL (ref 4.4–11.3)

## 2024-10-15 PROCEDURE — 36415 COLL VENOUS BLD VENIPUNCTURE: CPT

## 2024-10-15 PROCEDURE — 82947 ASSAY GLUCOSE BLOOD QUANT: CPT

## 2024-10-15 PROCEDURE — 85027 COMPLETE CBC AUTOMATED: CPT

## 2024-10-15 NOTE — TELEPHONE ENCOUNTER
31.5 wk ob called ob line with questions about her recent CBC that was drawn with her 1 hour glucose.  Patient states she was unaware that she was have a repeat CBC drawn, as she just had a CBC drawn 6 days prior.  Dr Montelongo saw patient for dizziness, recently and had started her on iron (taking every other day), which has been helping.    Patient assured that her CBC results don't look bad and not to be concerned.  When she sees Dr Mancilla for her next obfu on 10/17, she can review results with her at that time.    No further questions.

## 2024-10-17 ENCOUNTER — APPOINTMENT (OUTPATIENT)
Dept: OBSTETRICS AND GYNECOLOGY | Facility: CLINIC | Age: 29
End: 2024-10-17
Payer: COMMERCIAL

## 2024-10-17 VITALS — BODY MASS INDEX: 34.15 KG/M2 | WEIGHT: 192.8 LBS | DIASTOLIC BLOOD PRESSURE: 74 MMHG | SYSTOLIC BLOOD PRESSURE: 136 MMHG

## 2024-10-17 DIAGNOSIS — Z3A.32 32 WEEKS GESTATION OF PREGNANCY (HHS-HCC): Primary | ICD-10-CM

## 2024-10-17 PROCEDURE — 99213 OFFICE O/P EST LOW 20 MIN: CPT | Performed by: OBSTETRICS & GYNECOLOGY

## 2024-10-17 NOTE — PROGRESS NOTES
Pt is here for prenatal visit. She denies loss of fluid or bleeding. Pt states baby is moving a lot. No concerns today.  Ob Visit  10/17/24     SUBJECTIVE      HPI: Christiano Prieto is a 29 y.o.  at 32w0d here for RPNV.  She has no contractions, no bleeding, or no LOF. Reports normal fetal movement. Patient reports feeling better with anxiety being off work.  Would like cervix checked.       OBJECTIVE  Visit Vitals  /74   Wt 87.5 kg (192 lb 12.8 oz)   LMP 2024   BMI 34.15 kg/m²   OB Status Pregnant   Smoking Status Former   BSA 1.97 m²            ASSESSMENT & PLAN    Christiano Prieto is a 29 y.o.  at 32w0d here for the following concerns we addressed today:    Problem List Items Addressed This Visit    None        RTC in 2 weeks  Declines flu and will think about Tdap.  Will schedule RSV vaccine  US growth 10/29  Off work now due to anxiety and BP issues.    Samreen Mancilla, DO

## 2024-10-20 ENCOUNTER — TELEPHONE (OUTPATIENT)
Dept: OBSTETRICS AND GYNECOLOGY | Facility: HOSPITAL | Age: 29
End: 2024-10-20
Payer: COMMERCIAL

## 2024-10-20 NOTE — TELEPHONE ENCOUNTER
Patient paged answering service with [facility]. Briefly, 29 year old  at 32w3d with known cHTN not on meds. Developed a migrainous HA with visual aura (bright flashing lights) this evening. Took 1000mg tylenol 30 minutes ago. HA improved but still present, rating as a 6/10 on a pain scale. Vision now normalized. Has some associated nausea and photophobia. Does have a history of migraine with aura and this feels similar to her migraine HA but more intense. BP at home 130/80 which is her baseline. Denies unilateral paresthesias or slurred speech. Good FM, no OB complaints. Recommend PO hydrating and reassessing HA in 1 hour. If still present, to recheck BP and page answering service back. All questions answered, comfortable with plan.     Bettie Wadsworth MD

## 2024-10-23 ENCOUNTER — OFFICE VISIT (OUTPATIENT)
Dept: BEHAVIORAL HEALTH | Facility: CLINIC | Age: 29
End: 2024-10-23
Payer: COMMERCIAL

## 2024-10-23 DIAGNOSIS — F41.1 GENERALIZED ANXIETY DISORDER: ICD-10-CM

## 2024-10-23 DIAGNOSIS — F41.0 PANIC DISORDER WITHOUT AGORAPHOBIA: Primary | ICD-10-CM

## 2024-10-23 PROCEDURE — 99215 OFFICE O/P EST HI 40 MIN: CPT | Mod: GC,GT,AM | Performed by: STUDENT IN AN ORGANIZED HEALTH CARE EDUCATION/TRAINING PROGRAM

## 2024-10-23 RX ORDER — HYDROXYZINE HYDROCHLORIDE 25 MG/1
25 TABLET, FILM COATED ORAL 2 TIMES DAILY PRN
Qty: 60 TABLET | Refills: 1 | Status: SHIPPED | OUTPATIENT
Start: 2024-10-23 | End: 2024-12-22

## 2024-10-23 RX ORDER — PROPRANOLOL HYDROCHLORIDE 10 MG/1
10 TABLET ORAL 3 TIMES DAILY PRN
Qty: 90 TABLET | Refills: 1 | Status: SHIPPED | OUTPATIENT
Start: 2024-10-23 | End: 2025-10-23

## 2024-10-23 NOTE — PROGRESS NOTES
Outpatient Psychiatry    Subjective   Christiano Prieto, a 29 y.o. female, presenting to Psychiatry for evaluation.      HPI:  28 YO with anxiety, EtOH use disorder, panic, HTN, and asthma (reports this is an old diagnosis; no recent asthma) currently at 33wga (induction planned for 11/30), presenting to discuss worsening anxiety. Reports that she's always had bad anxiety but it got much worse during this pregnancy, such that she took maternity leave prematurely since she wasn't able to function at work. Panic attacks weekly, most often at night, sometimes out of the blue but sometimes triggered by an elevated blood pressure measurement. For example, two weeks ago a reading of 143/90s precipitated a panic attack, which she thought she was dying, called 911, and arrived at the emergency room.     Psychiatric Review Of Systems:  Depressive Symptoms: negative  Manic Symptoms: negative  Anxiety Symptoms: General Anxiety Disorder (TOM)TOM Behaviors: difficult to control worry, excessive anxiety/worry, easily fatigued, and irritability  Psychotic Symptoms: negative  Other Symptoms:    Current Medications:    Current Outpatient Medications:     ferrous gluconate 324 (38 Fe) mg tablet, Take 1 tablet (38 mg of iron) by mouth once daily with breakfast., Disp: 30 tablet, Rfl: 11    hydrOXYzine HCL (Atarax) 25 mg tablet, Take 1 tablet (25 mg) by mouth 2 times a day as needed for anxiety. For severe anxiety, Disp: 60 tablet, Rfl: 1    miscellaneous medical supply (Blood Pressure Cuff) misc, 1 Device 2 times a day., Disp: 1 each, Rfl: 0    PRENATAL VIT 10-IRON-FOLIC-DHA ORAL, Take 1 tablet by mouth once daily., Disp: , Rfl:     Symbicort 160-4.5 mcg/actuation inhaler, Inhale 2 puffs 2 times a day. as instructed, Disp: 10.2 g, Rfl: 1    Medical History:  Past Medical History:   Diagnosis Date    ETOH abuse     HTN (hypertension)     Moderate asthma ([facility])     Panic anxiety syndrome        Past Psychiatric History:   Diagnoses:  "anxiety, depression (diagnosed in 2017, after dad's death)  Previous Psychiatrist: Katelyn Dorado   Therapy: has tried it before, not impressed   Current psychiatric medications: hydroxyzine   Past psychiatric medications:   -sertraline recently restarted at 25 mg, only took if for three days because \"it gave me panic attacks\"; previously took it at 100 mg (before pregnancy-- still didn't find it helpful).  -lexapro  -low-dose benzo when initially diagnosed with anxiety   -Feels like none of these medications have been helpful   Hospitalizations: denies  Suicide attempts: denies   Family psychiatric history: anxiety (grandmother--on sertraline--, mom & brother)     Social History:   Currently lives: with two kids and boyfriend   Work/Finances: in sales  Current stressors: work   Social support: mom, grandma, boyfriend   Legal History: denies   History: denies  History of violence: denies    Substance Use History:  Tobacco use: quit smoking prior to pregnancy  Use of alcohol:  denies any EtOH since early February; \"used to be a full-blown alcoholic when my dad passed away\"  Use of other substances: denies  Legal consequences of substance use: denies  Substance use disorder treatment: denies    Record Review: brief     Medical Review Of Systems:  Pertinent items are noted in HPI.    OARRS:  No data recorded  I have personally reviewed the OARRS report for Christiano Prieto. I have considered the risks of abuse, dependence, addiction and diversion    Objective   Mental Status Exam  Appearance: at home for virtual appt, long dark hair  Attitude: Tense, cooperative   Behavior: Appropriate eye contact.   Motor Activity: No psychomotor agitation or retardation. No abnormal movements, tremors or tics. No evidence of extrapyramidal symptoms or tardive dyskinesia.  Speech: fast but not pressured   Mood: \"anxious\"  Affect: mood-congruent   Thought Process: Linear, logical, and goal-directed. No loose associations or gross " thought disorganization.  Thought Content: Denied current suicidal ideation or thoughts of harm to self, denied homicidal ideation or thoughts of harm to others. No delusional thinking elicited. No perseverations or obsessions identified.   Perception: Did not endorse auditory or visual hallucinations, did not appear to be responding to hallucinatory stimuli.   Cognition: Alert, oriented to prior meds and situation. Responded appropriately to questions.   Insight: Fair, in regards to understanding mental health condition  Judgement: Fair      Vitals:  There were no vitals filed for this visit.    Other Objective Information:  No recent TSH    Risk Assessment:  Risk of harm to self: Low Risk -- Risk factors include: Severe anxiety Protective factors include:Denies current suicidal ideation, Denies history of suicide attempts , Future-oriented talk , Willingness to seek help and support , Skills in problem solving, conflict resolution, and nonviolent handling of disputes, Access to a variety of clinical interventions , Receiving and engaged in care for mental, physical, and substance use disorders , Support through ongoing medical and mental healthcare relationships , Interpersonal relationships and supports, e.g., family, friends, peers, community , and Restricted access to firearms or other lethal means of suicide     Risk of harm to others: Low Risk - Risk factors include: No significant risk factors identified on screening. Protective factors include: Lack of known history of harm to others , Lack of known history of violent ideation , Lack of known access to firearms , Sense of community, availability/access to resources and support , Sense of optimism, hope , Interpersonal competence , Affect regulation , Sense of self-efficacy, internal locus of control , and Positive, pro-social family/peer network     Pt declines to start psychotherapy     There are no diagnoses linked to this encounter.     30 YO with  anxiety, EtOH use disorder, panic, HTN, and asthma (reports this is an old diagnosis; no recent asthma) currently at 33wga (induction planned for 11/30), presenting to discuss worsening anxiety. Her anxiety and panic attacks have become so bad that she recently had to take premature maternity leave, since the additional stress of work was too much to manage. While she finds PRN hydroxyzine helpful for panic attacks, she reports that prior SSRIs have been either useless or counterproductive: 25 mg sertraline resumed for just three days a few weeks ago triggered worsening panic attacks, in spite of previously having taking 100 mg prior to pregnancy. Given prior ineffectiveness of SSRIs and the fact that her panic attacks are often related to high blood pressure reads, recommend starting PRN propranolol for panic attacks.     Plan/Recommendations:  START propranolol 10 mg TID PRN for anxiety   Continue hydroxyzine 25 mg daily PRN for panic attacks  Follow up on TSH  Follow up: in 4 weeks  Call  Psychiatry at [phone] with issues.  For [place] residents, [employer] is a 24/7 hotline you can call for assistance [531.553.9395]. Please call 627/710 or go to your closest Emergency Room if you feel unsafe. This includes thoughts of hurting yourself or anyone else, or having other troubles such as hearing voices, seeing visions, or having new and scary thoughts about the people around you.    I saw and discussed this pt with Dr. Ricketts, who agrees with the above plan.     Kavita Girard MD

## 2024-10-28 ENCOUNTER — HOSPITAL ENCOUNTER (OUTPATIENT)
Facility: HOSPITAL | Age: 29
Discharge: HOME | End: 2024-10-28
Attending: STUDENT IN AN ORGANIZED HEALTH CARE EDUCATION/TRAINING PROGRAM | Admitting: STUDENT IN AN ORGANIZED HEALTH CARE EDUCATION/TRAINING PROGRAM
Payer: COMMERCIAL

## 2024-10-28 ENCOUNTER — APPOINTMENT (OUTPATIENT)
Dept: RADIOLOGY | Facility: HOSPITAL | Age: 29
End: 2024-10-28
Payer: COMMERCIAL

## 2024-10-28 VITALS
HEART RATE: 101 BPM | OXYGEN SATURATION: 100 % | SYSTOLIC BLOOD PRESSURE: 112 MMHG | TEMPERATURE: 97.5 F | DIASTOLIC BLOOD PRESSURE: 67 MMHG | RESPIRATION RATE: 16 BRPM

## 2024-10-28 DIAGNOSIS — G43.109 MIGRAINE WITH AURA AND WITHOUT STATUS MIGRAINOSUS, NOT INTRACTABLE: Primary | ICD-10-CM

## 2024-10-28 LAB
ABO GROUP (TYPE) IN BLOOD: NORMAL
ALBUMIN SERPL BCP-MCNC: 4.2 G/DL (ref 3.4–5)
ALP SERPL-CCNC: 85 U/L (ref 33–110)
ALT SERPL W P-5'-P-CCNC: 15 U/L (ref 7–45)
ANION GAP SERPL CALC-SCNC: 16 MMOL/L (ref 10–20)
ANTIBODY SCREEN: NORMAL
APTT PPP: 32 SECONDS (ref 27–38)
AST SERPL W P-5'-P-CCNC: 30 U/L (ref 9–39)
BILIRUB SERPL-MCNC: 0.4 MG/DL (ref 0–1.2)
BUN SERPL-MCNC: 5 MG/DL (ref 6–23)
CALCIUM SERPL-MCNC: 9.5 MG/DL (ref 8.6–10.3)
CHLORIDE SERPL-SCNC: 104 MMOL/L (ref 98–107)
CO2 SERPL-SCNC: 18 MMOL/L (ref 21–32)
CREAT SERPL-MCNC: 0.51 MG/DL (ref 0.5–1.05)
CREAT UR-MCNC: 13.8 MG/DL (ref 20–320)
EGFRCR SERPLBLD CKD-EPI 2021: >90 ML/MIN/1.73M*2
ERYTHROCYTE [DISTWIDTH] IN BLOOD BY AUTOMATED COUNT: 13.3 % (ref 11.5–14.5)
FIBRINOGEN PPP-MCNC: 531 MG/DL (ref 200–400)
GLUCOSE SERPL-MCNC: 77 MG/DL (ref 74–99)
HCT VFR BLD AUTO: 37.5 % (ref 36–46)
HGB BLD-MCNC: 12.4 G/DL (ref 12–16)
HOLD SPECIMEN: NORMAL
INR PPP: 0.9 (ref 0.9–1.1)
LDH SERPL L TO P-CCNC: 254 U/L (ref 84–246)
MCH RBC QN AUTO: 27.5 PG (ref 26–34)
MCHC RBC AUTO-ENTMCNC: 33.1 G/DL (ref 32–36)
MCV RBC AUTO: 83 FL (ref 80–100)
NRBC BLD-RTO: 0 /100 WBCS (ref 0–0)
PLATELET # BLD AUTO: 317 X10*3/UL (ref 150–450)
POTASSIUM SERPL-SCNC: 4.5 MMOL/L (ref 3.5–5.3)
PROT SERPL-MCNC: 7.6 G/DL (ref 6.4–8.2)
PROT UR-ACNC: <4 MG/DL (ref 5–24)
PROT/CREAT UR: ABNORMAL MG/G{CREAT}
PROTHROMBIN TIME: 10.3 SECONDS (ref 9.8–12.8)
RBC # BLD AUTO: 4.51 X10*6/UL (ref 4–5.2)
RH FACTOR (ANTIGEN D): NORMAL
SODIUM SERPL-SCNC: 133 MMOL/L (ref 136–145)
URATE SERPL-MCNC: 3.3 MG/DL (ref 2.3–6.7)
WBC # BLD AUTO: 13.5 X10*3/UL (ref 4.4–11.3)

## 2024-10-28 PROCEDURE — 70544 MR ANGIOGRAPHY HEAD W/O DYE: CPT | Mod: 59

## 2024-10-28 PROCEDURE — 85027 COMPLETE CBC AUTOMATED: CPT | Performed by: STUDENT IN AN ORGANIZED HEALTH CARE EDUCATION/TRAINING PROGRAM

## 2024-10-28 PROCEDURE — 86850 RBC ANTIBODY SCREEN: CPT | Performed by: STUDENT IN AN ORGANIZED HEALTH CARE EDUCATION/TRAINING PROGRAM

## 2024-10-28 PROCEDURE — 84156 ASSAY OF PROTEIN URINE: CPT | Performed by: STUDENT IN AN ORGANIZED HEALTH CARE EDUCATION/TRAINING PROGRAM

## 2024-10-28 PROCEDURE — 36415 COLL VENOUS BLD VENIPUNCTURE: CPT

## 2024-10-28 PROCEDURE — 2500000004 HC RX 250 GENERAL PHARMACY W/ HCPCS (ALT 636 FOR OP/ED)

## 2024-10-28 PROCEDURE — 70551 MRI BRAIN STEM W/O DYE: CPT

## 2024-10-28 PROCEDURE — 2500000004 HC RX 250 GENERAL PHARMACY W/ HCPCS (ALT 636 FOR OP/ED): Performed by: STUDENT IN AN ORGANIZED HEALTH CARE EDUCATION/TRAINING PROGRAM

## 2024-10-28 PROCEDURE — 70450 CT HEAD/BRAIN W/O DYE: CPT

## 2024-10-28 PROCEDURE — 80053 COMPREHEN METABOLIC PANEL: CPT | Performed by: STUDENT IN AN ORGANIZED HEALTH CARE EDUCATION/TRAINING PROGRAM

## 2024-10-28 PROCEDURE — 99291 CRITICAL CARE FIRST HOUR: CPT | Performed by: INTERNAL MEDICINE

## 2024-10-28 PROCEDURE — 59025 FETAL NON-STRESS TEST: CPT | Performed by: STUDENT IN AN ORGANIZED HEALTH CARE EDUCATION/TRAINING PROGRAM

## 2024-10-28 PROCEDURE — 85384 FIBRINOGEN ACTIVITY: CPT | Performed by: STUDENT IN AN ORGANIZED HEALTH CARE EDUCATION/TRAINING PROGRAM

## 2024-10-28 PROCEDURE — 85730 THROMBOPLASTIN TIME PARTIAL: CPT | Performed by: STUDENT IN AN ORGANIZED HEALTH CARE EDUCATION/TRAINING PROGRAM

## 2024-10-28 PROCEDURE — 84550 ASSAY OF BLOOD/URIC ACID: CPT | Performed by: STUDENT IN AN ORGANIZED HEALTH CARE EDUCATION/TRAINING PROGRAM

## 2024-10-28 PROCEDURE — 70450 CT HEAD/BRAIN W/O DYE: CPT | Performed by: RADIOLOGY

## 2024-10-28 PROCEDURE — 99214 OFFICE O/P EST MOD 30 MIN: CPT | Performed by: STUDENT IN AN ORGANIZED HEALTH CARE EDUCATION/TRAINING PROGRAM

## 2024-10-28 PROCEDURE — 83615 LACTATE (LD) (LDH) ENZYME: CPT | Performed by: STUDENT IN AN ORGANIZED HEALTH CARE EDUCATION/TRAINING PROGRAM

## 2024-10-28 PROCEDURE — 99213 OFFICE O/P EST LOW 20 MIN: CPT | Mod: 25

## 2024-10-28 PROCEDURE — 36415 COLL VENOUS BLD VENIPUNCTURE: CPT | Performed by: STUDENT IN AN ORGANIZED HEALTH CARE EDUCATION/TRAINING PROGRAM

## 2024-10-28 RX ORDER — CALCIUM GLUCONATE 98 MG/ML
1 INJECTION, SOLUTION INTRAVENOUS ONCE AS NEEDED
Status: DISCONTINUED | OUTPATIENT
Start: 2024-10-28 | End: 2024-10-28 | Stop reason: HOSPADM

## 2024-10-28 RX ORDER — MAGNESIUM SULFATE HEPTAHYDRATE 40 MG/ML
INJECTION, SOLUTION INTRAVENOUS
Status: COMPLETED
Start: 2024-10-28 | End: 2024-10-28

## 2024-10-28 RX ORDER — NIFEDIPINE 10 MG/1
10 CAPSULE ORAL ONCE AS NEEDED
Status: DISCONTINUED | OUTPATIENT
Start: 2024-10-28 | End: 2024-10-28 | Stop reason: HOSPADM

## 2024-10-28 RX ORDER — DIPHENHYDRAMINE HYDROCHLORIDE 50 MG/ML
25 INJECTION INTRAMUSCULAR; INTRAVENOUS ONCE
Status: COMPLETED | OUTPATIENT
Start: 2024-10-28 | End: 2024-10-28

## 2024-10-28 RX ORDER — HYDRALAZINE HYDROCHLORIDE 20 MG/ML
5 INJECTION INTRAMUSCULAR; INTRAVENOUS ONCE AS NEEDED
Status: DISCONTINUED | OUTPATIENT
Start: 2024-10-28 | End: 2024-10-28 | Stop reason: HOSPADM

## 2024-10-28 RX ORDER — METOCLOPRAMIDE 10 MG/1
10 TABLET ORAL EVERY 6 HOURS PRN
Qty: 30 TABLET | Refills: 1 | Status: SHIPPED | OUTPATIENT
Start: 2024-10-28 | End: 2024-11-27

## 2024-10-28 RX ORDER — METOCLOPRAMIDE HYDROCHLORIDE 5 MG/ML
10 INJECTION INTRAMUSCULAR; INTRAVENOUS ONCE
Status: COMPLETED | OUTPATIENT
Start: 2024-10-28 | End: 2024-10-28

## 2024-10-28 RX ORDER — MAGNESIUM SULFATE HEPTAHYDRATE 40 MG/ML
2 INJECTION, SOLUTION INTRAVENOUS CONTINUOUS
Status: DISCONTINUED | OUTPATIENT
Start: 2024-10-28 | End: 2024-10-28 | Stop reason: HOSPADM

## 2024-10-28 RX ORDER — LABETALOL HYDROCHLORIDE 5 MG/ML
20 INJECTION, SOLUTION INTRAVENOUS ONCE AS NEEDED
Status: DISCONTINUED | OUTPATIENT
Start: 2024-10-28 | End: 2024-10-28 | Stop reason: HOSPADM

## 2024-10-28 RX ORDER — LIDOCAINE HYDROCHLORIDE 10 MG/ML
0.5 INJECTION, SOLUTION EPIDURAL; INFILTRATION; INTRACAUDAL; PERINEURAL ONCE AS NEEDED
Status: DISCONTINUED | OUTPATIENT
Start: 2024-10-28 | End: 2024-10-28 | Stop reason: HOSPADM

## 2024-10-28 RX ORDER — ONDANSETRON HYDROCHLORIDE 2 MG/ML
4 INJECTION, SOLUTION INTRAVENOUS EVERY 6 HOURS PRN
Status: DISCONTINUED | OUTPATIENT
Start: 2024-10-28 | End: 2024-10-28 | Stop reason: HOSPADM

## 2024-10-28 RX ORDER — ONDANSETRON 4 MG/1
4 TABLET, FILM COATED ORAL EVERY 6 HOURS PRN
Status: DISCONTINUED | OUTPATIENT
Start: 2024-10-28 | End: 2024-10-28 | Stop reason: HOSPADM

## 2024-10-28 RX ORDER — DIPHENHYDRAMINE HCL 25 MG
25 TABLET ORAL EVERY 6 HOURS PRN
Qty: 30 TABLET | Refills: 1 | Status: SHIPPED | OUTPATIENT
Start: 2024-10-28 | End: 2024-11-27

## 2024-10-28 ASSESSMENT — PAIN SCALES - GENERAL: PAINLEVEL_OUTOF10: 0 - NO PAIN

## 2024-10-28 NOTE — PROGRESS NOTES
I saw and evaluated the patient. I personally obtained the key and critical portions of the history and physical exam or was physically present for key and critical portions performed by the resident/fellow. I reviewed the resident/fellow's documentation and discussed the patient with the resident/fellow. I agree with the resident/fellow's medical decision making as documented in the note.    Sherman Ricketts MD

## 2024-10-29 ENCOUNTER — HOSPITAL ENCOUNTER (OUTPATIENT)
Dept: RADIOLOGY | Facility: CLINIC | Age: 29
Discharge: HOME | End: 2024-10-29
Payer: COMMERCIAL

## 2024-10-29 DIAGNOSIS — O36.5930 MATERNAL CARE FOR OTHER KNOWN OR SUSPECTED POOR FETAL GROWTH, THIRD TRIMESTER, NOT APPLICABLE OR UNSPECIFIED: ICD-10-CM

## 2024-10-29 DIAGNOSIS — Z34.91 PRENATAL CARE IN FIRST TRIMESTER (HHS-HCC): ICD-10-CM

## 2024-10-29 DIAGNOSIS — O99.213 OBESITY COMPLICATING PREGNANCY, THIRD TRIMESTER (HHS-HCC): ICD-10-CM

## 2024-10-29 PROCEDURE — 76819 FETAL BIOPHYS PROFIL W/O NST: CPT

## 2024-10-29 PROCEDURE — 76816 OB US FOLLOW-UP PER FETUS: CPT

## 2024-10-29 PROCEDURE — 76816 OB US FOLLOW-UP PER FETUS: CPT | Performed by: MEDICAL GENETICS

## 2024-10-29 PROCEDURE — 76819 FETAL BIOPHYS PROFIL W/O NST: CPT | Performed by: MEDICAL GENETICS

## 2024-11-01 ENCOUNTER — APPOINTMENT (OUTPATIENT)
Dept: OBSTETRICS AND GYNECOLOGY | Facility: CLINIC | Age: 29
End: 2024-11-01
Payer: COMMERCIAL

## 2024-11-01 VITALS — WEIGHT: 195.8 LBS | SYSTOLIC BLOOD PRESSURE: 116 MMHG | DIASTOLIC BLOOD PRESSURE: 81 MMHG | BODY MASS INDEX: 34.68 KG/M2

## 2024-11-01 DIAGNOSIS — Z3A.34 34 WEEKS GESTATION OF PREGNANCY (HHS-HCC): ICD-10-CM

## 2024-11-01 PROCEDURE — 99213 OFFICE O/P EST LOW 20 MIN: CPT | Performed by: OBSTETRICS & GYNECOLOGY

## 2024-11-05 ENCOUNTER — HOSPITAL ENCOUNTER (OUTPATIENT)
Facility: HOSPITAL | Age: 29
Discharge: HOME | End: 2024-11-05
Attending: STUDENT IN AN ORGANIZED HEALTH CARE EDUCATION/TRAINING PROGRAM | Admitting: STUDENT IN AN ORGANIZED HEALTH CARE EDUCATION/TRAINING PROGRAM
Payer: COMMERCIAL

## 2024-11-05 ENCOUNTER — ROUTINE PRENATAL (OUTPATIENT)
Dept: OBSTETRICS AND GYNECOLOGY | Facility: CLINIC | Age: 29
End: 2024-11-05
Payer: COMMERCIAL

## 2024-11-05 ENCOUNTER — HOSPITAL ENCOUNTER (OUTPATIENT)
Dept: CARDIOLOGY | Facility: HOSPITAL | Age: 29
Discharge: HOME | End: 2024-11-05
Payer: COMMERCIAL

## 2024-11-05 ENCOUNTER — TELEPHONE (OUTPATIENT)
Dept: OBSTETRICS AND GYNECOLOGY | Facility: CLINIC | Age: 29
End: 2024-11-05

## 2024-11-05 VITALS
OXYGEN SATURATION: 100 % | WEIGHT: 195.55 LBS | BODY MASS INDEX: 34.65 KG/M2 | HEIGHT: 63 IN | HEART RATE: 94 BPM | SYSTOLIC BLOOD PRESSURE: 122 MMHG | TEMPERATURE: 98.3 F | RESPIRATION RATE: 16 BRPM | DIASTOLIC BLOOD PRESSURE: 79 MMHG

## 2024-11-05 VITALS — DIASTOLIC BLOOD PRESSURE: 78 MMHG | SYSTOLIC BLOOD PRESSURE: 112 MMHG | WEIGHT: 193 LBS | BODY MASS INDEX: 34.19 KG/M2

## 2024-11-05 DIAGNOSIS — O10.919 CHRONIC HYPERTENSION IN PREGNANCY (HHS-HCC): ICD-10-CM

## 2024-11-05 DIAGNOSIS — Z3A.34 34 WEEKS GESTATION OF PREGNANCY (HHS-HCC): Primary | ICD-10-CM

## 2024-11-05 PROBLEM — F41.1 GAD (GENERALIZED ANXIETY DISORDER): Status: RESOLVED | Noted: 2018-06-26 | Resolved: 2024-11-05

## 2024-11-05 PROBLEM — R11.0 NAUSEA: Status: RESOLVED | Noted: 2018-02-25 | Resolved: 2024-11-05

## 2024-11-05 PROBLEM — H60.90 OTITIS EXTERNA: Status: RESOLVED | Noted: 2024-07-10 | Resolved: 2024-11-05

## 2024-11-05 PROBLEM — H72.92 PERFORATION OF LEFT TYMPANIC MEMBRANE: Status: RESOLVED | Noted: 2020-06-12 | Resolved: 2024-11-05

## 2024-11-05 PROBLEM — R06.02 SHORTNESS OF BREATH: Status: RESOLVED | Noted: 2024-07-10 | Resolved: 2024-11-05

## 2024-11-05 PROBLEM — H53.149 VISUAL DISCOMFORT, UNSPECIFIED: Status: RESOLVED | Noted: 2018-02-25 | Resolved: 2024-11-05

## 2024-11-05 PROBLEM — O47.00 PRETERM UTERINE CONTRACTIONS (HHS-HCC): Status: RESOLVED | Noted: 2019-08-11 | Resolved: 2024-11-05

## 2024-11-05 PROBLEM — F41.9 ANXIETY: Status: RESOLVED | Noted: 2024-05-17 | Resolved: 2024-11-05

## 2024-11-05 PROBLEM — K85.90 ACUTE RECURRENT PANCREATITIS (HHS-HCC): Status: RESOLVED | Noted: 2023-10-08 | Resolved: 2024-11-05

## 2024-11-05 PROBLEM — O09.32: Status: RESOLVED | Noted: 2024-09-05 | Resolved: 2024-11-05

## 2024-11-05 PROBLEM — K85.20 ALCOHOL INDUCED ACUTE PANCREATITIS WITHOUT NECROSIS OR INFECTION (HHS-HCC): Status: RESOLVED | Noted: 2023-10-06 | Resolved: 2024-11-05

## 2024-11-05 PROBLEM — R10.9 ABDOMINAL PAIN: Status: RESOLVED | Noted: 2024-06-17 | Resolved: 2024-11-05

## 2024-11-05 PROBLEM — R51.9 HEADACHE: Status: RESOLVED | Noted: 2018-02-25 | Resolved: 2024-11-05

## 2024-11-05 PROBLEM — K85.90 PANCREATITIS (HHS-HCC): Status: RESOLVED | Noted: 2024-07-10 | Resolved: 2024-11-05

## 2024-11-05 PROBLEM — H92.09 EAR PAIN: Status: RESOLVED | Noted: 2024-07-10 | Resolved: 2024-11-05

## 2024-11-05 LAB
ALBUMIN SERPL BCP-MCNC: 3.7 G/DL (ref 3.4–5)
ALP SERPL-CCNC: 83 U/L (ref 33–110)
ALT SERPL W P-5'-P-CCNC: 9 U/L (ref 7–45)
ANION GAP SERPL CALC-SCNC: 13 MMOL/L (ref 10–20)
AST SERPL W P-5'-P-CCNC: 13 U/L (ref 9–39)
BILIRUB SERPL-MCNC: 0.2 MG/DL (ref 0–1.2)
BNP SERPL-MCNC: 14 PG/ML (ref 0–99)
BUN SERPL-MCNC: 7 MG/DL (ref 6–23)
CALCIUM SERPL-MCNC: 8.8 MG/DL (ref 8.6–10.3)
CARDIAC TROPONIN I PNL SERPL HS: 3 NG/L (ref 0–13)
CHLORIDE SERPL-SCNC: 106 MMOL/L (ref 98–107)
CO2 SERPL-SCNC: 21 MMOL/L (ref 21–32)
CREAT SERPL-MCNC: 0.46 MG/DL (ref 0.5–1.05)
EGFRCR SERPLBLD CKD-EPI 2021: >90 ML/MIN/1.73M*2
ERYTHROCYTE [DISTWIDTH] IN BLOOD BY AUTOMATED COUNT: 13.2 % (ref 11.5–14.5)
GLUCOSE SERPL-MCNC: 95 MG/DL (ref 74–99)
HCT VFR BLD AUTO: 32.4 % (ref 36–46)
HGB BLD-MCNC: 10.7 G/DL (ref 12–16)
MCH RBC QN AUTO: 27.4 PG (ref 26–34)
MCHC RBC AUTO-ENTMCNC: 33 G/DL (ref 32–36)
MCV RBC AUTO: 83 FL (ref 80–100)
NRBC BLD-RTO: 0 /100 WBCS (ref 0–0)
PLATELET # BLD AUTO: 274 X10*3/UL (ref 150–450)
POTASSIUM SERPL-SCNC: 3.5 MMOL/L (ref 3.5–5.3)
PROT SERPL-MCNC: 6.3 G/DL (ref 6.4–8.2)
RBC # BLD AUTO: 3.91 X10*6/UL (ref 4–5.2)
SODIUM SERPL-SCNC: 136 MMOL/L (ref 136–145)
WBC # BLD AUTO: 10.2 X10*3/UL (ref 4.4–11.3)

## 2024-11-05 PROCEDURE — 93005 ELECTROCARDIOGRAM TRACING: CPT

## 2024-11-05 PROCEDURE — 84484 ASSAY OF TROPONIN QUANT: CPT | Performed by: OBSTETRICS & GYNECOLOGY

## 2024-11-05 PROCEDURE — 59025 FETAL NON-STRESS TEST: CPT | Performed by: OBSTETRICS & GYNECOLOGY

## 2024-11-05 PROCEDURE — 87081 CULTURE SCREEN ONLY: CPT

## 2024-11-05 PROCEDURE — 36415 COLL VENOUS BLD VENIPUNCTURE: CPT

## 2024-11-05 PROCEDURE — 36415 COLL VENOUS BLD VENIPUNCTURE: CPT | Performed by: OBSTETRICS & GYNECOLOGY

## 2024-11-05 PROCEDURE — 83880 ASSAY OF NATRIURETIC PEPTIDE: CPT | Performed by: OBSTETRICS & GYNECOLOGY

## 2024-11-05 PROCEDURE — 99214 OFFICE O/P EST MOD 30 MIN: CPT | Performed by: OBSTETRICS & GYNECOLOGY

## 2024-11-05 PROCEDURE — 80053 COMPREHEN METABOLIC PANEL: CPT | Performed by: OBSTETRICS & GYNECOLOGY

## 2024-11-05 PROCEDURE — 99214 OFFICE O/P EST MOD 30 MIN: CPT | Mod: 25

## 2024-11-05 PROCEDURE — 85027 COMPLETE CBC AUTOMATED: CPT | Performed by: OBSTETRICS & GYNECOLOGY

## 2024-11-05 PROCEDURE — 2500000001 HC RX 250 WO HCPCS SELF ADMINISTERED DRUGS (ALT 637 FOR MEDICARE OP): Performed by: OBSTETRICS & GYNECOLOGY

## 2024-11-05 RX ORDER — ONDANSETRON 4 MG/1
4 TABLET, FILM COATED ORAL EVERY 6 HOURS PRN
Status: DISCONTINUED | OUTPATIENT
Start: 2024-11-05 | End: 2024-11-05 | Stop reason: HOSPADM

## 2024-11-05 RX ORDER — LABETALOL HYDROCHLORIDE 5 MG/ML
20 INJECTION, SOLUTION INTRAVENOUS ONCE AS NEEDED
Status: DISCONTINUED | OUTPATIENT
Start: 2024-11-05 | End: 2024-11-05 | Stop reason: HOSPADM

## 2024-11-05 RX ORDER — HYDRALAZINE HYDROCHLORIDE 20 MG/ML
5 INJECTION INTRAMUSCULAR; INTRAVENOUS ONCE AS NEEDED
Status: DISCONTINUED | OUTPATIENT
Start: 2024-11-05 | End: 2024-11-05 | Stop reason: HOSPADM

## 2024-11-05 RX ORDER — NIFEDIPINE 10 MG/1
10 CAPSULE ORAL ONCE AS NEEDED
Status: DISCONTINUED | OUTPATIENT
Start: 2024-11-05 | End: 2024-11-05 | Stop reason: HOSPADM

## 2024-11-05 RX ORDER — ONDANSETRON HYDROCHLORIDE 2 MG/ML
4 INJECTION, SOLUTION INTRAVENOUS EVERY 6 HOURS PRN
Status: DISCONTINUED | OUTPATIENT
Start: 2024-11-05 | End: 2024-11-05 | Stop reason: HOSPADM

## 2024-11-05 RX ORDER — LIDOCAINE HYDROCHLORIDE 10 MG/ML
0.5 INJECTION, SOLUTION EPIDURAL; INFILTRATION; INTRACAUDAL; PERINEURAL ONCE AS NEEDED
Status: DISCONTINUED | OUTPATIENT
Start: 2024-11-05 | End: 2024-11-05 | Stop reason: HOSPADM

## 2024-11-05 RX ORDER — ACETAMINOPHEN 325 MG/1
975 TABLET ORAL ONCE
Status: COMPLETED | OUTPATIENT
Start: 2024-11-05 | End: 2024-11-05

## 2024-11-05 ASSESSMENT — PAIN SCALES - GENERAL
PAINLEVEL_OUTOF10: 0 - NO PAIN
PAINLEVEL_OUTOF10: 5 - MODERATE PAIN

## 2024-11-05 ASSESSMENT — PAIN SCALES - PAIN ASSESSMENT IN ADVANCED DEMENTIA (PAINAD): TOTALSCORE: MEDICATION (SEE MAR)

## 2024-11-05 ASSESSMENT — PAIN DESCRIPTION - LOCATION: LOCATION: HEAD

## 2024-11-05 NOTE — H&P
OB Triage H&P    Assessment/Plan    Christiano Prieto is a 29 y.o.  at 34w5d, MIKEY: 2024, by Last Menstrual Period, who presents to triage with a headache, SOB, chest heaviness, and right-sided thoracic pain that began this morning. She has taken Tylenol for her headache and thoracic pain with minimal relief. She reports having a normal appetite and bowel movements but has had increased urinary frequency for a couple days. She also said she took Propanolol for the first time today.    She reports having had nausea and diarrhea yesterday that have since resolved.     Plan    #Headache  -Tylenol PRN    #Chest tightness  #SOB  #Right-sided thoracic pain  -CBC  -CMP  -BNP  -Troponin  -EKG  -CTA to exclude PE    #Diarrhea  -self-resolved    #Routine Care  -Fetal monitoring reassuring  -Good fetal movement  -Up to date on prenatal care  -Continue routine prenatal care    Dispo  -continue observation in triage for now    Discussed plan and reviewed with: Dr. Mayer    Pregnancy Problems (from 24 to present)       Problem Noted Diagnosed Resolved    Anxiety in pregnancy in third trimester, antepartum ([facility]) 10/9/2024 by Kathrin JARA MD  No    Priority:  Medium       34 weeks gestation of pregnancy ([facility]) 10/3/2024 by Samreen Mancilla DO  No    Priority:  Medium       Overview Addendum 2024 12:33 PM by Samreen Mancilla, DO     Desired provider in labor: [] CNM  [x] Physician  [x] Blood Products: [x] Yes, accepts [] No, needs counseling  [x] Initial BMI: 30.12   [x] Prenatal Labs: done  [x] Cervical Cancer Screening up to date  [x] Rh status: +  [x] Genetic Screening:  done  [x] NT US: (11-13 wks) done  [x] Baby ASA (if indicated): taking  [x] Pregnancy dated by: LMP    [x] Anatomy US: (19-20 wks)  [] Federal Sterilization consent signed (if indicated):  [x] 1hr GCT at 24-28wks:  [] Rhogam (if indicated):   [x] Fetal Surveillance (if indicated): growth US at 30 and 36wks for C.HTN  [x] Tdap  "(27-32 wks, may be given up to 36 wks if initial window missed):  declines  [x] RSV (32-36 wks) (Sept. to end of ): wants but didn't schedule  [x] Flu Vaccine: declines    [] Breastfeeding:  [] Postpartum Birth control method:   [] GBS at 36 - 37 wks:  [x] 39 weeks discussion of IOL vs. Expectant management: 38wk induction  [x] Mode of delivery ( anticipated ): vag           Chronic hypertension in pregnancy ([facility]) 2024 by Samreen Mancilla DO  No    Priority:  Medium       Overview Addendum 2024 12:18 PM by Pilar Alba MD     Not on med s  Baby ASA    IOL scheduled          Prenatal care insufficient, second trimester ([facility]) 2024 by Samreen Mancilla DO  2024 by Pilar Alba MD     uterine contractions ([facility]) 2019 by Robert Haynes RN  2024 by Pilar Alba MD            Subjective   Patient presents with complaints of HA, chest tightness, SOB, right sided pain and \"not feeling right.\"  Seen earlier today in office but continued to not feel well and presented to L&D.     Prenatal Provider Dr. Mancilla    OB History    Para Term  AB Living   6 2 2 0 3 2   SAB IAB Ectopic Multiple Live Births   3 0 0 0 2      # Outcome Date GA Lbr Ever/2nd Weight Sex Type Anes PTL Lv   6 Current            5 Term 13 39w0d   F Vag-Spont         Name: Nahid   4 SAB            3 SAB            2 SAB            1 Term  38w0d   M Vag-Spont   COLLEEN      Name: Shane       No past surgical history on file.    Social History     Tobacco Use    Smoking status: Former     Types: Cigarettes    Smokeless tobacco: Never   Substance Use Topics    Alcohol use: Never     No Known Allergies    Medications Prior to Admission   Medication Sig Dispense Refill Last Dose/Taking    diphenhydrAMINE (Sominex) 25 mg tablet Take 1 tablet (25 mg) by mouth every 6 hours if needed for other (Headache, take with reglan). 30 tablet 1 2024 at 11:00 AM    ferrous gluconate 324 (38 Fe) " mg tablet Take 1 tablet (38 mg of iron) by mouth once daily with breakfast. 30 tablet 11 2024 Evening    hydrOXYzine HCL (Atarax) 25 mg tablet Take 1 tablet (25 mg) by mouth 2 times a day as needed for anxiety. For severe anxiety 60 tablet 1 2024 Morning    metoclopramide (Reglan) 10 mg tablet Take 1 tablet (10 mg) by mouth every 6 hours if needed (Headache, take with benadryl). 30 tablet 1 2024 Noon    PRENATAL VIT 10-IRON-FOLIC-DHA ORAL Take 1 tablet by mouth once daily.   2024 Morning    propranolol (Inderal) 10 mg tablet Take 1 tablet (10 mg) by mouth 3 times a day as needed (anxiety). 90 tablet 1 2024 Morning    miscellaneous medical supply (Blood Pressure Cuff) misc 1 Device 2 times a day. 1 each 0     Symbicort 160-4.5 mcg/actuation inhaler Inhale 2 puffs 2 times a day. as instructed 10.2 g 1 Unknown     Objective     Last Vitals  Temp Pulse Resp BP MAP O2 Sat   37.6 °C (99.7 °F) 100 18 128/75 94 98 %     Blood Pressures         2024  1713             BP: 128/75           Physical Exam  General: NAD, mood appropriate  Cardiopulmonary: warm and well perfused, breathing comfortably on room air, equal breath sounds, no rhonchi or wheezes  Abdomen: Gravid, non-tender  Extremities: Symmetric    Fetal Monitoring  Baseline: 145 bpm, Variability: moderate,  Accelerations: present and Decelerations: none  Uterine Activity: No contractions seen on toco  Interpretation: Category 1    Labs in chart were reviewed.        Prenatal labs reviewed, not remarkable.    Mari Amado MS3    Attending Addendum    Appreciate and agree with above.  28 y/o  presenting with multiple complaints.  Reports a headache not relieved with tylenol this AM (had a negative CT and MRI last week during a separate triage visit last week), shortness of breath and chest tightness associated with right sided side/back pain.  BP normal on admission, vitals are stable and normal, O2 sat 98%.  On exam, her  lungs are CTAB.  Heart regular rate and rhythm.  Right sided upper back pain not reproducible on exam, not pleuritic in nature, comes and goes.  FHTs reactive.  Plan tylenol for HA, ECG, troponin and BNP for chest tightness.  Will consider CT PE protocol to r/o PE if all other workup unremarkable and symptoms do not resolve.    Kailyn Mayer MD

## 2024-11-05 NOTE — PROGRESS NOTES
OB patient presenting for  cramping.  She states that she had loose stool yesterday, followed by intense cramping. She also states that she lost her mucous plug, and it had a tint of brown in it. Denies any heavier bleeding. Feeling baby move well.    Seen in Hosp last week  for severe range BP w HA . Mag sulfate IV given / CT MRI wnl == Dx atypical migraine not SPEC Not sent home on meds  Pt is not checking home BPs , advised to do so . Call parameters given      Cervix 1 / thick not in labor   GBS done has IOL at 38 week for CHTN.   Growth scan last week wnl     Medical Problems       Problem List       Chronic hypertension in pregnancy ([facility])    Overview Signed 5/17/2024 12:47 PM by Samreen Mancilla, DO     Not on meds   Baby ASA         Anxiety    Overview Signed 5/17/2024 11:20 AM by Samreen Mancilla, DO     Takes hydroxyzine and referred to Psych.             Conductive hearing loss of left ear with unrestricted hearing of right ear    Overview Signed 7/10/2024  1:40 PM by Robert Haynes RN                          Iron deficiency anemia        Nicotine dependence, unspecified, uncomplicated    Migraine with aura and without status migrainosus, not intractable    35 weeks gestation of pregnancy ([facility])    Overview Addendum 11/1/2024 12:33 PM by Samreen Mancilla,      Desired provider in labor: [] CNM  [x] Physician  [x] Blood Products: [x] Yes, accepts [] No, needs counseling  [x] Initial BMI: 30.12   [x] Prenatal Labs: done  [x] Cervical Cancer Screening up to date  [x] Rh status: +  [x] Genetic Screening:  done  [x] NT US: (11-13 wks) done  [x] Baby ASA (if indicated): taking  [x] Pregnancy dated by: LMP    [x] Anatomy US: (19-20 wks)  [] Federal Sterilization consent signed (if indicated):  [x] 1hr GCT at 24-28wks:  [] Rhogam (if indicated):   [x] Fetal Surveillance (if indicated): growth US at 30 and 36wks for C.HTN  [x] Tdap (27-32 wks, may be given up to 36 wks if initial window missed):  declines  [x] RSV  (32-36 wks) (Sept. to end of Jan): wants but didn't schedule  [x] Flu Vaccine: declines    [] Breastfeeding:  [] Postpartum Birth control method:   [x] GBS at 36 - 37 wks: done at 35 weeks for IOL at 38 week s  [x] 39 weeks discussion of IOL vs. Expectant management: 38wk induction  [x] Mode of delivery ( anticipated ): vag                    Pilar Alba MD

## 2024-11-05 NOTE — TELEPHONE ENCOUNTER
34.5 wk ob called ob line stating that she called the on call physician over an hour ago and has yet to get a call back.  Patient states she is not feeling good and is going to make her way into [facility] OB triage for evaluation.    I attempted to calm patient down to see what her symptoms are.  She states she has been up all night with a headache, not bad like her past ocular headaches but bad enough where she ended up taking Tylenol 1000 mg at 5 am.  Headache has not gone away.  + FM.  Denies RUQ pain, blurry vision or visual changes, contractions or cramping or watery vaginal discharge.  Patient states she has been spotting since last night after losing her mucus plug.  I attempted to assure patient that losing her mucus plug does not necessarily mean that she will be going into labor anytime soon, its just something gross that happens.       Patient has extreme anxiety, which she states she took 2 of her anxiety medications to try to help calm her down as well.      I apologized to patient that she didn't get a call back, chances are the physician on call is with a patient in labor.  As for going into OB triage, patient advised that with her being only 34.5 wks she would need to try to make it into [facility] OB triage due to gestational age and being .  Patient states she is certain she can't make it all the way to [facility] and is ok with just going to [facility], if needed being taken by ambulance to [facility].    After speaking with patient and trying to calm her down I was able to obtain her BP, which was 141/95.  Patient offered to come in to see Dr Alba at our [place] office for evaluation first, which she agreed.    Patient scheduled to come in at 10:30 am, as she is unable to get her by 9:30 am.  Patient advised to take some deep, calming breaths, maybe eat some breakfast and then make her way in to our [place] office.    Patient agreed and will make her way in for evaluation

## 2024-11-08 LAB — GP B STREP GENITAL QL CULT: NORMAL

## 2024-11-09 LAB
ATRIAL RATE: 95 BPM
P AXIS: 19 DEGREES
P OFFSET: 195 MS
P ONSET: 144 MS
PR INTERVAL: 150 MS
Q ONSET: 219 MS
QRS COUNT: 16 BEATS
QRS DURATION: 94 MS
QT INTERVAL: 362 MS
QTC CALCULATION(BAZETT): 454 MS
QTC FREDERICIA: 421 MS
R AXIS: 28 DEGREES
T AXIS: 14 DEGREES
T OFFSET: 400 MS
VENTRICULAR RATE: 95 BPM

## 2024-11-10 ENCOUNTER — APPOINTMENT (OUTPATIENT)
Dept: CARDIOLOGY | Facility: HOSPITAL | Age: 29
End: 2024-11-10
Payer: COMMERCIAL

## 2024-11-10 ENCOUNTER — HOSPITAL ENCOUNTER (OUTPATIENT)
Facility: HOSPITAL | Age: 29
Discharge: HOME | End: 2024-11-10
Attending: STUDENT IN AN ORGANIZED HEALTH CARE EDUCATION/TRAINING PROGRAM | Admitting: STUDENT IN AN ORGANIZED HEALTH CARE EDUCATION/TRAINING PROGRAM
Payer: COMMERCIAL

## 2024-11-10 ENCOUNTER — HOSPITAL ENCOUNTER (EMERGENCY)
Facility: HOSPITAL | Age: 29
Discharge: OTHER INSTITUTION | End: 2024-11-10
Attending: STUDENT IN AN ORGANIZED HEALTH CARE EDUCATION/TRAINING PROGRAM
Payer: COMMERCIAL

## 2024-11-10 VITALS
BODY MASS INDEX: 33.66 KG/M2 | WEIGHT: 190 LBS | HEIGHT: 63 IN | RESPIRATION RATE: 18 BRPM | HEART RATE: 98 BPM | TEMPERATURE: 97.9 F | OXYGEN SATURATION: 99 % | DIASTOLIC BLOOD PRESSURE: 86 MMHG | SYSTOLIC BLOOD PRESSURE: 134 MMHG

## 2024-11-10 VITALS
DIASTOLIC BLOOD PRESSURE: 78 MMHG | HEART RATE: 98 BPM | SYSTOLIC BLOOD PRESSURE: 118 MMHG | OXYGEN SATURATION: 96 % | RESPIRATION RATE: 16 BRPM | TEMPERATURE: 98.8 F

## 2024-11-10 DIAGNOSIS — G43.809 OTHER MIGRAINE WITHOUT STATUS MIGRAINOSUS, NOT INTRACTABLE: Primary | ICD-10-CM

## 2024-11-10 LAB
ALBUMIN SERPL BCP-MCNC: 3.7 G/DL (ref 3.4–5)
ALP SERPL-CCNC: 93 U/L (ref 33–110)
ALT SERPL W P-5'-P-CCNC: 13 U/L (ref 7–45)
ANION GAP SERPL CALC-SCNC: 13 MMOL/L (ref 10–20)
APPEARANCE UR: ABNORMAL
AST SERPL W P-5'-P-CCNC: 15 U/L (ref 9–39)
BASOPHILS # BLD AUTO: 0.03 X10*3/UL (ref 0–0.1)
BASOPHILS NFR BLD AUTO: 0.3 %
BILIRUB SERPL-MCNC: 0.4 MG/DL (ref 0–1.2)
BILIRUB UR STRIP.AUTO-MCNC: NEGATIVE MG/DL
BUN SERPL-MCNC: 6 MG/DL (ref 6–23)
CALCIUM SERPL-MCNC: 8.8 MG/DL (ref 8.6–10.3)
CARDIAC TROPONIN I PNL SERPL HS: 3 NG/L (ref 0–13)
CARDIAC TROPONIN I PNL SERPL HS: 4 NG/L (ref 0–13)
CHLORIDE SERPL-SCNC: 105 MMOL/L (ref 98–107)
CO2 SERPL-SCNC: 23 MMOL/L (ref 21–32)
COLOR UR: ABNORMAL
CREAT SERPL-MCNC: 0.45 MG/DL (ref 0.5–1.05)
EGFRCR SERPLBLD CKD-EPI 2021: >90 ML/MIN/1.73M*2
EOSINOPHIL # BLD AUTO: 0.08 X10*3/UL (ref 0–0.7)
EOSINOPHIL NFR BLD AUTO: 0.7 %
ERYTHROCYTE [DISTWIDTH] IN BLOOD BY AUTOMATED COUNT: 13.2 % (ref 11.5–14.5)
GLUCOSE SERPL-MCNC: 79 MG/DL (ref 74–99)
GLUCOSE UR STRIP.AUTO-MCNC: NORMAL MG/DL
HCT VFR BLD AUTO: 35.5 % (ref 36–46)
HGB BLD-MCNC: 11.6 G/DL (ref 12–16)
IMM GRANULOCYTES # BLD AUTO: 0.05 X10*3/UL (ref 0–0.7)
IMM GRANULOCYTES NFR BLD AUTO: 0.5 % (ref 0–0.9)
INR PPP: 1 (ref 0.9–1.1)
KETONES UR STRIP.AUTO-MCNC: NEGATIVE MG/DL
LEUKOCYTE ESTERASE UR QL STRIP.AUTO: NEGATIVE
LYMPHOCYTES # BLD AUTO: 2.01 X10*3/UL (ref 1.2–4.8)
LYMPHOCYTES NFR BLD AUTO: 18.4 %
MCH RBC QN AUTO: 26.9 PG (ref 26–34)
MCHC RBC AUTO-ENTMCNC: 32.7 G/DL (ref 32–36)
MCV RBC AUTO: 82 FL (ref 80–100)
MONOCYTES # BLD AUTO: 0.8 X10*3/UL (ref 0.1–1)
MONOCYTES NFR BLD AUTO: 7.3 %
NEUTROPHILS # BLD AUTO: 7.96 X10*3/UL (ref 1.2–7.7)
NEUTROPHILS NFR BLD AUTO: 72.8 %
NITRITE UR QL STRIP.AUTO: NEGATIVE
NRBC BLD-RTO: 0 /100 WBCS (ref 0–0)
PH UR STRIP.AUTO: 7 [PH]
PLATELET # BLD AUTO: 296 X10*3/UL (ref 150–450)
POTASSIUM SERPL-SCNC: 3.7 MMOL/L (ref 3.5–5.3)
PROT SERPL-MCNC: 6.7 G/DL (ref 6.4–8.2)
PROT UR STRIP.AUTO-MCNC: NEGATIVE MG/DL
PROTHROMBIN TIME: 11.3 SECONDS (ref 9.8–12.8)
RBC # BLD AUTO: 4.31 X10*6/UL (ref 4–5.2)
RBC # UR STRIP.AUTO: NEGATIVE /UL
SODIUM SERPL-SCNC: 137 MMOL/L (ref 136–145)
SP GR UR STRIP.AUTO: 1
UROBILINOGEN UR STRIP.AUTO-MCNC: NORMAL MG/DL
WBC # BLD AUTO: 10.9 X10*3/UL (ref 4.4–11.3)

## 2024-11-10 PROCEDURE — 99285 EMERGENCY DEPT VISIT HI MDM: CPT | Performed by: STUDENT IN AN ORGANIZED HEALTH CARE EDUCATION/TRAINING PROGRAM

## 2024-11-10 PROCEDURE — 84484 ASSAY OF TROPONIN QUANT: CPT | Performed by: STUDENT IN AN ORGANIZED HEALTH CARE EDUCATION/TRAINING PROGRAM

## 2024-11-10 PROCEDURE — 36415 COLL VENOUS BLD VENIPUNCTURE: CPT | Performed by: STUDENT IN AN ORGANIZED HEALTH CARE EDUCATION/TRAINING PROGRAM

## 2024-11-10 PROCEDURE — 93010 ELECTROCARDIOGRAM REPORT: CPT | Performed by: STUDENT IN AN ORGANIZED HEALTH CARE EDUCATION/TRAINING PROGRAM

## 2024-11-10 PROCEDURE — 85610 PROTHROMBIN TIME: CPT | Performed by: STUDENT IN AN ORGANIZED HEALTH CARE EDUCATION/TRAINING PROGRAM

## 2024-11-10 PROCEDURE — 2500000004 HC RX 250 GENERAL PHARMACY W/ HCPCS (ALT 636 FOR OP/ED)

## 2024-11-10 PROCEDURE — 99285 EMERGENCY DEPT VISIT HI MDM: CPT | Mod: 25 | Performed by: STUDENT IN AN ORGANIZED HEALTH CARE EDUCATION/TRAINING PROGRAM

## 2024-11-10 PROCEDURE — 80053 COMPREHEN METABOLIC PANEL: CPT | Performed by: STUDENT IN AN ORGANIZED HEALTH CARE EDUCATION/TRAINING PROGRAM

## 2024-11-10 PROCEDURE — 96375 TX/PRO/DX INJ NEW DRUG ADDON: CPT | Performed by: STUDENT IN AN ORGANIZED HEALTH CARE EDUCATION/TRAINING PROGRAM

## 2024-11-10 PROCEDURE — 93005 ELECTROCARDIOGRAM TRACING: CPT

## 2024-11-10 PROCEDURE — 81003 URINALYSIS AUTO W/O SCOPE: CPT | Performed by: STUDENT IN AN ORGANIZED HEALTH CARE EDUCATION/TRAINING PROGRAM

## 2024-11-10 PROCEDURE — 96374 THER/PROPH/DIAG INJ IV PUSH: CPT | Performed by: STUDENT IN AN ORGANIZED HEALTH CARE EDUCATION/TRAINING PROGRAM

## 2024-11-10 PROCEDURE — 99212 OFFICE O/P EST SF 10 MIN: CPT

## 2024-11-10 PROCEDURE — 85025 COMPLETE CBC W/AUTO DIFF WBC: CPT | Performed by: STUDENT IN AN ORGANIZED HEALTH CARE EDUCATION/TRAINING PROGRAM

## 2024-11-10 RX ORDER — METOCLOPRAMIDE HYDROCHLORIDE 5 MG/ML
10 INJECTION INTRAMUSCULAR; INTRAVENOUS ONCE
Status: COMPLETED | OUTPATIENT
Start: 2024-11-10 | End: 2024-11-10

## 2024-11-10 RX ORDER — DIPHENHYDRAMINE HYDROCHLORIDE 50 MG/ML
12.5 INJECTION INTRAMUSCULAR; INTRAVENOUS ONCE
Status: COMPLETED | OUTPATIENT
Start: 2024-11-10 | End: 2024-11-10

## 2024-11-10 SDOH — SOCIAL STABILITY: SOCIAL INSECURITY: HAVE YOU HAD ANY THOUGHTS OF HARMING ANYONE ELSE?: NO

## 2024-11-10 SDOH — HEALTH STABILITY: MENTAL HEALTH: SUICIDAL BEHAVIOR (LIFETIME): NO

## 2024-11-10 SDOH — SOCIAL STABILITY: SOCIAL INSECURITY: HAS ANYONE EVER THREATENED TO HURT YOUR FAMILY OR YOUR PETS?: NO

## 2024-11-10 SDOH — SOCIAL STABILITY: SOCIAL INSECURITY: VERBAL ABUSE: DENIES

## 2024-11-10 SDOH — ECONOMIC STABILITY: HOUSING INSECURITY: DO YOU FEEL UNSAFE GOING BACK TO THE PLACE WHERE YOU ARE LIVING?: NO

## 2024-11-10 SDOH — HEALTH STABILITY: MENTAL HEALTH: HAVE YOU USED ANY PRESCRIPTION DRUGS OTHER THAN PRESCRIBED IN THE PAST 12 MONTHS?: NO

## 2024-11-10 SDOH — SOCIAL STABILITY: SOCIAL INSECURITY: HAVE YOU HAD THOUGHTS OF HARMING ANYONE ELSE?: NO

## 2024-11-10 SDOH — HEALTH STABILITY: MENTAL HEALTH: NON-SPECIFIC ACTIVE SUICIDAL THOUGHTS (PAST 1 MONTH): NO

## 2024-11-10 SDOH — SOCIAL STABILITY: SOCIAL INSECURITY: ARE THERE ANY APPARENT SIGNS OF INJURIES/BEHAVIORS THAT COULD BE RELATED TO ABUSE/NEGLECT?: NO

## 2024-11-10 SDOH — HEALTH STABILITY: MENTAL HEALTH: WERE YOU ABLE TO COMPLETE ALL THE BEHAVIORAL HEALTH SCREENINGS?: YES

## 2024-11-10 SDOH — SOCIAL STABILITY: SOCIAL INSECURITY: ARE YOU OR HAVE YOU BEEN THREATENED OR ABUSED PHYSICALLY, EMOTIONALLY, OR SEXUALLY BY ANYONE?: NO

## 2024-11-10 SDOH — HEALTH STABILITY: MENTAL HEALTH: HAVE YOU USED ANY SUBSTANCES (CANABIS, COCAINE, HEROIN, HALLUCINOGENS, INHALANTS, ETC.) IN THE PAST 12 MONTHS?: NO

## 2024-11-10 SDOH — SOCIAL STABILITY: SOCIAL INSECURITY: ABUSE SCREEN: ADULT

## 2024-11-10 SDOH — HEALTH STABILITY: MENTAL HEALTH: WISH TO BE DEAD (PAST 1 MONTH): NO

## 2024-11-10 SDOH — SOCIAL STABILITY: SOCIAL INSECURITY: DO YOU FEEL ANYONE HAS EXPLOITED OR TAKEN ADVANTAGE OF YOU FINANCIALLY OR OF YOUR PERSONAL PROPERTY?: NO

## 2024-11-10 SDOH — SOCIAL STABILITY: SOCIAL INSECURITY: PHYSICAL ABUSE: DENIES

## 2024-11-10 SDOH — SOCIAL STABILITY: SOCIAL INSECURITY: DOES ANYONE TRY TO KEEP YOU FROM HAVING/CONTACTING OTHER FRIENDS OR DOING THINGS OUTSIDE YOUR HOME?: NO

## 2024-11-10 ASSESSMENT — LIFESTYLE VARIABLES
AUDIT-C TOTAL SCORE: 0
SKIP TO QUESTIONS 9-10: 1
HOW OFTEN DO YOU HAVE 6 OR MORE DRINKS ON ONE OCCASION: NEVER
AUDIT-C TOTAL SCORE: 0
HOW OFTEN DO YOU HAVE A DRINK CONTAINING ALCOHOL: NEVER
HOW MANY STANDARD DRINKS CONTAINING ALCOHOL DO YOU HAVE ON A TYPICAL DAY: PATIENT DOES NOT DRINK

## 2024-11-10 ASSESSMENT — PAIN SCALES - GENERAL
PAINLEVEL_OUTOF10: 0 - NO PAIN

## 2024-11-10 ASSESSMENT — PATIENT HEALTH QUESTIONNAIRE - PHQ9
2. FEELING DOWN, DEPRESSED OR HOPELESS: NOT AT ALL
1. LITTLE INTEREST OR PLEASURE IN DOING THINGS: NOT AT ALL
SUM OF ALL RESPONSES TO PHQ9 QUESTIONS 1 & 2: 0

## 2024-11-10 ASSESSMENT — PAIN - FUNCTIONAL ASSESSMENT: PAIN_FUNCTIONAL_ASSESSMENT: 0-10

## 2024-11-10 NOTE — DISCHARGE INSTRUCTIONS
Seek immediate medical attention if you develop: worsening headache, nausea, vomiting, confusion, weakness, loss of motion in your arms or legs, loss of control of your urine or stool, difficulty waking from sleep, neck pain, fever, or any new or worsening symptoms.

## 2024-11-10 NOTE — ED PROVIDER NOTES
"EMERGENCY DEPARTMENT ENCOUNTER      Pt Name: Christiano Prieto  MRN: [id number]  Birthdate 1995  Date of evaluation: 11/10/2024  Provider: Faraz Houston DO    CHIEF COMPLAINT       Chief Complaint   Patient presents with    Hypertension    Dizziness         HISTORY OF PRESENT ILLNESS    29-year-old female, G3, P2 comes emergency room for multiple medical symptoms.  She woke up in the middle of the night, had a headache, vomited, went back to sleep, headache was gone however this morning she says she was feeling \"brain fog\" was initially lightheaded, felt like her whole left arm was going numb from the fingertips off.  She was able to move her arm however.  She called an ambulance, however the left arm numbness resolved along with that she did not have a headache when she was here.  She says currently she has a brain fog and \"does not feel well\" but is not really having any other symptoms.  This happened to her a week ago about.  She has been seen by neurology, diagnosed with complex migraines.  Had an MRI, MRA of her head on the 28th, that was unremarkable.  Patient was supposed to be going straight up to OB however was brought to the ER to be medically cleared and rule out stroke.  She did respond to Benadryl and Reglan last time she was here.      History provided by:  Patient      Nursing Notes were reviewed.    PAST MEDICAL HISTORY     Past Medical History:   Diagnosis Date    ETOH abuse     HTN (hypertension)     Moderate asthma (HHS-HCC)     Panic anxiety syndrome          SURGICAL HISTORY     No past surgical history on file.      CURRENT MEDICATIONS       Discharge Medication List as of 11/10/2024  2:15 PM        CONTINUE these medications which have NOT CHANGED    Details   diphenhydrAMINE (Sominex) 25 mg tablet Take 1 tablet (25 mg) by mouth every 6 hours if needed for other (Headache, take with reglan)., Starting Mon 10/28/2024, Until Wed 11/27/2024 at 2359, Normal      ferrous gluconate 324 (38 Fe) mg " tablet Take 1 tablet (38 mg of iron) by mouth once daily with breakfast., Starting Wed 10/9/2024, Until Thu 10/9/2025, Normal      hydrOXYzine HCL (Atarax) 25 mg tablet Take 1 tablet (25 mg) by mouth 2 times a day as needed for anxiety. For severe anxiety, Starting Wed 10/23/2024, Until Sun 12/22/2024 at 2359, Normal      metoclopramide (Reglan) 10 mg tablet Take 1 tablet (10 mg) by mouth every 6 hours if needed (Headache, take with benadryl)., Starting Mon 10/28/2024, Until Wed 11/27/2024 at 2359, Normal      miscellaneous medical supply (Blood Pressure Cuff) misc 1 Device 2 times a day., Starting Wed 5/29/2024, Normal      PRENATAL VIT 10-IRON-FOLIC-DHA ORAL Take 1 tablet by mouth once daily., Historical Med      propranolol (Inderal) 10 mg tablet Take 1 tablet (10 mg) by mouth 3 times a day as needed (anxiety)., Starting Wed 10/23/2024, Until Thu 10/23/2025 at 2359, Normal      Symbicort 160-4.5 mcg/actuation inhaler Inhale 2 puffs 2 times a day. as instructed, Starting Tue 8/20/2024, Normal             ALLERGIES     Patient has no known allergies.    FAMILY HISTORY     No family history on file.       SOCIAL HISTORY       Social History     Socioeconomic History    Marital status: Single   Tobacco Use    Smoking status: Former     Types: Cigarettes    Smokeless tobacco: Never   Vaping Use    Vaping status: Never Used   Substance and Sexual Activity    Alcohol use: Never    Drug use: Never     Social Drivers of Health     Financial Resource Strain: Low Risk  (8/8/2022)    Received from [facility]    Overall Financial Resource Strain (CARDIA)     Difficulty of Paying Living Expenses: Not very hard   Food Insecurity: No Food Insecurity (8/8/2022)    Received from [facility]    Hunger Vital Sign     Worried About Running Out of Food in the Last Year: Never true     Ran Out of Food in the Last Year:  Never true       SCREENINGS                        PHYSICAL EXAM    (up to 7 for level 4, 8 or more for level 5)     ED Triage Vitals [11/10/24 1304]   Temperature Heart Rate Respirations BP   36.5 °C (97.7 °F) (!) 102 18 135/84      Pulse Ox Temp Source Heart Rate Source Patient Position   99 % Temporal Monitor Sitting      BP Location FiO2 (%)     Right arm --       Physical Exam  Vitals and nursing note reviewed.   Constitutional:       General: She is not in acute distress.  HENT:      Head: Normocephalic and atraumatic.   Eyes:      General: No scleral icterus.        Right eye: No discharge.         Left eye: No discharge.      Conjunctiva/sclera: Conjunctivae normal.   Cardiovascular:      Rate and Rhythm: Normal rate and regular rhythm.      Pulses: Normal pulses.   Pulmonary:      Effort: Pulmonary effort is normal.   Abdominal:      General: Abdomen is flat.      Palpations: Abdomen is soft.      Tenderness: There is no abdominal tenderness. There is no guarding or rebound.      Comments: Gravid abdomen   Musculoskeletal:         General: No deformity.      Right lower leg: No edema.      Left lower leg: No edema.   Skin:     General: Skin is warm and dry.   Neurological:      Mental Status: She is alert and oriented to person, place, and time. Mental status is at baseline.      Comments: NIH 0, Van negative   Psychiatric:         Mood and Affect: Mood normal.         Behavior: Behavior normal.          DIAGNOSTIC RESULTS     LABS:  Labs Reviewed   CBC WITH AUTO DIFFERENTIAL - Abnormal       Result Value    WBC 10.9      nRBC 0.0      RBC 4.31      Hemoglobin 11.6 (*)     Hematocrit 35.5 (*)     MCV 82      MCH 26.9      MCHC 32.7      RDW 13.2      Platelets 296      Neutrophils % 72.8      Immature Granulocytes %, Automated 0.5      Lymphocytes % 18.4      Monocytes % 7.3      Eosinophils % 0.7      Basophils % 0.3      Neutrophils Absolute 7.96 (*)     Immature Granulocytes Absolute, Automated 0.05       Lymphocytes Absolute 2.01      Monocytes Absolute 0.80      Eosinophils Absolute 0.08      Basophils Absolute 0.03     COMPREHENSIVE METABOLIC PANEL - Abnormal    Glucose 79      Sodium 137      Potassium 3.7      Chloride 105      Bicarbonate 23      Anion Gap 13      Urea Nitrogen 6      Creatinine 0.45 (*)     eGFR >90      Calcium 8.8      Albumin 3.7      Alkaline Phosphatase 93      Total Protein 6.7      AST 15      Bilirubin, Total 0.4      ALT 13     URINALYSIS WITH REFLEX CULTURE AND MICROSCOPIC - Abnormal    Color, Urine Light-Yellow      Appearance, Urine Turbid (*)     Specific Gravity, Urine 1.002 (*)     pH, Urine 7.0      Protein, Urine NEGATIVE      Glucose, Urine Normal      Blood, Urine NEGATIVE      Ketones, Urine NEGATIVE      Bilirubin, Urine NEGATIVE      Urobilinogen, Urine Normal      Nitrite, Urine NEGATIVE      Leukocyte Esterase, Urine NEGATIVE     PROTIME-INR - Normal    Protime 11.3      INR 1.0     SERIAL TROPONIN-INITIAL - Normal    Troponin I, High Sensitivity 3      Narrative:     Less than 99th percentile of normal range cutoff-  Female and children under 18 years old <14 ng/L; Male <21 ng/L: Negative  Repeat testing should be performed if clinically indicated.     Female and children under 18 years old 14-50 ng/L; Male 21-50 ng/L:  Consistent with possible cardiac damage and possible increased clinical   risk. Serial measurements may help to assess extent of myocardial damage.     >50 ng/L: Consistent with cardiac damage, increased clinical risk and  myocardial infarction. Serial measurements may help assess extent of   myocardial damage.      NOTE: Children less than 1 year old may have higher baseline troponin   levels and results should be interpreted in conjunction with the overall   clinical context.     NOTE: Troponin I testing is performed using a different   testing methodology at [facility] than at other   [facility]. Direct result comparisons should  only   be made within the same method.   SERIAL TROPONIN, 1 HOUR - Normal    Troponin I, High Sensitivity 4      Narrative:     Less than 99th percentile of normal range cutoff-  Female and children under 18 years old <14 ng/L; Male <21 ng/L: Negative  Repeat testing should be performed if clinically indicated.     Female and children under 18 years old 14-50 ng/L; Male 21-50 ng/L:  Consistent with possible cardiac damage and possible increased clinical   risk. Serial measurements may help to assess extent of myocardial damage.     >50 ng/L: Consistent with cardiac damage, increased clinical risk and  myocardial infarction. Serial measurements may help assess extent of   myocardial damage.      NOTE: Children less than 1 year old may have higher baseline troponin   levels and results should be interpreted in conjunction with the overall   clinical context.     NOTE: Troponin I testing is performed using a different   testing methodology at [facility] than at other   [facility]. Direct result comparisons should only   be made within the same method.   TROPONIN SERIES- (INITIAL, 1 HR)    Narrative:     The following orders were created for panel order Troponin I Series, High Sensitivity (0, 1 HR).  Procedure                               Abnormality         Status                     ---------                               -----------         ------                     Troponin I, High Sensiti...[[id number]]  Normal              Final result               Troponin, High Sensitivi...[[id number]]  Normal              Final result                 Please view results for these tests on the individual orders.   URINALYSIS WITH REFLEX CULTURE AND MICROSCOPIC    Narrative:     The following orders were created for panel order Urinalysis with Reflex Culture and Microscopic.  Procedure                               Abnormality         Status                     ---------                                -----------         ------                     Urinalysis with Reflex C...[[id number]]  Abnormal            Final result               Extra Urine Gray Tube[[id number]]                            In process                   Please view results for these tests on the individual orders.   EXTRA URINE GRAY TUBE       All other labs were within normal range or not returned as of this dictation.    Imaging  No orders to display        Procedures  Procedures     EMERGENCY DEPARTMENT COURSE/MDM:     ED Course as of 11/10/24 1648   Sun Nov 10, 2024   1331 NIH 0.  No ataxia.  GCS 15. [TL]   1332 Call placed to neurology consultation for further recommendations. [TL]   1343 No protein in the urine. [TL]   1402 Dr. Mayer agrees that this is a complex migraine, no need for further stroke workup.  Patient cleared from our standpoint [RD]   1413 Patient marker gross negative neurospinal.  No elevation of liver enzymes or signs analysis.  No protein in the urine.  Blood pressure here is reassuring.  Spoke to the obstetrician Dr. Alan who is agreed to follow-up on patient workup given that we have reassurance the patient not having acute stroke with the neurology team. [TL]      ED Course User Index  [RD] Faraz Houston DO  [TL] Thad Long DO         Diagnoses as of 11/10/24 1648   Other migraine without status migrainosus, not intractable        Medical Decision Making  29-year-old female comes emergency room for left arm numbness, brain fog.  Was diagnosed with complex migraine recently, did wake up in the middle the night with a headache and some vomiting.  Not currently having a headache, her arm numbness has resolved as well.  She is currently NIH 0, Van negative.  Has had an MRI and MRA of her head about a week and a half ago, diagnosed by neurology with complex migraines here.  Did get basic blood work, CBC unremarkable, aside from a slightly decreased hemoglobin consistent with being 8 months pregnant, chemistry  unremarkable here as well, urinalysis negative, troponins are negative x 2, EKG shows sinus tachycardia 102 bpm, QTc 443, no acute injury pattern.  I spoke with neurology Dr. Mayer, decision was made that this is likely a complex migraine, patient did not seem to be having a stroke, she was NIH 0, Van negative here on multiple evaluations.  She received 12.5 mg IV Benadryl, 10 mg IV Reglan, and on reassessment was comfortable with being discharged after the OB unit.        Patient and or family in agreement and understanding of treatment plan.  All questions answered.      I reviewed the case with the attending ED physician. The attending ED physician agrees with the plan. Patient and/or patient´s representative was counseled regarding labs, imaging, likely diagnosis, and plan. All questions were answered.    ED Medications administered this visit:    Medications   diphenhydrAMINE (BENADryl) injection 12.5 mg (12.5 mg intravenous Given 11/10/24 1401)   metoclopramide (Reglan) injection 10 mg (10 mg intravenous Given 11/10/24 1401)       New Prescriptions from this visit:    Discharge Medication List as of 11/10/2024  2:15 PM          Follow-up:  Allen Zuniga DO  [address]  [place] [id number] [phone]    Schedule an appointment as soon as possible for a visit           Final Impression:   1. Other migraine without status migrainosus, not intractable          (Please note that portions of this note were completed with a voice recognition program.  Efforts were made to edit the dictations but occasionally words are mis-transcribed.)     Faraz Houston DO  Resident  11/10/24 7923

## 2024-11-10 NOTE — PROGRESS NOTES
Obstetrical DC Triage Note    Reason for Triage Observation: Non-stress Test (Was seen in ER and needs NST for ob clearance)    Assessment   Atypical Migraine    Plan   Normal pressures, normal labs no signs of pre-eclampsia  Discussed normal MRI/CT previously - ED had discussed with neurology no further work up recommended at this point    Subjective   History of Present Pregnancy  Christiano Prieto is a 29 y.o.  gravid, female. Patient's last menstrual period was 2024. with an Estimated Date of Delivery of 2024, by Last Menstrual Period, who is 35w3d gestation. The patient's blood type is O POS.     Description of Present Problem   The patient presented to OB Triage with  squad to ED with symptoms of numbness on the left upper extremity. Patient had similar symptoms about 2 weeks ago. Extensive work up was negative. Today denies any headache. No slurred speech. Good FM. No bleeding. Symptoms had resolved by the time she arrived at ED.     Pregnancy Complications  No pregnancy complications identified.    Objective   Physical Examination:  Vital Signs:  /78   Pulse 98   Temp 37.1 °C (98.8 °F) (Oral)   Resp 16   GENERAL: Examination reveals a well developed, well nourished, gravid female in no acute distress. She is alert and cooperative.  NECK: supple, no significant adenopathy  LUNGS: clear to auscultation bilaterally and normal effort  ABDOMEN: soft, gravid, nontender, nondistended, no abnormal masses, no epigastric pain  EXTREMITIES: no redness or tenderness in the calves or thighs, no edema  SKIN: normal coloration and turgor, no rashes  NEUROLOGICAL: alert, oriented, normal speech, no focal findings or movement disorder noted  PSYCHOLOGICAL: awake and alert; oriented to person, place, and time    Results for orders placed or performed during the hospital encounter of 11/10/24   CBC with Differential    Collection Time: 11/10/24  1:17 PM   Result Value Ref Range    WBC 10.9 4.4 - 11.3  x10*3/uL    nRBC 0.0 0.0 - 0.0 /100 WBCs    RBC 4.31 4.00 - 5.20 x10*6/uL    Hemoglobin 11.6 (L) 12.0 - 16.0 g/dL    Hematocrit 35.5 (L) 36.0 - 46.0 %    MCV 82 80 - 100 fL    MCH 26.9 26.0 - 34.0 pg    MCHC 32.7 32.0 - 36.0 g/dL    RDW 13.2 11.5 - 14.5 %    Platelets 296 150 - 450 x10*3/uL    Neutrophils % 72.8 40.0 - 80.0 %    Immature Granulocytes %, Automated 0.5 0.0 - 0.9 %    Lymphocytes % 18.4 13.0 - 44.0 %    Monocytes % 7.3 2.0 - 10.0 %    Eosinophils % 0.7 0.0 - 6.0 %    Basophils % 0.3 0.0 - 2.0 %    Neutrophils Absolute 7.96 (H) 1.20 - 7.70 x10*3/uL    Immature Granulocytes Absolute, Automated 0.05 0.00 - 0.70 x10*3/uL    Lymphocytes Absolute 2.01 1.20 - 4.80 x10*3/uL    Monocytes Absolute 0.80 0.10 - 1.00 x10*3/uL    Eosinophils Absolute 0.08 0.00 - 0.70 x10*3/uL    Basophils Absolute 0.03 0.00 - 0.10 x10*3/uL   Comprehensive Metabolic Panel    Collection Time: 11/10/24  1:17 PM   Result Value Ref Range    Glucose 79 74 - 99 mg/dL    Sodium 137 136 - 145 mmol/L    Potassium 3.7 3.5 - 5.3 mmol/L    Chloride 105 98 - 107 mmol/L    Bicarbonate 23 21 - 32 mmol/L    Anion Gap 13 10 - 20 mmol/L    Urea Nitrogen 6 6 - 23 mg/dL    Creatinine 0.45 (L) 0.50 - 1.05 mg/dL    eGFR >90 >60 mL/min/1.73m*2    Calcium 8.8 8.6 - 10.3 mg/dL    Albumin 3.7 3.4 - 5.0 g/dL    Alkaline Phosphatase 93 33 - 110 U/L    Total Protein 6.7 6.4 - 8.2 g/dL    AST 15 9 - 39 U/L    Bilirubin, Total 0.4 0.0 - 1.2 mg/dL    ALT 13 7 - 45 U/L   Protime-INR    Collection Time: 11/10/24  1:17 PM   Result Value Ref Range    Protime 11.3 9.8 - 12.8 seconds    INR 1.0 0.9 - 1.1   Troponin I, High Sensitivity, Initial    Collection Time: 11/10/24  1:17 PM   Result Value Ref Range    Troponin I, High Sensitivity 3 0 - 13 ng/L   Urinalysis with Reflex Culture and Microscopic    Collection Time: 11/10/24  1:17 PM   Result Value Ref Range    Color, Urine Light-Yellow Light-Yellow, Yellow, Dark-Yellow    Appearance, Urine Turbid (N) Clear    Specific  Gravity, Urine 1.002 (N) 1.005 - 1.035    pH, Urine 7.0 5.0, 5.5, 6.0, 6.5, 7.0, 7.5, 8.0    Protein, Urine NEGATIVE NEGATIVE, 10 (TRACE), 20 (TRACE) mg/dL    Glucose, Urine Normal Normal mg/dL    Blood, Urine NEGATIVE NEGATIVE    Ketones, Urine NEGATIVE NEGATIVE mg/dL    Bilirubin, Urine NEGATIVE NEGATIVE    Urobilinogen, Urine Normal Normal mg/dL    Nitrite, Urine NEGATIVE NEGATIVE    Leukocyte Esterase, Urine NEGATIVE NEGATIVE   Troponin, High Sensitivity, 1 Hour    Collection Time: 11/10/24  2:04 PM   Result Value Ref Range    Troponin I, High Sensitivity 4 0 - 13 ng/L

## 2024-11-10 NOTE — NURSING NOTE
Written and verbal discharge instructions given , pt verbalizes undwrstanding and importanc of foolow up care with dr leyva this week

## 2024-11-11 LAB — HOLD SPECIMEN: NORMAL

## 2024-11-14 ENCOUNTER — APPOINTMENT (OUTPATIENT)
Dept: OBSTETRICS AND GYNECOLOGY | Facility: CLINIC | Age: 29
End: 2024-11-14
Payer: COMMERCIAL

## 2024-11-14 VITALS — WEIGHT: 192.6 LBS | BODY MASS INDEX: 34.12 KG/M2 | DIASTOLIC BLOOD PRESSURE: 88 MMHG | SYSTOLIC BLOOD PRESSURE: 140 MMHG

## 2024-11-14 DIAGNOSIS — Z3A.36 36 WEEKS GESTATION OF PREGNANCY (HHS-HCC): Primary | ICD-10-CM

## 2024-11-14 LAB
ATRIAL RATE: 102 BPM
P AXIS: 27 DEGREES
P OFFSET: 197 MS
P ONSET: 152 MS
PR INTERVAL: 134 MS
Q ONSET: 219 MS
QRS COUNT: 16 BEATS
QRS DURATION: 82 MS
QT INTERVAL: 340 MS
QTC CALCULATION(BAZETT): 443 MS
QTC FREDERICIA: 405 MS
R AXIS: 47 DEGREES
T AXIS: -5 DEGREES
T OFFSET: 389 MS
VENTRICULAR RATE: 102 BPM

## 2024-11-14 PROCEDURE — 99213 OFFICE O/P EST LOW 20 MIN: CPT | Performed by: OBSTETRICS & GYNECOLOGY

## 2024-11-14 NOTE — PROGRESS NOTES
Ob Visit  24     SUBJECTIVE      HPI: Christiano Prieto is a 29 y.o.  at 36w0d here for RPNV.  She has few contractions, no bleeding, or no LOF. Reports normal fetal movement.  She reports taking blood pressure at home and doesn't think meds are controlling it as well. She also reports ocular migraines for 2 weeks almost daily, medication has been helping.       OBJECTIVE  Visit Vitals  /88   Wt 87.4 kg (192 lb 9.6 oz)   LMP 2024   BMI 34.12 kg/m²   OB Status Pregnant   Smoking Status Former   BSA 1.97 m²            ASSESSMENT & PLAN    Christiano Prieto is a 29 y.o.  at 36w0d here for the following concerns we addressed today:    Problem List Items Addressed This Visit    None        RTC Monday for NST and BP check  Had NST this week in triage that was reactive.  Gave PTL and PIH precautions.  Will move induction to 37wks due to C.HTN on meds.      Samreen Mancilla, DO

## 2024-11-15 ENCOUNTER — ANESTHESIA EVENT (OUTPATIENT)
Dept: OBSTETRICS AND GYNECOLOGY | Facility: HOSPITAL | Age: 29
End: 2024-11-15
Payer: COMMERCIAL

## 2024-11-15 ENCOUNTER — HOSPITAL ENCOUNTER (INPATIENT)
Facility: HOSPITAL | Age: 29
End: 2024-11-15
Attending: STUDENT IN AN ORGANIZED HEALTH CARE EDUCATION/TRAINING PROGRAM | Admitting: STUDENT IN AN ORGANIZED HEALTH CARE EDUCATION/TRAINING PROGRAM
Payer: COMMERCIAL

## 2024-11-15 ENCOUNTER — TELEPHONE (OUTPATIENT)
Dept: OBSTETRICS AND GYNECOLOGY | Facility: HOSPITAL | Age: 29
End: 2024-11-15
Payer: COMMERCIAL

## 2024-11-15 ENCOUNTER — ANESTHESIA (OUTPATIENT)
Dept: OBSTETRICS AND GYNECOLOGY | Facility: HOSPITAL | Age: 29
End: 2024-11-15
Payer: COMMERCIAL

## 2024-11-15 DIAGNOSIS — O99.343 ANXIETY IN PREGNANCY IN THIRD TRIMESTER, ANTEPARTUM (HHS-HCC): ICD-10-CM

## 2024-11-15 DIAGNOSIS — F41.9 ANXIETY IN PREGNANCY IN THIRD TRIMESTER, ANTEPARTUM (HHS-HCC): ICD-10-CM

## 2024-11-15 PROBLEM — J45.909 ASTHMA: Status: ACTIVE | Noted: 2024-11-15

## 2024-11-15 PROBLEM — K21.9 GASTROESOPHAGEAL REFLUX DISEASE: Status: ACTIVE | Noted: 2024-11-15

## 2024-11-15 LAB
ABO GROUP (TYPE) IN BLOOD: NORMAL
ALBUMIN SERPL BCP-MCNC: 3.6 G/DL (ref 3.4–5)
ALBUMIN SERPL BCP-MCNC: 3.7 G/DL (ref 3.4–5)
ALP SERPL-CCNC: 102 U/L (ref 33–110)
ALP SERPL-CCNC: 113 U/L (ref 33–110)
ALT SERPL W P-5'-P-CCNC: 10 U/L (ref 7–45)
ALT SERPL W P-5'-P-CCNC: 9 U/L (ref 7–45)
ANION GAP SERPL CALC-SCNC: 13 MMOL/L (ref 10–20)
ANION GAP SERPL CALC-SCNC: 14 MMOL/L (ref 10–20)
ANTIBODY SCREEN: NORMAL
AST SERPL W P-5'-P-CCNC: 13 U/L (ref 9–39)
AST SERPL W P-5'-P-CCNC: 13 U/L (ref 9–39)
BILIRUB SERPL-MCNC: 0.2 MG/DL (ref 0–1.2)
BILIRUB SERPL-MCNC: 0.2 MG/DL (ref 0–1.2)
BUN SERPL-MCNC: 4 MG/DL (ref 6–23)
BUN SERPL-MCNC: 6 MG/DL (ref 6–23)
CALCIUM SERPL-MCNC: 7.7 MG/DL (ref 8.6–10.6)
CALCIUM SERPL-MCNC: 9 MG/DL (ref 8.6–10.3)
CHLORIDE SERPL-SCNC: 104 MMOL/L (ref 98–107)
CHLORIDE SERPL-SCNC: 104 MMOL/L (ref 98–107)
CO2 SERPL-SCNC: 20 MMOL/L (ref 21–32)
CO2 SERPL-SCNC: 22 MMOL/L (ref 21–32)
CREAT SERPL-MCNC: 0.42 MG/DL (ref 0.5–1.05)
CREAT SERPL-MCNC: 0.44 MG/DL (ref 0.5–1.05)
CREAT UR-MCNC: 14.2 MG/DL (ref 20–320)
EGFRCR SERPLBLD CKD-EPI 2021: >90 ML/MIN/1.73M*2
EGFRCR SERPLBLD CKD-EPI 2021: >90 ML/MIN/1.73M*2
ERYTHROCYTE [DISTWIDTH] IN BLOOD BY AUTOMATED COUNT: 13.1 % (ref 11.5–14.5)
ERYTHROCYTE [DISTWIDTH] IN BLOOD BY AUTOMATED COUNT: 13.1 % (ref 11.5–14.5)
GLUCOSE SERPL-MCNC: 80 MG/DL (ref 74–99)
GLUCOSE SERPL-MCNC: 96 MG/DL (ref 74–99)
HCT VFR BLD AUTO: 34.1 % (ref 36–46)
HCT VFR BLD AUTO: 34.4 % (ref 36–46)
HGB BLD-MCNC: 11.1 G/DL (ref 12–16)
HGB BLD-MCNC: 11.5 G/DL (ref 12–16)
LDH SERPL L TO P-CCNC: 124 U/L (ref 84–246)
MCH RBC QN AUTO: 26.7 PG (ref 26–34)
MCH RBC QN AUTO: 27.4 PG (ref 26–34)
MCHC RBC AUTO-ENTMCNC: 32.6 G/DL (ref 32–36)
MCHC RBC AUTO-ENTMCNC: 33.4 G/DL (ref 32–36)
MCV RBC AUTO: 82 FL (ref 80–100)
MCV RBC AUTO: 82 FL (ref 80–100)
NRBC BLD-RTO: 0 /100 WBCS (ref 0–0)
NRBC BLD-RTO: 0 /100 WBCS (ref 0–0)
PLATELET # BLD AUTO: 279 X10*3/UL (ref 150–450)
PLATELET # BLD AUTO: 301 X10*3/UL (ref 150–450)
POTASSIUM SERPL-SCNC: 3.9 MMOL/L (ref 3.5–5.3)
POTASSIUM SERPL-SCNC: 4 MMOL/L (ref 3.5–5.3)
PROT SERPL-MCNC: 6.1 G/DL (ref 6.4–8.2)
PROT SERPL-MCNC: 6.7 G/DL (ref 6.4–8.2)
PROT UR-ACNC: <4 MG/DL (ref 5–24)
PROT/CREAT UR: ABNORMAL MG/G{CREAT}
RBC # BLD AUTO: 4.16 X10*6/UL (ref 4–5.2)
RBC # BLD AUTO: 4.19 X10*6/UL (ref 4–5.2)
RH FACTOR (ANTIGEN D): NORMAL
SODIUM SERPL-SCNC: 134 MMOL/L (ref 136–145)
SODIUM SERPL-SCNC: 135 MMOL/L (ref 136–145)
TREPONEMA PALLIDUM IGG+IGM AB [PRESENCE] IN SERUM OR PLASMA BY IMMUNOASSAY: NONREACTIVE
URATE SERPL-MCNC: 3.4 MG/DL (ref 2.3–6.7)
WBC # BLD AUTO: 9.2 X10*3/UL (ref 4.4–11.3)
WBC # BLD AUTO: 9.8 X10*3/UL (ref 4.4–11.3)

## 2024-11-15 PROCEDURE — 83615 LACTATE (LD) (LDH) ENZYME: CPT | Performed by: OBSTETRICS & GYNECOLOGY

## 2024-11-15 PROCEDURE — 84550 ASSAY OF BLOOD/URIC ACID: CPT | Performed by: OBSTETRICS & GYNECOLOGY

## 2024-11-15 PROCEDURE — 85027 COMPLETE CBC AUTOMATED: CPT

## 2024-11-15 PROCEDURE — 86780 TREPONEMA PALLIDUM: CPT

## 2024-11-15 PROCEDURE — 99199 UNLISTED SPECIAL SVC PX/RPRT: CPT

## 2024-11-15 PROCEDURE — 7210000002 HC LABOR PER HOUR

## 2024-11-15 PROCEDURE — 51702 INSERT TEMP BLADDER CATH: CPT

## 2024-11-15 PROCEDURE — 86850 RBC ANTIBODY SCREEN: CPT

## 2024-11-15 PROCEDURE — 36415 COLL VENOUS BLD VENIPUNCTURE: CPT

## 2024-11-15 PROCEDURE — 99215 OFFICE O/P EST HI 40 MIN: CPT

## 2024-11-15 PROCEDURE — 2500000001 HC RX 250 WO HCPCS SELF ADMINISTERED DRUGS (ALT 637 FOR MEDICARE OP)

## 2024-11-15 PROCEDURE — 1120000001 HC OB PRIVATE ROOM DAILY

## 2024-11-15 PROCEDURE — 2720000007 HC OR 272 NO HCPCS

## 2024-11-15 PROCEDURE — 2500000004 HC RX 250 GENERAL PHARMACY W/ HCPCS (ALT 636 FOR OP/ED): Mod: SE | Performed by: OBSTETRICS & GYNECOLOGY

## 2024-11-15 PROCEDURE — 82570 ASSAY OF URINE CREATININE: CPT | Performed by: OBSTETRICS & GYNECOLOGY

## 2024-11-15 PROCEDURE — 2500000001 HC RX 250 WO HCPCS SELF ADMINISTERED DRUGS (ALT 637 FOR MEDICARE OP): Performed by: OBSTETRICS & GYNECOLOGY

## 2024-11-15 PROCEDURE — 2500000004 HC RX 250 GENERAL PHARMACY W/ HCPCS (ALT 636 FOR OP/ED)

## 2024-11-15 PROCEDURE — 59050 FETAL MONITOR W/REPORT: CPT

## 2024-11-15 PROCEDURE — 2500000005 HC RX 250 GENERAL PHARMACY W/O HCPCS: Performed by: OBSTETRICS & GYNECOLOGY

## 2024-11-15 PROCEDURE — 36415 COLL VENOUS BLD VENIPUNCTURE: CPT | Performed by: OBSTETRICS & GYNECOLOGY

## 2024-11-15 PROCEDURE — 80053 COMPREHEN METABOLIC PANEL: CPT

## 2024-11-15 PROCEDURE — 80053 COMPREHEN METABOLIC PANEL: CPT | Performed by: OBSTETRICS & GYNECOLOGY

## 2024-11-15 PROCEDURE — 85027 COMPLETE CBC AUTOMATED: CPT | Performed by: OBSTETRICS & GYNECOLOGY

## 2024-11-15 RX ORDER — METOCLOPRAMIDE HYDROCHLORIDE 5 MG/ML
10 INJECTION INTRAMUSCULAR; INTRAVENOUS EVERY 6 HOURS PRN
Status: DISCONTINUED | OUTPATIENT
Start: 2024-11-15 | End: 2024-11-16

## 2024-11-15 RX ORDER — CARBOPROST TROMETHAMINE 250 UG/ML
250 INJECTION, SOLUTION INTRAMUSCULAR ONCE AS NEEDED
Status: DISCONTINUED | OUTPATIENT
Start: 2024-11-15 | End: 2024-11-16 | Stop reason: HOSPADM

## 2024-11-15 RX ORDER — HYDRALAZINE HYDROCHLORIDE 20 MG/ML
5 INJECTION INTRAMUSCULAR; INTRAVENOUS ONCE AS NEEDED
Status: DISCONTINUED | OUTPATIENT
Start: 2024-11-15 | End: 2024-11-15

## 2024-11-15 RX ORDER — HYDROXYZINE HYDROCHLORIDE 25 MG/1
25 TABLET, FILM COATED ORAL 2 TIMES DAILY PRN
Status: DISCONTINUED | OUTPATIENT
Start: 2024-11-15 | End: 2024-11-16

## 2024-11-15 RX ORDER — METOCLOPRAMIDE 10 MG/1
10 TABLET ORAL EVERY 6 HOURS PRN
Status: DISCONTINUED | OUTPATIENT
Start: 2024-11-15 | End: 2024-11-15

## 2024-11-15 RX ORDER — LIDOCAINE HYDROCHLORIDE 10 MG/ML
0.5 INJECTION, SOLUTION EPIDURAL; INFILTRATION; INTRACAUDAL; PERINEURAL ONCE AS NEEDED
Status: DISCONTINUED | OUTPATIENT
Start: 2024-11-15 | End: 2024-11-16

## 2024-11-15 RX ORDER — PROPRANOLOL HYDROCHLORIDE 10 MG/1
10 TABLET ORAL 3 TIMES DAILY PRN
Status: DISCONTINUED | OUTPATIENT
Start: 2024-11-15 | End: 2024-11-19 | Stop reason: HOSPADM

## 2024-11-15 RX ORDER — OXYTOCIN/0.9 % SODIUM CHLORIDE 30/500 ML
2-30 PLASTIC BAG, INJECTION (ML) INTRAVENOUS CONTINUOUS
Status: DISCONTINUED | OUTPATIENT
Start: 2024-11-15 | End: 2024-11-15

## 2024-11-15 RX ORDER — TRANEXAMIC ACID 100 MG/ML
1000 INJECTION, SOLUTION INTRAVENOUS ONCE AS NEEDED
Status: DISCONTINUED | OUTPATIENT
Start: 2024-11-15 | End: 2024-11-16 | Stop reason: HOSPADM

## 2024-11-15 RX ORDER — ONDANSETRON HYDROCHLORIDE 2 MG/ML
4 INJECTION, SOLUTION INTRAVENOUS EVERY 8 HOURS PRN
Status: DISCONTINUED | OUTPATIENT
Start: 2024-11-15 | End: 2024-11-15

## 2024-11-15 RX ORDER — TERBUTALINE SULFATE 1 MG/ML
0.25 INJECTION SUBCUTANEOUS ONCE AS NEEDED
Status: DISCONTINUED | OUTPATIENT
Start: 2024-11-15 | End: 2024-11-16 | Stop reason: HOSPADM

## 2024-11-15 RX ORDER — OXYTOCIN 10 [USP'U]/ML
10 INJECTION, SOLUTION INTRAMUSCULAR; INTRAVENOUS ONCE AS NEEDED
Status: DISCONTINUED | OUTPATIENT
Start: 2024-11-15 | End: 2024-11-16 | Stop reason: HOSPADM

## 2024-11-15 RX ORDER — ONDANSETRON HYDROCHLORIDE 2 MG/ML
4 INJECTION, SOLUTION INTRAVENOUS EVERY 6 HOURS PRN
Status: DISCONTINUED | OUTPATIENT
Start: 2024-11-15 | End: 2024-11-16

## 2024-11-15 RX ORDER — ONDANSETRON 4 MG/1
4 TABLET, FILM COATED ORAL EVERY 6 HOURS PRN
Status: DISCONTINUED | OUTPATIENT
Start: 2024-11-15 | End: 2024-11-16

## 2024-11-15 RX ORDER — ACETAMINOPHEN 325 MG/1
975 TABLET ORAL EVERY 6 HOURS PRN
Status: DISCONTINUED | OUTPATIENT
Start: 2024-11-15 | End: 2024-11-16 | Stop reason: SDUPTHER

## 2024-11-15 RX ORDER — NIFEDIPINE 10 MG/1
10 CAPSULE ORAL ONCE AS NEEDED
Status: DISCONTINUED | OUTPATIENT
Start: 2024-11-15 | End: 2024-11-15

## 2024-11-15 RX ORDER — CALCIUM GLUCONATE 98 MG/ML
1 INJECTION, SOLUTION INTRAVENOUS ONCE AS NEEDED
Status: DISCONTINUED | OUTPATIENT
Start: 2024-11-15 | End: 2024-11-19 | Stop reason: HOSPADM

## 2024-11-15 RX ORDER — FLUTICASONE FUROATE AND VILANTEROL 200; 25 UG/1; UG/1
1 POWDER RESPIRATORY (INHALATION)
Status: DISCONTINUED | OUTPATIENT
Start: 2024-11-16 | End: 2024-11-19 | Stop reason: HOSPADM

## 2024-11-15 RX ORDER — ONDANSETRON HYDROCHLORIDE 2 MG/ML
4 INJECTION, SOLUTION INTRAVENOUS EVERY 6 HOURS PRN
Status: DISCONTINUED | OUTPATIENT
Start: 2024-11-15 | End: 2024-11-15

## 2024-11-15 RX ORDER — LOPERAMIDE HYDROCHLORIDE 2 MG/1
4 CAPSULE ORAL EVERY 2 HOUR PRN
Status: DISCONTINUED | OUTPATIENT
Start: 2024-11-15 | End: 2024-11-16 | Stop reason: HOSPADM

## 2024-11-15 RX ORDER — METOCLOPRAMIDE 10 MG/1
10 TABLET ORAL EVERY 6 HOURS PRN
Status: DISCONTINUED | OUTPATIENT
Start: 2024-11-15 | End: 2024-11-16

## 2024-11-15 RX ORDER — METHYLERGONOVINE MALEATE 0.2 MG/ML
0.2 INJECTION INTRAVENOUS ONCE AS NEEDED
Status: DISCONTINUED | OUTPATIENT
Start: 2024-11-15 | End: 2024-11-16 | Stop reason: HOSPADM

## 2024-11-15 RX ORDER — FERROUS GLUCONATE 325 MG
38 TABLET ORAL
Status: DISCONTINUED | OUTPATIENT
Start: 2024-11-16 | End: 2024-11-16

## 2024-11-15 RX ORDER — MAGNESIUM SULFATE HEPTAHYDRATE 40 MG/ML
2 INJECTION, SOLUTION INTRAVENOUS CONTINUOUS
Status: DISCONTINUED | OUTPATIENT
Start: 2024-11-15 | End: 2024-11-19 | Stop reason: HOSPADM

## 2024-11-15 RX ORDER — OXYTOCIN/0.9 % SODIUM CHLORIDE 30/500 ML
60 PLASTIC BAG, INJECTION (ML) INTRAVENOUS ONCE AS NEEDED
Status: DISCONTINUED | OUTPATIENT
Start: 2024-11-15 | End: 2024-11-16 | Stop reason: HOSPADM

## 2024-11-15 RX ORDER — OXYTOCIN/0.9 % SODIUM CHLORIDE 30/500 ML
2-30 PLASTIC BAG, INJECTION (ML) INTRAVENOUS CONTINUOUS
Status: DISCONTINUED | OUTPATIENT
Start: 2024-11-15 | End: 2024-11-16

## 2024-11-15 RX ORDER — SODIUM CHLORIDE 9 MG/ML
20 INJECTION, SOLUTION INTRAVENOUS CONTINUOUS
Status: ACTIVE | OUTPATIENT
Start: 2024-11-15 | End: 2024-11-16

## 2024-11-15 RX ORDER — ONDANSETRON 4 MG/1
4 TABLET, FILM COATED ORAL EVERY 6 HOURS PRN
Status: DISCONTINUED | OUTPATIENT
Start: 2024-11-15 | End: 2024-11-15

## 2024-11-15 RX ORDER — ONDANSETRON 4 MG/1
4 TABLET, ORALLY DISINTEGRATING ORAL EVERY 8 HOURS PRN
Status: DISCONTINUED | OUTPATIENT
Start: 2024-11-15 | End: 2024-11-15

## 2024-11-15 RX ORDER — HYDRALAZINE HYDROCHLORIDE 20 MG/ML
5 INJECTION INTRAMUSCULAR; INTRAVENOUS ONCE AS NEEDED
Status: DISCONTINUED | OUTPATIENT
Start: 2024-11-15 | End: 2024-11-16 | Stop reason: HOSPADM

## 2024-11-15 RX ORDER — LABETALOL HYDROCHLORIDE 5 MG/ML
20 INJECTION, SOLUTION INTRAVENOUS ONCE AS NEEDED
Status: DISCONTINUED | OUTPATIENT
Start: 2024-11-15 | End: 2024-11-16 | Stop reason: HOSPADM

## 2024-11-15 RX ORDER — LABETALOL HYDROCHLORIDE 5 MG/ML
20 INJECTION, SOLUTION INTRAVENOUS ONCE AS NEEDED
Status: DISCONTINUED | OUTPATIENT
Start: 2024-11-15 | End: 2024-11-15

## 2024-11-15 RX ORDER — MISOPROSTOL 200 UG/1
800 TABLET ORAL ONCE AS NEEDED
Status: DISCONTINUED | OUTPATIENT
Start: 2024-11-15 | End: 2024-11-16 | Stop reason: HOSPADM

## 2024-11-15 RX ORDER — LIDOCAINE HYDROCHLORIDE 10 MG/ML
30 INJECTION, SOLUTION INFILTRATION; PERINEURAL ONCE AS NEEDED
Status: DISCONTINUED | OUTPATIENT
Start: 2024-11-15 | End: 2024-11-16 | Stop reason: HOSPADM

## 2024-11-15 RX ORDER — NIFEDIPINE 10 MG/1
10 CAPSULE ORAL ONCE AS NEEDED
Status: DISCONTINUED | OUTPATIENT
Start: 2024-11-15 | End: 2024-11-16 | Stop reason: HOSPADM

## 2024-11-15 RX ORDER — DIPHENHYDRAMINE HCL 25 MG
25 CAPSULE ORAL EVERY 6 HOURS PRN
Status: DISCONTINUED | OUTPATIENT
Start: 2024-11-15 | End: 2024-11-16

## 2024-11-15 RX ADMIN — ACETAMINOPHINE, CAFFEINE 1 TABLET: 500; 65 TABLET, FILM COATED ORAL at 11:13

## 2024-11-15 RX ADMIN — ONDANSETRON 4 MG: 4 TABLET, ORALLY DISINTEGRATING ORAL at 10:54

## 2024-11-15 RX ADMIN — DIPHENHYDRAMINE HYDROCHLORIDE 25 MG: 25 TABLET ORAL at 10:21

## 2024-11-15 RX ADMIN — Medication 2 MILLI-UNITS/MIN: at 19:58

## 2024-11-15 RX ADMIN — SODIUM CHLORIDE 20 ML/HR: 9 INJECTION, SOLUTION INTRAVENOUS at 19:57

## 2024-11-15 RX ADMIN — SODIUM CHLORIDE 20 ML/HR: 9 INJECTION, SOLUTION INTRAVENOUS at 19:55

## 2024-11-15 RX ADMIN — MAGNESIUM SULFATE HEPTAHYDRATE 2 G/HR: 40 INJECTION, SOLUTION INTRAVENOUS at 17:42

## 2024-11-15 RX ADMIN — ACETAMINOPHEN 975 MG: 325 TABLET ORAL at 19:16

## 2024-11-15 RX ADMIN — MAGNESIUM SULFATE HEPTAHYDRATE 2 G/HR: 40 INJECTION, SOLUTION INTRAVENOUS at 19:16

## 2024-11-15 RX ADMIN — METOCLOPRAMIDE 10 MG: 10 TABLET ORAL at 10:21

## 2024-11-15 SDOH — SOCIAL STABILITY: SOCIAL INSECURITY
WITHIN THE LAST YEAR, HAVE YOU BEEN KICKED, HIT, SLAPPED, OR OTHERWISE PHYSICALLY HURT BY YOUR PARTNER OR EX-PARTNER?: NO

## 2024-11-15 SDOH — SOCIAL STABILITY: SOCIAL INSECURITY: WITHIN THE LAST YEAR, HAVE YOU BEEN AFRAID OF YOUR PARTNER OR EX-PARTNER?: NO

## 2024-11-15 SDOH — HEALTH STABILITY: MENTAL HEALTH: SUICIDAL BEHAVIOR (LIFETIME): NO

## 2024-11-15 SDOH — HEALTH STABILITY: PHYSICAL HEALTH: ON AVERAGE, HOW MANY MINUTES DO YOU ENGAGE IN EXERCISE AT THIS LEVEL?: 0 MIN

## 2024-11-15 SDOH — SOCIAL STABILITY: SOCIAL NETWORK: HOW OFTEN DO YOU ATTEND CHURCH OR RELIGIOUS SERVICES?: NEVER

## 2024-11-15 SDOH — HEALTH STABILITY: MENTAL HEALTH: CURRENT SMOKER: 0

## 2024-11-15 SDOH — SOCIAL STABILITY: SOCIAL INSECURITY
WITHIN THE LAST YEAR, HAVE YOU BEEN RAPED OR FORCED TO HAVE ANY KIND OF SEXUAL ACTIVITY BY YOUR PARTNER OR EX-PARTNER?: NO

## 2024-11-15 SDOH — SOCIAL STABILITY: SOCIAL INSECURITY: ABUSE SCREEN: ADULT

## 2024-11-15 SDOH — HEALTH STABILITY: MENTAL HEALTH: WERE YOU ABLE TO COMPLETE ALL THE BEHAVIORAL HEALTH SCREENINGS?: YES

## 2024-11-15 SDOH — ECONOMIC STABILITY: FOOD INSECURITY: WITHIN THE PAST 12 MONTHS, THE FOOD YOU BOUGHT JUST DIDN'T LAST AND YOU DIDN'T HAVE MONEY TO GET MORE.: NEVER TRUE

## 2024-11-15 SDOH — SOCIAL STABILITY: SOCIAL INSECURITY: VERBAL ABUSE: DENIES

## 2024-11-15 SDOH — HEALTH STABILITY: PHYSICAL HEALTH: ON AVERAGE, HOW MANY DAYS PER WEEK DO YOU ENGAGE IN MODERATE TO STRENUOUS EXERCISE (LIKE A BRISK WALK)?: 2 DAYS

## 2024-11-15 SDOH — ECONOMIC STABILITY: FOOD INSECURITY: WITHIN THE PAST 12 MONTHS, YOU WORRIED THAT YOUR FOOD WOULD RUN OUT BEFORE YOU GOT THE MONEY TO BUY MORE.: NEVER TRUE

## 2024-11-15 SDOH — HEALTH STABILITY: MENTAL HEALTH: NON-SPECIFIC ACTIVE SUICIDAL THOUGHTS (PAST 1 MONTH): NO

## 2024-11-15 SDOH — HEALTH STABILITY: MENTAL HEALTH: HAVE YOU USED ANY SUBSTANCES (CANABIS, COCAINE, HEROIN, HALLUCINOGENS, INHALANTS, ETC.) IN THE PAST 12 MONTHS?: NO

## 2024-11-15 SDOH — ECONOMIC STABILITY: FOOD INSECURITY: HOW HARD IS IT FOR YOU TO PAY FOR THE VERY BASICS LIKE FOOD, HOUSING, MEDICAL CARE, AND HEATING?: NOT HARD AT ALL

## 2024-11-15 SDOH — SOCIAL STABILITY: SOCIAL INSECURITY: HAVE YOU HAD THOUGHTS OF HARMING ANYONE ELSE?: NO

## 2024-11-15 SDOH — HEALTH STABILITY: MENTAL HEALTH: HAVE YOU USED ANY PRESCRIPTION DRUGS OTHER THAN PRESCRIBED IN THE PAST 12 MONTHS?: NO

## 2024-11-15 SDOH — SOCIAL STABILITY: SOCIAL INSECURITY: DOES ANYONE TRY TO KEEP YOU FROM HAVING/CONTACTING OTHER FRIENDS OR DOING THINGS OUTSIDE YOUR HOME?: NO

## 2024-11-15 SDOH — SOCIAL STABILITY: SOCIAL INSECURITY: DO YOU FEEL ANYONE HAS EXPLOITED OR TAKEN ADVANTAGE OF YOU FINANCIALLY OR OF YOUR PERSONAL PROPERTY?: NO

## 2024-11-15 SDOH — HEALTH STABILITY: MENTAL HEALTH: WISH TO BE DEAD (PAST 1 MONTH): NO

## 2024-11-15 SDOH — SOCIAL STABILITY: SOCIAL INSECURITY: ARE YOU MARRIED, WIDOWED, DIVORCED, SEPARATED, NEVER MARRIED, OR LIVING WITH A PARTNER?: LIVING WITH PARTNER

## 2024-11-15 SDOH — ECONOMIC STABILITY: HOUSING INSECURITY: DO YOU FEEL UNSAFE GOING BACK TO THE PLACE WHERE YOU ARE LIVING?: NO

## 2024-11-15 SDOH — HEALTH STABILITY: MENTAL HEALTH
DO YOU FEEL STRESS - TENSE, RESTLESS, NERVOUS, OR ANXIOUS, OR UNABLE TO SLEEP AT NIGHT BECAUSE YOUR MIND IS TROUBLED ALL THE TIME - THESE DAYS?: ONLY A LITTLE

## 2024-11-15 SDOH — SOCIAL STABILITY: SOCIAL NETWORK
DO YOU BELONG TO ANY CLUBS OR ORGANIZATIONS SUCH AS CHURCH GROUPS, UNIONS, FRATERNAL OR ATHLETIC GROUPS, OR SCHOOL GROUPS?: NO

## 2024-11-15 SDOH — SOCIAL STABILITY: SOCIAL INSECURITY: HAS ANYONE EVER THREATENED TO HURT YOUR FAMILY OR YOUR PETS?: NO

## 2024-11-15 SDOH — SOCIAL STABILITY: SOCIAL INSECURITY: ARE YOU OR HAVE YOU BEEN THREATENED OR ABUSED PHYSICALLY, EMOTIONALLY, OR SEXUALLY BY ANYONE?: NO

## 2024-11-15 SDOH — SOCIAL STABILITY: SOCIAL INSECURITY: ARE THERE ANY APPARENT SIGNS OF INJURIES/BEHAVIORS THAT COULD BE RELATED TO ABUSE/NEGLECT?: NO

## 2024-11-15 SDOH — SOCIAL STABILITY: SOCIAL INSECURITY: WITHIN THE LAST YEAR, HAVE YOU BEEN HUMILIATED OR EMOTIONALLY ABUSED IN OTHER WAYS BY YOUR PARTNER OR EX-PARTNER?: NO

## 2024-11-15 SDOH — SOCIAL STABILITY: SOCIAL INSECURITY: HAVE YOU HAD ANY THOUGHTS OF HARMING ANYONE ELSE?: NO

## 2024-11-15 SDOH — SOCIAL STABILITY: SOCIAL NETWORK: HOW OFTEN DO YOU ATTEND MEETINGS OF THE CLUBS OR ORGANIZATIONS YOU BELONG TO?: NEVER

## 2024-11-15 SDOH — ECONOMIC STABILITY: TRANSPORTATION INSECURITY: IN THE PAST 12 MONTHS, HAS LACK OF TRANSPORTATION KEPT YOU FROM MEDICAL APPOINTMENTS OR FROM GETTING MEDICATIONS?: NO

## 2024-11-15 SDOH — HEALTH STABILITY: MENTAL HEALTH: STRENGTHS (MUST CHOOSE TWO): SUPPORT FROM FAMILY;INDEPENDENT LIVING;SENSE OF HUMOR

## 2024-11-15 SDOH — SOCIAL STABILITY: SOCIAL NETWORK: IN A TYPICAL WEEK, HOW MANY TIMES DO YOU TALK ON THE PHONE WITH FAMILY, FRIENDS, OR NEIGHBORS?: THREE TIMES A WEEK

## 2024-11-15 SDOH — SOCIAL STABILITY: SOCIAL INSECURITY: PHYSICAL ABUSE: DENIES

## 2024-11-15 SDOH — SOCIAL STABILITY: SOCIAL NETWORK: HOW OFTEN DO YOU GET TOGETHER WITH FRIENDS OR RELATIVES?: THREE TIMES A WEEK

## 2024-11-15 ASSESSMENT — ACTIVITIES OF DAILY LIVING (ADL)
LACK_OF_TRANSPORTATION: NO
BATHING: INDEPENDENT
WALKS IN HOME: INDEPENDENT
PATIENT'S MEMORY ADEQUATE TO SAFELY COMPLETE DAILY ACTIVITIES?: YES
GROOMING: INDEPENDENT
FEEDING YOURSELF: INDEPENDENT
DRESSING YOURSELF: INDEPENDENT
JUDGMENT_ADEQUATE_SAFELY_COMPLETE_DAILY_ACTIVITIES: YES
LACK_OF_TRANSPORTATION: NO
TOILETING: INDEPENDENT
HEARING - RIGHT EAR: FUNCTIONAL
ADEQUATE_TO_COMPLETE_ADL: YES
HEARING - LEFT EAR: FUNCTIONAL

## 2024-11-15 ASSESSMENT — PAIN SCALES - GENERAL
PAINLEVEL_OUTOF10: 0 - NO PAIN
PAINLEVEL_OUTOF10: 6
PAINLEVEL_OUTOF10: 0 - NO PAIN
PAINLEVEL_OUTOF10: 6
PAINLEVEL_OUTOF10: 5 - MODERATE PAIN
PAINLEVEL_OUTOF10: 0 - NO PAIN
PAINLEVEL_OUTOF10: 5 - MODERATE PAIN
PAINLEVEL_OUTOF10: 0 - NO PAIN
PAINLEVEL_OUTOF10: 0 - NO PAIN

## 2024-11-15 ASSESSMENT — LIFESTYLE VARIABLES
HOW OFTEN DO YOU HAVE A DRINK CONTAINING ALCOHOL: NEVER
AUDIT-C TOTAL SCORE: 0
HOW MANY STANDARD DRINKS CONTAINING ALCOHOL DO YOU HAVE ON A TYPICAL DAY: PATIENT DOES NOT DRINK
AUDIT-C TOTAL SCORE: 0
HOW OFTEN DO YOU HAVE A DRINK CONTAINING ALCOHOL: NEVER
SKIP TO QUESTIONS 9-10: 1
AUDIT-C TOTAL SCORE: 0
HOW MANY STANDARD DRINKS CONTAINING ALCOHOL DO YOU HAVE ON A TYPICAL DAY: PATIENT DOES NOT DRINK
SKIP TO QUESTIONS 9-10: 1
AUDIT-C TOTAL SCORE: 0
HOW OFTEN DO YOU HAVE 6 OR MORE DRINKS ON ONE OCCASION: NEVER
HOW OFTEN DO YOU HAVE 6 OR MORE DRINKS ON ONE OCCASION: NEVER

## 2024-11-15 ASSESSMENT — PATIENT HEALTH QUESTIONNAIRE - PHQ9
1. LITTLE INTEREST OR PLEASURE IN DOING THINGS: NOT AT ALL
1. LITTLE INTEREST OR PLEASURE IN DOING THINGS: NOT AT ALL
SUM OF ALL RESPONSES TO PHQ9 QUESTIONS 1 & 2: 0
2. FEELING DOWN, DEPRESSED OR HOPELESS: NOT AT ALL
SUM OF ALL RESPONSES TO PHQ9 QUESTIONS 1 & 2: 0
2. FEELING DOWN, DEPRESSED OR HOPELESS: NOT AT ALL

## 2024-11-15 NOTE — H&P
"Obstetrical Triage Note    Reason for Triage Observation: Hypertension    Assessment   headaches and hypertension  Triage Testing revealed neg labs   BP's in normal range  Patient's complaints have worsened.    Plan   Start magnesium and plan to transfer to [facility] main [facility].  Discussed case with MFM and on call attending at [facility].      Subjective   History of Present Pregnancy  Christiano Prieto is a 29 y.o.  gravid, female. Patient's last menstrual period was 2024. with an Estimated Date of Delivery of 2024, by Last Menstrual Period, who is now 36w1d gestation. The patient's blood type is O POS.   Triage Course:  Gave zofran, reglan, excedrin tension HA, benadryl and they did help HA.  Has some RUQ pain now.  PIH labs WNL.    Objective   Recent Vital Signs:                                 /77   Pulse 100   Temp 36.7 °C (98.1 °F) (Axillary)   Resp 16   Ht 1.6 m (5' 2.99\")   Wt 88.2 kg (194 lb 6.4 oz)   BMI 34.45 kg/m²     BP & Temp Min/Max Last 24 Hours:     BP  Min: 130/77   Min taken time: 11/15/24 1055  Max: 139/86   Max taken time: 11/15/24 0927  Temp  Av.7 °C (98.1 °F)  Min: 36.7 °C (98.1 °F)   Min taken time: 11/15/24 0924  Max: 36.7 °C (98.1 °F)   Max taken time: 11/15/24 0924    Physical Examination:  GENERAL: Examination reveals a well developed, well nourished, gravid female in no acute distress. She is alert and cooperative.  SKIN: normal coloration and turgor, no rashes  NEUROLOGICAL: DTRs normal and symmetrical  PSYCHOLOGICAL: awake and alert; oriented to person, place, and time    Lab Review:   Lab Results   Component Value Date    UTPCR  11/15/2024      Comment:      One or more analytes used in this calculation is outside of the analytical measurement range. Calculation cannot be performed.    URICACID 3.4 11/15/2024       "

## 2024-11-15 NOTE — TELEPHONE ENCOUNTER
Patient paged answering service with elevated BPs at home. Briefly, 29 year old  at 36w1d with known cHTN not on meds. Reports BPs this AM 150s/100s which are above her baseline. Has had intermittent HA and RUQ pain for weeks, nothing right now. Good FM. Recommend patient present to triage for evaluation to which she is agreeable    Bettie Wadsworth MD

## 2024-11-15 NOTE — ANESTHESIA PREPROCEDURE EVALUATION
Patient: Christiano Prieto    Evaluation Method: In-person visit    Procedure Information    Date: 11/15/24  Procedure: Labor Consult         Relevant Problems   Anesthesia (within normal limits)      Cardiac   (+) Chronic hypertension in pregnancy ([facility])      Pulmonary   (+) Asthma      Neuro (within normal limits)      GI   (+) Gastroesophageal reflux disease      /Renal (within normal limits)      Liver (within normal limits)      Endocrine (within normal limits)      Hematology   (+) Iron deficiency anemia      Musculoskeletal (within normal limits)      HEENT   (+) Conductive hearing loss of left ear with unrestricted hearing of right ear      ID (within normal limits)      Skin (within normal limits)      GYN   (+) 36 weeks gestation of pregnancy ([facility])       Clinical information reviewed:    Allergies  Meds               NPO Detail:  No data recorded     OB/Gyn Evaluation    Present Pregnancy    Patient is pregnant now ( @ 36w 1d).   Obstetric History    (+) hypertensive disorder of pregnancy - chronic hypertension and preeclampsia with severe features         Visit Vitals  /77   Pulse 90   Temp 36.6 °C (97.9 °F) (Temporal)   Resp 16         Physical Exam    Airway  Mallampati: I  TM distance: >3 FB  Neck ROM: full     Cardiovascular    Dental - normal exam     Pulmonary    Abdominal            Anesthesia Plan    History of general anesthesia?: yes  History of complications of general anesthesia?: no    ASA 3     epidural     The patient is not a current smoker.  Patient was previously instructed to abstain from smoking on day of procedure.  Patient did not smoke on day of procedure.    Anesthetic plan and risks discussed with patient.  Use of blood products discussed with patient who consented to blood products.    Plan discussed with CRNA.

## 2024-11-15 NOTE — H&P
OB Admission H&P    Assessment/Plan    Christiano Prieto is a 29 y.o.  at 36w1d, MIKEY: 2024, by Last Menstrual Period cw 13w US, who presents for Induction of Labor as transfer from [place] for sPEC dc by unrelenting HA on Mg.     Plan  -Admit to L&D, consented  -T&S, CBC, and Syphilis  -Epidural at patient request  -Recheck as clinically indicated by maternal or fetal status  -Plan to initiate induction with pit    sPEC   Dx by unrelenting HA on Mg gtt tx from [place]   Treated at [place] with tylenol, benadryl, flexeril, reglan and excedrin tension   Currently 6/10 HA, with intermittent floaters now resolved; otherwise asx   RUQ pain however mostly in side/ intercostals not in RUQ abdomen     Fetal Status  -NST reactive, reassuring   -Presentation cephalic based on ultrasound  -EFW 2600g extrap from US on 10/29 US 10% AC 48%  -GBS neg    Pregnancy notables  Asthma, rare albuterol use only when sick   Anxiety on hydroxyzine  sPEC see above     Postpartum  Contraception Plan:  interval IUD    Pregnancy Problems (from 24 to present)       Problem Noted Diagnosed Resolved    Anxiety in pregnancy in third trimester, antepartum ([facility]) 10/9/2024 by Kathrin JARA MD  No    Priority:  Medium       36 weeks gestation of pregnancy ([facility]) 10/3/2024 by Samreen Mancilla,   No    Priority:  Medium       Overview Addendum 2024 11:38 AM by Samreen Mancilla, DO     Desired provider in labor: [] CNM  [x] Physician  [x] Blood Products: [x] Yes, accepts [] No, needs counseling  [x] Initial BMI: 30.12   [x] Prenatal Labs: done  [x] Cervical Cancer Screening up to date  [x] Rh status: +  [x] Genetic Screening:  done  [x] NT US: (11-13 wks) done  [x] Baby ASA (if indicated): taking  [x] Pregnancy dated by: LMP    [x] Anatomy US: (19-20 wks)  [] Federal Sterilization consent signed (if indicated):  [x] 1hr GCT at 24-28wks:  [] Rhogam (if indicated):   [x] Fetal Surveillance (if indicated): growth US at 30  and 36wks for C.HTN  [x] Tdap (27-32 wks, may be given up to 36 wks if initial window missed):  declines  [x] RSV (32-36 wks) (Sept. to end of ): wants but didn't schedule  [x] Flu Vaccine: declines    [] Breastfeeding:  [] Postpartum Birth control method:   [x] GBS at 36 - 37 wks: Neg.  [x] 39 weeks discussion of IOL vs. Expectant management: 37wk induction  [x] Mode of delivery ( anticipated ): vag           Chronic hypertension in pregnancy ([facility]) 2024 by Samreen Mancilla DO  No    Priority:  Medium       Overview Addendum 2024 12:18 PM by Pilar Alba MD     Not on med s  Baby ASA    IOL scheduled          Prenatal care insufficient, second trimester ([facility]) 2024 by Samreen Mancilla DO  2024 by Pilar Alba MD     uterine contractions ([facility]) 2019 by Robert Haynes RN  2024 by Pilar Alba MD            Subjective   Good fetal movement.  Denies vaginal bleeding., Denies contractions., Denies leaking of fluid. Denies HA, CP, SOB, RUQ pain and vision changes. Pt is reporting no complaints at this time.     Prenatal Provider Laurent    OB History    Para Term  AB Living   5 2 2 0 2 2   SAB IAB Ectopic Multiple Live Births   2 0 0 0 2      # Outcome Date GA Lbr Ever/2nd Weight Sex Type Anes PTL Lv   5 Current            4 Term 13 39w0d   M Vag-Spont         Name: Avah   3 SAB            2 SAB            1 Term  38w0d   F Vag-Spont   COLLEEN      Name: Shane       No past surgical history on file.    Social History     Tobacco Use    Smoking status: Former     Types: Cigarettes    Smokeless tobacco: Never   Substance Use Topics    Alcohol use: Never       No Known Allergies    Medications Prior to Admission   Medication Sig Dispense Refill Last Dose/Taking    diphenhydrAMINE (Sominex) 25 mg tablet Take 1 tablet (25 mg) by mouth every 6 hours if needed for other (Headache, take with reglan). 30 tablet 1 2024    ferrous gluconate 324  (38 Fe) mg tablet Take 1 tablet (38 mg of iron) by mouth once daily with breakfast. 30 tablet 11 11/14/2024    hydrOXYzine HCL (Atarax) 25 mg tablet Take 1 tablet (25 mg) by mouth 2 times a day as needed for anxiety. For severe anxiety 60 tablet 1 11/15/2024 Morning    metoclopramide (Reglan) 10 mg tablet Take 1 tablet (10 mg) by mouth every 6 hours if needed (Headache, take with benadryl). 30 tablet 1 11/14/2024    PRENATAL VIT 10-IRON-FOLIC-DHA ORAL Take 1 tablet by mouth once daily.   11/15/2024 Morning    propranolol (Inderal) 10 mg tablet Take 1 tablet (10 mg) by mouth 3 times a day as needed (anxiety). 90 tablet 1 11/15/2024 Morning    miscellaneous medical supply (Blood Pressure Cuff) misc 1 Device 2 times a day. 1 each 0     Symbicort 160-4.5 mcg/actuation inhaler Inhale 2 puffs 2 times a day. as instructed (Patient not taking: Reported on 11/15/2024) 10.2 g 1 More than a month     Objective     Last Vitals  Temp Pulse Resp BP MAP O2 Sat   36.5 °C (97.7 °F) 90 16 119/77 92 (!) 95 %     Blood Pressures         11/15/2024  1345 11/15/2024  1455 11/15/2024  1555 11/15/2024  1748 11/15/2024  1848    BP: 103/58 118/76 110/70 116/75 119/77             Physical Exam  General: NAD, mood appropriate  Cardiopulmonary: warm and well perfused, breathing comfortably on room air, CTAB without crackles  Abdomen: Gravid, non-tender  Extremities: Symmetric  Neuro: UE reflexes bilaterally symmetrical 2+  Speculum Exam: deferred  Cervix: 2 /30 /-3      Fetal Monitoring  Baseline: 135 bpm, Variability: moderate,  Accelerations: present and Decelerations: none  Uterine Activity: Irregular contractions  Interpretation: Reactive    Bedside ultrasound: Yes cephalic    Labs in chart were reviewed.   Results from last 7 days   Lab Units 11/15/24  1014 11/10/24  1317   WBC AUTO x10*3/uL 9.2 10.9   HEMOGLOBIN g/dL 11.5* 11.6*   HEMATOCRIT % 34.4* 35.5*   PLATELETS AUTO x10*3/uL 279 296   AST U/L 13 15   ALT U/L 10 13   CREATININE mg/dL  0.42* 0.45*        Prenatal labs reviewed, remarkable for previous anemia with Hb faisal 10.4. Pap ASCUS 6/2024

## 2024-11-15 NOTE — H&P
"Obstetrical Triage Note    Reason for Triage Observation: Hypertension    Assessment   headaches and hypertension    Plan   non-stress test and PIH labs  will be ordered.  Will give HA medication, reglan, benadryl, excedrin tension HA  The patient will be evaluated and admitted if indicated.      Subjective   History of Present Pregnancy  Christiano Prieto is a 29 y.o.  gravid, female. Patient's last menstrual period was 2024. with an Estimated Date of Delivery of 2024, by Last Menstrual Period, who is 36w1d gestation. The patient's blood type is O POS.     Description of Present Problem   The patient presented to OB Triage with  HA and BP elevated at home.  Pt has had a QUEEN all week, does get a little better with meds but doesn't go away.   Did take some tylenol at 0700 and it hasn't helped.    Pregnancy Complications  C.HTN, anxiety, migraines with aura.    Objective   Physical Examination:  Vital Signs:  /77   Pulse 100   Temp 36.7 °C (98.1 °F) (Axillary)   Resp 16   Ht 1.6 m (5' 2.99\")   Wt 88.2 kg (194 lb 6.4 oz)   BMI 34.45 kg/m²   GENERAL: Examination reveals a well developed, well nourished, gravid female in no acute distress. She is alert and cooperative.  FHR is 140 , with mod keisha , and a cat 1  tracing.    [place] reading:  none  SKIN: normal coloration and turgor, no rashes  NEUROLOGICAL: DTRs normal and symmetrical  PSYCHOLOGICAL: awake and alert; oriented to person, place, and time  "

## 2024-11-16 ENCOUNTER — ANESTHESIA (OUTPATIENT)
Dept: OBSTETRICS AND GYNECOLOGY | Facility: HOSPITAL | Age: 29
End: 2024-11-16
Payer: COMMERCIAL

## 2024-11-16 ENCOUNTER — ANESTHESIA EVENT (OUTPATIENT)
Dept: OBSTETRICS AND GYNECOLOGY | Facility: HOSPITAL | Age: 29
End: 2024-11-16
Payer: COMMERCIAL

## 2024-11-16 PROCEDURE — 59409 OBSTETRICAL CARE: CPT

## 2024-11-16 PROCEDURE — 99199 UNLISTED SPECIAL SVC PX/RPRT: CPT

## 2024-11-16 PROCEDURE — 1100000001 HC PRIVATE ROOM DAILY

## 2024-11-16 PROCEDURE — 2500000004 HC RX 250 GENERAL PHARMACY W/ HCPCS (ALT 636 FOR OP/ED)

## 2024-11-16 PROCEDURE — 2500000001 HC RX 250 WO HCPCS SELF ADMINISTERED DRUGS (ALT 637 FOR MEDICARE OP)

## 2024-11-16 PROCEDURE — 3700000014 EPIDURAL BLOCK: Performed by: ANESTHESIOLOGY

## 2024-11-16 PROCEDURE — 10907ZC DRAINAGE OF AMNIOTIC FLUID, THERAPEUTIC FROM PRODUCTS OF CONCEPTION, VIA NATURAL OR ARTIFICIAL OPENING: ICD-10-PCS | Performed by: OBSTETRICS & GYNECOLOGY

## 2024-11-16 PROCEDURE — 2500000001 HC RX 250 WO HCPCS SELF ADMINISTERED DRUGS (ALT 637 FOR MEDICARE OP): Performed by: OBSTETRICS & GYNECOLOGY

## 2024-11-16 PROCEDURE — 01967 NEURAXL LBR ANES VAG DLVR: CPT | Performed by: STUDENT IN AN ORGANIZED HEALTH CARE EDUCATION/TRAINING PROGRAM

## 2024-11-16 PROCEDURE — 59409 OBSTETRICAL CARE: CPT | Performed by: OBSTETRICS & GYNECOLOGY

## 2024-11-16 PROCEDURE — 2500000004 HC RX 250 GENERAL PHARMACY W/ HCPCS (ALT 636 FOR OP/ED): Performed by: ANESTHESIOLOGY

## 2024-11-16 PROCEDURE — 7100000016 HC LABOR RECOVERY PER HOUR

## 2024-11-16 PROCEDURE — 88307 TISSUE EXAM BY PATHOLOGIST: CPT | Performed by: STUDENT IN AN ORGANIZED HEALTH CARE EDUCATION/TRAINING PROGRAM

## 2024-11-16 PROCEDURE — 88307 TISSUE EXAM BY PATHOLOGIST: CPT | Mod: TC,SUR

## 2024-11-16 PROCEDURE — 2500000004 HC RX 250 GENERAL PHARMACY W/ HCPCS (ALT 636 FOR OP/ED): Performed by: OBSTETRICS & GYNECOLOGY

## 2024-11-16 PROCEDURE — 87591 N.GONORRHOEAE DNA AMP PROB: CPT | Performed by: STUDENT IN AN ORGANIZED HEALTH CARE EDUCATION/TRAINING PROGRAM

## 2024-11-16 PROCEDURE — 10H07YZ INSERTION OF OTHER DEVICE INTO PRODUCTS OF CONCEPTION, VIA NATURAL OR ARTIFICIAL OPENING: ICD-10-PCS | Performed by: OBSTETRICS & GYNECOLOGY

## 2024-11-16 PROCEDURE — 7210000002 HC LABOR PER HOUR

## 2024-11-16 RX ORDER — CYPROHEPTADINE HYDROCHLORIDE 4 MG/1
4 TABLET ORAL 3 TIMES DAILY PRN
Status: DISCONTINUED | OUTPATIENT
Start: 2024-11-16 | End: 2024-11-16

## 2024-11-16 RX ORDER — DIPHENHYDRAMINE HCL 25 MG
25 CAPSULE ORAL EVERY 6 HOURS PRN
Status: DISCONTINUED | OUTPATIENT
Start: 2024-11-16 | End: 2024-11-19 | Stop reason: HOSPADM

## 2024-11-16 RX ORDER — METHYLERGONOVINE MALEATE 0.2 MG/ML
0.2 INJECTION INTRAVENOUS ONCE AS NEEDED
Status: DISCONTINUED | OUTPATIENT
Start: 2024-11-16 | End: 2024-11-19 | Stop reason: HOSPADM

## 2024-11-16 RX ORDER — OXYTOCIN/0.9 % SODIUM CHLORIDE 30/500 ML
60 PLASTIC BAG, INJECTION (ML) INTRAVENOUS ONCE AS NEEDED
Status: DISCONTINUED | OUTPATIENT
Start: 2024-11-16 | End: 2024-11-19 | Stop reason: HOSPADM

## 2024-11-16 RX ORDER — DIPHENHYDRAMINE HYDROCHLORIDE 50 MG/ML
25 INJECTION INTRAMUSCULAR; INTRAVENOUS ONCE
Status: COMPLETED | OUTPATIENT
Start: 2024-11-16 | End: 2024-11-16

## 2024-11-16 RX ORDER — SIMETHICONE 80 MG
80 TABLET,CHEWABLE ORAL 4 TIMES DAILY PRN
Status: DISCONTINUED | OUTPATIENT
Start: 2024-11-16 | End: 2024-11-19 | Stop reason: HOSPADM

## 2024-11-16 RX ORDER — ONDANSETRON HYDROCHLORIDE 2 MG/ML
4 INJECTION, SOLUTION INTRAVENOUS EVERY 6 HOURS PRN
Status: DISCONTINUED | OUTPATIENT
Start: 2024-11-16 | End: 2024-11-19 | Stop reason: HOSPADM

## 2024-11-16 RX ORDER — MISOPROSTOL 200 UG/1
800 TABLET ORAL ONCE AS NEEDED
Status: DISCONTINUED | OUTPATIENT
Start: 2024-11-16 | End: 2024-11-19 | Stop reason: HOSPADM

## 2024-11-16 RX ORDER — LIDOCAINE HYDROCHLORIDE AND EPINEPHRINE 15; 5 MG/ML; UG/ML
INJECTION, SOLUTION EPIDURAL AS NEEDED
Status: DISCONTINUED | OUTPATIENT
Start: 2024-11-16 | End: 2024-11-16

## 2024-11-16 RX ORDER — ONDANSETRON 4 MG/1
4 TABLET, FILM COATED ORAL EVERY 6 HOURS PRN
Status: DISCONTINUED | OUTPATIENT
Start: 2024-11-16 | End: 2024-11-19 | Stop reason: HOSPADM

## 2024-11-16 RX ORDER — OXYTOCIN 10 [USP'U]/ML
10 INJECTION, SOLUTION INTRAMUSCULAR; INTRAVENOUS ONCE AS NEEDED
Status: DISCONTINUED | OUTPATIENT
Start: 2024-11-16 | End: 2024-11-19 | Stop reason: HOSPADM

## 2024-11-16 RX ORDER — HYDROXYZINE HYDROCHLORIDE 25 MG/1
25 TABLET, FILM COATED ORAL EVERY 6 HOURS PRN
Status: DISCONTINUED | OUTPATIENT
Start: 2024-11-16 | End: 2024-11-18

## 2024-11-16 RX ORDER — LOPERAMIDE HYDROCHLORIDE 2 MG/1
4 CAPSULE ORAL EVERY 2 HOUR PRN
Status: DISCONTINUED | OUTPATIENT
Start: 2024-11-16 | End: 2024-11-19 | Stop reason: HOSPADM

## 2024-11-16 RX ORDER — BISACODYL 10 MG/1
10 SUPPOSITORY RECTAL DAILY PRN
Status: DISCONTINUED | OUTPATIENT
Start: 2024-11-16 | End: 2024-11-19 | Stop reason: HOSPADM

## 2024-11-16 RX ORDER — ENOXAPARIN SODIUM 100 MG/ML
40 INJECTION SUBCUTANEOUS EVERY 24 HOURS
Status: DISCONTINUED | OUTPATIENT
Start: 2024-11-16 | End: 2024-11-19 | Stop reason: HOSPADM

## 2024-11-16 RX ORDER — CARBOPROST TROMETHAMINE 250 UG/ML
250 INJECTION, SOLUTION INTRAMUSCULAR ONCE AS NEEDED
Status: DISCONTINUED | OUTPATIENT
Start: 2024-11-16 | End: 2024-11-19 | Stop reason: HOSPADM

## 2024-11-16 RX ORDER — POLYETHYLENE GLYCOL 3350 17 G/17G
17 POWDER, FOR SOLUTION ORAL 2 TIMES DAILY PRN
Status: DISCONTINUED | OUTPATIENT
Start: 2024-11-16 | End: 2024-11-19 | Stop reason: HOSPADM

## 2024-11-16 RX ORDER — LIDOCAINE 560 MG/1
1 PATCH PERCUTANEOUS; TOPICAL; TRANSDERMAL
Status: DISCONTINUED | OUTPATIENT
Start: 2024-11-16 | End: 2024-11-19 | Stop reason: HOSPADM

## 2024-11-16 RX ORDER — HYDRALAZINE HYDROCHLORIDE 20 MG/ML
5 INJECTION INTRAMUSCULAR; INTRAVENOUS ONCE AS NEEDED
Status: DISCONTINUED | OUTPATIENT
Start: 2024-11-16 | End: 2024-11-19 | Stop reason: HOSPADM

## 2024-11-16 RX ORDER — ACETAMINOPHEN 325 MG/1
975 TABLET ORAL EVERY 6 HOURS
Status: DISCONTINUED | OUTPATIENT
Start: 2024-11-16 | End: 2024-11-19 | Stop reason: HOSPADM

## 2024-11-16 RX ORDER — IBUPROFEN 600 MG/1
600 TABLET ORAL EVERY 6 HOURS
Status: DISCONTINUED | OUTPATIENT
Start: 2024-11-16 | End: 2024-11-19 | Stop reason: HOSPADM

## 2024-11-16 RX ORDER — NIFEDIPINE 10 MG/1
10 CAPSULE ORAL ONCE AS NEEDED
Status: DISCONTINUED | OUTPATIENT
Start: 2024-11-16 | End: 2024-11-19 | Stop reason: HOSPADM

## 2024-11-16 RX ORDER — FENTANYL/ROPIVACAINE/NS/PF 2MCG/ML-.2
0-25 PLASTIC BAG, INJECTION (ML) INJECTION CONTINUOUS
Status: DISCONTINUED | OUTPATIENT
Start: 2024-11-16 | End: 2024-11-16

## 2024-11-16 RX ORDER — LABETALOL HYDROCHLORIDE 5 MG/ML
20 INJECTION, SOLUTION INTRAVENOUS ONCE AS NEEDED
Status: DISCONTINUED | OUTPATIENT
Start: 2024-11-16 | End: 2024-11-19 | Stop reason: HOSPADM

## 2024-11-16 RX ORDER — DIPHENHYDRAMINE HYDROCHLORIDE 50 MG/ML
25 INJECTION INTRAMUSCULAR; INTRAVENOUS EVERY 6 HOURS PRN
Status: DISCONTINUED | OUTPATIENT
Start: 2024-11-16 | End: 2024-11-19 | Stop reason: HOSPADM

## 2024-11-16 RX ORDER — TRANEXAMIC ACID 100 MG/ML
1000 INJECTION, SOLUTION INTRAVENOUS ONCE AS NEEDED
Status: ACTIVE | OUTPATIENT
Start: 2024-11-16 | End: 2024-11-19

## 2024-11-16 RX ORDER — ADHESIVE BANDAGE
10 BANDAGE TOPICAL
Status: DISCONTINUED | OUTPATIENT
Start: 2024-11-16 | End: 2024-11-19 | Stop reason: HOSPADM

## 2024-11-16 RX ADMIN — ACETAMINOPHEN 975 MG: 325 TABLET, FILM COATED ORAL at 23:59

## 2024-11-16 RX ADMIN — MAGNESIUM SULFATE HEPTAHYDRATE 2 G/HR: 40 INJECTION, SOLUTION INTRAVENOUS at 05:26

## 2024-11-16 RX ADMIN — IBUPROFEN 600 MG: 600 TABLET ORAL at 23:59

## 2024-11-16 RX ADMIN — HYDROXYZINE HYDROCHLORIDE 25 MG: 25 TABLET ORAL at 05:26

## 2024-11-16 RX ADMIN — METOCLOPRAMIDE 10 MG: 5 INJECTION, SOLUTION INTRAMUSCULAR; INTRAVENOUS at 08:05

## 2024-11-16 RX ADMIN — ACETAMINOPHEN 975 MG: 325 TABLET ORAL at 01:23

## 2024-11-16 RX ADMIN — HYDROXYZINE HYDROCHLORIDE 25 MG: 25 TABLET, FILM COATED ORAL at 00:10

## 2024-11-16 RX ADMIN — ONDANSETRON 4 MG: 2 INJECTION INTRAMUSCULAR; INTRAVENOUS at 07:30

## 2024-11-16 RX ADMIN — MAGNESIUM SULFATE HEPTAHYDRATE 2 G/HR: 40 INJECTION, SOLUTION INTRAVENOUS at 15:01

## 2024-11-16 RX ADMIN — HYDROXYZINE HYDROCHLORIDE 25 MG: 25 TABLET ORAL at 21:40

## 2024-11-16 RX ADMIN — METOCLOPRAMIDE 10 MG: 10 TABLET ORAL at 01:23

## 2024-11-16 RX ADMIN — ACETAMINOPHEN 975 MG: 325 TABLET, FILM COATED ORAL at 17:53

## 2024-11-16 RX ADMIN — IBUPROFEN 600 MG: 600 TABLET ORAL at 17:53

## 2024-11-16 RX ADMIN — SODIUM CHLORIDE 500 ML: 9 INJECTION, SOLUTION INTRAVENOUS at 03:43

## 2024-11-16 RX ADMIN — DIPHENHYDRAMINE HYDROCHLORIDE 25 MG: 25 TABLET ORAL at 01:23

## 2024-11-16 RX ADMIN — DIPHENHYDRAMINE HYDROCHLORIDE 25 MG: 50 INJECTION INTRAMUSCULAR; INTRAVENOUS at 07:30

## 2024-11-16 SDOH — HEALTH STABILITY: MENTAL HEALTH: CURRENT SMOKER: 0

## 2024-11-16 ASSESSMENT — PAIN SCALES - GENERAL
PAINLEVEL_OUTOF10: 0 - NO PAIN
PAINLEVEL_OUTOF10: 3
PAINLEVEL_OUTOF10: 0 - NO PAIN
PAINLEVEL_OUTOF10: 0 - NO PAIN

## 2024-11-16 ASSESSMENT — PAIN DESCRIPTION - DESCRIPTORS: DESCRIPTORS: CRAMPING

## 2024-11-16 ASSESSMENT — PAIN - FUNCTIONAL ASSESSMENT: PAIN_FUNCTIONAL_ASSESSMENT: 0-10

## 2024-11-16 NOTE — SIGNIFICANT EVENT
Labor check    S: to bedside for deep variable decels. Patient feeling incrtased pressure, recently havign emesis    SVE: 5/80/-1, IUPC in place  FHT: 120/mod/+accel/+ multiple deep variable decelerations  [place]: q2-3 min    A/P: Christiano is a 29 y.o.  at 36w2d undergoing IOL in s/o sPEC    IOL  - latent labor, suspect transitioning to active phase shortly  - Pitocin currently at 22, uptitrate per protocol  - GBS neg  - epidural infusing    Fetal Wellbeing  - CEFM, Cat 2 currently though overall reassuring with low suspicion for acid-base disturbance of the fetus given moderate variability and accelerations, decelerations in the setting of active vomiting. Plan for amnionufison if variables persist once emesis has resolved      [place] Diego MD  PGY-3, Obstetrics and Gynecology

## 2024-11-16 NOTE — CARE PLAN
The patient's goals for the shift include breastfeed infant every 2-3hours    The clinical goals for the shift include Bleeding will remain moderate to scant

## 2024-11-16 NOTE — SIGNIFICANT EVENT
"Labor Progress Note    Pt resting comfortably in bed. Pt reports feeling very anxious.     Vitals: /55   Pulse 86   Temp 36.7 °C (98.1 °F) (Oral)   Resp 18   Ht 1.6 m (5' 2.99\")   Wt 88.2 kg (194 lb 6.4 oz)   LMP 03/07/2024   SpO2 100%   BMI 34.45 kg/m²     SVE: 5/70/-2, IUPC placed   FHT: 120/mod/+accel/-decel  [place]: CTX q2-4 min    A/P:  IOL  Continue to monitor for cervical change  Continue pitocin per protocol, currently at 16  CEFM, currently Category I  Epidural infusing  IUPC placed for difficulty monitoring ctx     sPEC   Dx by unrelenting HA with intermittent visual floaters on Mg gtt tx from [place]   Treated at [place] with tylenol, benadryl, flexeril, reglan and excedrin tension  HA now resolved, most recently tx at 0130 with tylenol, benadryl, reglan; otherwise asx  Normotensive      Pregnancy Notables  Asthma, rare albuterol use only when sick   Anxiety on hydroxyzine, pt with significant symptoms at this time, increased dosing from BID Prn to q6hr PRN, will continue to assess  sPEC see above     Ayana Molina MD PGY-1   OB/GYN   "

## 2024-11-16 NOTE — SIGNIFICANT EVENT
Patient now postpartum from uncomplicated  in the setting of SPEC.    BPs remain normotensive, patient doing well in recovery.    Stable for transfer to [facility], continue 24hrs postpartum magnesium gtt.    [place] Diego MD  PGY-3, Obstetrics and Gynecology

## 2024-11-16 NOTE — PROGRESS NOTES
Postpartum Progress Note    Assessment/Plan   Christiano Prieto is a 29 y.o.  now s/p Vaginal, Spontaneous on 2024 admitted for postpartum magnesium in the setting of severe pre-eclampsia.     Severe pre-eclampsia  Diagnosed by unrelenting HA at [facility] on mag to [facility] [place] [place]   No meds  HELLP labs wnl x2  Asymptomatic and normotensive   Continue magnesium 2g/hr for seizure prophylaxis, no signs/symptoms of mag toxicity, until 1000     Postpartum care  Continue routine postpartum care  Breastfeeding, lactation consultation as needed  Contraception plan: OCPs at interval visit    Pregnancy notables:  Asthma, rare albuterol use only when sick   Anxiety on hydroxyzine, pt with significant symptoms at this time, increased dosing from BID Prn to q6hr PRN on 11/15  sPEC see above     Ayana Molina MD PGY-1   OBGYN    Subjective   Pt is lying comfortably in bed. Denies HA, CP, SOB, RUQ pain and vision changes. Feels that bleeding has decreased, now level of normal period for her. Working on breast feeding, feels like it is going well. Otherwise doing well, no other complaints at this time.      Objective   Last Vitals:  Temp Pulse Resp BP MAP Pulse Ox   36.5 °C (97.7 °F) 88 18 117/89   99 %     Vitals Min/Max Last 24 Hours:  Temp  Min: 36 °C (96.8 °F)  Max: 36.8 °C (98.2 °F)  Pulse  Min: 77  Max: 103  Resp  Min: 16  Max: 18  BP  Min: 80/47  Max: 130/72    Intake/Output:    Intake/Output Summary (Last 24 hours) at 2024 1903  Last data filed at 2024 1848  Gross per 24 hour   Intake 6238.76 ml   Output 7680 ml   Net -1441.24 ml       Physical Examination:  General: no acute distress  HEENT: normocephalic, atraumatic  Heart: normal rate, regular rhythm  Lungs: CTAB  Abdomen: soft, nondistended  Extremities: moving all extremities  Neuro: awake and conversant, reflexes 2+ and symmetrical in BUE  Psych: appropriate mood and affect    Lab Review:  Results from last 7 days   Lab Units 11/15/24  1931  11/15/24  1014 11/10/24  1317   WBC AUTO x10*3/uL 9.8 9.2 10.9   HEMOGLOBIN g/dL 11.1* 11.5* 11.6*   HEMATOCRIT % 34.1* 34.4* 35.5*   PLATELETS AUTO x10*3/uL 301 279 296   AST U/L 13 13 15   ALT U/L 9 10 13   CREATININE mg/dL 0.44* 0.42* 0.45*

## 2024-11-16 NOTE — SIGNIFICANT EVENT
"Labor Progress Note    Pt resting comfortably in bed.    Vitals: /83   Pulse 87   Temp 36.8 °C (98.2 °F) (Oral)   Resp 16   Ht 1.6 m (5' 2.99\")   Wt 88.2 kg (194 lb 6.4 oz)   LMP 03/07/2024   SpO2 100%   BMI 34.45 kg/m²     SVE: 5/50/-3  FHT: 120/mod/+accel/-decel  [place]: CTX q3-4 min    A/P:  IOL  Continue to monitor for cervical change  Continue pitocin per protocol, currently at 8  CEFM, currently Category I  Epidural as requested    sPEC   Dx by unrelenting HA with intermittent visual floaters on Mg gtt tx from [place]   Treated at [place] with tylenol, benadryl, flexeril, reglan and excedrin tension  6/10 HA will treat with tylenol, benadryl, reglan; otherwise asx   Normotensive      Pregnancy Notables  Asthma, rare albuterol use only when sick   Anxiety on hydroxyzine  sPEC see above     Ayana Molina MD PGY-1   OB/GYN   "

## 2024-11-16 NOTE — ANESTHESIA PREPROCEDURE EVALUATION
Patient: Christiano Prieto    Evaluation Method: In-person visit    Procedure Information    Date: 11/15/24  Procedure: Labor Consult         Relevant Problems   Anesthesia (within normal limits)      Cardiac   (+) Chronic hypertension in pregnancy ([facility])      Pulmonary   (+) Asthma      Neuro (within normal limits)      GI   (+) Gastroesophageal reflux disease      /Renal (within normal limits)      Liver (within normal limits)      Endocrine (within normal limits)      Hematology   (+) Iron deficiency anemia      Musculoskeletal (within normal limits)      HEENT   (+) Conductive hearing loss of left ear with unrestricted hearing of right ear      ID (within normal limits)      Skin (within normal limits)      GYN   (+) 36 weeks gestation of pregnancy ([facility])       Clinical information reviewed:    Allergies  Meds               NPO Detail:  No data recorded     OB/Gyn Evaluation    Present Pregnancy    Patient is pregnant now ( @ 36w 1d).   Obstetric History    (+) hypertensive disorder of pregnancy - chronic hypertension and preeclampsia with severe features         Visit Vitals  /59   Pulse 87   Temp 36.8 °C (98.2 °F) (Oral)   Resp 18         Physical Exam    Airway  Mallampati: I  TM distance: >3 FB  Neck ROM: full     Cardiovascular    Dental - normal exam     Pulmonary    Abdominal            Anesthesia Plan    History of general anesthesia?: yes  History of complications of general anesthesia?: no    ASA 3     epidural     The patient is not a current smoker.    Anesthetic plan and risks discussed with patient.  Use of blood products discussed with patient who consented to blood products.    Plan discussed with attending.

## 2024-11-16 NOTE — SIGNIFICANT EVENT
"Labor Progress Note    Pt resting comfortably in bed. Feeling more uncomfortable and desiring epidural.     Vitals: /59   Pulse 87   Temp 36.8 °C (98.2 °F) (Oral)   Resp 18   Ht 1.6 m (5' 2.99\")   Wt 88.2 kg (194 lb 6.4 oz)   LMP 03/07/2024   SpO2 97%   BMI 34.45 kg/m²     SVE: 5/50/-2  FHT: 120/mod/+accel/-decel  [place]: CTX q1-4 min    A/P:  IOL  Continue to monitor for cervical change  Continue pitocin per protocol, currently at 16  CEFM, currently Category I  Epidural as requested    sPEC   Dx by unrelenting HA with intermittent visual floaters on Mg gtt tx from [place]   Treated at [place] with tylenol, benadryl, flexeril, reglan and excedrin tension  Now 2/10 HA recently tx with tylenol, benadryl, reglan; otherwise asx; pt declines more meds at this time  Normotensive      Pregnancy Notables  Asthma, rare albuterol use only when sick   Anxiety on hydroxyzine  sPEC see above     Ayana Molina MD PGY-1   OB/GYN   "

## 2024-11-16 NOTE — SIGNIFICANT EVENT
"Labor Progress Note    Pt resting comfortably in bed. Denies HA, CP, SOB, RUQ pain and vision changes. Pt is reporting no complaints at this time.     Vitals: /80   Pulse 88   Temp (!) 35.8 °C (96.4 °F)   Resp 18   Ht 1.6 m (5' 2.99\")   Wt 88.2 kg (194 lb 6.4 oz)   LMP 03/07/2024   SpO2 100%   BMI 34.45 kg/m²     SVE: 2/30/-3, CRB placed manually   FHT: 120/mod/+accel/-decel  [place]: CTX q3-5 min    A/P:  IOL  Continue to monitor for cervical change  Continue pitocin per protocol, currently at 6  CRB placed  CEFM, currently Category I  Epidural as requested    sPEC   Dx by unrelenting HA with intermittent visual floaters on Mg gtt tx from [place]   Treated at [place] with tylenol, benadryl, flexeril, reglan and excedrin tension  6/10 HA on admission now resolved s/p tylenol; otherwise asx     Pregnancy Notables  Asthma, rare albuterol use only when sick   Anxiety on hydroxyzine  sPEC see above     Ayana Molina MD PGY-1   OB/GYN   "

## 2024-11-16 NOTE — LACTATION NOTE
Lactation Consultant Note  Lactation Consultation  Reason for Consult: Initial assessment  Consultant Name: Katy Thrasher, RN, IBCLC    Maternal Information  Has mother  before?: No  Infant to breast within first 2 hours of birth?: Yes  Exclusive Pump and Bottle Feed: No  WIC Program: No    Maternal Assessment  Breast Assessment: Medium, Soft, Warm, Compressible  Nipple Assessment: Intact, Erect  [place] Assessment: Normal    Infant Assessment  Infant Behavior: Light sleep    Feeding Assessment  Nutrition Source: Breastmilk  Feeding Method: Nursing at the breast  Feeding Position: Nose lightly touching breast, Breast sandwich, Cross - cradle, One side per feeding, Skin to skin, Nipple to nose  Suck/Feeding: Sustained, Baby led rhythmically, Swallowing intermittently only with encouragement, Tactile stimulation needed  Latch Assessment: Latch achieved, Comfortable with no pain, Deep latch obtained, Flanged lips    LATCH TOOL  Latch: Repeated attempts, hold nipple in mouth, stimulate to suck  Audible Swallowing: A few with stimulation  Type of Nipple: Everted (After stimulation)  Comfort (Breast/Nipple): Soft/non-tender  Hold (Positioning): Full assist, staff holds infant at breast  LATCH Score: 6    Breast Pump  Pump: Hospital grade electric pump  Frequency: 8-10 times per day  Duration: Initiate phase  Breast Shield Size and Type: 21 mm    Other OB Lactation Tools       Patient Follow-up  Inpatient Lactation Follow-up Needed : Yes  Outpatient Lactation Follow-up: Recommended    Other OB Lactation Documentation  Maternal Risk Factors: Preeclampsia, Hypertension  Infant Risk Factors: Prematurity <37 weeks    Recommendations/Summary  LC in room for a quick consultation as family awaits outside to see infant. Bedside Rn attempting to latch baby, baby sleepy. Mom receptive to allow LC attempt a latch. LC stripped baby down and placed baby STS. Baby woke up and started showing hunger cues. LC demonstrated how mom  can support her breast as well as baby's head and neck. Baby was placed in a cross-cradle hold with ear, shoulder and hip all aligned. LC demonstrated how to lead with baby's lower lip and chin. Baby sleepy, 36 weeks. LC reviewed characteristics of a premature  in the first 24-48 hours. After a few attempts baby did open up wide and sustain a latch. LC reviewed how to provide tactile sitmulation with dad - baby receptive to it. Mom struggled latching baby on her own - she needed to provide better support of her breast and baby's head and neck. Family anxious to come in. LC quickly reviewed hospital pump and sized mom's nipples to be 20mm bilaterally. LC reviewed pump part cleaning, duration and frequency of pumping using initiation phase. LC encouraged mom to pump every 3 hours after DBF attempt. Mom told LC that she may not latch baby very often and only pump and is open to using formula. This is a multip mom, however, her first time breastfeeding.   Mom does have a pump for home.   LC wanted to review more however family came in room to visit.

## 2024-11-16 NOTE — L&D DELIVERY NOTE
OB Delivery Note  2024  Christiano Ida  29 y.o.   Vaginal, Spontaneous       Gestational Age: 36w2d  /Para:   Quantitative Blood Loss: Admission to Discharge: 100 mL (11/15/2024  9:09 AM - 2024 12:42 PM)    AnthonyFelice [email or web address] [[id number]]      Labor Events    Rupture date/time: 2024 0114  Rupture type: Artificial  Fluid color: Clear  Fluid odor: None  Labor type: Induced Onset of Labor  Labor allowed to proceed with plans for an attempted vaginal birth?: Yes  Induction: Oxytocin  First cervical ripening date/time: 11/15/2024 2000  Induction indications: Hypertensive Disorder of Pregnancy  Complications: None       Labor Event Times    Labor onset date/time: 11/15/2024 2000  Dilation complete date/time: 202454  Start pushing date/time: 2024 0956       Labor Length    1st stage: 13h 54m  2nd stage: 0h 02m  3rd stage: 0h 04m       Placenta    Placenta delivery date/time: 2024 1000  Placenta removal: Spontaneous  Placenta disposition: lab       Cord    Vessels: 3 vessels  Complications: None  Delayed cord clamping?: Yes  Cord blood disposition: Lab  Gases sent?: No  Stem cell collection (by provider): No       Lacerations    Episiotomy: None  Perineal laceration: None  Other lacerations?: No  Repair suture: None       Anesthesia    Method: Epidural       Operative Delivery    Forceps attempted?: No  Vacuum extractor attempted?: No       Shoulder Dystocia    Shoulder dystocia present?: No        Delivery    Birth date/time: 2024 09:56:00  Delivery type: Vaginal, Spontaneous  Complications: None       Resuscitation    Method: Tactile stimulation, Suctioning       Apgars    Living status: Living  Apgar Component Scores:  1 min.:  5 min.:  10 min.:  15 min.:  20 min.:    Skin color:  0  1       Heart rate:  2  2       Reflex irritability:  2  2       Muscle tone:  2  2       Respiratory effort:  2  2       Total:  8  9       Apgars assigned by: ALESSANDRO AYALA        Delivery Providers    Delivering clinician: Travis Barrios MD   Provider Role    Salomon Pinon, RN Delivery Nurse    Amanda Lehman, RN Nursery Nurse    [place] Diego MD Resident                   [place] Diego MD  PGY-3, Obstetrics and Gynecology

## 2024-11-16 NOTE — ANESTHESIA PROCEDURE NOTES
Epidural Block    Patient location during procedure: OB  Start time: 11/16/2024 3:50 AM  End time: 11/16/2024 4:11 AM  Reason for block: labor analgesia  Staffing  Performed: [facility]   Authorized by: Kash Villa MD    Performed by: Lavell Oliveros    Preanesthetic Checklist  Completed: patient identified, IV checked, risks and benefits discussed, surgical consent, monitors and equipment checked, pre-op evaluation, timeout performed and sterile techniques followed  Block Timeout  RN/Licensed healthcare professional reads aloud to the Anesthesia provider and entire team: Patient identity, procedure with side and site, patient position, and as applicable the availability of implants/special equipment/special requirements.  Patient on coagulant treatment: no  Timeout performed at: 11/16/2024 3:50 AM  Block Placement  Patient position: sitting  Prep: ChloraPrep  Sterility prep: cap, drape, gloves, hand and mask  Sedation level: no sedation  Patient monitoring: blood pressure and continuous pulse oximetry  Approach: midline  Local numbing: lidocaine 1% to skin and subcutaneous tissues  Vertebral space: lumbar  Lumbar location: L3-L4  Epidural  Loss of resistance technique: saline  Guidance: landmark technique        Needle  Needle type: Tuohy   Needle gauge: 17  Needle length: 8.9cm  Needle insertion depth: 7 cm  Catheter type: multi-orifice  Catheter size: 20 G  Catheter at skin depth: 12 cm  Catheter securement method: clear occlusive dressing    Test dose: lidocaine 1.5% with epinephrine 1-to-200,000  Test dose: lidocaine 1.5% with epinephrine 1-to-200,000  Test dose result: no positive test dose    PCEA  Medication concentration used: 0.2% Ropivacaine with 2 mcg/mL Fentanyl  Dose (mL): 5  Lockout (minutes): 30  1-Hour Limit (boluses/hr): 2  Basal Rate: 10        Assessment  Block outcome: pain improved  Number of attempts: 2 (initial attempt in L4-L5 space, unable to find GORDON. Second attempt, moved up to L3-L4 and  successfully found GORDON.)  Events: intravascular injection and no positive test dose  Procedure assessment: patient tolerated procedure well with no immediate complications

## 2024-11-17 VITALS
TEMPERATURE: 98.6 F | OXYGEN SATURATION: 96 % | WEIGHT: 194.4 LBS | BODY MASS INDEX: 34.45 KG/M2 | SYSTOLIC BLOOD PRESSURE: 128 MMHG | HEIGHT: 63 IN | RESPIRATION RATE: 20 BRPM | HEART RATE: 77 BPM | DIASTOLIC BLOOD PRESSURE: 83 MMHG

## 2024-11-17 LAB
C TRACH RRNA SPEC QL NAA+PROBE: NEGATIVE
N GONORRHOEA DNA SPEC QL PROBE+SIG AMP: NEGATIVE

## 2024-11-17 PROCEDURE — 99199 UNLISTED SPECIAL SVC PX/RPRT: CPT

## 2024-11-17 PROCEDURE — 2500000001 HC RX 250 WO HCPCS SELF ADMINISTERED DRUGS (ALT 637 FOR MEDICARE OP)

## 2024-11-17 PROCEDURE — 2500000004 HC RX 250 GENERAL PHARMACY W/ HCPCS (ALT 636 FOR OP/ED)

## 2024-11-17 PROCEDURE — 1100000001 HC PRIVATE ROOM DAILY

## 2024-11-17 RX ORDER — POLYETHYLENE GLYCOL 3350 17 G/17G
17 POWDER, FOR SOLUTION ORAL 2 TIMES DAILY
Qty: 40 PACKET | Refills: 0 | Status: ON HOLD | OUTPATIENT
Start: 2024-11-17 | End: 2024-12-07

## 2024-11-17 RX ORDER — DIPHENHYDRAMINE HCL 25 MG
25 CAPSULE ORAL ONCE
Status: COMPLETED | OUTPATIENT
Start: 2024-11-17 | End: 2024-11-17

## 2024-11-17 RX ORDER — IBUPROFEN 600 MG/1
600 TABLET ORAL EVERY 6 HOURS
Qty: 40 TABLET | Refills: 0 | Status: ON HOLD | OUTPATIENT
Start: 2024-11-17 | End: 2024-11-27

## 2024-11-17 RX ORDER — ACETAMINOPHEN 325 MG/1
975 TABLET ORAL EVERY 6 HOURS PRN
Qty: 120 TABLET | Refills: 0 | Status: ON HOLD | OUTPATIENT
Start: 2024-11-17

## 2024-11-17 RX ORDER — ACETAMINOPHEN 500 MG
5 TABLET ORAL NIGHTLY PRN
Status: DISCONTINUED | OUTPATIENT
Start: 2024-11-17 | End: 2024-11-19 | Stop reason: HOSPADM

## 2024-11-17 RX ORDER — FERROUS SULFATE 325(65) MG
325 TABLET ORAL
Qty: 30 TABLET | Refills: 0 | Status: ON HOLD | OUTPATIENT
Start: 2024-11-17 | End: 2024-12-17

## 2024-11-17 RX ADMIN — HYDROXYZINE HYDROCHLORIDE 25 MG: 25 TABLET ORAL at 10:38

## 2024-11-17 RX ADMIN — IBUPROFEN 600 MG: 600 TABLET ORAL at 17:46

## 2024-11-17 RX ADMIN — ACETAMINOPHEN 975 MG: 325 TABLET, FILM COATED ORAL at 17:46

## 2024-11-17 RX ADMIN — ACETAMINOPHEN 975 MG: 325 TABLET, FILM COATED ORAL at 05:59

## 2024-11-17 RX ADMIN — DIPHENHYDRAMINE HYDROCHLORIDE 25 MG: 25 CAPSULE ORAL at 22:37

## 2024-11-17 RX ADMIN — ACETAMINOPHEN 975 MG: 325 TABLET, FILM COATED ORAL at 12:08

## 2024-11-17 RX ADMIN — IBUPROFEN 600 MG: 600 TABLET ORAL at 05:59

## 2024-11-17 RX ADMIN — MAGNESIUM SULFATE HEPTAHYDRATE 2 G/HR: 40 INJECTION, SOLUTION INTRAVENOUS at 01:40

## 2024-11-17 RX ADMIN — IBUPROFEN 600 MG: 600 TABLET ORAL at 12:08

## 2024-11-17 RX ADMIN — SIMETHICONE 80 MG: 80 TABLET, CHEWABLE ORAL at 16:19

## 2024-11-17 RX ADMIN — HYDROXYZINE HYDROCHLORIDE 25 MG: 25 TABLET ORAL at 19:00

## 2024-11-17 ASSESSMENT — PAIN SCALES - GENERAL
PAINLEVEL_OUTOF10: 3
PAINLEVEL_OUTOF10: 2
PAINLEVEL_OUTOF10: 2
PAINLEVEL_OUTOF10: 0 - NO PAIN

## 2024-11-17 ASSESSMENT — PAIN - FUNCTIONAL ASSESSMENT: PAIN_FUNCTIONAL_ASSESSMENT: 0-10

## 2024-11-17 ASSESSMENT — PAIN DESCRIPTION - LOCATION
LOCATION: ABDOMEN
LOCATION: ABDOMEN

## 2024-11-17 ASSESSMENT — PAIN DESCRIPTION - DESCRIPTORS: DESCRIPTORS: CRAMPING

## 2024-11-17 NOTE — PROGRESS NOTES
Postpartum Progress Note    Assessment/Plan   Christiano Prieto is a 29 y.o.  now s/p Vaginal, Spontaneous on 2024 admitted for postpartum magnesium in the setting of severe pre-eclampsia.     Severe pre-eclampsia  Diagnosed by unrelenting HA at [facility]  Normotensive, no meds  HELLP labs wnl x2  Asymptomatic  Continue magnesium 2g/hr for seizure prophylaxis, no signs/symptoms of mag toxicity, until 1000     Postpartum care  Continue routine postpartum care  Breastfeeding/pumping, lactation consultation as needed  Contraception plan: OCPs at interval visit    Pregnancy notables:  Asthma, rare albuterol use only when sick   Anxiety on hydroxyzine, pt with significant symptoms at this time, increased dosing from BID Prn to q6hr PRN on 11/15  sPEC see above     D/w Dr. Belem Mayo MD  PGY3, Obstetrics and Gynecology     Subjective   Resting comfortably. Denies HA, VA changes, CP, SOB, RUQ pain.  Reports some cramping with breastfeeding/pumping. Working on supply. Lochia minimal.     Objective   Last Vitals:  Temp Pulse Resp BP MAP Pulse Ox   36.5 °C (97.7 °F) 81 18 114/74   99 %     Vitals Min/Max Last 24 Hours:  Temp  Min: 36 °C (96.8 °F)  Max: 36.8 °C (98.2 °F)  Pulse  Min: 77  Max: 103  Resp  Min: 16  Max: 20  BP  Min: 80/47  Max: 127/75    Intake/Output:    Intake/Output Summary (Last 24 hours) at 2024 0726  Last data filed at 2024 0600  Gross per 24 hour   Intake 5214.5 ml   Output 7580 ml   Net -2365.5 ml       Physical Examination:  General: no acute distress  HEENT: normocephalic, atraumatic  Heart: normal rate, regular rhythm  Lungs: CTAB  Abdomen: soft, nondistended  Extremities: moving all extremities  Neuro: awake and conversant, reflexes 2+ and symmetrical in BUE  Psych: appropriate mood and affect    Lab Review:  Results from last 7 days   Lab Units 11/15/24  1931 11/15/24  1014 11/10/24  1317   WBC AUTO x10*3/uL 9.8 9.2 10.9   HEMOGLOBIN g/dL 11.1* 11.5* 11.6*   HEMATOCRIT %  34.1* 34.4* 35.5*   PLATELETS AUTO x10*3/uL 301 279 296   AST U/L 13 13 15   ALT U/L 9 10 13   CREATININE mg/dL 0.44* 0.42* 0.45*

## 2024-11-17 NOTE — CARE PLAN
Problem: Postpartum  Goal: Minimal s/sx of HDP and BP<160/110  Outcome: Progressing     Problem: Pain - Adult  Goal: Verbalizes/displays adequate comfort level or baseline comfort level  Outcome: Progressing     Problem: Safety - Adult  Goal: Free from fall injury  Outcome: Progressing  The clinical goals for the shift include maintain stable BP

## 2024-11-17 NOTE — PROGRESS NOTES
SCOTT met with Ms. Prieto, mother of baby justen Prieto. Also present was FOB. Ms. Prieto reports that they have necessary baby supplies and she will not be in need of . Ms. Prieto reports that she has family support. SCOTT discussed post partum depression. Ms. Prieto denied feelings of depression and shared that she has an appointment this week with her psychiatrist who she see for anxiety. She had no questions or concerns. Clear for discharge when medically ready.  JONNA Duarte

## 2024-11-17 NOTE — ANESTHESIA POSTPROCEDURE EVALUATION
Patient: Christiano Prieto    Procedure Summary       Date: 24 Room / Location:     Anesthesia Start: 350 Anesthesia Stop: 956    Procedure: Labor Analgesia Diagnosis:     Scheduled Providers:  Responsible Provider: Tim Knowles MD    Anesthesia Type: epidural ASA Status: 3            Christiano Prieto is a 29 y.o., , who had a Vaginal, Spontaneous delivery on 2024 at 36w2d and is now POD1.    She had Neuraxial Anesthesia without immediate complications noted.       Pain well controlled    Vitals:    24 0703   BP: 118/78   Pulse: 84   Resp: 20   Temp:    SpO2: 95%       Neuraxial site assessed. No visible redness or swelling or drainage. Patient able to ambulate and move all extremities without difficulty. Able to void. No complaints of nausea/vomiting. Tolerating PO intake well. No s/sx of PDPH.     Anesthesia will sign off     Kevyn Fierro MD

## 2024-11-17 NOTE — PROGRESS NOTES
Postpartum Progress Note    Assessment/Plan   Christiano Prieto is a 29 y.o., , who was admitted on 11/15/2024 for IOL as a transfer from [facility] for sPEC, delivered at 36w2d gestation via Vaginal, Spontaneous. Her pregnancy was complicated by Asthma and Anxiety is now postpartum day 1    Routine PostPartum care  Now PPD#1 s/p Vaginal, Spontaneous on 2024  -Meeting all postpartum milestones  - Continue routine postpartum care  - Pain well controlled on PO medications  - DVT Score: 5 - encourage ambulation,  SCDs, and  ppx lovenox  - O POS, Rhogam not indicated  -  mL  - Hgb:   Results from last 7 days   Lab Units 11/15/24  1931 11/15/24  1014   HEMOGLOBIN g/dL 11.1* 11.5*      siPEC   -cHTN in pregnancy, controlled previously on no medications  -Diagnosed by unrelenting HA at [facility]  -HELLP labs negative, P:C undetectable (11/15)  -Asymptomatic  -Normotensive in postpartum state  -Discussed 3 day admission, patient strongly desires discharge tomorrow. Agreeable to 2-5 day blood pressure check, has blood pressure cuff at home.  -BP cuff for home monitoring    Anxiety  -Symptoms controlled with hydroxyzine    Asthma  -Rare use of albuterol    Maternal Well-Being  -Bonding appropriately with male infant at bedside.  -Emotional support and reassurance provided.  -Postpartum course anticipatory guidance provided, reviewed normal expectations and maternal warning signs  -Provided family centered care, and addressed all questions and concerns    [place] Feeding  -The patient is breast feeding/pumping  - Breastfeeding/pumping encouraged  -Lactation consult ordered, PRN    Family Planning  - interval IUD       Dispo  - Anticipate d/c on PPD #3 pending BP control.  - The signs and symptoms of PEC were reviewed with the patient, including unrelenting headache, vision changes/blurred vision, and pain underneath the right breast.   - BP cuff for home monitoring BP BID.  - On discharge, follow up  with primary OB in 2-5 days for BP check, and 4-6 weeks for postpartum visit.    Stephanie Kingsley, APRN-CNP, CLC     Principal Problem:    36 weeks gestation of pregnancy ([facility])  Active Problems:    Gastroesophageal reflux disease    Asthma    Pregnancy Problems (from 24 to present)       Problem Noted Diagnosed Resolved    Anxiety in pregnancy in third trimester, antepartum ([facility]) 10/9/2024 by Kathrin JARA MD  No    Priority:  Medium       36 weeks gestation of pregnancy ([facility]) 10/3/2024 by Samreen Mancilla DO  No    Priority:  Medium       Overview Addendum 2024 11:38 AM by Samreen Mancilla, DO     Desired provider in labor: [] CNM  [x] Physician  [x] Blood Products: [x] Yes, accepts [] No, needs counseling  [x] Initial BMI: 30.12   [x] Prenatal Labs: done  [x] Cervical Cancer Screening up to date  [x] Rh status: +  [x] Genetic Screening:  done  [x] NT US: (11-13 wks) done  [x] Baby ASA (if indicated): taking  [x] Pregnancy dated by: LMP    [x] Anatomy US: (19-20 wks)  [] Federal Sterilization consent signed (if indicated):  [x] 1hr GCT at 24-28wks:  [] Rhogam (if indicated):   [x] Fetal Surveillance (if indicated): growth US at 30 and 36wks for C.HTN  [x] Tdap (27-32 wks, may be given up to 36 wks if initial window missed):  declines  [x] RSV (32-36 wks) (Sept. to end of ): wants but didn't schedule  [x] Flu Vaccine: declines    [] Breastfeeding:  [] Postpartum Birth control method:   [x] GBS at 36 - 37 wks: Neg.  [x] 39 weeks discussion of IOL vs. Expectant management: 37wk induction  [x] Mode of delivery ( anticipated ): vag           Chronic hypertension in pregnancy ([facility]) 2024 by Samreen Mancilla DO  No    Priority:  Medium       Overview Addendum 2024 12:18 PM by Pilar Alba MD     Not on med s  Baby ASA    IOL scheduled          Prenatal care insufficient, second trimester ([facility]) 2024 by Samreen Mancilla, DO  2024 by Pilar Alba MD      uterine contractions ([facility]) 8/11/2019 by Robert Haynes, RN  11/5/2024 by Pilar Alba MD            Subjective   The patient's pain is well controlled with current medication regimen. She denies chest pain, cough, shortness of breath, headaches, vision changes, pain under right breast, fever, heavy vaginal bleeding, abdominal pain, dizziness, fatigue, or chills. She denies any breast concerns or emotional concerns at this time.     Christiano Prieto is PPD#1 s/p vaginal delivery who reports feeling overall well.  She denies any concerns today. No acute events overnight. She is ambulating and urinating without difficulty. She is tolerating an adult regular diet, passing flatus, and has had a bowel movment      Objective   Allergies:   Patient has no known allergies.         Last Vitals:  Temp Pulse Resp BP MAP Pulse Ox   36.5 °C (97.7 °F) 77 20 126/80   98 %     Vitals Min/Max Last 24 Hours:  Temp  Min: 36.2 °C (97.2 °F)  Max: 36.8 °C (98.2 °F)  Pulse  Min: 77  Max: 97  Resp  Min: 16  Max: 20  BP  Min: 99/61  Max: 126/80    Physical Exam:  Constitutional: examination reveals a well developed, well nourished, female, in no acute distress. She is alert, pleasant and cooperative.  Cardiac: warm and well perfused, S1, S2, RRR  Respiratory:  Even and unlabored breathing in room air, clear to auscultation bilaterally. No crackles or wheezes.  Fundus: Firm and below umbilicus  Derm: Skin color, texture, turgor normal. No rashes, or lesions.    Abdominal: soft, non-tender, non-distended, active bowel sounds x4, no masses, no organomegaly  Extremities: Symmetrical, no redness or tenderness in the calves or thighs, no edema or discoloration  Neurological: PERRLA. alert, oriented, normal speech, no focal findings or movement disorder noted.  Psychological: Appropriate mood and affect. Awake and alert; oriented to person, place, and time.   Skin: no rashes or lesions    Lab Data:  Labs in chart were reviewed.  Lab  Results   Component Value Date    WBC 9.8 11/15/2024    HGB 11.1 (L) 11/15/2024    HCT 34.1 (L) 11/15/2024     11/15/2024     Lab Results   Component Value Date    GLUCOSE 96 11/15/2024     (L) 11/15/2024    K 4.0 11/15/2024     11/15/2024    CO2 20 (L) 11/15/2024    ANIONGAP 14 11/15/2024    BUN 6 11/15/2024    CREATININE 0.44 (L) 11/15/2024    EGFR >90 11/15/2024    CALCIUM 7.7 (L) 11/15/2024    ALBUMIN 3.6 11/15/2024    PROT 6.1 (L) 11/15/2024    ALKPHOS 113 (H) 11/15/2024    ALT 9 11/15/2024    AST 13 11/15/2024    BILITOT 0.2 11/15/2024     0   Lab Value Date/Time    UTPCR  11/15/2024 1023      Comment:      One or more analytes used in this calculation is outside of the analytical measurement range. Calculation cannot be performed.    UTPCR  10/28/2024 1251      Comment:      One or more analytes used in this calculation is outside of the analytical measurement range. Calculation cannot be performed.    UTPCR  10/09/2024 1049      Comment:      One or more analytes used in this calculation is outside of the analytical measurement range. Calculation cannot be performed.    UTPCR 0.07 05/17/2024 1158

## 2024-11-17 NOTE — LACTATION NOTE
Lactation Consultant Note  Lactation Consultation  Reason for Consult: Follow-up assessment, Late  infant, Other (Comment) (mother plans to pump exclusively)  Consultant Name: Ariana Cardoso RN IBCLC    Maternal Information  Has mother  before?: No  Infant to breast within first 2 hours of birth?: Yes  Exclusive Pump and Bottle Feed: Yes    Maternal Assessment  Breast Assessment: Other (Comment) (deferred -mother feeding infant a bottle)  Nipple Assessment: Other (Comment) (deferred -mother feeding infant a bottle)  [place] Assessment: Other (Comment) (deferred)    Infant Assessment  Infant Behavior: Light sleep  Infant Assessment:  (suck not assessd as mother feeding infant a bottle during consult)    Feeding Assessment  Nutrition Source: Breastmilk, Formula (per mother’s request)  Feeding Method: Feeding expressed breastmilk, Syringe feeding  Unable to assess infant feeding at this time: Other (Comment) (mother plans to pump only -does not plan on latching infant directly to her breast)    LATCH TOOL       Breast Pump  Pump: Hospital grade electric pump, Double breast pumping  Frequency: 5-7 times per day  Duration: Initiate phase  Breast Shield Size and Type: 21 mm  Volume of Milk Production: -1  Units of Volume: mL per session    Other OB Lactation Tools       Patient Follow-up  Inpatient Lactation Follow-up Needed : Yes  Outpatient Lactation Follow-up: Recommended    Other OB Lactation Documentation  Maternal Risk Factors: Hypertension, Preeclampsia,  delivery <37 weeks ([facility])  Infant Risk Factors: Prematurity <37 weeks, Low birth weight <2500 g    Recommendations/Summary  Mother feeding infant a bottle of formula upon my arrival to room. Mother shared that her plan for feeding is to exclusively pump. She stated that she is not interested in latching infant directly to the breast. She reported that she had pumped often yesterday but had only pumped once today. Encouraged to increase  frequency of pumping sessions to every three hours. Discussed with mother the importance of regular and consistent double breast pumping in order to adequately stimulate the breasts to establish a full milk supply. She reports that she had obtained a few ml's of colostrum which she fed to infant. Mother expressed concern over volume obtained and we reviewed that the amount she pumped out was good and adequate at this time. Instructed to always offer infant her own ebm first before supplementing infant with any formula. Mother denied having any questions nor concerns at this time. She has a breast pump for home.

## 2024-11-18 ENCOUNTER — APPOINTMENT (OUTPATIENT)
Dept: OBSTETRICS AND GYNECOLOGY | Facility: CLINIC | Age: 29
End: 2024-11-18
Payer: COMMERCIAL

## 2024-11-18 PROCEDURE — 99254 IP/OBS CNSLTJ NEW/EST MOD 60: CPT

## 2024-11-18 PROCEDURE — 2500000005 HC RX 250 GENERAL PHARMACY W/O HCPCS

## 2024-11-18 PROCEDURE — 2500000001 HC RX 250 WO HCPCS SELF ADMINISTERED DRUGS (ALT 637 FOR MEDICARE OP)

## 2024-11-18 PROCEDURE — 2500000001 HC RX 250 WO HCPCS SELF ADMINISTERED DRUGS (ALT 637 FOR MEDICARE OP): Performed by: FAMILY MEDICINE

## 2024-11-18 PROCEDURE — 1100000001 HC PRIVATE ROOM DAILY

## 2024-11-18 RX ORDER — ARIPIPRAZOLE 5 MG/1
5 TABLET ORAL EVERY 12 HOURS
Status: DISCONTINUED | OUTPATIENT
Start: 2024-11-18 | End: 2024-11-19

## 2024-11-18 RX ORDER — HYDROXYZINE HYDROCHLORIDE 25 MG/1
25 TABLET, FILM COATED ORAL EVERY 6 HOURS PRN
Status: DISCONTINUED | OUTPATIENT
Start: 2024-11-18 | End: 2024-11-18

## 2024-11-18 RX ORDER — CYCLOBENZAPRINE HCL 10 MG
10 TABLET ORAL ONCE
Status: COMPLETED | OUTPATIENT
Start: 2024-11-18 | End: 2024-11-18

## 2024-11-18 RX ORDER — HYDROXYZINE HYDROCHLORIDE 25 MG/1
50 TABLET, FILM COATED ORAL EVERY 8 HOURS PRN
Status: DISCONTINUED | OUTPATIENT
Start: 2024-11-18 | End: 2024-11-19 | Stop reason: HOSPADM

## 2024-11-18 RX ORDER — HYDROXYZINE HYDROCHLORIDE 25 MG/1
50 TABLET, FILM COATED ORAL EVERY 6 HOURS PRN
Status: DISCONTINUED | OUTPATIENT
Start: 2024-11-18 | End: 2024-11-18

## 2024-11-18 RX ADMIN — ACETAMINOPHEN 975 MG: 325 TABLET, FILM COATED ORAL at 00:55

## 2024-11-18 RX ADMIN — HYDROXYZINE HYDROCHLORIDE 25 MG: 25 TABLET ORAL at 08:06

## 2024-11-18 RX ADMIN — CYCLOBENZAPRINE 10 MG: 10 TABLET, FILM COATED ORAL at 16:55

## 2024-11-18 RX ADMIN — IBUPROFEN 600 MG: 600 TABLET ORAL at 17:26

## 2024-11-18 RX ADMIN — ACETAMINOPHEN 975 MG: 325 TABLET, FILM COATED ORAL at 06:34

## 2024-11-18 RX ADMIN — IBUPROFEN 600 MG: 600 TABLET ORAL at 00:55

## 2024-11-18 RX ADMIN — LIDOCAINE 1 PATCH: 4 PATCH TOPICAL at 21:00

## 2024-11-18 RX ADMIN — ACETAMINOPHEN 975 MG: 325 TABLET, FILM COATED ORAL at 17:26

## 2024-11-18 RX ADMIN — IBUPROFEN 600 MG: 600 TABLET ORAL at 12:39

## 2024-11-18 RX ADMIN — HYDROXYZINE HYDROCHLORIDE 25 MG: 25 TABLET ORAL at 21:06

## 2024-11-18 RX ADMIN — ACETAMINOPHEN 975 MG: 325 TABLET, FILM COATED ORAL at 12:39

## 2024-11-18 RX ADMIN — IBUPROFEN 600 MG: 600 TABLET ORAL at 06:34

## 2024-11-18 RX ADMIN — ARIPIPRAZOLE 5 MG: 5 TABLET ORAL at 17:22

## 2024-11-18 ASSESSMENT — PAIN SCALES - GENERAL
PAINLEVEL_OUTOF10: 4
PAINLEVEL_OUTOF10: 8
PAINLEVEL_OUTOF10: 0 - NO PAIN
PAINLEVEL_OUTOF10: 0 - NO PAIN
PAINLEVEL_OUTOF10: 8
PAINLEVEL_OUTOF10: 5 - MODERATE PAIN

## 2024-11-18 ASSESSMENT — ENCOUNTER SYMPTOMS
NERVOUS/ANXIOUS: 1
CARDIOVASCULAR NEGATIVE: 1
GASTROINTESTINAL NEGATIVE: 1
RESPIRATORY NEGATIVE: 1
EYES NEGATIVE: 1
AGITATION: 0
CONSTITUTIONAL NEGATIVE: 1
CONFUSION: 0
DYSPHORIC MOOD: 0

## 2024-11-18 ASSESSMENT — PAIN DESCRIPTION - LOCATION: LOCATION: ABDOMEN

## 2024-11-18 ASSESSMENT — PAIN DESCRIPTION - DESCRIPTORS: DESCRIPTORS: CRAMPING

## 2024-11-18 NOTE — CARE PLAN
The patient's goals for the shift include rest    The clinical goals for the shift include VSS, bleeding minimal, be free from anxiety      Problem: Postpartum  Goal: Minimal s/sx of HDP and BP<160/110  Outcome: Progressing     Problem: Pain - Adult  Goal: Verbalizes/displays adequate comfort level or baseline comfort level  Outcome: Progressing     Problem: Safety - Adult  Goal: Free from fall injury  Outcome: Progressing     VSS, bleeding minimal, anxiety resolved with medication and rest. Bonding with infant.

## 2024-11-18 NOTE — CARE PLAN
The patient's goals for the shift include rest    The clinical goals for the shift include light to scant lochia      Problem: Postpartum  Goal: Minimal s/sx of HDP and BP<160/110  Outcome: Progressing     Problem: Pain - Adult  Goal: Verbalizes/displays adequate comfort level or baseline comfort level  Outcome: Progressing     Problem: Safety - Adult  Goal: Free from fall injury  Outcome: Progressing      Patient has had stable VS and assessments, pain well controlled and bleeding has been appropriate during this shift.  Patient bonding with infant, feeding infant every 2-3 hours

## 2024-11-18 NOTE — PROGRESS NOTES
Postpartum Progress Note    Assessment/Plan   Christiano Prieto is a 29 y.o., , who was admitted on 11/15/2024 for IOL as a transfer from [facility] for sPEC, delivered at 36w2d gestation via Vaginal, Spontaneous. Her pregnancy was complicated by Asthma and Anxiety is now postpartum day 2    Routine PostPartum care  Now PPD#2 s/p Vaginal, Spontaneous on 2024  -Meeting all postpartum milestones  - Continue routine postpartum care  - Pain well controlled on PO medications  - DVT Score: 5 - encourage ambulation,  SCDs, and  ppx lovenox  - O POS, Rhogam not indicated  -  mL  - Hgb:   Results from last 7 days   Lab Units 11/15/24  1931 11/15/24  1014   HEMOGLOBIN g/dL 11.1* 11.5*      siPEC   -cHTN in pregnancy, controlled previously on no medications  -[place] [facility]  -HELLP labs negative, P:C undetectable (11/15)  -Pt saw four floaters in her vision this AM that lasted <30seconds, pt reports may be from sleep deprivation; otherwise asx will continue to monitor  -Normotensive in postpartum state; no meds  -Pt amenable to 3 day admission. Agreeable to 2-5 day blood pressure check, has blood pressure cuff at home.  -BP cuff for home monitoring    Anxiety  -Symptoms not controlled with hydroxyzine 25mg, pt reports feeling like she is having panic attacks  -Transition to 25mg for moderate and 50mg for severe anxiety today and will continue to monitor  -Long standing hx without tolerance to multiple medications in SSRI class but amenable to long term controller medication   -Will consult psych for further assistance on management     Asthma  -Rare use of albuterol    Maternal Well-Being  -Bonding appropriately with male infant at bedside.  -Emotional support and reassurance provided.  -Postpartum course anticipatory guidance provided, reviewed normal expectations and maternal warning signs  -Provided family centered care, and addressed all questions and concerns      Feeding  -The patient is bottle feeding  - Breastfeeding/pumping encouraged, discussed with patient and she has made a well-informed decision on what she can emotionally tolerate at this time given her anxiety as above and will bottle feed for now, discussed lactation is available if she changes her mind   -Lactation consult ordered, PRN    Family Planning  - interval IUD     Dispo  - Anticipate d/c on PPD #3 pending BP control.  - The signs and symptoms of PEC were reviewed with the patient, including unrelenting headache, vision changes/blurred vision, and pain underneath the right breast.   - BP cuff for home monitoring BP BID.  - On discharge, follow up with primary OB in 2-5 days for BP check, and 4-6 weeks for postpartum visit.    Ayana Molina MD PGY-1     Principal Problem:    36 weeks gestation of pregnancy ([facility])  Active Problems:    Gastroesophageal reflux disease    Asthma    Pregnancy Problems (from 05/17/24 to present)       Problem Noted Diagnosed Resolved    Anxiety in pregnancy in third trimester, antepartum ([facility]) 10/9/2024 by Kathrin JARA MD  No    Priority:  Medium       36 weeks gestation of pregnancy ([facility]) 10/3/2024 by Samreen Mancilla DO  No    Priority:  Medium       Overview Addendum 11/14/2024 11:38 AM by Samreen Mancilla, DO     Desired provider in labor: [] CNM  [x] Physician  [x] Blood Products: [x] Yes, accepts [] No, needs counseling  [x] Initial BMI: 30.12   [x] Prenatal Labs: done  [x] Cervical Cancer Screening up to date  [x] Rh status: +  [x] Genetic Screening:  done  [x] NT US: (11-13 wks) done  [x] Baby ASA (if indicated): taking  [x] Pregnancy dated by: LMP    [x] Anatomy US: (19-20 wks)  [] Federal Sterilization consent signed (if indicated):  [x] 1hr GCT at 24-28wks:  [] Rhogam (if indicated):   [x] Fetal Surveillance (if indicated): growth US at 30 and 36wks for C.HTN  [x] Tdap (27-32 wks, may be given up to 36 wks if initial window missed):  declines  [x] RSV  (32-36 wks) (Sept. to end of ): wants but didn't schedule  [x] Flu Vaccine: declines    [] Breastfeeding:  [] Postpartum Birth control method:   [x] GBS at 36 - 37 wks: Neg.  [x] 39 weeks discussion of IOL vs. Expectant management: 37wk induction  [x] Mode of delivery ( anticipated ): vag           Chronic hypertension in pregnancy ([facility]) 2024 by Samreen Mancilla DO  No    Priority:  Medium       Overview Addendum 2024 12:18 PM by Pilar Alba MD     Not on med s  Baby ASA    IOL scheduled          Prenatal care insufficient, second trimester ([facility]) 2024 by Samreen Mancilla DO  2024 by Pilar Alba MD     uterine contractions ([facility]) 2019 by Robert Haynes, RN  2024 by Pilar Alba MD            Subjective   The patient's pain is well controlled with current medication regimen. She denies chest pain, cough, shortness of breath, headaches, vision changes, pain under right breast, fever, heavy vaginal bleeding, abdominal pain, dizziness, fatigue, or chills. She denies any breast concerns at this time. Reports anxiety is poorly controlled at this time and feels that atarax is not touching her anxiety symptoms. Reports she has needed 50mg in the past. Wants to start long term med but has failed multiple meds in the past. One episode of four floaters in her vision that immediately resolved. Otherwise doing well, no other complaints at this time.       Christiano Prieto is PPD#2 s/p vaginal delivery who reports feeling overall well.  She denies any concerns today. No acute events overnight. She is ambulating and urinating without difficulty. She is tolerating an adult regular diet, passing flatus, and has had a bowel movment      Objective   Allergies:   Patient has no known allergies.         Last Vitals:  Temp Pulse Resp BP MAP Pulse Ox   36.6 °C (97.9 °F) 78 20 137/79   98 %     Vitals Min/Max Last 24 Hours:  Temp  Min: 36 °C (96.8 °F)  Max: 37 °C (98.6  °F)  Pulse  Min: 65  Max: 78  Resp  Min: 16  Max: 20  BP  Min: 121/79  Max: 137/79    Physical Exam:  Constitutional: examination reveals a well developed, well nourished, female, in no acute distress. She is alert, pleasant and cooperative.  Cardiac: warm and well perfused  Respiratory:  Even and unlabored breathing in room air  Fundus: Firm and below umbilicus  Derm: Skin color, texture, turgor normal. No rashes, or lesions.    Abdominal: soft, non-tender, non-distended, no masses, no organomegaly  Extremities: Symmetrical, no redness or tenderness in the calves or thighs, no edema or discoloration  Neurological: alert, oriented, normal speech, no focal findings or movement disorder noted.  Psychological: Appropriate mood and affect. Awake and alert; oriented to person, place, and time.   Skin: no rashes or lesions    Lab Data:  Labs in chart were reviewed.  Lab Results   Component Value Date    WBC 9.8 11/15/2024    HGB 11.1 (L) 11/15/2024    HCT 34.1 (L) 11/15/2024     11/15/2024     Lab Results   Component Value Date    GLUCOSE 96 11/15/2024     (L) 11/15/2024    K 4.0 11/15/2024     11/15/2024    CO2 20 (L) 11/15/2024    ANIONGAP 14 11/15/2024    BUN 6 11/15/2024    CREATININE 0.44 (L) 11/15/2024    EGFR >90 11/15/2024    CALCIUM 7.7 (L) 11/15/2024    ALBUMIN 3.6 11/15/2024    PROT 6.1 (L) 11/15/2024    ALKPHOS 113 (H) 11/15/2024    ALT 9 11/15/2024    AST 13 11/15/2024    BILITOT 0.2 11/15/2024     0   Lab Value Date/Time    UTPCR  11/15/2024 1023      Comment:      One or more analytes used in this calculation is outside of the analytical measurement range. Calculation cannot be performed.    UTPCR  10/28/2024 1251      Comment:      One or more analytes used in this calculation is outside of the analytical measurement range. Calculation cannot be performed.    UTPCR  10/09/2024 1049      Comment:      One or more analytes used in this calculation is outside of the analytical measurement  range. Calculation cannot be performed.    UTPCR 0.07 05/17/2024 0509

## 2024-11-18 NOTE — LACTATION NOTE
Lactation Consultant Note  Lactation Consultation       Maternal Information       Maternal Assessment       Infant Assessment       Feeding Assessment       LATCH TOOL       Breast Pump       Other OB Lactation Tools       Patient Follow-up       Other OB Lactation Documentation       Recommendations/Summary  Mother verbalized she has not been pumping at this time due to feeling anxious and exhausted, has been providing infant formula via bottles. Deferred pumping and lactation visit at this time. Iverbalized understanding and made aware that lactation support is available if she desires.

## 2024-11-18 NOTE — CONSULTS
Inpatient consult to Psychiatry  Consult performed by: Gerardo Ramsey MD  Consult ordered by: Travis Barrios MD  Reason for consult: Anxiety         HISTORY OF PRESENT ILLNESS:  Christiano Prieto is a 29 y.o. female with a past psychiatric history of anxiety, panic disorder, depression, and alcohol use disorder and a past medical history of HTN and asthma who was admitted to [facility] on [facility] [place] [facility] [place] concerns of severe pre[place]. Psychiatry was consulted on  for worsening anxiety and panic attacks in need of long-term pharmacologic management.    On chart review, Christiano has a history of chronic anxiety and panic attacks, alcohol use disorder, and depression. Multiple medications have been trialed in the past, including sertraline, propranolol, escitalopram, and low-dose benzodiazepines; however, she reported lack of effectiveness with these medications.     On interview, Christiano shared that she has been experiencing worsening symptoms of anxiety over the past few months, along with more frequent panic attacks. She describes these panic attacks as feelings of heart palpitations, sweating, tightening in her chest, and lightheadedness. Regarding prior medications, Christiano stated that she tried sertraline 25mg but discontinued after 3 days due to exacerbation of her panic attacks. Additionally, she felt that the propranolol was not effective for the treatment of her symptoms. Christiano felt that she was previously able to manage her symptoms with hydroxyzine 25mg; however, she feels that the medication is no longer reliably treating her panic attacks and has required increased use, with up to three doses daily. She would like to have a short- and rapid-acting medication to provide relief of panic attack symptoms and is also interested in long-term treatment to prevent future panic attacks. Of note, the patient does not plan on breastfeeding her . She denied symptoms of  postpartum depression.    PSYCHIATRIC REVIEW OF SYSTEMS  Depression: negative  Anxiety:  endorses increased anxiety over the past couple of months  [place]: negative  Psychosis: negative  Delirium: negative   Trauma: negative    PSYCHIATRIC HISTORY  Prior diagnoses: anxiety, panic disorder, depression, alcohol use disorder  Prior hospitalizations: none  History of suicide attempts: none  History of self-harm: none  History of trauma/abuse/loss: none  History of violence: none    Current psychiatrist: Dr. Sherman Ricketts  Current outpatient treatment: hydroxyzine 25mg oral TID prn    Current psychiatric medications: hydroxyzine 25mg oral TID prn  Past psychiatric medications: sertraline 100mg and 25mg, escitalopram, low-dose benzodiazepine (per chart)    Family psychiatric history:      - Psychiatric disorders: anxiety in grandmother (on sertraline), mother, and brother     - Suicide: denies     - Substance use: denies     - Medication history: denies     - Neurologic diseases: denies    SUBSTANCE USE HISTORY   Tobacco: The patient endorses smoking cigarettes for 10+ years, <1 pack per day  Alcohol: denies alcohol use; per chart review, patient endorsed alcohol use disorder in the past but denied use since February 2024  Cannabis: denies  Other substances: denies    SOCIAL HISTORY  Social History     Socioeconomic History    Marital status: Single   Tobacco Use    Smoking status: Former     Types: Cigarettes    Smokeless tobacco: Never   Vaping Use    Vaping status: Never Used   Substance and Sexual Activity    Alcohol use: Never    Drug use: Never     Social Drivers of Health     Financial Resource Strain: Low Risk  (11/15/2024)    Overall Financial Resource Strain (CARDIA)     Difficulty of Paying Living Expenses: Not hard at all   Food Insecurity: No Food Insecurity (11/15/2024)    Hunger Vital Sign     Worried About Running Out of Food in the Last Year: Never true     Ran Out of Food in the Last Year: Never true    Transportation Needs: No Transportation Needs (11/15/2024)    PRAPARE - Transportation     Lack of Transportation (Medical): No     Lack of Transportation (Non-Medical): No   Physical Activity: Inactive (11/15/2024)    Exercise Vital Sign     Days of Exercise per Week: 2 days     Minutes of Exercise per Session: 0 min   Stress: No Stress Concern Present (11/15/2024)    [employer] [place] [employer] - Occupational Stress Questionnaire     Feeling of Stress : Only a little   Social Connections: Moderately Isolated (11/15/2024)    Social Connection and Isolation Panel [NHANES]     Frequency of Communication with Friends and Family: Three times a week     Frequency of Social Gatherings with Friends and Family: Three times a week     Attends Taoist Services: Never     Active Member of Clubs or Organizations: No     Attends Club or Organization Meetings: Never     Marital Status: Living with partner   Intimate Partner Violence: Not At Risk (11/15/2024)    Humiliation, Afraid, Rape, and Kick questionnaire     Fear of Current or Ex-Partner: No     Emotionally Abused: No     Physically Abused: No     Sexually Abused: No      Current living situation: The patient lives at home with her boyfriend and three children  Current employment/source of income: The patient works from home as a   Current stressors: Christiano denies additional stressors beyond her existing anxiety and panic attacks    Employment: Works as a   Marital status: Not    Children: Christiano has three children (ages 11, 5, and )  Social support: mother, grandmother, boyfriend (per chart review)  Legal history: denies   history: denies  Access to weapons: denies    PAST MEDICAL HISTORY  Past Medical History:   Diagnosis Date    ETOH abuse     HTN (hypertension)     Moderate asthma (HHS-HCC)     Panic anxiety syndrome         Additional Past Medical History: none  Prior  "Head trauma/TBI/LOC/seizure history: none  Ob/Gyn history: Patient is , recent pregnancy complicated by severe preeclampsia with development of headaches  LMP/contraception: IUD for contraception    PAST SURGICAL HISTORY  No past surgical history on file.     Additional Past Surgical History: N/A    FAMILY HISTORY  No family history on file.     ALLERGIES  Patient has no known allergies.    Some components of the patient's history were obtained through personal review of the patient's available medical records.    OARRS REVIEW  OARRS checked: Reviewed.     OBJECTIVE    VITALS      2024     4:35 PM 2024     7:30 PM 2024     8:28 PM 2024    12:55 AM 2024     3:27 AM 2024     8:14 AM 2024    11:33 AM   Vitals   Systolic 121 123 128 128 133 137 122   Diastolic 79 78 83 85 83 79 83   Heart Rate 71 76 77 68 65 78 78   Temp 36.4 °C (97.5 °F) 36.9 °C (98.4 °F) 37 °C (98.6 °F) 36 °C (96.8 °F) 36.4 °C (97.6 °F) 36.6 °C (97.9 °F) 36.7 °C (98.1 °F)   Resp 18 16 20 18 16 20 16        MENTAL STATUS EXAM  Appearance: Patient is laying in bed and appears tired but is dressed and appears her stated age.  Attitude: Calm and cooperative, pleasant during interview  Behavior: Relaxed, maintains good eye contact during interview  Motor Activity: No evidence of EPS or psychomotor retardation apparent  Speech: normal tone, rate, and rhythm  Mood: \"Good\"  Affect: Pleasant, congruent with stated mood  Thought Process: Linear and goal-directed; answers appropriate to questions being asked  Thought Content:  Patient denied suicidal and homicidal ideation.  Thought Perception: Patient did not appear to be responding to internal stimuli  Cognition: Patient was alert and oriented to the conversation. She was knowledgeable of her situation and engaged in discussion of medication options.   Insight: Good; The patient is able to describe her symptoms and understands that the symptoms are a result of " her anxiety and panic attacks.  Judgement: Good; She understands the need for long-term management of her symptoms.    PHYSICAL EXAM  Patient did not appear to be in acute distress. Exam of other systems was not completed.    MEDICAL REVIEW OF SYSTEMS  Review of Systems   Constitutional: Negative.    HENT: Negative.     Eyes: Negative.    Respiratory: Negative.     Cardiovascular: Negative.    Gastrointestinal: Negative.    Psychiatric/Behavioral:  Negative for agitation, confusion, dysphoric mood, self-injury and suicidal ideas. The patient is nervous/anxious.         Endorses frequent panic attacks        HOME MEDICATIONS  Medication Documentation Review Audit       Reviewed by Katy Silva RN (Registered Nurse) on 11/15/24 at 1732      Medication Order Taking? Sig Documenting Provider Last Dose Status   diphenhydrAMINE (Sominex) 25 mg tablet [id number] Yes Take 1 tablet (25 mg) by mouth every 6 hours if needed for other (Headache, take with reglan). Bettie Wadsworth MD 11/14/2024 Active   ferrous gluconate 324 (38 Fe) mg tablet [id number] Yes Take 1 tablet (38 mg of iron) by mouth once daily with breakfast. Kathrin JARA MD 11/14/2024 Active   hydrOXYzine HCL (Atarax) 25 mg tablet [id number] Yes Take 1 tablet (25 mg) by mouth 2 times a day as needed for anxiety. For severe anxiety Kavita Girard MD 11/15/2024 Morning Active   metoclopramide (Reglan) 10 mg tablet [id number] Yes Take 1 tablet (10 mg) by mouth every 6 hours if needed (Headache, take with benadryl). Bettie Wadsworth MD 11/14/2024 Active   miscellaneous medical supply (Blood Pressure Cuff) misc [id number]  1 Device 2 times a day. Samreen Mancilla, DO  Active   PRENATAL VIT 10-IRON-FOLIC-DHA ORAL [id number] Yes Take 1 tablet by mouth once daily. Historical MD Carlos 11/15/2024 Morning Active   propranolol (Inderal) 10 mg tablet [id number] Yes Take 1 tablet (10 mg) by mouth 3 times a day as needed (anxiety). Kavita Girard MD 11/15/2024 Morning  Active   Symbicort 160-4.5 mcg/actuation inhaler [id number] No Inhale 2 puffs 2 times a day. as instructed   Patient not taking: Reported on 11/15/2024    Samreen Mancilla, DO More than a month Active                  CURRENT MEDICATIONS  Scheduled medications  acetaminophen, 975 mg, oral, q6h  enoxaparin, 40 mg, subcutaneous, q24h  fluticasone furoate-vilanteroL, 1 puff, inhalation, Daily  ibuprofen, 600 mg, oral, q6h      Continuous medications  magnesium sulfate, 2 g/hr, Last Rate: Stopped (11/17/24 1000)      PRN medications  PRN medications: benzocaine-menthoL, bisacodyl, calcium gluconate, carboprost, diphenhydrAMINE **OR** diphenhydrAMINE, hydrALAZINE, hydrOXYzine HCL **OR** hydrOXYzine HCL, labetaloL, lanolin, lidocaine, loperamide, magnesium hydroxide, measles, mumps and rubella, melatonin, methylergonovine, miSOPROStoL, NIFEdipine, ondansetron **OR** ondansetron, oxytocin, oxytocin, oxytocin, oxytocin, oxytocin, polyethylene glycol, propranolol, psyllium, simethicone, tranexamic acid, witch hazel     LABS  No results found for this or any previous visit (from the past 24 hours).     IMAGING  No results found.     PSYCHIATRIC RISK ASSESSMENT  Violence Risk Factors:  none  Acute Risk of Harm to Others is Considered: Low  Suicide Risk Factors: none  Protective Factors: denies suicidal ideation  Acute Risk of Harm to Self is Considered: Low    ASSESSMENT AND PLAN  Christiano Prieto is a 29 y.o. female with a past psychiatric history of anxiety, panic disorder, depression, and alcohol use disorder and a past medical history of HTN and asthma who was admitted to [facility] on 11/15/2024 as transfer from [facility] for concerns of severe preeclampsia. Psychiatry was consulted on 11/18 for worsening anxiety and panic attacks in need of long-term pharmacologic management.    On initial assessment, Christiano endorses chronic symptoms of anxiety and panic attacks that have worsened recently and have caused increased  distress in her life. She recently gave birth on 11/15/2024 and reported that she noticed worsening symptoms throughout her pregnancy. When discussing prior treatments for her anxiety and panic attacks, she stated that she has tried multiple SSRIs (escitalopram and sertraline) and propranolol, all of which she felt were ineffective and caused worsening of her symptoms. Most recently, she endorses requiring more frequent use of her hydroxyzine with insufficient relief of symptoms.     Christiano would like to explore a new medication regimen, with hopes for a rapid-acting medication to assist with acute exacerbations and willingness to attempt long-term preventive treatment. Importantly, the patient is not planning to breastfeed her . With the patient's disinterest in SSRIs, the use of an SNRI was considered; however, was not preferred due to her history of HTN and severe preeclampsia. Furthermore, the use of benzodiazepines for short-acting treatment was not preferred for this patient given her history of alcohol use disorder.     Additional options for pharmacologic management of her symptoms include increasing hydroxyzine to 50mg for short-term treatment along with the addition of a maintenance medication, such as buspirone or aripiprazole. Medication recommendations as below.     IMPRESSION  #Anxiety, unspecified   #Panic disorder    RECOMMENDATIONS  Safety:  - Patient does not currently meet criteria for inpatient psychiatric admission.   - Patient does not require a 1:1 sitter from a psychiatric perspective at this time.    Medications:  - INCREASE hydroxyzine from 25mg PO TID PRN to 50mg PO TID PRN  - START Aripiprazole 5mg PO daily for mood     Follow-up:  - Patient plans to follow-up with Dr. Sherman Ricketts in outpatient psychiatry next week ().    ==========  - Discussed recommendations with primary team.  - Psychiatry will continue to follow.    Thank you for allowing us to participate in the care of  this patient. Please page [phone] with any questions or concerns.    Patient seen and staffed with Dr. Ricketts, who agrees with above plan.    Mary Dixon  Medical Student, MS3    Patient seen and discussed with our excellent medical student Mary Dixon. I reviewed and edited portions of her note above and agree with the overall recommendations.     Discussed treatment, care, and recommendations with Dr. Sherman Ricketts who agrees with stated above.    Gerardo Ramsey MD  PGY2 Psychiatry

## 2024-11-19 VITALS
HEART RATE: 83 BPM | HEIGHT: 63 IN | OXYGEN SATURATION: 97 % | SYSTOLIC BLOOD PRESSURE: 143 MMHG | TEMPERATURE: 97.3 F | WEIGHT: 194.4 LBS | DIASTOLIC BLOOD PRESSURE: 92 MMHG | BODY MASS INDEX: 34.45 KG/M2 | RESPIRATION RATE: 16 BRPM

## 2024-11-19 PROCEDURE — 2500000004 HC RX 250 GENERAL PHARMACY W/ HCPCS (ALT 636 FOR OP/ED)

## 2024-11-19 PROCEDURE — 2500000001 HC RX 250 WO HCPCS SELF ADMINISTERED DRUGS (ALT 637 FOR MEDICARE OP)

## 2024-11-19 PROCEDURE — 2500000001 HC RX 250 WO HCPCS SELF ADMINISTERED DRUGS (ALT 637 FOR MEDICARE OP): Performed by: FAMILY MEDICINE

## 2024-11-19 PROCEDURE — 99238 HOSP IP/OBS DSCHRG MGMT 30/<: CPT | Performed by: NURSE PRACTITIONER

## 2024-11-19 RX ORDER — ARIPIPRAZOLE 5 MG/1
5 TABLET ORAL DAILY
Qty: 30 TABLET | Refills: 1 | Status: ON HOLD | OUTPATIENT
Start: 2024-11-20

## 2024-11-19 RX ORDER — HYDROXYZINE HYDROCHLORIDE 50 MG/1
50 TABLET, FILM COATED ORAL EVERY 8 HOURS PRN
Qty: 90 TABLET | Refills: 1 | Status: ON HOLD | OUTPATIENT
Start: 2024-11-19

## 2024-11-19 RX ORDER — ARIPIPRAZOLE 5 MG/1
5 TABLET ORAL DAILY
Status: DISCONTINUED | OUTPATIENT
Start: 2024-11-20 | End: 2024-11-19 | Stop reason: HOSPADM

## 2024-11-19 RX ADMIN — ACETAMINOPHEN 975 MG: 325 TABLET, FILM COATED ORAL at 06:57

## 2024-11-19 RX ADMIN — HYDROXYZINE HYDROCHLORIDE 50 MG: 25 TABLET ORAL at 11:46

## 2024-11-19 RX ADMIN — IBUPROFEN 600 MG: 600 TABLET ORAL at 11:55

## 2024-11-19 RX ADMIN — ARIPIPRAZOLE 5 MG: 5 TABLET ORAL at 06:58

## 2024-11-19 RX ADMIN — ACETAMINOPHEN 975 MG: 325 TABLET, FILM COATED ORAL at 00:57

## 2024-11-19 RX ADMIN — ACETAMINOPHEN 975 MG: 325 TABLET, FILM COATED ORAL at 11:54

## 2024-11-19 RX ADMIN — ENOXAPARIN SODIUM 40 MG: 100 INJECTION SUBCUTANEOUS at 01:00

## 2024-11-19 RX ADMIN — IBUPROFEN 600 MG: 600 TABLET ORAL at 00:57

## 2024-11-19 ASSESSMENT — PAIN SCALES - GENERAL
PAINLEVEL_OUTOF10: 4
PAINLEVEL_OUTOF10: 0 - NO PAIN
PAINLEVEL_OUTOF10: 2

## 2024-11-19 NOTE — CARE PLAN
Problem: Postpartum  Goal: Minimal s/sx of HDP and BP<160/110  Outcome: Met     Problem: Pain - Adult  Goal: Verbalizes/displays adequate comfort level or baseline comfort level  Outcome: Met     Problem: Safety - Adult  Goal: Free from fall injury  Outcome: Met    Patient with some mild range blood pressures but VS and assessment otherwise stable. No c/o HA, CP, SOB, RUQ pain. Pain well controlled with PO meds. Infant requiring another overnight stay d/t low temperatures. Mom stable to be discharged to boarding status per OB with follow-up in 2-5 days for blood pressure check and 4-6 weeks for postpartum follow-up.

## 2024-11-19 NOTE — DISCHARGE SUMMARY
Discharge Summary    Admission Date: 11/15/2024  Discharge Date: 11/19/24      Discharge Diagnosis  36 weeks gestation of pregnancy ([facility])    Hospital Course  Delivery Date: 11/16/2024 9:56 AM  Delivery type: Vaginal, Spontaneous   GA at delivery: 36w2d  Outcome: Living  Anesthesia during delivery: Epidural  Intrapartum complications: None  Feeding method: Breastfeeding Status: No     Procedures: none  Contraception at discharge: none  Anxious to go home.  Feels well.  Denies any sPEC sx.  Reviewed sx. When to return prn.  Will fu with Dr. Simmons in [place].    Pertinent Physical Exam At Time of Discharge  General: Examination reveals a well developed, well nourished, female, in no acute distress. She is alert and cooperative.  Lungs: symmetrical, non-labored breathing.  Cardiac: warm, well-perfused.  Abdomen: soft, non-tender.  Fundus: firm, at umbilicus, and nontender.  Extremities: no redness or tenderness in the calves or thighs.  Neurological: alert, oriented, normal speech, no focal findings or movement disorder noted.     Last Vitals:  Temp Pulse Resp BP MAP Pulse Ox   36.2 °C (97.2 °F) 74 16 (!) 137/91 106 97 %     Discharge Meds     Your medication list        START taking these medications        Instructions Last Dose Given Next Dose Due   acetaminophen 325 mg tablet  Commonly known as: Tylenol      Take 3 tablets (975 mg) by mouth every 6 hours if needed for mild pain (1 - 3) or moderate pain (4 - 6).       ARIPiprazole 5 mg tablet  Commonly known as: Abilify  Start taking on: November 20, 2024      Take 1 tablet (5 mg) by mouth once daily. Do not fill before November 20, 2024.       ferrous sulfate (325 mg ferrous sulfate) tablet  Commonly known as: Iron (ferrous sulfate)      Take 1 tablet (325 mg) by mouth once daily in the morning. Take before meals. Take on an empty stomach with vitamin C supplement or vitamin C containing juice       ibuprofen 600 mg tablet      Take 1 tablet (600 mg) by mouth  every 6 hours for 10 days.       polyethylene glycol 17 gram packet  Commonly known as: Miralax      Take 17 g by mouth 2 times a day for 20 days.              CHANGE how you take these medications        Instructions Last Dose Given Next Dose Due   hydrOXYzine HCL 25 mg tablet  Commonly known as: Atarax  What changed: Another medication with the same name was added. Make sure you understand how and when to take each.      Take 1 tablet (25 mg) by mouth 2 times a day as needed for anxiety. For severe anxiety       hydrOXYzine HCL 50 mg tablet  Commonly known as: Atarax  What changed: You were already taking a medication with the same name, and this prescription was added. Make sure you understand how and when to take each.      Take 1 tablet (50 mg) by mouth every 8 hours if needed for anxiety.              CONTINUE taking these medications        Instructions Last Dose Given Next Dose Due   Blood Pressure Cuff misc  Generic drug: miscellaneous medical supply      1 Device 2 times a day.       diphenhydrAMINE 25 mg tablet  Commonly known as: Sominex      Take 1 tablet (25 mg) by mouth every 6 hours if needed for other (Headache, take with reglan).       ferrous gluconate 324 (38 Fe) mg tablet  Commonly known as: Fergon      Take 1 tablet (38 mg of iron) by mouth once daily with breakfast.       metoclopramide 10 mg tablet  Commonly known as: Reglan      Take 1 tablet (10 mg) by mouth every 6 hours if needed (Headache, take with benadryl).       PRENATAL VIT 10-IRON-FOLIC-DHA ORAL           propranolol 10 mg tablet  Commonly known as: Inderal      Take 1 tablet (10 mg) by mouth 3 times a day as needed (anxiety).              STOP taking these medications      Symbicort 160-4.5 mcg/actuation inhaler  Generic drug: budesonide-formoteroL                  Where to Get Your Medications        These medications were sent to [id number] - [place], [place] - [address] [address]      Phone:  [phone]   acetaminophen 325 mg tablet  ARIPiprazole 5 mg tablet  ferrous sulfate (325 mg ferrous sulfate) tablet  hydrOXYzine HCL 50 mg tablet  ibuprofen 600 mg tablet  polyethylene glycol 17 gram packet          Complications Requiring Follow-Up  Heavy vaginal bleeding, passing clots, fever and/or chills      Test Results Pending At Discharge  Pending Labs       Order Current Status    Surgical Pathology Exam - [facility] In process            Outpatient Follow-Up  Future Appointments   Date Time Provider Department Center   11/27/2024  3:30 PM Sherman Ricketts MD [id number]       I spent 10 minutes in the professional and overall care of this patient.      Payton Hernandez, APRN-CNP

## 2024-11-25 LAB
LABORATORY COMMENT REPORT: NORMAL
PATH REPORT.FINAL DX SPEC: NORMAL
PATH REPORT.GROSS SPEC: NORMAL
PATH REPORT.RELEVANT HX SPEC: NORMAL
PATH REPORT.TOTAL CANCER: NORMAL

## 2024-11-26 NOTE — PROGRESS NOTES
Subjective   29 y.o.  presenting for postpartum follow-up, BP check, PEC diagnosed at 36w1d.    States that she has not been consistent on taking her blood pressures at home.  This morning at 0630, BP was 132/86.  The other blood pressures that she has taken have been in the 130s/80s range, though admits to having a few diastolic pressures in the 90s.      Having an anxiety attack on the call this morning, though took her BP because it was making her feel more anxious if she did not check it, 150/100.    Concern and anxiety that her left hand is numb, worried that this is a complication from the epidural, requesting referral to another provider to discuss this.     Denies HA, visual changes, or RUQ pain.    Delivery Date: 2024  Gestational Age: 36w2d   Type of Delivery: Vaginal, Spontaneous     Pregnancy Problems (from 24 to 24)       Problem Noted Diagnosed Resolved    Anxiety in pregnancy in third trimester, antepartum ([facility]) 10/9/2024 by Kathrin JARA MD  2024 by Chantell Perdomo MA    36 weeks gestation of pregnancy ([facility]) 10/3/2024 by Samreen Mancilla DO  2024 by Chantell Perdomo MA    Overview Addendum 2024 11:38 AM by Samreen Mancilla DO     Desired provider in labor: [] CNM  [x] Physician  [x] Blood Products: [x] Yes, accepts [] No, needs counseling  [x] Initial BMI: 30.12   [x] Prenatal Labs: done  [x] Cervical Cancer Screening up to date  [x] Rh status: +  [x] Genetic Screening:  done  [x] NT US: (11-13 wks) done  [x] Baby ASA (if indicated): taking  [x] Pregnancy dated by: LMP    [x] Anatomy US: (19-20 wks)  [] Federal Sterilization consent signed (if indicated):  [x] 1hr GCT at 24-28wks:  [] Rhogam (if indicated):   [x] Fetal Surveillance (if indicated): growth US at 30 and 36wks for C.HTN  [x] Tdap (27-32 wks, may be given up to 36 wks if initial window missed):  declines  [x] RSV (32-36 wks) (Sept. to end of ): wants but didn't schedule  [x] Flu  Vaccine: declines    [] Breastfeeding:  [] Postpartum Birth control method:   [x] GBS at 36 - 37 wks: Neg.  [x] 39 weeks discussion of IOL vs. Expectant management: 37wk induction  [x] Mode of delivery ( anticipated ): vag           Prenatal care insufficient, second trimester ([facility]) 2024 by Samreen Mancilla, DO  2024 by Pilar Alba MD    Chronic hypertension in pregnancy ([facility]) 2024 by Samreen Mancilla, DO  2024 by Chantell Perdomo MA    Overview Addendum 2024 12:18 PM by Pilar Alba MD     Not on med s  Baby ASA    IOL scheduled           uterine contractions ([facility]) 2019 by Robert Haynes RN  2024 by Pilar Alba MD            Mood:   Postpartum Depression: Medium Risk (10/3/2024)    [place]  Depression Scale     Last EPDS Total Score: 8     Last EPDS Self Harm Result: Never       Objective    LMP 2024    Physical Exam  Constitutional:       Appearance: Normal appearance.   HENT:      Head: Normocephalic.   Pulmonary:      Effort: Pulmonary effort is normal.   Neurological:      Mental Status: She is alert and oriented to person, place, and time.   Skin:     General: Skin is warm and dry.   Psychiatric:      Comments: Anxious.          Plan    Patient admits to not taking her blood pressure as often as previously advised, though reports that most have ranged WNL 130s/80s.  BP elevated 150/100 during call today, though patient took blood pressure while reportedly having an anxiety attack.  Advised that patient repeat blood pressure once anxiety attack resolves and she has rested for at least 10 minutes prior.  Coping mechanisms reviewed.   Warning s/sx of PEC reviewed; aware to call emergency answering service phone line number with BP greater than or equal to 160 systolic and/or 110 diastolic, as well as if any HA unrelieved by Tylenol, visual changes, and/or RUQ pain.  Patient reports understanding; all questions answered at this  time.    Diagnoses and all orders for this visit:  Blood pressure check  Hand numbness  -     Referral to Neurology; Future    F/U for 6 week PPV, or sooner as needed.     VANNA Bridges-WOODY    Virtual or Telephone Consent    An interactive audio and video telecommunication system which permits real time communications between the patient (at the originating site) and provider (at the distant site) was utilized to provide this telehealth service.   Verbal consent was requested and obtained from Christiano Prieto on this date, 11/27/24 for a telehealth visit.

## 2024-11-27 ENCOUNTER — APPOINTMENT (OUTPATIENT)
Dept: BEHAVIORAL HEALTH | Facility: CLINIC | Age: 29
End: 2024-11-27
Payer: COMMERCIAL

## 2024-11-27 ENCOUNTER — TELEMEDICINE (OUTPATIENT)
Dept: OBSTETRICS AND GYNECOLOGY | Facility: CLINIC | Age: 29
End: 2024-11-27
Payer: COMMERCIAL

## 2024-11-27 DIAGNOSIS — R20.0 HAND NUMBNESS: ICD-10-CM

## 2024-11-27 DIAGNOSIS — F41.0 PANIC DISORDER WITHOUT AGORAPHOBIA: ICD-10-CM

## 2024-11-27 DIAGNOSIS — Z01.30 BLOOD PRESSURE CHECK: Primary | ICD-10-CM

## 2024-11-27 PROBLEM — F41.9 ANXIETY IN PREGNANCY IN THIRD TRIMESTER, ANTEPARTUM (HHS-HCC): Status: RESOLVED | Noted: 2024-10-09 | Resolved: 2024-11-27

## 2024-11-27 PROBLEM — O10.919 CHRONIC HYPERTENSION IN PREGNANCY (HHS-HCC): Status: RESOLVED | Noted: 2024-05-17 | Resolved: 2024-11-27

## 2024-11-27 PROBLEM — Z3A.36 36 WEEKS GESTATION OF PREGNANCY (HHS-HCC): Status: RESOLVED | Noted: 2024-10-03 | Resolved: 2024-11-27

## 2024-11-27 PROBLEM — O99.343 ANXIETY IN PREGNANCY IN THIRD TRIMESTER, ANTEPARTUM (HHS-HCC): Status: RESOLVED | Noted: 2024-10-09 | Resolved: 2024-11-27

## 2024-11-27 PROCEDURE — 99214 OFFICE O/P EST MOD 30 MIN: CPT

## 2024-11-27 PROCEDURE — 1036F TOBACCO NON-USER: CPT

## 2024-11-27 RX ORDER — SERTRALINE HYDROCHLORIDE 25 MG/1
TABLET, FILM COATED ORAL
Qty: 154 TABLET | Refills: 0 | Status: ON HOLD | OUTPATIENT
Start: 2024-11-27 | End: 2025-01-16

## 2024-11-27 RX ORDER — ALPRAZOLAM 0.25 MG/1
0.25 TABLET ORAL DAILY PRN
Qty: 10 TABLET | Refills: 1 | Status: ON HOLD | OUTPATIENT
Start: 2024-11-27

## 2024-11-27 NOTE — PROGRESS NOTES
"Outpatient Psychiatry    Subjective   Christiano Prieto, a 29 y.o. female, presenting to Psychiatry for evaluation.    30 YO with anxiety, EtOH use disorder, panic, HTN, and asthma (reports this is an old diagnosis; no recent asthma) currently at 33wga (induction planned for 11/30), presenting to discuss worsening anxiety. Reports that she's always had bad anxiety but it got much worse during this pregnancy, such that she took maternity leave prematurely since she wasn't able to function at work. Panic attacks weekly, most often at night, sometimes out of the blue but sometimes triggered by an elevated blood pressure measurement. For example, two weeks ago a reading of 143/90s precipitated a panic attack, which she thought she was dying, called 911, and arrived at the emergency room.     Interval Update:  - Delivered baby Jude on 11/16, had severe preeclampsia, and was seen by  psychiatry for anxiety. Abilify 5 mg was started and was continued on hydroxyzine 50 mg TID PRN  -Not doing well-- initially felt like abilify 5 mg was really helping with anxiety but had a panic attack this morning that was so bad she almost went to the ED  -Has had some other panic attacks since hospitalization but others were more manageable   -Has been taking PRN hydroxyzine daily, averaging 25 mg in the morning and then 25 mg in the evening; doesn't find it very helpful for anxiety   -Anxiety has worsened since delivery-- describes it as previously episodic but since she delivered she has felt \"worried 24/7\"   -Doesn't think SSRIs have previously helped but feels \"so desperate that I'll try anything\"  -Reports that epidural was messed up and so she thinks she's lost feeling in her hand  -\"I clean my house all day every day. At least 2 hours/day\"  -Bothered by obsessive thoughts   -Describes 12 YO daughter with some compulsions   -No suicidal ideation     Current Medications:    Current Outpatient Medications:     acetaminophen " (Tylenol) 325 mg tablet, Take 3 tablets (975 mg) by mouth every 6 hours if needed for mild pain (1 - 3) or moderate pain (4 - 6)., Disp: 120 tablet, Rfl: 0    ALPRAZolam (Xanax) 0.25 mg tablet, Take 1 tablet (0.25 mg) by mouth once daily as needed for anxiety., Disp: 10 tablet, Rfl: 1    ARIPiprazole (Abilify) 5 mg tablet, Take 1 tablet (5 mg) by mouth once daily. Do not fill before November 20, 2024., Disp: 30 tablet, Rfl: 1    diphenhydrAMINE (Sominex) 25 mg tablet, Take 1 tablet (25 mg) by mouth every 6 hours if needed for other (Headache, take with reglan)., Disp: 30 tablet, Rfl: 1    ferrous gluconate 324 (38 Fe) mg tablet, Take 1 tablet (38 mg of iron) by mouth once daily with breakfast., Disp: 30 tablet, Rfl: 11    ferrous sulfate, 325 mg ferrous sulfate, (Iron, ferrous sulfate,) tablet, Take 1 tablet (325 mg) by mouth once daily in the morning. Take before meals. Take on an empty stomach with vitamin C supplement or vitamin C containing juice, Disp: 30 tablet, Rfl: 0    hydrOXYzine HCL (Atarax) 25 mg tablet, Take 1 tablet (25 mg) by mouth 2 times a day as needed for anxiety. For severe anxiety, Disp: 60 tablet, Rfl: 1    hydrOXYzine HCL (Atarax) 50 mg tablet, Take 1 tablet (50 mg) by mouth every 8 hours if needed for anxiety., Disp: 90 tablet, Rfl: 1    ibuprofen 600 mg tablet, Take 1 tablet (600 mg) by mouth every 6 hours for 10 days., Disp: 40 tablet, Rfl: 0    metoclopramide (Reglan) 10 mg tablet, Take 1 tablet (10 mg) by mouth every 6 hours if needed (Headache, take with benadryl)., Disp: 30 tablet, Rfl: 1    miscellaneous medical supply (Blood Pressure Cuff) misc, 1 Device 2 times a day., Disp: 1 each, Rfl: 0    polyethylene glycol (Miralax) 17 gram packet, Take 17 g by mouth 2 times a day for 20 days., Disp: 40 packet, Rfl: 0    PRENATAL VIT 10-IRON-FOLIC-DHA ORAL, Take 1 tablet by mouth once daily., Disp: , Rfl:     propranolol (Inderal) 10 mg tablet, Take 1 tablet (10 mg) by mouth 3 times a day as  "needed (anxiety)., Disp: 90 tablet, Rfl: 1    sertraline (Zoloft) 25 mg tablet, Take 1 tablet (25 mg) by mouth once daily for 6 days, THEN 2 tablets (50 mg) once daily for 14 days, THEN 4 tablets (100 mg) once daily., Disp: 154 tablet, Rfl: 0    Medical History:  Past Medical History:   Diagnosis Date    ETOH abuse     HTN (hypertension)     Moderate asthma (HHS-HCC)     Panic anxiety syndrome        Past Psychiatric History:   Diagnoses: anxiety, depression (diagnosed in 2017, after dad's death)  Previous Psychiatrist: Katelyn Dorado   Therapy: has tried it before, not impressed   Current psychiatric medications: hydroxyzine   Past psychiatric medications:   -sertraline recently restarted at 25 mg, only took if for three days because \"it gave me panic attacks\"; previously took it at 100 mg (before pregnancy-- still didn't find it helpful).  -lexapro  -low-dose benzo when initially diagnosed with anxiety   -Feels like none of these medications have been helpful   Hospitalizations: denies  Suicide attempts: denies   Family psychiatric history: anxiety (grandmother--on sertraline--, mom & brother)     Social History:   Currently lives: with two kids and boyfriend   Work/Finances: in sales  Current stressors: work   Social support: mom, grandma, boyfriend   Legal History: denies   History: denies  History of violence: denies    Substance Use History:  Tobacco use: quit smoking prior to pregnancy  Use of alcohol:  denies any EtOH since early February; \"used to be a full-blown alcoholic when my dad passed away\"  Use of other substances: denies  Legal consequences of substance use: denies  Substance use disorder treatment: denies    Record Review: brief     Medical Review Of Systems:  Pertinent items are noted in HPI.    OARRS:  No data recorded  I have personally reviewed the OARRS report for Christiano Prieto. I have considered the risks of abuse, dependence, addiction and diversion    Objective   Mental Status " "Exam  Appearance: at home for virtual appt, long dark hair  Attitude: Tense, cooperative   Behavior: Appropriate eye contact.   Motor Activity: No psychomotor agitation or retardation. No abnormal movements, tremors or tics. No evidence of extrapyramidal symptoms or tardive dyskinesia.  Speech: fast but not pressured   Mood: \"freaking out constantly\"   Affect: mood-congruent   Thought Process: Linear, logical, and goal-directed. No loose associations or gross thought disorganization.  Thought Content: Denied current suicidal ideation or thoughts of harm to self, denied homicidal ideation or thoughts of harm to others. No delusional thinking elicited. No perseverations or obsessions identified.   Perception: Did not endorse auditory or visual hallucinations, did not appear to be responding to hallucinatory stimuli.   Cognition: Alert, oriented to prior meds and situation. Responded appropriately to questions.   Insight: Fair, in regards to understanding mental health condition  Judgement: Fair    Vitals:  There were no vitals filed for this visit.    Other Objective Information:  No recent TSH    Risk Assessment:  Risk of harm to self: Low Risk -- Risk factors include: Severe anxiety Protective factors include:Denies current suicidal ideation, Denies history of suicide attempts , Future-oriented talk , Willingness to seek help and support , Skills in problem solving, conflict resolution, and nonviolent handling of disputes, Access to a variety of clinical interventions , Receiving and engaged in care for mental, physical, and substance use disorders , Support through ongoing medical and mental healthcare relationships , Interpersonal relationships and supports, e.g., family, friends, peers, community , and Restricted access to firearms or other lethal means of suicide     Risk of harm to others: Low Risk - Risk factors include: No significant risk factors identified on screening. Protective factors include: Lack of " known history of harm to others , Lack of known history of violent ideation , Lack of known access to firearms , Sense of community, availability/access to resources and support , Sense of optimism, hope , Interpersonal competence , Affect regulation , Sense of self-efficacy, internal locus of control , and Positive, pro-social family/peer network     Pt declines to start psychotherapy     Diagnoses and all orders for this visit:  Panic disorder without agoraphobia  -     ALPRAZolam (Xanax) 0.25 mg tablet; Take 1 tablet (0.25 mg) by mouth once daily as needed for anxiety.  -     sertraline (Zoloft) 25 mg tablet; Take 1 tablet (25 mg) by mouth once daily for 6 days, THEN 2 tablets (50 mg) once daily for 14 days, THEN 4 tablets (100 mg) once daily.       28 YO with anxiety, EtOH use disorder, panic, HTN, and asthma (reports this is an old diagnosis; no recent asthma) currently at 33wga (induction planned for 11/30), presenting to discuss worsening anxiety. Her anxiety and panic attacks have become so bad that she recently had to take premature maternity leave, since the additional stress of work was too much to manage. While she finds PRN hydroxyzine helpful for panic attacks, she reports that prior SSRIs have been either useless or counterproductive: 25 mg sertraline resumed for just three days a few weeks ago triggered worsening panic attacks, in spite of previously having taking 100 mg prior to pregnancy. Given prior ineffectiveness of SSRIs and the fact that her panic attacks are often related to high blood pressure reads, recommend starting PRN propranolol for panic attacks.     11/27/24: was started on abilify 5 mg daily while at [facility] after 11/16 delivery of baby Jude (delivered early for preeclampisa). Initially felt abilify was helpful for anxiety but wonders whether she was actually reassured by the hospital environment. Very tense on interview and reports that she almost went to the hospital this morning  for a panic attack. Reports worsening anxiety since she delivered. Interested in resuming zoloft, which she only ever took at 100 mg maximum. She's optimistic this might help at a higher dose (targeting ~150 or 200 mg daily), especially since this medication has helped her grandmother. Given history of worse anxiety after starting sertraline, providing with short-term dose of alprazolam.     Plan/Recommendations:  START sertraline at 25 mg daily for 6 days, then INCREASE to 50 mg daily for two weeks, then increase to either 75 mg daily of 100 mg daily (pt will titrate according to side effects)  START alprazolam 0.25 daily PRN for panic attacks (send 10 pills)  Continue hydroxyzine 50 mg TID PRN for panic attacks  Continue abilify 5 mg daily for anxiolytic adjunct   Follow up on TSH  Recommend starting therapy.   Follow up: in 4 weeks  Call  Psychiatry at [phone] with issues.  For [facility] residents, [employer] is a 24/7 hotline you can call for assistance [[phone]]. Please call [phone] or go to your closest Emergency Room if you feel unsafe. This includes thoughts of hurting yourself or anyone else, or having other troubles such as hearing voices, seeing visions, or having new and scary thoughts about the people around you.    I will discuss this pt with Dr. Ricketts.     Kavita Girard MD

## 2024-12-01 ENCOUNTER — APPOINTMENT (OUTPATIENT)
Dept: RADIOLOGY | Facility: HOSPITAL | Age: 29
End: 2024-12-01
Payer: COMMERCIAL

## 2024-12-01 ENCOUNTER — APPOINTMENT (OUTPATIENT)
Dept: CARDIOLOGY | Facility: HOSPITAL | Age: 29
End: 2024-12-01
Payer: COMMERCIAL

## 2024-12-01 ENCOUNTER — HOSPITAL ENCOUNTER (EMERGENCY)
Facility: HOSPITAL | Age: 29
Discharge: OTHER INSTITUTION | End: 2024-12-01
Attending: EMERGENCY MEDICINE
Payer: COMMERCIAL

## 2024-12-01 ENCOUNTER — HOSPITAL ENCOUNTER (INPATIENT)
Facility: HOSPITAL | Age: 29
End: 2024-12-01
Attending: STUDENT IN AN ORGANIZED HEALTH CARE EDUCATION/TRAINING PROGRAM | Admitting: STUDENT IN AN ORGANIZED HEALTH CARE EDUCATION/TRAINING PROGRAM
Payer: COMMERCIAL

## 2024-12-01 VITALS
SYSTOLIC BLOOD PRESSURE: 134 MMHG | WEIGHT: 150 LBS | OXYGEN SATURATION: 97 % | TEMPERATURE: 95.7 F | HEART RATE: 90 BPM | HEIGHT: 63 IN | DIASTOLIC BLOOD PRESSURE: 86 MMHG | RESPIRATION RATE: 20 BRPM | BODY MASS INDEX: 26.58 KG/M2

## 2024-12-01 VITALS
SYSTOLIC BLOOD PRESSURE: 138 MMHG | HEART RATE: 75 BPM | HEIGHT: 63 IN | OXYGEN SATURATION: 97 % | RESPIRATION RATE: 18 BRPM | TEMPERATURE: 98.1 F | BODY MASS INDEX: 26.58 KG/M2 | DIASTOLIC BLOOD PRESSURE: 85 MMHG | WEIGHT: 150 LBS

## 2024-12-01 DIAGNOSIS — J18.9 PNEUMONIA OF RIGHT LUNG DUE TO INFECTIOUS ORGANISM, UNSPECIFIED PART OF LUNG: ICD-10-CM

## 2024-12-01 DIAGNOSIS — I10 ELEVATED BLOOD PRESSURE READING WITH DIAGNOSIS OF HYPERTENSION: Primary | ICD-10-CM

## 2024-12-01 DIAGNOSIS — F41.0 PANIC DISORDER WITHOUT AGORAPHOBIA: ICD-10-CM

## 2024-12-01 DIAGNOSIS — J18.9 COMMUNITY ACQUIRED PNEUMONIA, UNSPECIFIED LATERALITY: ICD-10-CM

## 2024-12-01 LAB
ALBUMIN SERPL BCP-MCNC: 4 G/DL (ref 3.4–5)
ALBUMIN SERPL BCP-MCNC: 4.3 G/DL (ref 3.4–5)
ALP SERPL-CCNC: 119 U/L (ref 33–110)
ALP SERPL-CCNC: 97 U/L (ref 33–110)
ALT SERPL W P-5'-P-CCNC: 20 U/L (ref 7–45)
ALT SERPL W P-5'-P-CCNC: 20 U/L (ref 7–45)
ANION GAP SERPL CALC-SCNC: 14 MMOL/L (ref 10–20)
ANION GAP SERPL CALC-SCNC: 18 MMOL/L (ref 10–20)
APPEARANCE UR: CLEAR
AST SERPL W P-5'-P-CCNC: 18 U/L (ref 9–39)
AST SERPL W P-5'-P-CCNC: 42 U/L (ref 9–39)
ATRIAL RATE: 80 BPM
BASOPHILS # BLD AUTO: 0.05 X10*3/UL (ref 0–0.1)
BASOPHILS NFR BLD AUTO: 0.7 %
BILIRUB SERPL-MCNC: 0.3 MG/DL (ref 0–1.2)
BILIRUB SERPL-MCNC: 0.5 MG/DL (ref 0–1.2)
BILIRUB UR STRIP.AUTO-MCNC: NEGATIVE MG/DL
BUN SERPL-MCNC: 10 MG/DL (ref 6–23)
BUN SERPL-MCNC: 8 MG/DL (ref 6–23)
CALCIUM SERPL-MCNC: 8.3 MG/DL (ref 8.6–10.6)
CALCIUM SERPL-MCNC: 8.8 MG/DL (ref 8.6–10.3)
CARDIAC TROPONIN I PNL SERPL HS: 3 NG/L (ref 0–13)
CHLORIDE SERPL-SCNC: 102 MMOL/L (ref 98–107)
CHLORIDE SERPL-SCNC: 105 MMOL/L (ref 98–107)
CO2 SERPL-SCNC: 20 MMOL/L (ref 21–32)
CO2 SERPL-SCNC: 22 MMOL/L (ref 21–32)
COLOR UR: COLORLESS
CREAT SERPL-MCNC: 0.67 MG/DL (ref 0.5–1.05)
CREAT SERPL-MCNC: 0.68 MG/DL (ref 0.5–1.05)
EGFRCR SERPLBLD CKD-EPI 2021: >90 ML/MIN/1.73M*2
EGFRCR SERPLBLD CKD-EPI 2021: >90 ML/MIN/1.73M*2
EOSINOPHIL # BLD AUTO: 0.13 X10*3/UL (ref 0–0.7)
EOSINOPHIL NFR BLD AUTO: 1.9 %
ERYTHROCYTE [DISTWIDTH] IN BLOOD BY AUTOMATED COUNT: 12.1 % (ref 11.5–14.5)
ERYTHROCYTE [DISTWIDTH] IN BLOOD BY AUTOMATED COUNT: 12.2 % (ref 11.5–14.5)
FLUAV RNA RESP QL NAA+PROBE: NOT DETECTED
FLUBV RNA RESP QL NAA+PROBE: NOT DETECTED
GLUCOSE SERPL-MCNC: 107 MG/DL (ref 74–99)
GLUCOSE SERPL-MCNC: 94 MG/DL (ref 74–99)
GLUCOSE UR STRIP.AUTO-MCNC: NORMAL MG/DL
HCT VFR BLD AUTO: 38.3 % (ref 36–46)
HCT VFR BLD AUTO: 40.1 % (ref 36–46)
HGB BLD-MCNC: 12.9 G/DL (ref 12–16)
HGB BLD-MCNC: 13.3 G/DL (ref 12–16)
HOLD SPECIMEN: NORMAL
IMM GRANULOCYTES # BLD AUTO: 0.07 X10*3/UL (ref 0–0.7)
IMM GRANULOCYTES NFR BLD AUTO: 1 % (ref 0–0.9)
INR PPP: 1 (ref 0.9–1.1)
KETONES UR STRIP.AUTO-MCNC: NEGATIVE MG/DL
LEUKOCYTE ESTERASE UR QL STRIP.AUTO: NEGATIVE
LYMPHOCYTES # BLD AUTO: 1.85 X10*3/UL (ref 1.2–4.8)
LYMPHOCYTES NFR BLD AUTO: 26.6 %
MAGNESIUM SERPL-MCNC: 2.08 MG/DL (ref 1.6–2.4)
MCH RBC QN AUTO: 26.8 PG (ref 26–34)
MCH RBC QN AUTO: 27.3 PG (ref 26–34)
MCHC RBC AUTO-ENTMCNC: 33.2 G/DL (ref 32–36)
MCHC RBC AUTO-ENTMCNC: 33.7 G/DL (ref 32–36)
MCV RBC AUTO: 81 FL (ref 80–100)
MCV RBC AUTO: 81 FL (ref 80–100)
MONOCYTES # BLD AUTO: 0.41 X10*3/UL (ref 0.1–1)
MONOCYTES NFR BLD AUTO: 5.9 %
NEUTROPHILS # BLD AUTO: 4.45 X10*3/UL (ref 1.2–7.7)
NEUTROPHILS NFR BLD AUTO: 63.9 %
NITRITE UR QL STRIP.AUTO: NEGATIVE
NRBC BLD-RTO: 0 /100 WBCS (ref 0–0)
NRBC BLD-RTO: 0.4 /100 WBCS (ref 0–0)
P AXIS: 35 DEGREES
P OFFSET: 192 MS
P ONSET: 139 MS
PH UR STRIP.AUTO: 6 [PH]
PLATELET # BLD AUTO: 434 X10*3/UL (ref 150–450)
PLATELET # BLD AUTO: 449 X10*3/UL (ref 150–450)
POTASSIUM SERPL-SCNC: 3.7 MMOL/L (ref 3.5–5.3)
POTASSIUM SERPL-SCNC: 5.3 MMOL/L (ref 3.5–5.3)
PR INTERVAL: 164 MS
PROT SERPL-MCNC: 7.4 G/DL (ref 6.4–8.2)
PROT SERPL-MCNC: 7.4 G/DL (ref 6.4–8.2)
PROT UR STRIP.AUTO-MCNC: NEGATIVE MG/DL
PROTHROMBIN TIME: 11.6 SECONDS (ref 9.8–12.8)
Q ONSET: 221 MS
QRS COUNT: 13 BEATS
QRS DURATION: 94 MS
QT INTERVAL: 386 MS
QTC CALCULATION(BAZETT): 445 MS
QTC FREDERICIA: 425 MS
R AXIS: 32 DEGREES
RBC # BLD AUTO: 4.73 X10*6/UL (ref 4–5.2)
RBC # BLD AUTO: 4.97 X10*6/UL (ref 4–5.2)
RBC # UR STRIP.AUTO: ABNORMAL /UL
RBC #/AREA URNS AUTO: NORMAL /HPF
RSV RNA RESP QL NAA+PROBE: NOT DETECTED
SARS-COV-2 RNA RESP QL NAA+PROBE: NOT DETECTED
SODIUM SERPL-SCNC: 136 MMOL/L (ref 136–145)
SODIUM SERPL-SCNC: 136 MMOL/L (ref 136–145)
SP GR UR STRIP.AUTO: 1
SQUAMOUS #/AREA URNS AUTO: NORMAL /HPF
T AXIS: 36 DEGREES
T OFFSET: 414 MS
UROBILINOGEN UR STRIP.AUTO-MCNC: NORMAL MG/DL
VENTRICULAR RATE: 80 BPM
WBC # BLD AUTO: 7 X10*3/UL (ref 4.4–11.3)
WBC # BLD AUTO: 9.1 X10*3/UL (ref 4.4–11.3)
WBC #/AREA URNS AUTO: NORMAL /HPF

## 2024-12-01 PROCEDURE — 80053 COMPREHEN METABOLIC PANEL: CPT | Performed by: STUDENT IN AN ORGANIZED HEALTH CARE EDUCATION/TRAINING PROGRAM

## 2024-12-01 PROCEDURE — 99285 EMERGENCY DEPT VISIT HI MDM: CPT | Mod: 25

## 2024-12-01 PROCEDURE — 81001 URINALYSIS AUTO W/SCOPE: CPT | Performed by: PHYSICIAN ASSISTANT

## 2024-12-01 PROCEDURE — 1100000001 HC PRIVATE ROOM DAILY

## 2024-12-01 PROCEDURE — 99291 CRITICAL CARE FIRST HOUR: CPT | Mod: 25

## 2024-12-01 PROCEDURE — 96365 THER/PROPH/DIAG IV INF INIT: CPT | Mod: 59

## 2024-12-01 PROCEDURE — 96376 TX/PRO/DX INJ SAME DRUG ADON: CPT | Mod: 59

## 2024-12-01 PROCEDURE — 71045 X-RAY EXAM CHEST 1 VIEW: CPT | Performed by: STUDENT IN AN ORGANIZED HEALTH CARE EDUCATION/TRAINING PROGRAM

## 2024-12-01 PROCEDURE — 99199 UNLISTED SPECIAL SVC PX/RPRT: CPT

## 2024-12-01 PROCEDURE — 2500000001 HC RX 250 WO HCPCS SELF ADMINISTERED DRUGS (ALT 637 FOR MEDICARE OP): Performed by: STUDENT IN AN ORGANIZED HEALTH CARE EDUCATION/TRAINING PROGRAM

## 2024-12-01 PROCEDURE — 1120000001 HC OB PRIVATE ROOM DAILY

## 2024-12-01 PROCEDURE — 2500000002 HC RX 250 W HCPCS SELF ADMINISTERED DRUGS (ALT 637 FOR MEDICARE OP, ALT 636 FOR OP/ED): Performed by: STUDENT IN AN ORGANIZED HEALTH CARE EDUCATION/TRAINING PROGRAM

## 2024-12-01 PROCEDURE — 93005 ELECTROCARDIOGRAM TRACING: CPT

## 2024-12-01 PROCEDURE — 99222 1ST HOSP IP/OBS MODERATE 55: CPT | Performed by: STUDENT IN AN ORGANIZED HEALTH CARE EDUCATION/TRAINING PROGRAM

## 2024-12-01 PROCEDURE — 2550000001 HC RX 255 CONTRASTS: Performed by: EMERGENCY MEDICINE

## 2024-12-01 PROCEDURE — 85027 COMPLETE CBC AUTOMATED: CPT | Performed by: STUDENT IN AN ORGANIZED HEALTH CARE EDUCATION/TRAINING PROGRAM

## 2024-12-01 PROCEDURE — 85025 COMPLETE CBC W/AUTO DIFF WBC: CPT | Performed by: PHYSICIAN ASSISTANT

## 2024-12-01 PROCEDURE — 36415 COLL VENOUS BLD VENIPUNCTURE: CPT | Performed by: PHYSICIAN ASSISTANT

## 2024-12-01 PROCEDURE — 71260 CT THORAX DX C+: CPT

## 2024-12-01 PROCEDURE — 84484 ASSAY OF TROPONIN QUANT: CPT | Performed by: PHYSICIAN ASSISTANT

## 2024-12-01 PROCEDURE — 71045 X-RAY EXAM CHEST 1 VIEW: CPT

## 2024-12-01 PROCEDURE — 71260 CT THORAX DX C+: CPT | Performed by: RADIOLOGY

## 2024-12-01 PROCEDURE — 84075 ASSAY ALKALINE PHOSPHATASE: CPT | Performed by: PHYSICIAN ASSISTANT

## 2024-12-01 PROCEDURE — 70450 CT HEAD/BRAIN W/O DYE: CPT | Performed by: RADIOLOGY

## 2024-12-01 PROCEDURE — 83735 ASSAY OF MAGNESIUM: CPT | Performed by: PHYSICIAN ASSISTANT

## 2024-12-01 PROCEDURE — 87637 SARSCOV2&INF A&B&RSV AMP PRB: CPT | Performed by: EMERGENCY MEDICINE

## 2024-12-01 PROCEDURE — 99291 CRITICAL CARE FIRST HOUR: CPT | Performed by: EMERGENCY MEDICINE

## 2024-12-01 PROCEDURE — 96375 TX/PRO/DX INJ NEW DRUG ADDON: CPT | Mod: 59

## 2024-12-01 PROCEDURE — 2500000002 HC RX 250 W HCPCS SELF ADMINISTERED DRUGS (ALT 637 FOR MEDICARE OP, ALT 636 FOR OP/ED): Performed by: PHYSICIAN ASSISTANT

## 2024-12-01 PROCEDURE — 85610 PROTHROMBIN TIME: CPT | Performed by: PHYSICIAN ASSISTANT

## 2024-12-01 PROCEDURE — 70450 CT HEAD/BRAIN W/O DYE: CPT

## 2024-12-01 PROCEDURE — 2500000001 HC RX 250 WO HCPCS SELF ADMINISTERED DRUGS (ALT 637 FOR MEDICARE OP): Performed by: PHYSICIAN ASSISTANT

## 2024-12-01 PROCEDURE — 84443 ASSAY THYROID STIM HORMONE: CPT | Performed by: STUDENT IN AN ORGANIZED HEALTH CARE EDUCATION/TRAINING PROGRAM

## 2024-12-01 PROCEDURE — 2500000004 HC RX 250 GENERAL PHARMACY W/ HCPCS (ALT 636 FOR OP/ED): Performed by: PHYSICIAN ASSISTANT

## 2024-12-01 PROCEDURE — 2500000004 HC RX 250 GENERAL PHARMACY W/ HCPCS (ALT 636 FOR OP/ED): Performed by: EMERGENCY MEDICINE

## 2024-12-01 RX ORDER — OXYMETAZOLINE HCL 0.05 %
2 SPRAY, NON-AEROSOL (ML) NASAL ONCE
Status: COMPLETED | OUTPATIENT
Start: 2024-12-01 | End: 2024-12-01

## 2024-12-01 RX ORDER — LOPERAMIDE HYDROCHLORIDE 2 MG/1
4 CAPSULE ORAL EVERY 2 HOUR PRN
Status: DISCONTINUED | OUTPATIENT
Start: 2024-12-01 | End: 2024-12-03 | Stop reason: HOSPADM

## 2024-12-01 RX ORDER — NIFEDIPINE 30 MG/1
30 TABLET, FILM COATED, EXTENDED RELEASE ORAL ONCE
Status: COMPLETED | OUTPATIENT
Start: 2024-12-01 | End: 2024-12-01

## 2024-12-01 RX ORDER — ACETAMINOPHEN 325 MG/1
975 TABLET ORAL ONCE
Status: COMPLETED | OUTPATIENT
Start: 2024-12-01 | End: 2024-12-01

## 2024-12-01 RX ORDER — METOCLOPRAMIDE 10 MG/1
10 TABLET ORAL ONCE
Status: COMPLETED | OUTPATIENT
Start: 2024-12-01 | End: 2024-12-01

## 2024-12-01 RX ORDER — BISACODYL 10 MG/1
10 SUPPOSITORY RECTAL DAILY PRN
Status: DISCONTINUED | OUTPATIENT
Start: 2024-12-01 | End: 2024-12-03 | Stop reason: HOSPADM

## 2024-12-01 RX ORDER — ARIPIPRAZOLE 5 MG/1
5 TABLET ORAL DAILY
Status: DISCONTINUED | OUTPATIENT
Start: 2024-12-01 | End: 2024-12-02

## 2024-12-01 RX ORDER — NIFEDIPINE 10 MG/1
10 CAPSULE ORAL ONCE AS NEEDED
Status: DISCONTINUED | OUTPATIENT
Start: 2024-12-01 | End: 2024-12-03 | Stop reason: HOSPADM

## 2024-12-01 RX ORDER — CARBOPROST TROMETHAMINE 250 UG/ML
250 INJECTION, SOLUTION INTRAMUSCULAR ONCE AS NEEDED
Status: DISCONTINUED | OUTPATIENT
Start: 2024-12-01 | End: 2024-12-03 | Stop reason: HOSPADM

## 2024-12-01 RX ORDER — SERTRALINE HYDROCHLORIDE 100 MG/1
100 TABLET, FILM COATED ORAL DAILY
Status: DISCONTINUED | OUTPATIENT
Start: 2024-12-17 | End: 2024-12-03 | Stop reason: HOSPADM

## 2024-12-01 RX ORDER — AMOXICILLIN 250 MG/1
500 CAPSULE ORAL EVERY 8 HOURS SCHEDULED
Status: DISCONTINUED | OUTPATIENT
Start: 2024-12-01 | End: 2024-12-01

## 2024-12-01 RX ORDER — HYDRALAZINE HYDROCHLORIDE 20 MG/ML
5 INJECTION INTRAMUSCULAR; INTRAVENOUS ONCE AS NEEDED
Status: DISCONTINUED | OUTPATIENT
Start: 2024-12-01 | End: 2024-12-03 | Stop reason: HOSPADM

## 2024-12-01 RX ORDER — HYDROXYZINE HYDROCHLORIDE 25 MG/1
50 TABLET, FILM COATED ORAL EVERY 8 HOURS PRN
Status: DISCONTINUED | OUTPATIENT
Start: 2024-12-01 | End: 2024-12-01

## 2024-12-01 RX ORDER — HYDROXYZINE HYDROCHLORIDE 25 MG/1
25 TABLET, FILM COATED ORAL 2 TIMES DAILY PRN
Status: DISCONTINUED | OUTPATIENT
Start: 2024-12-01 | End: 2024-12-02

## 2024-12-01 RX ORDER — ONDANSETRON HYDROCHLORIDE 2 MG/ML
4 INJECTION, SOLUTION INTRAVENOUS EVERY 6 HOURS PRN
Status: DISCONTINUED | OUTPATIENT
Start: 2024-12-01 | End: 2024-12-03 | Stop reason: HOSPADM

## 2024-12-01 RX ORDER — METOCLOPRAMIDE 10 MG/1
10 TABLET ORAL EVERY 6 HOURS PRN
Status: DISCONTINUED | OUTPATIENT
Start: 2024-12-01 | End: 2024-12-03 | Stop reason: HOSPADM

## 2024-12-01 RX ORDER — DIPHENHYDRAMINE HCL 25 MG
25 CAPSULE ORAL ONCE
Status: COMPLETED | OUTPATIENT
Start: 2024-12-01 | End: 2024-12-01

## 2024-12-01 RX ORDER — CEFTRIAXONE 1 G/50ML
1 INJECTION, SOLUTION INTRAVENOUS ONCE
Status: COMPLETED | OUTPATIENT
Start: 2024-12-01 | End: 2024-12-01

## 2024-12-01 RX ORDER — DIAZEPAM 5 MG/ML
5 INJECTION, SOLUTION INTRAMUSCULAR; INTRAVENOUS ONCE
Status: COMPLETED | OUTPATIENT
Start: 2024-12-01 | End: 2024-12-01

## 2024-12-01 RX ORDER — SIMETHICONE 80 MG
80 TABLET,CHEWABLE ORAL 4 TIMES DAILY PRN
Status: DISCONTINUED | OUTPATIENT
Start: 2024-12-01 | End: 2024-12-03 | Stop reason: HOSPADM

## 2024-12-01 RX ORDER — METHYLERGONOVINE MALEATE 0.2 MG/ML
0.2 INJECTION INTRAVENOUS ONCE AS NEEDED
Status: DISCONTINUED | OUTPATIENT
Start: 2024-12-01 | End: 2024-12-03 | Stop reason: HOSPADM

## 2024-12-01 RX ORDER — MAGNESIUM SULFATE HEPTAHYDRATE 40 MG/ML
2 INJECTION, SOLUTION INTRAVENOUS CONTINUOUS
Status: DISCONTINUED | OUTPATIENT
Start: 2024-12-01 | End: 2024-12-01 | Stop reason: HOSPADM

## 2024-12-01 RX ORDER — SODIUM CHLORIDE 9 MG/ML
125 INJECTION, SOLUTION INTRAVENOUS CONTINUOUS
Status: ACTIVE | OUTPATIENT
Start: 2024-12-01 | End: 2024-12-02

## 2024-12-01 RX ORDER — AZITHROMYCIN 500 MG/1
500 TABLET, FILM COATED ORAL
Status: DISCONTINUED | OUTPATIENT
Start: 2024-12-02 | End: 2024-12-03 | Stop reason: HOSPADM

## 2024-12-01 RX ORDER — ALPRAZOLAM 0.5 MG/1
0.25 TABLET ORAL DAILY PRN
Status: DISCONTINUED | OUTPATIENT
Start: 2024-12-01 | End: 2024-12-03 | Stop reason: HOSPADM

## 2024-12-01 RX ORDER — OXYTOCIN 10 [USP'U]/ML
10 INJECTION, SOLUTION INTRAMUSCULAR; INTRAVENOUS ONCE AS NEEDED
Status: DISCONTINUED | OUTPATIENT
Start: 2024-12-01 | End: 2024-12-03 | Stop reason: HOSPADM

## 2024-12-01 RX ORDER — AZITHROMYCIN 500 MG/1
500 TABLET, FILM COATED ORAL
Status: DISCONTINUED | OUTPATIENT
Start: 2024-12-02 | End: 2024-12-01

## 2024-12-01 RX ORDER — LABETALOL HYDROCHLORIDE 5 MG/ML
20 INJECTION, SOLUTION INTRAVENOUS ONCE AS NEEDED
Status: DISCONTINUED | OUTPATIENT
Start: 2024-12-01 | End: 2024-12-03 | Stop reason: HOSPADM

## 2024-12-01 RX ORDER — ONDANSETRON 4 MG/1
4 TABLET, FILM COATED ORAL EVERY 6 HOURS PRN
Status: DISCONTINUED | OUTPATIENT
Start: 2024-12-01 | End: 2024-12-03 | Stop reason: HOSPADM

## 2024-12-01 RX ORDER — ALBUTEROL SULFATE 90 UG/1
2 INHALANT RESPIRATORY (INHALATION) EVERY 6 HOURS PRN
Status: DISCONTINUED | OUTPATIENT
Start: 2024-12-01 | End: 2024-12-03 | Stop reason: HOSPADM

## 2024-12-01 RX ORDER — ADHESIVE BANDAGE
10 BANDAGE TOPICAL
Status: DISCONTINUED | OUTPATIENT
Start: 2024-12-01 | End: 2024-12-03 | Stop reason: HOSPADM

## 2024-12-01 RX ORDER — SERTRALINE HYDROCHLORIDE 25 MG/1
25 TABLET, FILM COATED ORAL DAILY
Status: COMPLETED | OUTPATIENT
Start: 2024-12-01 | End: 2024-12-02

## 2024-12-01 RX ORDER — NIFEDIPINE 30 MG/1
30 TABLET, FILM COATED, EXTENDED RELEASE ORAL
Status: DISCONTINUED | OUTPATIENT
Start: 2024-12-02 | End: 2024-12-02

## 2024-12-01 RX ORDER — SERTRALINE HYDROCHLORIDE 50 MG/1
50 TABLET, FILM COATED ORAL DAILY
Status: DISCONTINUED | OUTPATIENT
Start: 2024-12-03 | End: 2024-12-03 | Stop reason: HOSPADM

## 2024-12-01 RX ORDER — MISOPROSTOL 200 UG/1
800 TABLET ORAL ONCE AS NEEDED
Status: DISCONTINUED | OUTPATIENT
Start: 2024-12-01 | End: 2024-12-03 | Stop reason: HOSPADM

## 2024-12-01 RX ORDER — MAGNESIUM SULFATE HEPTAHYDRATE 40 MG/ML
2 INJECTION, SOLUTION INTRAVENOUS CONTINUOUS
Status: DISCONTINUED | OUTPATIENT
Start: 2024-12-01 | End: 2024-12-01

## 2024-12-01 RX ORDER — LABETALOL HYDROCHLORIDE 5 MG/ML
20 INJECTION, SOLUTION INTRAVENOUS ONCE
Status: COMPLETED | OUTPATIENT
Start: 2024-12-01 | End: 2024-12-01

## 2024-12-01 RX ORDER — AMOXICILLIN 250 MG/1
250 CAPSULE ORAL EVERY 8 HOURS SCHEDULED
Status: DISCONTINUED | OUTPATIENT
Start: 2024-12-01 | End: 2024-12-01

## 2024-12-01 RX ORDER — IBUPROFEN 600 MG/1
600 TABLET ORAL ONCE
Status: COMPLETED | OUTPATIENT
Start: 2024-12-01 | End: 2024-12-01

## 2024-12-01 RX ORDER — POLYETHYLENE GLYCOL 3350 17 G/17G
17 POWDER, FOR SOLUTION ORAL 2 TIMES DAILY PRN
Status: DISCONTINUED | OUTPATIENT
Start: 2024-12-01 | End: 2024-12-03 | Stop reason: HOSPADM

## 2024-12-01 RX ORDER — PROPRANOLOL HYDROCHLORIDE 10 MG/1
10 TABLET ORAL 3 TIMES DAILY PRN
Status: DISCONTINUED | OUTPATIENT
Start: 2024-12-01 | End: 2024-12-03 | Stop reason: HOSPADM

## 2024-12-01 RX ORDER — AMOXICILLIN 500 MG/1
1000 CAPSULE ORAL EVERY 8 HOURS SCHEDULED
Status: DISCONTINUED | OUTPATIENT
Start: 2024-12-01 | End: 2024-12-03 | Stop reason: HOSPADM

## 2024-12-01 RX ORDER — TRANEXAMIC ACID 100 MG/ML
1000 INJECTION, SOLUTION INTRAVENOUS ONCE AS NEEDED
Status: DISCONTINUED | OUTPATIENT
Start: 2024-12-01 | End: 2024-12-03 | Stop reason: HOSPADM

## 2024-12-01 RX ORDER — METOCLOPRAMIDE HYDROCHLORIDE 5 MG/ML
10 INJECTION INTRAMUSCULAR; INTRAVENOUS EVERY 6 HOURS PRN
Status: DISCONTINUED | OUTPATIENT
Start: 2024-12-01 | End: 2024-12-03 | Stop reason: HOSPADM

## 2024-12-01 RX ORDER — DIAZEPAM 5 MG/ML
2 INJECTION, SOLUTION INTRAMUSCULAR; INTRAVENOUS ONCE
Status: COMPLETED | OUTPATIENT
Start: 2024-12-01 | End: 2024-12-01

## 2024-12-01 RX ADMIN — ACETAMINOPHEN 975 MG: 325 TABLET, FILM COATED ORAL at 17:58

## 2024-12-01 RX ADMIN — DIPHENHYDRAMINE HYDROCHLORIDE 25 MG: 25 CAPSULE ORAL at 17:59

## 2024-12-01 RX ADMIN — SERTRALINE HYDROCHLORIDE 25 MG: 25 TABLET ORAL at 16:55

## 2024-12-01 RX ADMIN — METOCLOPRAMIDE 10 MG: 10 TABLET ORAL at 17:59

## 2024-12-01 RX ADMIN — ALPRAZOLAM 0.25 MG: 0.5 TABLET ORAL at 16:47

## 2024-12-01 RX ADMIN — IBUPROFEN 600 MG: 600 TABLET, FILM COATED ORAL at 17:58

## 2024-12-01 SDOH — SOCIAL STABILITY: SOCIAL INSECURITY: DO YOU FEEL ANYONE HAS EXPLOITED OR TAKEN ADVANTAGE OF YOU FINANCIALLY OR OF YOUR PERSONAL PROPERTY?: NO

## 2024-12-01 SDOH — SOCIAL STABILITY: SOCIAL INSECURITY: WITHIN THE LAST YEAR, HAVE YOU BEEN HUMILIATED OR EMOTIONALLY ABUSED IN OTHER WAYS BY YOUR PARTNER OR EX-PARTNER?: NO

## 2024-12-01 SDOH — SOCIAL STABILITY: SOCIAL INSECURITY: ARE YOU OR HAVE YOU BEEN THREATENED OR ABUSED PHYSICALLY, EMOTIONALLY, OR SEXUALLY BY ANYONE?: NO

## 2024-12-01 SDOH — SOCIAL STABILITY: SOCIAL INSECURITY: WITHIN THE LAST YEAR, HAVE YOU BEEN AFRAID OF YOUR PARTNER OR EX-PARTNER?: NO

## 2024-12-01 SDOH — ECONOMIC STABILITY: FOOD INSECURITY: WITHIN THE PAST 12 MONTHS, THE FOOD YOU BOUGHT JUST DIDN'T LAST AND YOU DIDN'T HAVE MONEY TO GET MORE.: NEVER TRUE

## 2024-12-01 SDOH — SOCIAL STABILITY: SOCIAL INSECURITY: PHYSICAL ABUSE: DENIES

## 2024-12-01 SDOH — HEALTH STABILITY: MENTAL HEALTH: WISH TO BE DEAD (PAST 1 MONTH): NO

## 2024-12-01 SDOH — SOCIAL STABILITY: SOCIAL INSECURITY: HAVE YOU HAD THOUGHTS OF HARMING ANYONE ELSE?: NO

## 2024-12-01 SDOH — ECONOMIC STABILITY: HOUSING INSECURITY: DO YOU FEEL UNSAFE GOING BACK TO THE PLACE WHERE YOU ARE LIVING?: NO

## 2024-12-01 SDOH — ECONOMIC STABILITY: FOOD INSECURITY: WITHIN THE PAST 12 MONTHS, YOU WORRIED THAT YOUR FOOD WOULD RUN OUT BEFORE YOU GOT THE MONEY TO BUY MORE.: NEVER TRUE

## 2024-12-01 SDOH — SOCIAL STABILITY: SOCIAL INSECURITY: VERBAL ABUSE: DENIES

## 2024-12-01 SDOH — SOCIAL STABILITY: SOCIAL INSECURITY: DOES ANYONE TRY TO KEEP YOU FROM HAVING/CONTACTING OTHER FRIENDS OR DOING THINGS OUTSIDE YOUR HOME?: NO

## 2024-12-01 SDOH — HEALTH STABILITY: MENTAL HEALTH: SUICIDAL BEHAVIOR (LIFETIME): NO

## 2024-12-01 SDOH — SOCIAL STABILITY: SOCIAL NETWORK: IN A TYPICAL WEEK, HOW MANY TIMES DO YOU TALK ON THE PHONE WITH FAMILY, FRIENDS, OR NEIGHBORS?: THREE TIMES A WEEK

## 2024-12-01 SDOH — HEALTH STABILITY: MENTAL HEALTH: WERE YOU ABLE TO COMPLETE ALL THE BEHAVIORAL HEALTH SCREENINGS?: YES

## 2024-12-01 SDOH — HEALTH STABILITY: MENTAL HEALTH: HAVE YOU USED ANY PRESCRIPTION DRUGS OTHER THAN PRESCRIBED IN THE PAST 12 MONTHS?: NO

## 2024-12-01 SDOH — SOCIAL STABILITY: SOCIAL INSECURITY: ARE THERE ANY APPARENT SIGNS OF INJURIES/BEHAVIORS THAT COULD BE RELATED TO ABUSE/NEGLECT?: NO

## 2024-12-01 SDOH — SOCIAL STABILITY: SOCIAL INSECURITY: HAS ANYONE EVER THREATENED TO HURT YOUR FAMILY OR YOUR PETS?: NO

## 2024-12-01 SDOH — HEALTH STABILITY: MENTAL HEALTH: NON-SPECIFIC ACTIVE SUICIDAL THOUGHTS (PAST 1 MONTH): NO

## 2024-12-01 SDOH — HEALTH STABILITY: MENTAL HEALTH: HAVE YOU USED ANY SUBSTANCES (CANABIS, COCAINE, HEROIN, HALLUCINOGENS, INHALANTS, ETC.) IN THE PAST 12 MONTHS?: NO

## 2024-12-01 SDOH — SOCIAL STABILITY: SOCIAL INSECURITY: ABUSE SCREEN: ADULT

## 2024-12-01 ASSESSMENT — LIFESTYLE VARIABLES
AUDIT-C TOTAL SCORE: 0
EVER FELT BAD OR GUILTY ABOUT YOUR DRINKING: NO
HAVE YOU EVER FELT YOU SHOULD CUT DOWN ON YOUR DRINKING: NO
AUDIT-C TOTAL SCORE: 0
HAVE PEOPLE ANNOYED YOU BY CRITICIZING YOUR DRINKING: NO
EVER HAD A DRINK FIRST THING IN THE MORNING TO STEADY YOUR NERVES TO GET RID OF A HANGOVER: NO
HOW OFTEN DO YOU HAVE A DRINK CONTAINING ALCOHOL: NEVER
HOW MANY STANDARD DRINKS CONTAINING ALCOHOL DO YOU HAVE ON A TYPICAL DAY: PATIENT DOES NOT DRINK
HOW OFTEN DO YOU HAVE 6 OR MORE DRINKS ON ONE OCCASION: NEVER
TOTAL SCORE: 0
SKIP TO QUESTIONS 9-10: 1

## 2024-12-01 ASSESSMENT — PAIN SCALES - GENERAL
PAINLEVEL_OUTOF10: 0 - NO PAIN
PAINLEVEL_OUTOF10: 5 - MODERATE PAIN
PAINLEVEL_OUTOF10: 0 - NO PAIN
PAINLEVEL_OUTOF10: 2
PAINLEVEL_OUTOF10: 0 - NO PAIN

## 2024-12-01 ASSESSMENT — COLUMBIA-SUICIDE SEVERITY RATING SCALE - C-SSRS
2. HAVE YOU ACTUALLY HAD ANY THOUGHTS OF KILLING YOURSELF?: NO
6. HAVE YOU EVER DONE ANYTHING, STARTED TO DO ANYTHING, OR PREPARED TO DO ANYTHING TO END YOUR LIFE?: NO
1. IN THE PAST MONTH, HAVE YOU WISHED YOU WERE DEAD OR WISHED YOU COULD GO TO SLEEP AND NOT WAKE UP?: NO

## 2024-12-01 ASSESSMENT — PAIN DESCRIPTION - DESCRIPTORS
DESCRIPTORS: DULL
DESCRIPTORS: ACHING

## 2024-12-01 ASSESSMENT — PAIN - FUNCTIONAL ASSESSMENT: PAIN_FUNCTIONAL_ASSESSMENT: 0-10

## 2024-12-01 ASSESSMENT — PATIENT HEALTH QUESTIONNAIRE - PHQ9
2. FEELING DOWN, DEPRESSED OR HOPELESS: NOT AT ALL
SUM OF ALL RESPONSES TO PHQ9 QUESTIONS 1 & 2: 0
1. LITTLE INTEREST OR PLEASURE IN DOING THINGS: NOT AT ALL

## 2024-12-01 ASSESSMENT — ACTIVITIES OF DAILY LIVING (ADL): LACK_OF_TRANSPORTATION: NO

## 2024-12-01 ASSESSMENT — PAIN DESCRIPTION - LOCATION: LOCATION: BACK

## 2024-12-01 ASSESSMENT — PAIN DESCRIPTION - PAIN TYPE: TYPE: ACUTE PAIN

## 2024-12-01 NOTE — H&P
OB Admission H&P    Assessment/Plan    Christiano Prieto is a 29 y.o.  s/p  on  admitted as a transfer from [place] for BP management in the setting of known siPEC w/ SF    siPEC w/ SF  Dx prior to delivery by unrelenting HA and visual changes in the s/o know cHTN  Now with severe range BP's at OSH requiring IV tx  Labetalol 20mg IV given prior to transfer  BP regimen: Nifed 30mg daily , started prior to transfer  HELLP labs neg x 1 at OSH, repeat ordered on admission  s/p 24 postpartum Mg gtt, re-started on Mag gtt at time of re-presentation but asymptomatic. Discontinued on arrival given no neuro sx. Continue to monitor. Discussed with patient will consider re-starting Mg if symptoms develop.    Community Acquired Pneumonia (CAP)  CXR at OSH showing right perihilar opacities, CT Chest showing PNA in the right upper lobe, anterior segment  Negative COVID, Flu, RSV  O2 sat appropriate on RA on admission  S/p IV CTX and Azithro, will transition to PO Azithro and Amoxicillin    Postpartum   Doing well, pain well controlled with PO meds, afebrile, tolerating diet, voiding  Anti-emetics PRN and bowel regimen ordered  Continue routine postpartum care.   Baby not with patient at this time, formula feeds at home with partner and patient's mother    DVT ppx  DVT risk score <5  Ambulation, SCDs,     Dispo  Anticipate discharge on HD#2-3 if continues to meet milestones and BP stable  Plan for BP check in 1 wk, postpartum visit in 4-6wks as scheduled    D/w Dr. Abelardo Thornton MD PGY-4  OB/GYN             Pregnancy Problems (from 24 to present)       Problem Noted Diagnosed Resolved    Severe pre-eclampsia, postpartum ([facility]) 2024 by Adrien Thornton MD  No    Priority:  Medium               Subjective   Patient is a 28yo  s/p  on  in the setting of siPEC w/ SF admitted as a transfer from [place] with new onset severe range BP's requiring IV tx. On transfer pt denies  ongoing HA, visual changes, RUQ pain or lower extremity swelling.     Pregnancy Notable For  -siPEC w/ SF s/p 24 hour mg gtt postpartum, previously not on PO BP medications prior to delivery  -Anxiety, on zoloft, abilify and PRN atarax, xanax,    Prenatal Provider Dr. Wadsworth    OB History    Para Term  AB Living   6 3 2 1 2 3   SAB IAB Ectopic Multiple Live Births   2 0 0 0 3      # Outcome Date GA Lbr Ever/2nd Weight Sex Type Anes PTL Lv   6 Current            5  24 36w2d 13:54 / 00:02 2.46 kg M Vag-Spont EPI  COLLEEN      Name: Jude Phipps      Apgar1: 8  Apgar5: 9   4 Term 13 39w0d   M Vag-Spont         Name: Nahid   3 SAB            2 SAB            1 Term  38w0d   F Vag-Spont   COLLEEN      Name: Shane       History reviewed. No pertinent surgical history.    Social History     Tobacco Use    Smoking status: Former     Types: Cigarettes    Smokeless tobacco: Never   Substance Use Topics    Alcohol use: Never       No Known Allergies    Medications Prior to Admission   Medication Sig Dispense Refill Last Dose/Taking    acetaminophen (Tylenol) 325 mg tablet Take 3 tablets (975 mg) by mouth every 6 hours if needed for mild pain (1 - 3) or moderate pain (4 - 6). 120 tablet 0 2024    ferrous gluconate 324 (38 Fe) mg tablet Take 1 tablet (38 mg of iron) by mouth once daily with breakfast. 30 tablet 11 Past Week    hydrOXYzine HCL (Atarax) 50 mg tablet Take 1 tablet (50 mg) by mouth every 8 hours if needed for anxiety. 90 tablet 1 2024    PRENATAL VIT 10-IRON-FOLIC-DHA ORAL Take 1 tablet by mouth once daily.   2024    ALPRAZolam (Xanax) 0.25 mg tablet Take 1 tablet (0.25 mg) by mouth once daily as needed for anxiety. 10 tablet 1     ARIPiprazole (Abilify) 5 mg tablet Take 1 tablet (5 mg) by mouth once daily. Do not fill before 2024. 30 tablet 1 Unknown    diphenhydrAMINE (Sominex) 25 mg tablet Take 1 tablet (25 mg) by mouth every 6 hours if needed for  other (Headache, take with reglan). 30 tablet 1     ferrous sulfate, 325 mg ferrous sulfate, (Iron, ferrous sulfate,) tablet Take 1 tablet (325 mg) by mouth once daily in the morning. Take before meals. Take on an empty stomach with vitamin C supplement or vitamin C containing juice 30 tablet 0     hydrOXYzine HCL (Atarax) 25 mg tablet Take 1 tablet (25 mg) by mouth 2 times a day as needed for anxiety. For severe anxiety 60 tablet 1     [] ibuprofen 600 mg tablet Take 1 tablet (600 mg) by mouth every 6 hours for 10 days. 40 tablet 0     metoclopramide (Reglan) 10 mg tablet Take 1 tablet (10 mg) by mouth every 6 hours if needed (Headache, take with benadryl). 30 tablet 1     miscellaneous medical supply (Blood Pressure Cuff) misc 1 Device 2 times a day. 1 each 0     polyethylene glycol (Miralax) 17 gram packet Take 17 g by mouth 2 times a day for 20 days. 40 packet 0     propranolol (Inderal) 10 mg tablet Take 1 tablet (10 mg) by mouth 3 times a day as needed (anxiety). 90 tablet 1 Unknown    sertraline (Zoloft) 25 mg tablet Take 1 tablet (25 mg) by mouth once daily for 6 days, THEN 2 tablets (50 mg) once daily for 14 days, THEN 4 tablets (100 mg) once daily. 154 tablet 0      Objective     Last Vitals  Temp Pulse Resp BP MAP O2 Sat     88 18 124/76 96 98 %     Blood Pressures         2024  1615             BP: 124/76             Physical Exam  General: NAD, mood appropriate  Cardiopulmonary: warm and well perfused, breathing comfortably on room air  Abdomen: Soft, nontender  Extremities: Symmetric    Results from last 7 days   Lab Units 24  0912   WBC AUTO x10*3/uL 7.0   HEMOGLOBIN g/dL 12.9   HEMATOCRIT % 38.3   PLATELETS AUTO x10*3/uL 449   AST U/L 42*   ALT U/L 20   CREATININE mg/dL 0.68        Prenatal labs reviewed, not remarkable.

## 2024-12-01 NOTE — ED PROVIDER NOTES
HPI   Chief Complaint   Patient presents with    Hypertension     Pt states she delivered two weeks ago and has been having increasing anxiety/high bp and is worried about post eclampsia, also states L arm numbness intermittently since she got an epidural         History provided by:  Patient   used: No      29-year-old female past medical history anxiety and preeclampsia who gave birth a couple weeks ago presents for elevated blood pressure reading with vision changes in her left eye that started 4 hours ago.  She states that she had to be induced 1 month early for preeclampsia.  She has not been on any medications.  She states that she had a telehealth follow-up with the [facility] for her OB and she had an elevated blood pressure reading in the 150s at that time without symptoms and they told her that she was fine with no intervention.  She complains of feeling like her left arm is weak ever since she received the epidural.  She states that she addressed this but was not addressed by staff.  She feels like she needs to put in more effort to move her arm.  Denies BUE/BLE swelling, temp changes.  Denies fever, dizziness, headache, n/v/d, abd or back pain, dysuria, SOB or CP    ROS negative unless otherwise stated in HPI          Patient History   Past Medical History:   Diagnosis Date    ETOH abuse     HTN (hypertension)     Moderate asthma ([facility]HS-HCC)     Panic anxiety syndrome      No past surgical history on file.  No family history on file.  Social History     Tobacco Use    Smoking status: Former     Types: Cigarettes    Smokeless tobacco: Never   Vaping Use    Vaping status: Never Used   Substance Use Topics    Alcohol use: Never    Drug use: Never       Physical Exam   ED Triage Vitals [12/01/24 0844]   Temperature Heart Rate Respirations BP   36.7 °C (98.1 °F) 93 20 174/90      Pulse Ox Temp Source Heart Rate Source Patient Position   98 % Temporal Monitor --      BP Location FiO2 (%)     --  --       Physical Exam    General: alert, no distress, well nourished, talking in full and complete sentences  Skin: dry, intact, no rashes  Head: atraumatic, normocephalic  Eyes: EOMI, PERRLA, normal conjunctiva, no nystagmus  Throat: no stridor, no hot potato voice  Nose: nares patent  Mouth: mucous membranes moist  Neck: FROM without pain  Respiratory: non labored breathing  Extremities: FROM X4, strength +5/5, capillary refill intact  Neuro: A&Ox3, cranial nerves II through XII intact, decreased sensation to left arm  Psych: cooperative, appropriate mood      ED Course & MDM   Diagnoses as of 12/01/24 1240   Elevated blood pressure reading with diagnosis of hypertension   Pneumonia of right lung due to infectious organism, unspecified part of lung                 No data recorded     Jenny Coma Scale Score: 15 (12/01/24 0846 : Blossom Sands RN)                     Labs Reviewed   CBC WITH AUTO DIFFERENTIAL - Abnormal       Result Value    WBC 7.0      nRBC 0.4 (*)     RBC 4.73      Hemoglobin 12.9      Hematocrit 38.3      MCV 81      MCH 27.3      MCHC 33.7      RDW 12.2      Platelets 449      Neutrophils % 63.9      Immature Granulocytes %, Automated 1.0 (*)     Lymphocytes % 26.6      Monocytes % 5.9      Eosinophils % 1.9      Basophils % 0.7      Neutrophils Absolute 4.45      Immature Granulocytes Absolute, Automated 0.07      Lymphocytes Absolute 1.85      Monocytes Absolute 0.41      Eosinophils Absolute 0.13      Basophils Absolute 0.05     COMPREHENSIVE METABOLIC PANEL - Abnormal    Glucose 94      Sodium 136      Potassium 5.3      Chloride 105      Bicarbonate 22      Anion Gap 14      Urea Nitrogen 10      Creatinine 0.68      eGFR >90      Calcium 8.8      Albumin 4.0      Alkaline Phosphatase 97      Total Protein 7.4      AST 42 (*)     Bilirubin, Total 0.5      ALT 20     URINALYSIS WITH REFLEX CULTURE AND MICROSCOPIC - Abnormal    Color, Urine Colorless (*)     Appearance, Urine Clear       Specific Gravity, Urine 1.003 (*)     pH, Urine 6.0      Protein, Urine NEGATIVE      Glucose, Urine Normal      Blood, Urine 0.2 (2+) (*)     Ketones, Urine NEGATIVE      Bilirubin, Urine NEGATIVE      Urobilinogen, Urine Normal      Nitrite, Urine NEGATIVE      Leukocyte Esterase, Urine NEGATIVE     MAGNESIUM - Normal    Magnesium 2.08     PROTIME-INR - Normal    Protime 11.6      INR 1.0     TROPONIN I, HIGH SENSITIVITY - Normal    Troponin I, High Sensitivity 3      Narrative:     Less than 99th percentile of normal range cutoff-  Female and children under 18 years old <14 ng/L; Male <21 ng/L: Negative  Repeat testing should be performed if clinically indicated.     Female and children under 18 years old 14-50 ng/L; Male 21-50 ng/L:  Consistent with possible cardiac damage and possible increased clinical   risk. Serial measurements may help to assess extent of myocardial damage.     >50 ng/L: Consistent with cardiac damage, increased clinical risk and  myocardial infarction. Serial measurements may help assess extent of   myocardial damage.      NOTE: Children less than 1 year old may have higher baseline troponin   levels and results should be interpreted in conjunction with the overall   clinical context.     NOTE: Troponin I testing is performed using a different   testing methodology at [facility] than at other   [facility]. Direct result comparisons should only   be made within the same method.   URINALYSIS WITH REFLEX CULTURE AND MICROSCOPIC    Narrative:     The following orders were created for panel order Urinalysis with Reflex Culture and Microscopic.  Procedure                               Abnormality         Status                     ---------                               -----------         ------                     Urinalysis with Reflex C...[[id number]]  Abnormal            Final result               Extra Urine Gray Tube[[id number]]                            In process                    Please view results for these tests on the individual orders.   EXTRA URINE GRAY TUBE   INFLUENZA A AND B PCR   SARS-COV-2 PCR   RSV PCR   URINALYSIS MICROSCOPIC WITH REFLEX CULTURE    WBC, Urine 1-5      RBC, Urine 1-2      Squamous Epithelial Cells, Urine 1-9 (SPARSE)        CT head wo IV contrast   Final Result   1. No acute intracranial abnormality.             MACRO:   None        Signed by: Shelley Dang 12/1/2024 12:00 PM   Dictation workstation:   [id number]      CT chest w IV contrast   Final Result   1. Pneumonia demonstrated in the right upper lobe anterior segment   paramediastinal location as well as within the lingula as described   above. Also, subtle patchy infiltrate right posterior perihilar   location in the medial segment of the right lower lobe.        2. Soft tissue prominence within the anterior mediastinum component   which is seen on the remote CT. However, this is more conspicuous on   the current examination suggesting rebound thymic hyperplasia.   Attention on follow-up.             MACRO:   None        Signed by: Shelley Dang 12/1/2024 12:06 PM   Dictation workstation:   [id number]      XR chest 1 view   Final Result   1. Right perihilar ill-defined opacity measuring 3.3 cm. Findings   could be related to infectious process, however advise further   assessment by dedicated chest CT scan with IV contrast for further   assessment as well as follow-up to ensure resolution.        Critical Finding:  See findings. Notification was initiated on   12/1/2024 at 9:28 am by  Arya Whatley.  (**-YCF-**) Instructions:             Signed by: Arya Whatley 12/1/2024 9:28 AM   Dictation workstation:   [id number]              Medical Decision Making  NIH 1 due to decreased sensation of left arm.  This started 2 weeks ago after her epidural and she is not a TNK candidate.  Concern for preeclampsia as her blood pressure upon arrival is 174/90 and repeat is in the 150s systolic and  will be given 20 mg IV labetalol and magnesium for concern of preeclampsia.  Previous records reviewed and she had a follow-up with psychiatry 4 days ago for anxiety.  She was started on Abilify and continued on hydroxyzine.  I reviewed the OB progress note from 4 days ago and the note states the patient reported blood pressures in the 130s.  She did have an elevated blood pressure of 150/100 during that day that is documented.  Patient was prescribed Xanax 4 days ago.  Chart review shows that this was prescribed 0.25 mg once daily as needed and she was prescribed 10 pills.  EKG my interpretation NSR with an incomplete right bundle branch block at a rate of 80 with no ST elevations.  Final read of chest x-ray shows a right perihilar ill-defined opacity measuring 3.3 cm.  Findings could be related to infectious process, however advised further chest CT with IV for further assessment.Final read of CT chest with contrast shows pneumonia in the right upper lobe as well as the lingula and perihilar location.  No acute findings on final read of head CT.  She will be started on Zithromax and Rocephin.  OB/GYN will be consulted.  Case discussed with Dr. Zhou from [facility] [place] who accepts patient for transfer.            Shared GRETTA Attestation:    This patient was seen by the advanced practice provider.  I personally saw the patient and made/approved the management plan and take responsibility for the patient management.    History: 29-year-old female approximately 3 weeks postpartum presents with vision changes and elevated blood pressure.    Exam: Regular rate and rhythm cardiac exam with clear breath sounds bilaterally.  Abdomen is soft and nontender.  Neurological exam is grossly intact.  NIH stroke scale is 0.  Negative Homans' sign bilaterally.    MDM: Preeclampsia, eclampsia, hypertension, stroke, intracranial bleed    Labs Reviewed   CBC WITH AUTO DIFFERENTIAL - Abnormal       Result Value    WBC 7.0      nRBC 0.4 (*)      RBC 4.73      Hemoglobin 12.9      Hematocrit 38.3      MCV 81      MCH 27.3      MCHC 33.7      RDW 12.2      Platelets 449      Neutrophils % 63.9      Immature Granulocytes %, Automated 1.0 (*)     Lymphocytes % 26.6      Monocytes % 5.9      Eosinophils % 1.9      Basophils % 0.7      Neutrophils Absolute 4.45      Immature Granulocytes Absolute, Automated 0.07      Lymphocytes Absolute 1.85      Monocytes Absolute 0.41      Eosinophils Absolute 0.13      Basophils Absolute 0.05     COMPREHENSIVE METABOLIC PANEL - Abnormal    Glucose 94      Sodium 136      Potassium 5.3      Chloride 105      Bicarbonate 22      Anion Gap 14      Urea Nitrogen 10      Creatinine 0.68      eGFR >90      Calcium 8.8      Albumin 4.0      Alkaline Phosphatase 97      Total Protein 7.4      AST 42 (*)     Bilirubin, Total 0.5      ALT 20     URINALYSIS WITH REFLEX CULTURE AND MICROSCOPIC - Abnormal    Color, Urine Colorless (*)     Appearance, Urine Clear      Specific Gravity, Urine 1.003 (*)     pH, Urine 6.0      Protein, Urine NEGATIVE      Glucose, Urine Normal      Blood, Urine 0.2 (2+) (*)     Ketones, Urine NEGATIVE      Bilirubin, Urine NEGATIVE      Urobilinogen, Urine Normal      Nitrite, Urine NEGATIVE      Leukocyte Esterase, Urine NEGATIVE     MAGNESIUM - Normal    Magnesium 2.08     PROTIME-INR - Normal    Protime 11.6      INR 1.0     TROPONIN I, HIGH SENSITIVITY - Normal    Troponin I, High Sensitivity 3      Narrative:     Less than 99th percentile of normal range cutoff-  Female and children under 18 years old <14 ng/L; Male <21 ng/L: Negative  Repeat testing should be performed if clinically indicated.     Female and children under 18 years old 14-50 ng/L; Male 21-50 ng/L:  Consistent with possible cardiac damage and possible increased clinical   risk. Serial measurements may help to assess extent of myocardial damage.     >50 ng/L: Consistent with cardiac damage, increased clinical risk and  myocardial  infarction. Serial measurements may help assess extent of   myocardial damage.      NOTE: Children less than 1 year old may have higher baseline troponin   levels and results should be interpreted in conjunction with the overall   clinical context.     NOTE: Troponin I testing is performed using a different   testing methodology at [facility] than at other   [facility]. Direct result comparisons should only   be made within the same method.   URINALYSIS WITH REFLEX CULTURE AND MICROSCOPIC    Narrative:     The following orders were created for panel order Urinalysis with Reflex Culture and Microscopic.  Procedure                               Abnormality         Status                     ---------                               -----------         ------                     Urinalysis with Reflex C...[[id number]]  Abnormal            Final result               Extra Urine Gray Tube[[id number]]                            In process                   Please view results for these tests on the individual orders.   EXTRA URINE GRAY TUBE   INFLUENZA A AND B PCR   SARS-COV-2 PCR   RSV PCR   URINALYSIS MICROSCOPIC WITH REFLEX CULTURE    WBC, Urine 1-5      RBC, Urine 1-2      Squamous Epithelial Cells, Urine 1-9 (SPARSE)         CT head wo IV contrast   Final Result   1. No acute intracranial abnormality.             MACRO:   None        Signed by: Shelley Dang 12/1/2024 12:00 PM   Dictation workstation:   [id number]      CT chest w IV contrast   Final Result   1. Pneumonia demonstrated in the right upper lobe anterior segment   paramediastinal location as well as within the lingula as described   above. Also, subtle patchy infiltrate right posterior perihilar   location in the medial segment of the right lower lobe.        2. Soft tissue prominence within the anterior mediastinum component   which is seen on the remote CT. However, this is more conspicuous on   the current examination suggesting  rebound thymic hyperplasia.   Attention on follow-up.             MACRO:   None        Signed by: Shelley Dang 12/1/2024 12:06 PM   Dictation workstation:   [id number]      XR chest 1 view   Final Result   1. Right perihilar ill-defined opacity measuring 3.3 cm. Findings   could be related to infectious process, however advise further   assessment by dedicated chest CT scan with IV contrast for further   assessment as well as follow-up to ensure resolution.        Critical Finding:  See findings. Notification was initiated on   12/1/2024 at 9:28 am by  Arya Whatley.  (**-YCF-**) Instructions:             Signed by: Arya Whatley 12/1/2024 9:28 AM   Dictation workstation:   [id number]            I have seen and examined the patient, agree with the workup, evaluation, medical decision making, management and diagnosis.  The care plan has been discussed.    Azar Luke MD      Procedure  Procedures     Francia Jolly PA-C  12/01/24 1248

## 2024-12-01 NOTE — ED PROCEDURE NOTE
Procedure  Critical Care    Performed by: Azar JARA MD  Authorized by: Azar JARA MD    Critical care provider statement:     Critical care time (minutes):  32    Critical care time was exclusive of:  Separately billable procedures and treating other patients    Critical care was necessary to treat or prevent imminent or life-threatening deterioration of the following conditions: Possible postpartum preeclampsia.    Critical care was time spent personally by me on the following activities:  Blood draw for specimens, discussions with primary provider, evaluation of patient's response to treatment, examination of patient, obtaining history from patient or surrogate, ordering and performing treatments and interventions, ordering and review of laboratory studies, ordering and review of radiographic studies, pulse oximetry, re-evaluation of patient's condition and development of treatment plan with patient or surrogate    Care discussed with: accepting provider at another facility                 Azar JARA MD  12/01/24 0388

## 2024-12-02 LAB — TSH SERPL-ACNC: 1.29 MIU/L (ref 0.44–3.98)

## 2024-12-02 PROCEDURE — 2500000002 HC RX 250 W HCPCS SELF ADMINISTERED DRUGS (ALT 637 FOR MEDICARE OP, ALT 636 FOR OP/ED): Performed by: STUDENT IN AN ORGANIZED HEALTH CARE EDUCATION/TRAINING PROGRAM

## 2024-12-02 PROCEDURE — 1120000001 HC OB PRIVATE ROOM DAILY

## 2024-12-02 PROCEDURE — 2500000005 HC RX 250 GENERAL PHARMACY W/O HCPCS: Performed by: STUDENT IN AN ORGANIZED HEALTH CARE EDUCATION/TRAINING PROGRAM

## 2024-12-02 PROCEDURE — 1100000001 HC PRIVATE ROOM DAILY

## 2024-12-02 PROCEDURE — 2500000001 HC RX 250 WO HCPCS SELF ADMINISTERED DRUGS (ALT 637 FOR MEDICARE OP)

## 2024-12-02 PROCEDURE — 2500000001 HC RX 250 WO HCPCS SELF ADMINISTERED DRUGS (ALT 637 FOR MEDICARE OP): Performed by: STUDENT IN AN ORGANIZED HEALTH CARE EDUCATION/TRAINING PROGRAM

## 2024-12-02 RX ORDER — HYDROXYZINE HYDROCHLORIDE 50 MG/1
50 TABLET, FILM COATED ORAL 3 TIMES DAILY
Status: DISCONTINUED | OUTPATIENT
Start: 2024-12-02 | End: 2024-12-03 | Stop reason: HOSPADM

## 2024-12-02 RX ORDER — LIDOCAINE 560 MG/1
1 PATCH PERCUTANEOUS; TOPICAL; TRANSDERMAL DAILY
Status: DISCONTINUED | OUTPATIENT
Start: 2024-12-02 | End: 2024-12-03 | Stop reason: HOSPADM

## 2024-12-02 RX ORDER — HYDROXYZINE HYDROCHLORIDE 25 MG/1
25 TABLET, FILM COATED ORAL 3 TIMES DAILY
Status: DISCONTINUED | OUTPATIENT
Start: 2024-12-02 | End: 2024-12-02

## 2024-12-02 RX ORDER — NIFEDIPINE 30 MG/1
30 TABLET, FILM COATED, EXTENDED RELEASE ORAL EVERY EVENING
Status: DISCONTINUED | OUTPATIENT
Start: 2024-12-02 | End: 2024-12-02

## 2024-12-02 RX ORDER — DIPHENHYDRAMINE HCL 25 MG
25 CAPSULE ORAL ONCE
Status: COMPLETED | OUTPATIENT
Start: 2024-12-02 | End: 2024-12-02

## 2024-12-02 RX ORDER — NIFEDIPINE 60 MG/1
60 TABLET, FILM COATED, EXTENDED RELEASE ORAL
Status: DISCONTINUED | OUTPATIENT
Start: 2024-12-02 | End: 2024-12-02

## 2024-12-02 RX ORDER — NIFEDIPINE 30 MG/1
30 TABLET, FILM COATED, EXTENDED RELEASE ORAL ONCE
Status: COMPLETED | OUTPATIENT
Start: 2024-12-02 | End: 2024-12-02

## 2024-12-02 RX ORDER — NIFEDIPINE 60 MG/1
60 TABLET, FILM COATED, EXTENDED RELEASE ORAL 2 TIMES DAILY
Status: DISCONTINUED | OUTPATIENT
Start: 2024-12-03 | End: 2024-12-03 | Stop reason: HOSPADM

## 2024-12-02 RX ORDER — DIAZEPAM 5 MG/1
5 TABLET ORAL ONCE
Status: COMPLETED | OUTPATIENT
Start: 2024-12-02 | End: 2024-12-02

## 2024-12-02 RX ORDER — DIAZEPAM 2 MG/1
2 TABLET ORAL ONCE
Status: DISCONTINUED | OUTPATIENT
Start: 2024-12-02 | End: 2024-12-02

## 2024-12-02 RX ADMIN — NIFEDIPINE 60 MG: 60 TABLET, FILM COATED, EXTENDED RELEASE ORAL at 06:44

## 2024-12-02 RX ADMIN — AMOXICILLIN 1000 MG: 500 CAPSULE ORAL at 23:55

## 2024-12-02 RX ADMIN — HYDROXYZINE HYDROCHLORIDE 25 MG: 25 TABLET ORAL at 08:40

## 2024-12-02 RX ADMIN — DIPHENHYDRAMINE HYDROCHLORIDE 25 MG: 25 CAPSULE ORAL at 20:35

## 2024-12-02 RX ADMIN — SERTRALINE HYDROCHLORIDE 25 MG: 25 TABLET ORAL at 08:39

## 2024-12-02 RX ADMIN — HYDROXYZINE HYDROCHLORIDE 50 MG: 50 TABLET, FILM COATED ORAL at 19:52

## 2024-12-02 RX ADMIN — HYDROXYZINE HYDROCHLORIDE 25 MG: 25 TABLET ORAL at 00:15

## 2024-12-02 RX ADMIN — AMOXICILLIN 1000 MG: 500 CAPSULE ORAL at 16:09

## 2024-12-02 RX ADMIN — DIAZEPAM 5 MG: 5 TABLET ORAL at 16:09

## 2024-12-02 RX ADMIN — AMOXICILLIN 1000 MG: 500 CAPSULE ORAL at 08:41

## 2024-12-02 RX ADMIN — LIDOCAINE 1 PATCH: 4 PATCH TOPICAL at 12:35

## 2024-12-02 RX ADMIN — AZITHROMYCIN DIHYDRATE 500 MG: 500 TABLET ORAL at 08:40

## 2024-12-02 RX ADMIN — NIFEDIPINE 30 MG: 30 TABLET, FILM COATED, EXTENDED RELEASE ORAL at 16:43

## 2024-12-02 RX ADMIN — ALPRAZOLAM 0.25 MG: 0.5 TABLET ORAL at 13:10

## 2024-12-02 RX ADMIN — NIFEDIPINE 30 MG: 30 TABLET, FILM COATED, EXTENDED RELEASE ORAL at 20:34

## 2024-12-02 RX ADMIN — ALPRAZOLAM 0.25 MG: 0.5 TABLET ORAL at 08:40

## 2024-12-02 RX ADMIN — AMOXICILLIN 1000 MG: 500 CAPSULE ORAL at 00:11

## 2024-12-02 RX ADMIN — METOCLOPRAMIDE 10 MG: 10 TABLET ORAL at 13:10

## 2024-12-02 ASSESSMENT — PAIN SCALES - GENERAL
PAINLEVEL_OUTOF10: 0 - NO PAIN
PAINLEVEL_OUTOF10: 0 - NO PAIN

## 2024-12-02 NOTE — CARE PLAN
The patient's goals for the shift include      The clinical goals for the shift include pts vitals will be stable throughout shift;      Problem: Hypertensive Disorder of Pregnancy (HDP)  Goal: Minimal s/sx of HDP and BP<160/110  Outcome: Progressing  Goal: Adequate urine output (0.5 ml/kg/hr)  Outcome: Progressing

## 2024-12-02 NOTE — PROGRESS NOTES
Postpartum Progress Note    Assessment/Plan   Christiano Prieto is a 29 y.o.  s/p  on  admitted as a transfer from [place] for BP management in the setting of known siPEC w/ SF     siPEC w/ SF  Dx prior to delivery by unrelenting HA and visual changes in the s/o know cHTN  Now with severe range BP's at OSH requiring IV tx. Labetalol 20mg IV given prior to transfer, no treatment since admission  BP regimen: Nifed 30mg daily , started prior to transfer uptitrated to nifed 60 this AM  HELLP labs neg x 2  s/p 24 postpartum Mg gtt at time of delivery, asx     Community Acquired Pneumonia (CAP)  CXR at OSH showing right perihilar opacities, CT Chest showing PNA in the right upper lobe, anterior segment  Negative COVID, Flu, RSV  O2 sat appropriate on RA on admission  Currently on PO Azithro and Amoxicillin (D1)     Postpartum   Doing well, pain well controlled with PO meds, afebrile, tolerating diet, voiding  Anti-emetics PRN and bowel regimen ordered  Continue routine postpartum care.   Baby not with patient at this time, formula feeds at home with partner and patient's mother     Anxiety  - hydroxizine 25mg PRN  - TSH with reflex T4 added on  - CT chest obtained for pneumonia showing possible thymic hyperplasia, will follow up TSH as above    DVT ppx  DVT risk score <5  Ambulation, SCDs     Dispo  Anticipate discharge on HD#2-3 if continues to meet milestones and BP stable, patient strongly desires to leave today, but due to severe range Bps this AM, will recommend staying  Plan for BP check in 1 wk, postpartum visit in 4-6wks as scheduled    Amine Jose E, PGY4 OBGYN  Seen and discussed with Dr. [place]         Subjective   Patient doing well with no HA, no vision changes, no RUQ pain. She has no CP, no SOB. She has no abdominal pain and minimal bleeding.    Objective   Allergies:   Patient has no known allergies.         Last Vitals:  Temp Pulse Resp BP MAP Pulse Ox   37 °C (98.6 °F) 97 16 (!)  160/117 131 92 %     Vitals Min/Max Last 24 Hours:  Temp  Min: 35.4 °C (95.7 °F)  Max: 37 °C (98.6 °F)  Pulse  Min: 75  Max: 101  Resp  Min: 16  Max: 20  BP  Min: 113/68  Max: 174/90  MAP (mmHg)  Min: 85  Max: 131    Intake/Output:   No intake or output data in the 24 hours ending 12/02/24 0807    Physical Exam:  General: no acute distress  HEENT: normocephalic, atraumatic  Heart: warm and well perfused  Lungs: inspiratory wheezes bilaterally, no crackles appreciated. On room air  Abdomen: nondistended  Extremities: no edema  Neuro: sleeping    Lab Data:  Labs in chart were reviewed.

## 2024-12-02 NOTE — PROGRESS NOTES
Postpartum Progress Note    Assessment/Plan   Christiano Prieto is a 29 y.o.  s/p  on  admitted as a transfer from [place] for BP management in the setting of known siPEC w/ SF     siPEC w/ SF  Dx prior to delivery by unrelenting HA and visual changes in the s/o know cHTN  Now with severe range BP's at OSH requiring IV tx. Labetalol 20mg IV given prior to transfer, no treatment since admission  BP regimen: Nifed 30mg daily , started prior to transfer, currently normotensive  HELLP labs neg x 2  s/p 24 postpartum Mg gtt at time of delivery     Community Acquired Pneumonia (CAP)  CXR at OSH showing right perihilar opacities, CT Chest showing PNA in the right upper lobe, anterior segment  Negative COVID, Flu, RSV  O2 sat appropriate on RA on admission  Currently on PO Azithro and Amoxicillin (D1)     Postpartum   Doing well, pain well controlled with PO meds, afebrile, tolerating diet, voiding  Anti-emetics PRN and bowel regimen ordered  Continue routine postpartum care.   Baby not with patient at this time, formula feeds at home with partner and patient's mother     DVT ppx  DVT risk score <5  Ambulation, SCDs     Dispo  Anticipate discharge on HD#2-3 if continues to meet milestones and BP stable  Plan for BP check in 1 wk, postpartum visit in 4-6wks as scheduled    Stable for transfer to postpartum floor given no severe range BPs in last four hours    Seen and discussed with Dr. Macho Drake MD  PGY-4, Obstetrics and Gynecology         Subjective   Patient sleeping. Partner at bedside reporting that she is feeling improved, headache resolved.    Objective   Allergies:   Patient has no known allergies.         Last Vitals:  Temp Pulse Resp BP MAP Pulse Ox   36.5 °C (97.7 °F) 96 17 113/68 85 97 %     Vitals Min/Max Last 24 Hours:  Temp  Min: 36.5 °C (97.7 °F)  Max: 36.7 °C (98.1 °F)  Pulse  Min: 75  Max: 98  Resp  Min: 17  Max: 20  BP  Min: 113/68  Max: 174/90  MAP (mmHg)  Min: 85  Max:  96    Intake/Output:   No intake or output data in the 24 hours ending 12/01/24 1933    Physical Exam:  General: no acute distress  HEENT: normocephalic, atraumatic  Heart: warm and well perfused  Lungs: inspiratory wheezes bilaterally, no crackles appreciated. On room air  Abdomen: nondistended  Extremities: no edema  Neuro: sleeping    Lab Data:  Labs in chart were reviewed.

## 2024-12-02 NOTE — CARE PLAN
The patient's goals for the shift include  Maintain low anxiety for the entirety of shift     The clinical goals for the shift include pts vitals will be stable throughout shift;        Problem: Hypertensive Disorder of Pregnancy (HDP)  Goal: Minimal s/sx of HDP and BP<160/110  Outcome: Not Progressing     Morning assessment:   0754: BP of 160/117 MD Sandoval notified - RN to retake BP in 15  0816: BP of 160/102 MD Sandoval notified - Pt. Having increased anxiety at this time. Pt. States she feels she is going to pass out and feels dizzy. No blurred vision during these episodes per pt.  RN to retake BP   0825: BP of 154/104. RN repositioned pt. And coached her through deep breathing. Pt. Reassured she is safe and baby is safe. RN discussed with pt. her Fear of having high blood pressure, and created plan of care with pt. Retake taken at 0825 not severe. Scheduled anxiety meds given.     All assessments WNL, pt free from any signs of preeclampsia. Goal of lowering anxiety with deep breathing, repositioning and meditation before each vital set for the remainder of stay.     1300: pt. Stating she is dizzy and has pressure in her head, but does not consider it to be a headache because it is not painful. Providers notified.       Goal of working on coping skills to improve pt. Ability to calm herself down when having panic attacks

## 2024-12-02 NOTE — SIGNIFICANT EVENT
Patient meets criteria for home monitoring of blood pressure post discharge.  Reason: current preeclampsia with severe features. Met with patient to assess for availability of home BP monitor.  Patient stated she owns home BP monitor. . Patient educated on importance of continuing to monitor BP at home, recording BP on home monitoring log and s/sx of when to call her provider.  Pt verbalized understanding the above information.

## 2024-12-03 ENCOUNTER — TELEPHONE (OUTPATIENT)
Dept: OTHER | Age: 29
End: 2024-12-03
Payer: COMMERCIAL

## 2024-12-03 VITALS
WEIGHT: 150 LBS | TEMPERATURE: 98.2 F | OXYGEN SATURATION: 97 % | RESPIRATION RATE: 16 BRPM | SYSTOLIC BLOOD PRESSURE: 145 MMHG | BODY MASS INDEX: 26.58 KG/M2 | HEART RATE: 93 BPM | DIASTOLIC BLOOD PRESSURE: 94 MMHG | HEIGHT: 63 IN

## 2024-12-03 DIAGNOSIS — F41.9 ANXIETY IN PREGNANCY IN THIRD TRIMESTER, ANTEPARTUM (HHS-HCC): ICD-10-CM

## 2024-12-03 DIAGNOSIS — O99.343 ANXIETY IN PREGNANCY IN THIRD TRIMESTER, ANTEPARTUM (HHS-HCC): ICD-10-CM

## 2024-12-03 PROBLEM — J18.9 COMMUNITY ACQUIRED PNEUMONIA: Status: ACTIVE | Noted: 2024-12-03

## 2024-12-03 PROBLEM — F41.0 PANIC DISORDER WITHOUT AGORAPHOBIA: Status: ACTIVE | Noted: 2024-12-03

## 2024-12-03 PROCEDURE — 2500000001 HC RX 250 WO HCPCS SELF ADMINISTERED DRUGS (ALT 637 FOR MEDICARE OP): Performed by: STUDENT IN AN ORGANIZED HEALTH CARE EDUCATION/TRAINING PROGRAM

## 2024-12-03 PROCEDURE — 99238 HOSP IP/OBS DSCHRG MGMT 30/<: CPT | Performed by: STUDENT IN AN ORGANIZED HEALTH CARE EDUCATION/TRAINING PROGRAM

## 2024-12-03 PROCEDURE — 2500000002 HC RX 250 W HCPCS SELF ADMINISTERED DRUGS (ALT 637 FOR MEDICARE OP, ALT 636 FOR OP/ED): Performed by: STUDENT IN AN ORGANIZED HEALTH CARE EDUCATION/TRAINING PROGRAM

## 2024-12-03 RX ORDER — NIFEDIPINE 60 MG/1
60 TABLET, FILM COATED, EXTENDED RELEASE ORAL 2 TIMES DAILY
Qty: 90 TABLET | Refills: 11 | Status: SHIPPED | OUTPATIENT
Start: 2024-12-03

## 2024-12-03 RX ORDER — AZITHROMYCIN 500 MG/1
500 TABLET, FILM COATED ORAL
Qty: 2 TABLET | Refills: 0 | Status: SHIPPED | OUTPATIENT
Start: 2024-12-04 | End: 2024-12-06

## 2024-12-03 RX ORDER — IBUPROFEN 600 MG/1
600 TABLET ORAL EVERY 6 HOURS
Qty: 20 TABLET | Refills: 3 | Status: SHIPPED | OUTPATIENT
Start: 2024-12-03

## 2024-12-03 RX ORDER — DIAZEPAM 5 MG/1
5 TABLET ORAL ONCE
Status: COMPLETED | OUTPATIENT
Start: 2024-12-03 | End: 2024-12-03

## 2024-12-03 RX ORDER — ACETAMINOPHEN 325 MG/1
975 TABLET ORAL EVERY 6 HOURS PRN
Status: DISCONTINUED | OUTPATIENT
Start: 2024-12-03 | End: 2024-12-03 | Stop reason: HOSPADM

## 2024-12-03 RX ORDER — AMOXICILLIN 500 MG/1
1000 CAPSULE ORAL EVERY 8 HOURS SCHEDULED
Qty: 14 CAPSULE | Refills: 0 | Status: SHIPPED | OUTPATIENT
Start: 2024-12-03 | End: 2024-12-06

## 2024-12-03 RX ORDER — IBUPROFEN 600 MG/1
600 TABLET ORAL EVERY 6 HOURS PRN
Status: DISCONTINUED | OUTPATIENT
Start: 2024-12-03 | End: 2024-12-03 | Stop reason: HOSPADM

## 2024-12-03 RX ORDER — ARIPIPRAZOLE 5 MG/1
5 TABLET ORAL DAILY
Qty: 30 TABLET | Refills: 1 | Status: SHIPPED | OUTPATIENT
Start: 2024-12-03

## 2024-12-03 RX ORDER — ALPRAZOLAM 0.25 MG/1
0.25 TABLET ORAL DAILY PRN
Qty: 10 TABLET | Refills: 0 | Status: SHIPPED | OUTPATIENT
Start: 2024-12-03

## 2024-12-03 RX ORDER — ALBUTEROL SULFATE 90 UG/1
2 INHALANT RESPIRATORY (INHALATION) EVERY 6 HOURS PRN
Qty: 18 G | Refills: 11 | Status: SHIPPED | OUTPATIENT
Start: 2024-12-03

## 2024-12-03 RX ADMIN — NIFEDIPINE 60 MG: 60 TABLET, FILM COATED, EXTENDED RELEASE ORAL at 08:43

## 2024-12-03 RX ADMIN — ALPRAZOLAM 0.25 MG: 0.5 TABLET ORAL at 11:22

## 2024-12-03 RX ADMIN — HYDROXYZINE HYDROCHLORIDE 50 MG: 50 TABLET, FILM COATED ORAL at 08:43

## 2024-12-03 RX ADMIN — IBUPROFEN 600 MG: 600 TABLET, FILM COATED ORAL at 05:45

## 2024-12-03 RX ADMIN — DIAZEPAM 5 MG: 5 TABLET ORAL at 00:14

## 2024-12-03 RX ADMIN — AZITHROMYCIN DIHYDRATE 500 MG: 500 TABLET ORAL at 08:43

## 2024-12-03 RX ADMIN — ACETAMINOPHEN 975 MG: 325 TABLET ORAL at 05:46

## 2024-12-03 RX ADMIN — AMOXICILLIN 1000 MG: 500 CAPSULE ORAL at 08:42

## 2024-12-03 NOTE — CARE PLAN
The patient's goals for the shift include prepare for discharge    The clinical goals for the shift include maintain stable BP's for the shift      Problem: Hypertensive Disorder of Pregnancy (HDP)  Goal: Minimal s/sx of HDP and BP<160/110  Outcome: Adequate for Discharge  Goal: Adequate urine output (0.5 ml/kg/hr)  Outcome: Adequate for Discharge     Pt. Has not had any severe blood pressures during my shift. Pt. Is still having intermittent anxiety. Pt. Free from blurred vision, headache and upper right quadrant abdominal pain.     Pt. Cleared for discharge per provider.

## 2024-12-03 NOTE — TELEPHONE ENCOUNTER
Patient is currently admitted in the hospital and her clinical team there is recommending she sees you in the next few weeks. Your next available is for 1/22. Pt has been scheduled for the next available and added to the cancellation list.

## 2024-12-03 NOTE — DISCHARGE SUMMARY
Discharge Summary    Admission Date: 2024  Discharge Date: 12/3/2024    Discharge Diagnosis  Severe pre-eclampsia, postpartum ([facility])    Hospital Course  30 yo  presented on  postpartum from [facility] on  as a transfer from [place] for blood pressure management, in setting of known superimposed preeclampsia with severe features. She had sustained severe range blood pressures that required IV treatment, and she was started on nifedipine, which was up titrated to 60/60 during her admission. At the time of her delivery she had received magnesium gtt for seizure prophylaxis, but was not started on any long acting blood pressure agent. She had no neurologic symptoms on this admission, and had 2 sets of negative HELLP labs. She was also diagnosed with RUL community acquired pneumonia before transfer, received dose of CTX and azithromycin, and was then transitioned to an oral regimen of azithromycin and amoxicillin. 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 Patient also has significant anxiety, and was prescribed a limited course of alprazolam for this, in addition to her zoloft, abilify, atarax.   By HD#3 her blood pressures were normotensive to mild range, and she was stable for discharge home with close outpatient follow up.      Pertinent Physical Exam At Time of Discharge  Gen: NAD  HEENT: NCAT  Resp: normal work of breathing on room air  Card: regular rate  Neuro: grossly intact   Psych: appropriate  Motor: moving all extremities spontaneously   Abdomen: Non distended      Last Vitals:  Temp Pulse Resp BP MAP Pulse Ox   36.8 °C (98.2 °F) 93 16 (!) 145/94 111 97 %     Discharge Meds     Your medication list        START taking these medications        Instructions Last Dose Given Next Dose Due   albuterol 90 mcg/actuation inhaler      Inhale 2 puffs every 6 hours if needed for wheezing.       amoxicillin 500 mg capsule  Commonly  known as: Amoxil      Take 2 capsules (1,000 mg) by mouth every 8 hours for 7 doses.       azithromycin 500 mg tablet  Commonly known as: Zithromax  Start taking on: December 4, 2024      Take 1 tablet (500 mg) by mouth once every 24 hours for 2 doses.       NIFEdipine ER 60 mg 24 hr tablet  Commonly known as: Adalat CC      Take 1 tablet (60 mg) by mouth 2 times a day. Do not crush, chew, or split.              CONTINUE taking these medications        Instructions Last Dose Given Next Dose Due   acetaminophen 325 mg tablet  Commonly known as: Tylenol      Take 3 tablets (975 mg) by mouth every 6 hours if needed for mild pain (1 - 3) or moderate pain (4 - 6).       ALPRAZolam 0.25 mg tablet  Commonly known as: Xanax      Take 1 tablet (0.25 mg) by mouth once daily as needed for anxiety.       ARIPiprazole 5 mg tablet  Commonly known as: Abilify      Take 1 tablet (5 mg) by mouth once daily. Do not fill before November 20, 2024.       Blood Pressure Cuff misc  Generic drug: miscellaneous medical supply      1 Device 2 times a day.       diphenhydrAMINE 25 mg tablet  Commonly known as: Sominex      Take 1 tablet (25 mg) by mouth every 6 hours if needed for other (Headache, take with reglan).       ferrous gluconate 324 (38 Fe) mg tablet  Commonly known as: Fergon      Take 1 tablet (38 mg of iron) by mouth once daily with breakfast.       ferrous sulfate (325 mg ferrous sulfate) tablet  Commonly known as: Iron (ferrous sulfate)      Take 1 tablet (325 mg) by mouth once daily in the morning. Take before meals. Take on an empty stomach with vitamin C supplement or vitamin C containing juice       hydrOXYzine HCL 25 mg tablet  Commonly known as: Atarax      Take 1 tablet (25 mg) by mouth 2 times a day as needed for anxiety. For severe anxiety       hydrOXYzine HCL 50 mg tablet  Commonly known as: Atarax      Take 1 tablet (50 mg) by mouth every 8 hours if needed for anxiety.       ibuprofen 600 mg tablet      Take 1  tablet (600 mg) by mouth every 6 hours.       metoclopramide 10 mg tablet  Commonly known as: Reglan      Take 1 tablet (10 mg) by mouth every 6 hours if needed (Headache, take with benadryl).       polyethylene glycol 17 gram packet  Commonly known as: Miralax      Take 17 g by mouth 2 times a day for 20 days.       PRENATAL VIT 10-IRON-FOLIC-DHA ORAL           propranolol 10 mg tablet  Commonly known as: Inderal      Take 1 tablet (10 mg) by mouth 3 times a day as needed (anxiety).       sertraline 25 mg tablet  Commonly known as: Zoloft  Start taking on: November 27, 2024      Take 1 tablet (25 mg) by mouth once daily for 6 days, THEN 2 tablets (50 mg) once daily for 14 days, THEN 4 tablets (100 mg) once daily.                 Where to Get Your Medications        These medications were sent to [facility] [address] [place] [address]      Phone: [phone]   albuterol 90 mcg/actuation inhaler  ALPRAZolam 0.25 mg tablet  amoxicillin 500 mg capsule  azithromycin 500 mg tablet  ibuprofen 600 mg tablet  NIFEdipine ER 60 mg 24 hr tablet          Complications Requiring Follow-Up  -superimposed preeclampsia with severe features  - Anxiety   - community acquired pneumonia   - postpartum state      Test Results Pending At Discharge  Pending Labs       No current pending labs.            Outpatient Follow-Up  Future Appointments   Date Time Provider Department Center   1/22/2025  1:00 PM Sherman Ricketts MD [id number]       Seen and Dw Dr Kofi Mcguire MD

## 2024-12-10 PROCEDURE — 2720000007 HC OR 272 NO HCPCS

## 2024-12-11 ENCOUNTER — HOSPITAL ENCOUNTER (EMERGENCY)
Facility: HOSPITAL | Age: 29
Discharge: HOME | End: 2024-12-11
Payer: COMMERCIAL

## 2024-12-11 VITALS
OXYGEN SATURATION: 99 % | WEIGHT: 150 LBS | RESPIRATION RATE: 18 BRPM | HEIGHT: 63 IN | HEART RATE: 85 BPM | SYSTOLIC BLOOD PRESSURE: 131 MMHG | TEMPERATURE: 97.7 F | DIASTOLIC BLOOD PRESSURE: 90 MMHG | BODY MASS INDEX: 26.58 KG/M2

## 2024-12-11 DIAGNOSIS — F41.9 ANXIETY: Primary | ICD-10-CM

## 2024-12-11 PROCEDURE — 2500000001 HC RX 250 WO HCPCS SELF ADMINISTERED DRUGS (ALT 637 FOR MEDICARE OP): Performed by: PHYSICIAN ASSISTANT

## 2024-12-11 PROCEDURE — 99283 EMERGENCY DEPT VISIT LOW MDM: CPT

## 2024-12-11 RX ORDER — HYDROXYZINE HYDROCHLORIDE 25 MG/1
25 TABLET, FILM COATED ORAL ONCE
Status: COMPLETED | OUTPATIENT
Start: 2024-12-11 | End: 2024-12-11

## 2024-12-11 ASSESSMENT — PAIN SCALES - GENERAL: PAINLEVEL_OUTOF10: 5 - MODERATE PAIN

## 2024-12-11 ASSESSMENT — PAIN DESCRIPTION - LOCATION: LOCATION: CHEST

## 2024-12-11 ASSESSMENT — LIFESTYLE VARIABLES
EVER FELT BAD OR GUILTY ABOUT YOUR DRINKING: NO
HAVE PEOPLE ANNOYED YOU BY CRITICIZING YOUR DRINKING: NO
EVER HAD A DRINK FIRST THING IN THE MORNING TO STEADY YOUR NERVES TO GET RID OF A HANGOVER: NO
TOTAL SCORE: 0
HAVE YOU EVER FELT YOU SHOULD CUT DOWN ON YOUR DRINKING: NO

## 2024-12-11 ASSESSMENT — PAIN DESCRIPTION - FREQUENCY: FREQUENCY: CONSTANT/CONTINUOUS

## 2024-12-11 ASSESSMENT — PAIN DESCRIPTION - ONSET: ONSET: ONGOING

## 2024-12-11 ASSESSMENT — PAIN DESCRIPTION - PAIN TYPE: TYPE: ACUTE PAIN

## 2024-12-11 ASSESSMENT — PAIN DESCRIPTION - DESCRIPTORS: DESCRIPTORS: HEAVINESS

## 2024-12-11 ASSESSMENT — PAIN - FUNCTIONAL ASSESSMENT: PAIN_FUNCTIONAL_ASSESSMENT: 0-10

## 2024-12-11 ASSESSMENT — PAIN DESCRIPTION - ORIENTATION: ORIENTATION: MID

## 2024-12-11 NOTE — ED PROVIDER NOTES
HPI   Chief Complaint   Patient presents with    Panic Attack     Pt states that she had a panic attack and felt lightheaded during the episode. Pt denies any lightheadedness now. Pt also states that she had pneumonia and just finished the course of antibiotics         History provided by:  Patient and EMS personnel   used: No      29-year-old female past medical history anxiety presents by squad as she felt like she was going to have a panic attack.  She states that squad did not allow her to take her Atarax.  She is feeling improved right now.  Denies headache, vision changes, weakness, n/v/d, abd or back pain, dysuria, SOB or CP    ROS negative unless otherwise stated in HPI          Patient History   Past Medical History:   Diagnosis Date    ETOH abuse     HTN (hypertension)     Moderate asthma ([facility])     Panic anxiety syndrome      History reviewed. No pertinent surgical history.  No family history on file.  Social History     Tobacco Use    Smoking status: Former     Types: Cigarettes    Smokeless tobacco: Never   Vaping Use    Vaping status: Never Used   Substance Use Topics    Alcohol use: Never    Drug use: Never       Physical Exam   ED Triage Vitals [12/11/24 1221]   Temperature Heart Rate Respirations BP   36.4 °C (97.5 °F) 96 18 133/84      Pulse Ox Temp Source Heart Rate Source Patient Position   98 % Temporal Monitor Sitting      BP Location FiO2 (%)     Left arm --       Physical Exam    General: alert, no distress, well nourished, talking in full and complete sentences  Skin: dry, intact, no rashes  Head: atraumatic  Eyes: EOMI, PERRLA, normal conjunctiva  Throat: no stridor, no hot potato voice  Nose: nares patent  Mouth: mucous membranes moist  CV: +S1/S1  Respiratory: non labored breathing, lungs CTA, no wheezing, no retractions  Extremities: FROM X4, strength +5/5, pulses intact, capillary refill intact, radial pulses equal  Neuro: A&Ox3, no focal neurological  deficits  Psych: cooperative, appropriate mood        ED Course & MDM   Diagnoses as of 12/11/24 1231   Anxiety                 No data recorded     Jenny Coma Scale Score: 15 (12/11/24 1225 : Gaye Paz RN)                           Medical Decision Making  Patient is sleeping comfortably on the room when I walk in.  She states that squad did not allow her to take her Atarax.  She will be given a dose here.  She has significantly decreased her symptoms since she left her house.  OARRS reviewed and she was prescribed Xanax 4 days ago.  No further workup required and her blood pressure is not meeting preeclampsia requirements.  She has to follow-up with the family doctor and psych.  Afebrile, not tachycardic, not tachypneic, not hypoxic, tolerating PO, non toxic appearing and ambulating at baseline at discharge. Hemodynamically intact. Patient instructed on return precautions. All questions answered. Patient in agreement with treatment.      Procedure  Procedures     Francia Jolly PA-C  12/11/24 123

## 2025-01-01 ENCOUNTER — APPOINTMENT (OUTPATIENT)
Dept: CARDIOLOGY | Facility: HOSPITAL | Age: 30
End: 2025-01-01
Payer: COMMERCIAL

## 2025-01-01 ENCOUNTER — HOSPITAL ENCOUNTER (EMERGENCY)
Facility: HOSPITAL | Age: 30
Discharge: HOME | End: 2025-01-02
Payer: COMMERCIAL

## 2025-01-01 ENCOUNTER — APPOINTMENT (OUTPATIENT)
Dept: RADIOLOGY | Facility: HOSPITAL | Age: 30
End: 2025-01-01
Payer: COMMERCIAL

## 2025-01-01 VITALS
SYSTOLIC BLOOD PRESSURE: 118 MMHG | OXYGEN SATURATION: 98 % | TEMPERATURE: 97.3 F | HEIGHT: 63 IN | WEIGHT: 150 LBS | HEART RATE: 97 BPM | RESPIRATION RATE: 20 BRPM | DIASTOLIC BLOOD PRESSURE: 60 MMHG | BODY MASS INDEX: 26.58 KG/M2

## 2025-01-01 DIAGNOSIS — J20.9 ACUTE BRONCHITIS, UNSPECIFIED ORGANISM: Primary | ICD-10-CM

## 2025-01-01 LAB
ALBUMIN SERPL BCP-MCNC: 4.6 G/DL (ref 3.4–5)
ALP SERPL-CCNC: 99 U/L (ref 33–110)
ALT SERPL W P-5'-P-CCNC: 44 U/L (ref 7–45)
ANION GAP SERPL CALC-SCNC: 13 MMOL/L (ref 10–20)
APPEARANCE UR: CLEAR
AST SERPL W P-5'-P-CCNC: 34 U/L (ref 9–39)
B-HCG SERPL-ACNC: <2 MIU/ML
BASOPHILS # BLD AUTO: 0.04 X10*3/UL (ref 0–0.1)
BASOPHILS NFR BLD AUTO: 0.6 %
BILIRUB SERPL-MCNC: 0.4 MG/DL (ref 0–1.2)
BILIRUB UR STRIP.AUTO-MCNC: NEGATIVE MG/DL
BUN SERPL-MCNC: 14 MG/DL (ref 6–23)
CALCIUM SERPL-MCNC: 9.2 MG/DL (ref 8.6–10.3)
CARDIAC TROPONIN I PNL SERPL HS: 4 NG/L (ref 0–13)
CARDIAC TROPONIN I PNL SERPL HS: <3 NG/L (ref 0–13)
CHLORIDE SERPL-SCNC: 105 MMOL/L (ref 98–107)
CO2 SERPL-SCNC: 23 MMOL/L (ref 21–32)
COLOR UR: NORMAL
CREAT SERPL-MCNC: 1.15 MG/DL (ref 0.5–1.05)
D DIMER PPP FEU-MCNC: <215 NG/ML FEU
EGFRCR SERPLBLD CKD-EPI 2021: 66 ML/MIN/1.73M*2
EOSINOPHIL # BLD AUTO: 0.11 X10*3/UL (ref 0–0.7)
EOSINOPHIL NFR BLD AUTO: 1.5 %
ERYTHROCYTE [DISTWIDTH] IN BLOOD BY AUTOMATED COUNT: 12.2 % (ref 11.5–14.5)
GLUCOSE SERPL-MCNC: 110 MG/DL (ref 74–99)
GLUCOSE UR STRIP.AUTO-MCNC: NORMAL MG/DL
HCT VFR BLD AUTO: 34.7 % (ref 36–46)
HGB BLD-MCNC: 12 G/DL (ref 12–16)
IMM GRANULOCYTES # BLD AUTO: 0.02 X10*3/UL (ref 0–0.7)
IMM GRANULOCYTES NFR BLD AUTO: 0.3 % (ref 0–0.9)
KETONES UR STRIP.AUTO-MCNC: NEGATIVE MG/DL
LACTATE SERPL-SCNC: 1.4 MMOL/L (ref 0.4–2)
LEUKOCYTE ESTERASE UR QL STRIP.AUTO: NEGATIVE
LYMPHOCYTES # BLD AUTO: 2.13 X10*3/UL (ref 1.2–4.8)
LYMPHOCYTES NFR BLD AUTO: 30 %
MCH RBC QN AUTO: 26.5 PG (ref 26–34)
MCHC RBC AUTO-ENTMCNC: 34.6 G/DL (ref 32–36)
MCV RBC AUTO: 77 FL (ref 80–100)
MONOCYTES # BLD AUTO: 0.51 X10*3/UL (ref 0.1–1)
MONOCYTES NFR BLD AUTO: 7.2 %
NEUTROPHILS # BLD AUTO: 4.29 X10*3/UL (ref 1.2–7.7)
NEUTROPHILS NFR BLD AUTO: 60.4 %
NITRITE UR QL STRIP.AUTO: NEGATIVE
NRBC BLD-RTO: 0 /100 WBCS (ref 0–0)
PH UR STRIP.AUTO: 5.5 [PH]
PLATELET # BLD AUTO: 333 X10*3/UL (ref 150–450)
POTASSIUM SERPL-SCNC: 3.6 MMOL/L (ref 3.5–5.3)
PROT SERPL-MCNC: 7.3 G/DL (ref 6.4–8.2)
PROT UR STRIP.AUTO-MCNC: NEGATIVE MG/DL
RBC # BLD AUTO: 4.52 X10*6/UL (ref 4–5.2)
RBC # UR STRIP.AUTO: NEGATIVE /UL
SODIUM SERPL-SCNC: 137 MMOL/L (ref 136–145)
SP GR UR STRIP.AUTO: 1.01
UROBILINOGEN UR STRIP.AUTO-MCNC: NORMAL MG/DL
WBC # BLD AUTO: 7.1 X10*3/UL (ref 4.4–11.3)

## 2025-01-01 PROCEDURE — 2500000002 HC RX 250 W HCPCS SELF ADMINISTERED DRUGS (ALT 637 FOR MEDICARE OP, ALT 636 FOR OP/ED): Performed by: PHYSICIAN ASSISTANT

## 2025-01-01 PROCEDURE — 71045 X-RAY EXAM CHEST 1 VIEW: CPT

## 2025-01-01 PROCEDURE — 93005 ELECTROCARDIOGRAM TRACING: CPT

## 2025-01-01 PROCEDURE — 99285 EMERGENCY DEPT VISIT HI MDM: CPT

## 2025-01-01 PROCEDURE — 36415 COLL VENOUS BLD VENIPUNCTURE: CPT | Performed by: PHYSICIAN ASSISTANT

## 2025-01-01 PROCEDURE — 94640 AIRWAY INHALATION TREATMENT: CPT | Mod: 59

## 2025-01-01 PROCEDURE — 84075 ASSAY ALKALINE PHOSPHATASE: CPT | Performed by: PHYSICIAN ASSISTANT

## 2025-01-01 PROCEDURE — 85025 COMPLETE CBC W/AUTO DIFF WBC: CPT | Performed by: PHYSICIAN ASSISTANT

## 2025-01-01 PROCEDURE — 71045 X-RAY EXAM CHEST 1 VIEW: CPT | Performed by: STUDENT IN AN ORGANIZED HEALTH CARE EDUCATION/TRAINING PROGRAM

## 2025-01-01 PROCEDURE — 84484 ASSAY OF TROPONIN QUANT: CPT | Performed by: PHYSICIAN ASSISTANT

## 2025-01-01 PROCEDURE — 81003 URINALYSIS AUTO W/O SCOPE: CPT | Performed by: PHYSICIAN ASSISTANT

## 2025-01-01 PROCEDURE — 96374 THER/PROPH/DIAG INJ IV PUSH: CPT

## 2025-01-01 PROCEDURE — 84702 CHORIONIC GONADOTROPIN TEST: CPT | Performed by: PHYSICIAN ASSISTANT

## 2025-01-01 PROCEDURE — 2500000004 HC RX 250 GENERAL PHARMACY W/ HCPCS (ALT 636 FOR OP/ED): Performed by: PHYSICIAN ASSISTANT

## 2025-01-01 PROCEDURE — 96375 TX/PRO/DX INJ NEW DRUG ADDON: CPT

## 2025-01-01 PROCEDURE — 83605 ASSAY OF LACTIC ACID: CPT | Performed by: PHYSICIAN ASSISTANT

## 2025-01-01 PROCEDURE — 85379 FIBRIN DEGRADATION QUANT: CPT | Performed by: PHYSICIAN ASSISTANT

## 2025-01-01 PROCEDURE — 96361 HYDRATE IV INFUSION ADD-ON: CPT

## 2025-01-01 RX ORDER — ACETAMINOPHEN 325 MG/1
650 TABLET ORAL EVERY 6 HOURS PRN
Qty: 30 TABLET | Refills: 0 | Status: SHIPPED | OUTPATIENT
Start: 2025-01-01 | End: 2025-01-11

## 2025-01-01 RX ORDER — MORPHINE SULFATE 4 MG/ML
4 INJECTION, SOLUTION INTRAMUSCULAR; INTRAVENOUS ONCE
Status: COMPLETED | OUTPATIENT
Start: 2025-01-01 | End: 2025-01-01

## 2025-01-01 RX ORDER — IPRATROPIUM BROMIDE AND ALBUTEROL SULFATE 2.5; .5 MG/3ML; MG/3ML
3 SOLUTION RESPIRATORY (INHALATION) ONCE
Status: COMPLETED | OUTPATIENT
Start: 2025-01-01 | End: 2025-01-01

## 2025-01-01 RX ORDER — AZITHROMYCIN 250 MG/1
250 TABLET, FILM COATED ORAL DAILY
Qty: 4 TABLET | Refills: 0 | Status: SHIPPED | OUTPATIENT
Start: 2025-01-02 | End: 2025-01-06

## 2025-01-01 RX ORDER — GUAIFENESIN 600 MG/1
600 TABLET, EXTENDED RELEASE ORAL 2 TIMES DAILY
Qty: 14 TABLET | Refills: 0 | Status: SHIPPED | OUTPATIENT
Start: 2025-01-01 | End: 2025-01-08

## 2025-01-01 RX ORDER — PREDNISONE 50 MG/1
50 TABLET ORAL DAILY
Qty: 5 TABLET | Refills: 0 | Status: SHIPPED | OUTPATIENT
Start: 2025-01-01 | End: 2025-01-06

## 2025-01-01 RX ORDER — ONDANSETRON HYDROCHLORIDE 2 MG/ML
4 INJECTION, SOLUTION INTRAVENOUS ONCE
Status: COMPLETED | OUTPATIENT
Start: 2025-01-01 | End: 2025-01-01

## 2025-01-01 RX ORDER — KETOROLAC TROMETHAMINE 30 MG/ML
15 INJECTION, SOLUTION INTRAMUSCULAR; INTRAVENOUS ONCE
Status: COMPLETED | OUTPATIENT
Start: 2025-01-01 | End: 2025-01-01

## 2025-01-01 RX ORDER — AZITHROMYCIN 250 MG/1
500 TABLET, FILM COATED ORAL ONCE
Status: COMPLETED | OUTPATIENT
Start: 2025-01-01 | End: 2025-01-01

## 2025-01-01 RX ORDER — ALBUTEROL SULFATE 90 UG/1
1-2 INHALANT RESPIRATORY (INHALATION) EVERY 6 HOURS PRN
Qty: 18 G | Refills: 0 | Status: SHIPPED | OUTPATIENT
Start: 2025-01-01 | End: 2025-01-31

## 2025-01-01 RX ADMIN — MORPHINE SULFATE 4 MG: 4 INJECTION, SOLUTION INTRAMUSCULAR; INTRAVENOUS at 21:32

## 2025-01-01 RX ADMIN — AZITHROMYCIN DIHYDRATE 500 MG: 250 TABLET, FILM COATED ORAL at 23:59

## 2025-01-01 RX ADMIN — IPRATROPIUM BROMIDE AND ALBUTEROL SULFATE 3 ML: 2.5; .5 SOLUTION RESPIRATORY (INHALATION) at 21:32

## 2025-01-01 RX ADMIN — KETOROLAC TROMETHAMINE 15 MG: 30 INJECTION, SOLUTION INTRAMUSCULAR at 21:32

## 2025-01-01 RX ADMIN — SODIUM CHLORIDE, POTASSIUM CHLORIDE, SODIUM LACTATE AND CALCIUM CHLORIDE 1000 ML: 600; 310; 30; 20 INJECTION, SOLUTION INTRAVENOUS at 21:33

## 2025-01-01 RX ADMIN — DEXAMETHASONE 10 MG: 4 TABLET ORAL at 21:44

## 2025-01-01 RX ADMIN — ONDANSETRON 4 MG: 2 INJECTION INTRAMUSCULAR; INTRAVENOUS at 21:32

## 2025-01-01 ASSESSMENT — PAIN SCALES - GENERAL
PAINLEVEL_OUTOF10: 0 - NO PAIN
PAINLEVEL_OUTOF10: 0 - NO PAIN

## 2025-01-01 ASSESSMENT — PAIN - FUNCTIONAL ASSESSMENT: PAIN_FUNCTIONAL_ASSESSMENT: 0-10

## 2025-01-01 ASSESSMENT — LIFESTYLE VARIABLES
HAVE PEOPLE ANNOYED YOU BY CRITICIZING YOUR DRINKING: NO
EVER FELT BAD OR GUILTY ABOUT YOUR DRINKING: NO
TOTAL SCORE: 0
HAVE YOU EVER FELT YOU SHOULD CUT DOWN ON YOUR DRINKING: NO
EVER HAD A DRINK FIRST THING IN THE MORNING TO STEADY YOUR NERVES TO GET RID OF A HANGOVER: NO

## 2025-01-01 ASSESSMENT — COLUMBIA-SUICIDE SEVERITY RATING SCALE - C-SSRS
6. HAVE YOU EVER DONE ANYTHING, STARTED TO DO ANYTHING, OR PREPARED TO DO ANYTHING TO END YOUR LIFE?: NO
1. IN THE PAST MONTH, HAVE YOU WISHED YOU WERE DEAD OR WISHED YOU COULD GO TO SLEEP AND NOT WAKE UP?: NO
2. HAVE YOU ACTUALLY HAD ANY THOUGHTS OF KILLING YOURSELF?: NO

## 2025-01-01 NOTE — Clinical Note
Christiano Prieto was seen and treated in our emergency department on 1/1/2025.  She may return to work on 01/03/2025.       If you have any questions or concerns, please don't hesitate to call.      Brandan Mackay PA-C

## 2025-01-02 ENCOUNTER — APPOINTMENT (OUTPATIENT)
Dept: RADIOLOGY | Facility: HOSPITAL | Age: 30
End: 2025-01-02
Payer: COMMERCIAL

## 2025-01-02 ENCOUNTER — APPOINTMENT (OUTPATIENT)
Dept: CARDIOLOGY | Facility: HOSPITAL | Age: 30
End: 2025-01-02
Payer: COMMERCIAL

## 2025-01-02 ENCOUNTER — HOSPITAL ENCOUNTER (EMERGENCY)
Facility: HOSPITAL | Age: 30
Discharge: HOME | End: 2025-01-02
Attending: EMERGENCY MEDICINE
Payer: COMMERCIAL

## 2025-01-02 VITALS
BODY MASS INDEX: 26.58 KG/M2 | RESPIRATION RATE: 16 BRPM | SYSTOLIC BLOOD PRESSURE: 136 MMHG | HEART RATE: 85 BPM | OXYGEN SATURATION: 95 % | WEIGHT: 150 LBS | DIASTOLIC BLOOD PRESSURE: 86 MMHG | HEIGHT: 63 IN | TEMPERATURE: 98.6 F

## 2025-01-02 DIAGNOSIS — G43.809 OTHER MIGRAINE WITHOUT STATUS MIGRAINOSUS, NOT INTRACTABLE: Primary | ICD-10-CM

## 2025-01-02 LAB
ALBUMIN SERPL BCP-MCNC: 4.6 G/DL (ref 3.4–5)
ALP SERPL-CCNC: 87 U/L (ref 33–110)
ALT SERPL W P-5'-P-CCNC: 39 U/L (ref 7–45)
ANION GAP SERPL CALC-SCNC: 14 MMOL/L (ref 10–20)
APPEARANCE UR: CLEAR
AST SERPL W P-5'-P-CCNC: 27 U/L (ref 9–39)
ATRIAL RATE: 105 BPM
ATRIAL RATE: 97 BPM
BASOPHILS # BLD AUTO: 0.01 X10*3/UL (ref 0–0.1)
BASOPHILS NFR BLD AUTO: 0.1 %
BILIRUB SERPL-MCNC: 0.4 MG/DL (ref 0–1.2)
BILIRUB UR STRIP.AUTO-MCNC: NEGATIVE MG/DL
BUN SERPL-MCNC: 12 MG/DL (ref 6–23)
CALCIUM SERPL-MCNC: 9.9 MG/DL (ref 8.6–10.3)
CHLORIDE SERPL-SCNC: 105 MMOL/L (ref 98–107)
CO2 SERPL-SCNC: 21 MMOL/L (ref 21–32)
COLOR UR: COLORLESS
CREAT SERPL-MCNC: 0.62 MG/DL (ref 0.5–1.05)
EGFRCR SERPLBLD CKD-EPI 2021: >90 ML/MIN/1.73M*2
EOSINOPHIL # BLD AUTO: 0 X10*3/UL (ref 0–0.7)
EOSINOPHIL NFR BLD AUTO: 0 %
ERYTHROCYTE [DISTWIDTH] IN BLOOD BY AUTOMATED COUNT: 12.1 % (ref 11.5–14.5)
GLUCOSE SERPL-MCNC: 126 MG/DL (ref 74–99)
GLUCOSE UR STRIP.AUTO-MCNC: NORMAL MG/DL
HCT VFR BLD AUTO: 34.1 % (ref 36–46)
HGB BLD-MCNC: 11.7 G/DL (ref 12–16)
HOLD SPECIMEN: NORMAL
IMM GRANULOCYTES # BLD AUTO: 0.03 X10*3/UL (ref 0–0.7)
IMM GRANULOCYTES NFR BLD AUTO: 0.4 % (ref 0–0.9)
KETONES UR STRIP.AUTO-MCNC: NEGATIVE MG/DL
LEUKOCYTE ESTERASE UR QL STRIP.AUTO: NEGATIVE
LYMPHOCYTES # BLD AUTO: 0.5 X10*3/UL (ref 1.2–4.8)
LYMPHOCYTES NFR BLD AUTO: 7.1 %
MAGNESIUM SERPL-MCNC: 1.93 MG/DL (ref 1.6–2.4)
MCH RBC QN AUTO: 26.4 PG (ref 26–34)
MCHC RBC AUTO-ENTMCNC: 34.3 G/DL (ref 32–36)
MCV RBC AUTO: 77 FL (ref 80–100)
MONOCYTES # BLD AUTO: 0.17 X10*3/UL (ref 0.1–1)
MONOCYTES NFR BLD AUTO: 2.4 %
NEUTROPHILS # BLD AUTO: 6.31 X10*3/UL (ref 1.2–7.7)
NEUTROPHILS NFR BLD AUTO: 90 %
NITRITE UR QL STRIP.AUTO: NEGATIVE
NRBC BLD-RTO: 0 /100 WBCS (ref 0–0)
P AXIS: 49 DEGREES
P AXIS: 58 DEGREES
P OFFSET: 195 MS
P OFFSET: 203 MS
P ONSET: 138 MS
P ONSET: 138 MS
PH UR STRIP.AUTO: 5.5 [PH]
PLATELET # BLD AUTO: 306 X10*3/UL (ref 150–450)
POTASSIUM SERPL-SCNC: 4.1 MMOL/L (ref 3.5–5.3)
PR INTERVAL: 160 MS
PR INTERVAL: 162 MS
PROT SERPL-MCNC: 7.3 G/DL (ref 6.4–8.2)
PROT UR STRIP.AUTO-MCNC: NEGATIVE MG/DL
Q ONSET: 218 MS
Q ONSET: 219 MS
QRS COUNT: 16 BEATS
QRS COUNT: 18 BEATS
QRS DURATION: 100 MS
QRS DURATION: 92 MS
QT INTERVAL: 348 MS
QT INTERVAL: 368 MS
QTC CALCULATION(BAZETT): 459 MS
QTC CALCULATION(BAZETT): 467 MS
QTC FREDERICIA: 419 MS
QTC FREDERICIA: 431 MS
R AXIS: 41 DEGREES
R AXIS: 85 DEGREES
RBC # BLD AUTO: 4.43 X10*6/UL (ref 4–5.2)
RBC # UR STRIP.AUTO: NEGATIVE /UL
SODIUM SERPL-SCNC: 136 MMOL/L (ref 136–145)
SP GR UR STRIP.AUTO: 1
T AXIS: 17 DEGREES
T AXIS: 5 DEGREES
T OFFSET: 393 MS
T OFFSET: 402 MS
UROBILINOGEN UR STRIP.AUTO-MCNC: NORMAL MG/DL
VENTRICULAR RATE: 105 BPM
VENTRICULAR RATE: 97 BPM
WBC # BLD AUTO: 7 X10*3/UL (ref 4.4–11.3)

## 2025-01-02 PROCEDURE — 93005 ELECTROCARDIOGRAM TRACING: CPT

## 2025-01-02 PROCEDURE — 81003 URINALYSIS AUTO W/O SCOPE: CPT | Performed by: REGISTERED NURSE

## 2025-01-02 PROCEDURE — 70450 CT HEAD/BRAIN W/O DYE: CPT

## 2025-01-02 PROCEDURE — 36415 COLL VENOUS BLD VENIPUNCTURE: CPT | Performed by: REGISTERED NURSE

## 2025-01-02 PROCEDURE — 96365 THER/PROPH/DIAG IV INF INIT: CPT

## 2025-01-02 PROCEDURE — 70450 CT HEAD/BRAIN W/O DYE: CPT | Performed by: STUDENT IN AN ORGANIZED HEALTH CARE EDUCATION/TRAINING PROGRAM

## 2025-01-02 PROCEDURE — 2500000004 HC RX 250 GENERAL PHARMACY W/ HCPCS (ALT 636 FOR OP/ED): Performed by: REGISTERED NURSE

## 2025-01-02 PROCEDURE — 99285 EMERGENCY DEPT VISIT HI MDM: CPT | Mod: 25 | Performed by: EMERGENCY MEDICINE

## 2025-01-02 PROCEDURE — 80053 COMPREHEN METABOLIC PANEL: CPT | Performed by: REGISTERED NURSE

## 2025-01-02 PROCEDURE — 83735 ASSAY OF MAGNESIUM: CPT | Performed by: REGISTERED NURSE

## 2025-01-02 PROCEDURE — 85025 COMPLETE CBC W/AUTO DIFF WBC: CPT | Performed by: REGISTERED NURSE

## 2025-01-02 PROCEDURE — 96375 TX/PRO/DX INJ NEW DRUG ADDON: CPT

## 2025-01-02 RX ORDER — MAGNESIUM SULFATE HEPTAHYDRATE 40 MG/ML
2 INJECTION, SOLUTION INTRAVENOUS ONCE
Status: COMPLETED | OUTPATIENT
Start: 2025-01-02 | End: 2025-01-02

## 2025-01-02 RX ORDER — DEXAMETHASONE SODIUM PHOSPHATE 10 MG/ML
10 INJECTION INTRAMUSCULAR; INTRAVENOUS ONCE
Status: COMPLETED | OUTPATIENT
Start: 2025-01-02 | End: 2025-01-02

## 2025-01-02 RX ADMIN — MAGNESIUM SULFATE HEPTAHYDRATE 2 G: 40 INJECTION, SOLUTION INTRAVENOUS at 13:17

## 2025-01-02 RX ADMIN — DEXAMETHASONE SODIUM PHOSPHATE 10 MG: 10 INJECTION, SOLUTION INTRAMUSCULAR; INTRAVENOUS at 13:16

## 2025-01-02 ASSESSMENT — PAIN DESCRIPTION - DESCRIPTORS: DESCRIPTORS: ACHING

## 2025-01-02 ASSESSMENT — PAIN - FUNCTIONAL ASSESSMENT
PAIN_FUNCTIONAL_ASSESSMENT: 0-10
PAIN_FUNCTIONAL_ASSESSMENT: 0-10

## 2025-01-02 ASSESSMENT — LIFESTYLE VARIABLES
HAVE PEOPLE ANNOYED YOU BY CRITICIZING YOUR DRINKING: NO
HAVE YOU EVER FELT YOU SHOULD CUT DOWN ON YOUR DRINKING: NO
EVER FELT BAD OR GUILTY ABOUT YOUR DRINKING: NO
TOTAL SCORE: 0
EVER HAD A DRINK FIRST THING IN THE MORNING TO STEADY YOUR NERVES TO GET RID OF A HANGOVER: NO

## 2025-01-02 ASSESSMENT — PAIN SCALES - GENERAL
PAINLEVEL_OUTOF10: 8
PAINLEVEL_OUTOF10: 2

## 2025-01-02 ASSESSMENT — PAIN DESCRIPTION - PAIN TYPE: TYPE: ACUTE PAIN

## 2025-01-02 ASSESSMENT — PAIN DESCRIPTION - LOCATION: LOCATION: HEAD

## 2025-01-02 NOTE — ED PROVIDER NOTES
HPI   Chief Complaint   Patient presents with    Migraine     Pt states she got diagnosed with      29-year-old female past medical history significant for preeclampsia who is 6 weeks and 5 days status post delivery presents emergency department today for evaluation of headache.  Patient tells me she does have a history of migraines and they typically present in the occipital area and in her neck.  Patient tells me that she is on blood pressure medicine secondary to preeclampsia.  Patient tells me she has not had her 6-week follow up yet as she had COVID.  Patient tells me she was here last night and diagnosed with bronchitis and is on antibiotics.  Patient tells me that she started to have a headache while she was feeding her baby today.  She tells me that she was experiencing a tingling and numbness sensation in her tongue and in her left arm.  She tells me she is no longer feeling those symptoms.        History provided by:  Patient and medical records   used: No            Patient History   Past Medical History:   Diagnosis Date    ETOH abuse     HTN (hypertension)     Moderate asthma (HHS-HCC)     Panic anxiety syndrome      No past surgical history on file.  No family history on file.  Social History     Tobacco Use    Smoking status: Former     Types: Cigarettes    Smokeless tobacco: Never   Vaping Use    Vaping status: Never Used   Substance Use Topics    Alcohol use: Never    Drug use: Never       Physical Exam   ED Triage Vitals [01/02/25 1239]   Temperature Heart Rate Respirations BP   37 °C (98.6 °F) 97 16 134/73      Pulse Ox Temp src Heart Rate Source Patient Position   97 % -- -- --      BP Location FiO2 (%)     -- --       Physical Exam  Vitals and nursing note reviewed.   Constitutional:       Appearance: Normal appearance.   HENT:      Head: Normocephalic and atraumatic.   Eyes:      Extraocular Movements: Extraocular movements intact.      Right eye: Normal extraocular motion  and no nystagmus.      Left eye: Normal extraocular motion and no nystagmus.      Pupils: Pupils are equal, round, and reactive to light.   Cardiovascular:      Rate and Rhythm: Normal rate and regular rhythm.      Pulses: Normal pulses.      Heart sounds: Normal heart sounds.   Pulmonary:      Effort: Pulmonary effort is normal.      Breath sounds: Normal breath sounds.   Abdominal:      Palpations: Abdomen is soft.   Skin:     General: Skin is warm and dry.      Capillary Refill: Capillary refill takes less than 2 seconds.   Neurological:      General: No focal deficit present.      Mental Status: She is alert and oriented to person, place, and time.      GCS: GCS eye subscore is 4. GCS verbal subscore is 5. GCS motor subscore is 6.      Cranial Nerves: Cranial nerves 2-12 are intact.      Sensory: Sensation is intact.      Motor: Motor function is intact.      Coordination: Coordination is intact.      Gait: Gait is intact.   Psychiatric:         Mood and Affect: Mood normal.         Behavior: Behavior normal.           ED Course & MDM   Diagnoses as of 01/02/25 1418   Other migraine without status migrainosus, not intractable                 No data recorded     [place] Coma Scale Score: 15 (01/02/25 1326 : Valeria Turner RN)                           Medical Decision Making    Patient seen exam emergency department; patient is healthy nontoxic appearance not appear in acute distress.  Patient's lung sounds are clear to auscultation without any adventitious noise.  Heart regular rate and rhythm without murmur appreciated. skin is warm and dry no diaphoresis noted.  Patient neurologically intact any focal deficits.  Patient's NIH is 0.  No nystagmus on exam.  No abnormal ocular movements noted PERRLA.    Patient is greater than 6  weeks postpartum and her blood pressure is 134/73, therefore we will treat as a migraine. Will obtain a head CT as patient tells me that her migraine is somewhat different than her  baseline.  Will also obtain basic labs.  Patient's symptoms treated with IV magnesium and IV Decadron as she already took Reglan and Benadryl at home.    EKG at 12:43 with ventricular rate of 97, as interpreted by me, shows a normal rate and rhythm, normal axis, prolonged QT: 368 MS. And nonspecific T wave abnormality with no evidence of acute ischemia or other acute findings    Patient's magnesium is 1.93.  CMP glucose of 126 otherwise unremarkable.  Urinalysis is negative for leukocytes, nitrates, and protein.  CBC shows a slight anemia but is expected status post recent delivery.  CT of the head without a cute intracranial abnormalities.    Patient is sleeping on ED cart on reevaluation.  Patient awakens easily to verbal stimulus.  Patient I did discuss her results.  Patient tells me she is feeling much better post ministration medications.  Patient tells me that she has never seen a neurologist and is requesting a neurologist for follow-up.  I did provide her with a neurology CMP to call for follow-up.  I also recommended that she reach out to OB to discuss her preeclampsia and if she needs to continue to take her medications as patient is concerned that maybe she should be taken off these.  I did outline return parameters which patient verbalizes understanding of.  All patient's questions and concerns were addressed prior to discharge.  Patient discharged home in stable condition.    Shared GRETTA Attestation:    I personally saw the patient and made/approved the management plan and take responsibility for the patient management.     History: 29-year-old female presents with headache with tingling in her arm.    Exam: Regular rate and rhythm cardiac exam with clear breath sounds bilaterally.  Abdomen is soft and nontender.  Neurological exam is grossly intact.  NIH stroke scale is 0.  Normal test of skew.  Negative Homans' sign bilaterally.    MDM: Preeclampsia, eclampsia, hypertension, stroke, intracranial bleed,  migraine, cluster, tension    Labs Reviewed   CBC WITH AUTO DIFFERENTIAL - Abnormal       Result Value    WBC 7.0      nRBC 0.0      RBC 4.43      Hemoglobin 11.7 (*)     Hematocrit 34.1 (*)     MCV 77 (*)     MCH 26.4      MCHC 34.3      RDW 12.1      Platelets 306      Neutrophils % 90.0      Immature Granulocytes %, Automated 0.4      Lymphocytes % 7.1      Monocytes % 2.4      Eosinophils % 0.0      Basophils % 0.1      Neutrophils Absolute 6.31      Immature Granulocytes Absolute, Automated 0.03      Lymphocytes Absolute 0.50 (*)     Monocytes Absolute 0.17      Eosinophils Absolute 0.00      Basophils Absolute 0.01     URINALYSIS WITH REFLEX CULTURE AND MICROSCOPIC - Abnormal    Color, Urine Colorless (*)     Appearance, Urine Clear      Specific Gravity, Urine 1.002 (*)     pH, Urine 5.5      Protein, Urine NEGATIVE      Glucose, Urine Normal      Blood, Urine NEGATIVE      Ketones, Urine NEGATIVE      Bilirubin, Urine NEGATIVE      Urobilinogen, Urine Normal      Nitrite, Urine NEGATIVE      Leukocyte Esterase, Urine NEGATIVE     URINALYSIS WITH REFLEX CULTURE AND MICROSCOPIC    Narrative:     The following orders were created for panel order Urinalysis with Reflex Culture and Microscopic.  Procedure                               Abnormality         Status                     ---------                               -----------         ------                     Urinalysis with Reflex C...[[id number]]  Abnormal            Final result               Extra Urine Gray Tube[[id number]]                            In process                   Please view results for these tests on the individual orders.   MAGNESIUM   COMPREHENSIVE METABOLIC PANEL   EXTRA URINE GRAY TUBE       CT head wo IV contrast    (Results Pending)       Azar Luke MD      Procedure  Procedures     Mohini Ga, VANNA-CNP  01/02/25 1420       VANNA Painter-CNP  01/02/25 2807

## 2025-01-02 NOTE — ED PROVIDER NOTES
HPI   Chief Complaint   Patient presents with   • Cough      I am  coughing I think I pneumonia  every time  I cough my chest hurts        This is a 29-year-old female who presents with chief complaint of fever chills cough shortness of breath and back pain.  Patient states she was diagnosed with COVID last week and now she is concerned she has pneumonia.  She denies any hemoptysis.  She does complain of sharp 7 chest pain which radiates into her back.  The pain is constant worse with deep inspiration as well as with coughing.  She does smoke tobacco products.  She denies any history of DVT or PE.  She does have a past medical history of iron deficiency anemia, nicotine dependence, migraines, GERD, asthma, to me acquired pneumonia, panic disorder, alcohol abuse.      History provided by:  Patient and medical records          Patient History   Past Medical History:   Diagnosis Date   • ETOH abuse    • HTN (hypertension)    • Moderate asthma (HHS-HCC)    • Panic anxiety syndrome      History reviewed. No pertinent surgical history.  No family history on file.  Social History     Tobacco Use   • Smoking status: Former     Types: Cigarettes   • Smokeless tobacco: Never   Vaping Use   • Vaping status: Never Used   Substance Use Topics   • Alcohol use: Never   • Drug use: Never       Physical Exam   ED Triage Vitals [01/01/25 2038]   Temperature Heart Rate Respirations BP   36.3 °C (97.3 °F) (!) 112 18 133/61      Pulse Ox Temp Source Heart Rate Source Patient Position   99 % Axillary Monitor Sitting      BP Location FiO2 (%)     Right arm --       Physical Exam  Vitals and nursing note reviewed.   Constitutional:       Appearance: Normal appearance. She is normal weight.   HENT:      Head: Normocephalic and atraumatic.      Right Ear: Tympanic membrane, ear canal and external ear normal.      Left Ear: Tympanic membrane, ear canal and external ear normal.      Nose: Nose normal.      Mouth/Throat:      Mouth: Mucous  membranes are moist.      Pharynx: Oropharynx is clear.   Eyes:      Extraocular Movements: Extraocular movements intact.      Conjunctiva/sclera: Conjunctivae normal.      Pupils: Pupils are equal, round, and reactive to light.   Cardiovascular:      Rate and Rhythm: Regular rhythm. Tachycardia present.      Pulses: Normal pulses.      Heart sounds: Normal heart sounds.   Pulmonary:      Effort: Pulmonary effort is normal.      Breath sounds: Normal air entry. Examination of the right-upper field reveals decreased breath sounds and wheezing. Examination of the left-upper field reveals decreased breath sounds and wheezing. Decreased breath sounds and wheezing present. No rhonchi or rales.   Abdominal:      General: Abdomen is flat. Bowel sounds are normal.      Palpations: Abdomen is soft. There is no mass.      Tenderness: There is no abdominal tenderness. There is no guarding.   Musculoskeletal:         General: No swelling or tenderness. Normal range of motion.      Cervical back: Normal range of motion and neck supple.      Right lower leg: No edema.      Left lower leg: No edema.   Skin:     General: Skin is warm and dry.      Capillary Refill: Capillary refill takes less than 2 seconds.      Findings: No rash.   Neurological:      General: No focal deficit present.      Mental Status: She is alert and oriented to person, place, and time. Mental status is at baseline.   Psychiatric:         Mood and Affect: Mood normal.         Behavior: Behavior normal.         Thought Content: Thought content normal.         Judgment: Judgment normal.           ED Course & MDM   Diagnoses as of 01/01/25 2329   Acute bronchitis, unspecified organism                 No data recorded     [place] Coma Scale Score: 15 (01/01/25 2042 : Martha Cardoza RN)                           Medical Decision Making  Temperature is 36.3 heart rate 112 respirations 18 blood pressure 130/61 pulse ox is 99% on room air  EKG was interpreted by me  at 2130 hrs. sinus tachycardia rate 105, normal axis  QRS is 100  QTc was 459 no STEMI  White count 7.1 hemoglobin 12 medic at 34.7 platelet count was 333, metabolic glucose is 110, creatinine 1.15, troponin was negative, beta quant negative, D-dimer negative less than 215, urinalysis shows no signs of UTI lactic is 1.4.  The patient's portable chest x-ray shows no acute cardiopulmonary process, interval resolution of previous pulmonary opacities in the right upper lobe and left upper lobe.  No pneumothorax, no pleural effusion.  Patient was given 500 mg of Zithromax p.o.  I discussed results of workup with the patient.  At this point, I do feel the patient's symptoms are more consistent with acute bronchitis.  Her wheezing has improved after the aerosol treatments.  She was discharged home with prescription for azithromycin, prednisone, Mucinex, albuterol inhaler and was referred to her PCP for follow-up.  She was advised to return back to the ER with any concerns or worsening of symptoms.  At this point time I do not suspect MI pneumothorax or pneumonia, negative D-dimer, there is no signs of respiratory distress dehydration or sepsis.  The patient verbalizes understanding and agreement with the plan disposition all questions answered prior to discharge        Procedure  Procedures     Brandan Mackay PA-C  01/01/25 2013

## 2025-01-03 LAB — HOLD SPECIMEN: NORMAL

## 2025-01-04 ENCOUNTER — HOSPITAL ENCOUNTER (EMERGENCY)
Facility: HOSPITAL | Age: 30
Discharge: HOME | End: 2025-01-04
Attending: EMERGENCY MEDICINE
Payer: COMMERCIAL

## 2025-01-04 ENCOUNTER — APPOINTMENT (OUTPATIENT)
Dept: RADIOLOGY | Facility: HOSPITAL | Age: 30
End: 2025-01-04
Payer: COMMERCIAL

## 2025-01-04 ENCOUNTER — APPOINTMENT (OUTPATIENT)
Dept: CARDIOLOGY | Facility: HOSPITAL | Age: 30
End: 2025-01-04
Payer: COMMERCIAL

## 2025-01-04 VITALS
BODY MASS INDEX: 26.58 KG/M2 | OXYGEN SATURATION: 96 % | HEART RATE: 78 BPM | WEIGHT: 150 LBS | HEIGHT: 63 IN | DIASTOLIC BLOOD PRESSURE: 86 MMHG | TEMPERATURE: 97.7 F | SYSTOLIC BLOOD PRESSURE: 125 MMHG | RESPIRATION RATE: 7 BRPM

## 2025-01-04 DIAGNOSIS — R52 BODY ACHES: ICD-10-CM

## 2025-01-04 DIAGNOSIS — J06.9 VIRAL UPPER RESPIRATORY TRACT INFECTION WITH COUGH: Primary | ICD-10-CM

## 2025-01-04 LAB
ALBUMIN SERPL BCP-MCNC: 4.2 G/DL (ref 3.4–5)
ALP SERPL-CCNC: 61 U/L (ref 33–110)
ALT SERPL W P-5'-P-CCNC: 41 U/L (ref 7–45)
ANION GAP SERPL CALC-SCNC: 11 MMOL/L (ref 10–20)
AST SERPL W P-5'-P-CCNC: 26 U/L (ref 9–39)
BASOPHILS # BLD AUTO: 0.04 X10*3/UL (ref 0–0.1)
BASOPHILS NFR BLD AUTO: 0.6 %
BILIRUB SERPL-MCNC: 0.3 MG/DL (ref 0–1.2)
BUN SERPL-MCNC: 12 MG/DL (ref 6–23)
CALCIUM SERPL-MCNC: 8.4 MG/DL (ref 8.6–10.3)
CARDIAC TROPONIN I PNL SERPL HS: <3 NG/L (ref 0–13)
CARDIAC TROPONIN I PNL SERPL HS: <3 NG/L (ref 0–13)
CHLORIDE SERPL-SCNC: 105 MMOL/L (ref 98–107)
CO2 SERPL-SCNC: 24 MMOL/L (ref 21–32)
CREAT SERPL-MCNC: 0.67 MG/DL (ref 0.5–1.05)
EGFRCR SERPLBLD CKD-EPI 2021: >90 ML/MIN/1.73M*2
EOSINOPHIL # BLD AUTO: 0.06 X10*3/UL (ref 0–0.7)
EOSINOPHIL NFR BLD AUTO: 0.9 %
ERYTHROCYTE [DISTWIDTH] IN BLOOD BY AUTOMATED COUNT: 12.5 % (ref 11.5–14.5)
FLUAV RNA RESP QL NAA+PROBE: NOT DETECTED
FLUBV RNA RESP QL NAA+PROBE: NOT DETECTED
GLUCOSE SERPL-MCNC: 84 MG/DL (ref 74–99)
HCT VFR BLD AUTO: 37 % (ref 36–46)
HGB BLD-MCNC: 12.3 G/DL (ref 12–16)
HOLD SPECIMEN: NORMAL
IMM GRANULOCYTES # BLD AUTO: 0.03 X10*3/UL (ref 0–0.7)
IMM GRANULOCYTES NFR BLD AUTO: 0.5 % (ref 0–0.9)
LYMPHOCYTES # BLD AUTO: 2.45 X10*3/UL (ref 1.2–4.8)
LYMPHOCYTES NFR BLD AUTO: 38.3 %
MAGNESIUM SERPL-MCNC: 1.89 MG/DL (ref 1.6–2.4)
MCH RBC QN AUTO: 26.5 PG (ref 26–34)
MCHC RBC AUTO-ENTMCNC: 33.2 G/DL (ref 32–36)
MCV RBC AUTO: 80 FL (ref 80–100)
MONOCYTES # BLD AUTO: 0.52 X10*3/UL (ref 0.1–1)
MONOCYTES NFR BLD AUTO: 8.1 %
NEUTROPHILS # BLD AUTO: 3.3 X10*3/UL (ref 1.2–7.7)
NEUTROPHILS NFR BLD AUTO: 51.6 %
NRBC BLD-RTO: 0 /100 WBCS (ref 0–0)
PHOSPHATE SERPL-MCNC: 4 MG/DL (ref 2.5–4.9)
PLATELET # BLD AUTO: 295 X10*3/UL (ref 150–450)
POTASSIUM SERPL-SCNC: 3.4 MMOL/L (ref 3.5–5.3)
PROT SERPL-MCNC: 6.7 G/DL (ref 6.4–8.2)
RBC # BLD AUTO: 4.65 X10*6/UL (ref 4–5.2)
SARS-COV-2 RNA RESP QL NAA+PROBE: NOT DETECTED
SODIUM SERPL-SCNC: 137 MMOL/L (ref 136–145)
WBC # BLD AUTO: 6.4 X10*3/UL (ref 4.4–11.3)

## 2025-01-04 PROCEDURE — 71045 X-RAY EXAM CHEST 1 VIEW: CPT

## 2025-01-04 PROCEDURE — 71045 X-RAY EXAM CHEST 1 VIEW: CPT | Performed by: RADIOLOGY

## 2025-01-04 PROCEDURE — 83735 ASSAY OF MAGNESIUM: CPT

## 2025-01-04 PROCEDURE — 2500000001 HC RX 250 WO HCPCS SELF ADMINISTERED DRUGS (ALT 637 FOR MEDICARE OP)

## 2025-01-04 PROCEDURE — 84100 ASSAY OF PHOSPHORUS: CPT

## 2025-01-04 PROCEDURE — 2500000004 HC RX 250 GENERAL PHARMACY W/ HCPCS (ALT 636 FOR OP/ED)

## 2025-01-04 PROCEDURE — 36415 COLL VENOUS BLD VENIPUNCTURE: CPT

## 2025-01-04 PROCEDURE — 2500000004 HC RX 250 GENERAL PHARMACY W/ HCPCS (ALT 636 FOR OP/ED): Performed by: EMERGENCY MEDICINE

## 2025-01-04 PROCEDURE — 93005 ELECTROCARDIOGRAM TRACING: CPT

## 2025-01-04 PROCEDURE — 99284 EMERGENCY DEPT VISIT MOD MDM: CPT | Performed by: EMERGENCY MEDICINE

## 2025-01-04 PROCEDURE — 96374 THER/PROPH/DIAG INJ IV PUSH: CPT

## 2025-01-04 PROCEDURE — 85025 COMPLETE CBC W/AUTO DIFF WBC: CPT

## 2025-01-04 PROCEDURE — 96361 HYDRATE IV INFUSION ADD-ON: CPT

## 2025-01-04 PROCEDURE — 84484 ASSAY OF TROPONIN QUANT: CPT

## 2025-01-04 PROCEDURE — 99285 EMERGENCY DEPT VISIT HI MDM: CPT | Mod: 25 | Performed by: EMERGENCY MEDICINE

## 2025-01-04 PROCEDURE — 96375 TX/PRO/DX INJ NEW DRUG ADDON: CPT

## 2025-01-04 PROCEDURE — 87636 SARSCOV2 & INF A&B AMP PRB: CPT

## 2025-01-04 PROCEDURE — 80053 COMPREHEN METABOLIC PANEL: CPT

## 2025-01-04 RX ORDER — DIPHENHYDRAMINE HYDROCHLORIDE 50 MG/ML
25 INJECTION INTRAMUSCULAR; INTRAVENOUS ONCE
Status: DISCONTINUED | OUTPATIENT
Start: 2025-01-04 | End: 2025-01-04

## 2025-01-04 RX ORDER — CYCLOBENZAPRINE HCL 5 MG
5 TABLET ORAL 3 TIMES DAILY PRN
Qty: 15 TABLET | Refills: 0 | Status: SHIPPED | OUTPATIENT
Start: 2025-01-04 | End: 2025-01-09

## 2025-01-04 RX ORDER — METOCLOPRAMIDE HYDROCHLORIDE 5 MG/ML
10 INJECTION INTRAMUSCULAR; INTRAVENOUS ONCE
Status: DISCONTINUED | OUTPATIENT
Start: 2025-01-04 | End: 2025-01-04

## 2025-01-04 RX ORDER — CYCLOBENZAPRINE HCL 10 MG
10 TABLET ORAL ONCE
Status: COMPLETED | OUTPATIENT
Start: 2025-01-04 | End: 2025-01-04

## 2025-01-04 RX ORDER — METOCLOPRAMIDE HYDROCHLORIDE 5 MG/ML
10 INJECTION INTRAMUSCULAR; INTRAVENOUS ONCE
Status: COMPLETED | OUTPATIENT
Start: 2025-01-04 | End: 2025-01-04

## 2025-01-04 RX ORDER — KETOROLAC TROMETHAMINE 15 MG/ML
15 INJECTION, SOLUTION INTRAMUSCULAR; INTRAVENOUS ONCE
Status: COMPLETED | OUTPATIENT
Start: 2025-01-04 | End: 2025-01-04

## 2025-01-04 RX ADMIN — SODIUM CHLORIDE, POTASSIUM CHLORIDE, SODIUM LACTATE AND CALCIUM CHLORIDE 1000 ML: 600; 310; 30; 20 INJECTION, SOLUTION INTRAVENOUS at 20:54

## 2025-01-04 RX ADMIN — METOCLOPRAMIDE 10 MG: 5 INJECTION, SOLUTION INTRAMUSCULAR; INTRAVENOUS at 20:54

## 2025-01-04 RX ADMIN — CYCLOBENZAPRINE 10 MG: 10 TABLET, FILM COATED ORAL at 22:29

## 2025-01-04 RX ADMIN — KETOROLAC TROMETHAMINE 15 MG: 15 INJECTION, SOLUTION INTRAMUSCULAR; INTRAVENOUS at 20:54

## 2025-01-04 ASSESSMENT — COLUMBIA-SUICIDE SEVERITY RATING SCALE - C-SSRS
6. HAVE YOU EVER DONE ANYTHING, STARTED TO DO ANYTHING, OR PREPARED TO DO ANYTHING TO END YOUR LIFE?: NO
6. HAVE YOU EVER DONE ANYTHING, STARTED TO DO ANYTHING, OR PREPARED TO DO ANYTHING TO END YOUR LIFE?: NO
1. IN THE PAST MONTH, HAVE YOU WISHED YOU WERE DEAD OR WISHED YOU COULD GO TO SLEEP AND NOT WAKE UP?: NO
2. HAVE YOU ACTUALLY HAD ANY THOUGHTS OF KILLING YOURSELF?: NO
1. IN THE PAST MONTH, HAVE YOU WISHED YOU WERE DEAD OR WISHED YOU COULD GO TO SLEEP AND NOT WAKE UP?: NO
2. HAVE YOU ACTUALLY HAD ANY THOUGHTS OF KILLING YOURSELF?: NO

## 2025-01-04 ASSESSMENT — PAIN DESCRIPTION - DESCRIPTORS
DESCRIPTORS: SHARP
DESCRIPTORS: ACHING;PRESSURE
DESCRIPTORS: ACHING;PRESSURE

## 2025-01-04 ASSESSMENT — LIFESTYLE VARIABLES
EVER HAD A DRINK FIRST THING IN THE MORNING TO STEADY YOUR NERVES TO GET RID OF A HANGOVER: NO
TOTAL SCORE: 0
HAVE YOU EVER FELT YOU SHOULD CUT DOWN ON YOUR DRINKING: NO
HAVE PEOPLE ANNOYED YOU BY CRITICIZING YOUR DRINKING: NO
EVER FELT BAD OR GUILTY ABOUT YOUR DRINKING: NO

## 2025-01-04 ASSESSMENT — PAIN DESCRIPTION - PAIN TYPE
TYPE: ACUTE PAIN
TYPE: ACUTE PAIN

## 2025-01-04 ASSESSMENT — PAIN DESCRIPTION - PROGRESSION
CLINICAL_PROGRESSION: NOT CHANGED
CLINICAL_PROGRESSION: GRADUALLY WORSENING

## 2025-01-04 ASSESSMENT — PAIN DESCRIPTION - LOCATION
LOCATION: CHEST
LOCATION: CHEST

## 2025-01-04 ASSESSMENT — PAIN DESCRIPTION - FREQUENCY: FREQUENCY: CONSTANT/CONTINUOUS

## 2025-01-04 ASSESSMENT — PAIN DESCRIPTION - ORIENTATION
ORIENTATION: MID
ORIENTATION: MID

## 2025-01-04 ASSESSMENT — PAIN DESCRIPTION - ONSET: ONSET: ONGOING

## 2025-01-04 ASSESSMENT — PAIN - FUNCTIONAL ASSESSMENT
PAIN_FUNCTIONAL_ASSESSMENT: 0-10
PAIN_FUNCTIONAL_ASSESSMENT: 0-10

## 2025-01-04 ASSESSMENT — PAIN SCALES - GENERAL
PAINLEVEL_OUTOF10: 5 - MODERATE PAIN
PAINLEVEL_OUTOF10: 7
PAINLEVEL_OUTOF10: 7

## 2025-01-05 LAB
ATRIAL RATE: 95 BPM
P AXIS: 65 DEGREES
P OFFSET: 199 MS
P ONSET: 146 MS
PR INTERVAL: 144 MS
Q ONSET: 218 MS
QRS COUNT: 15 BEATS
QRS DURATION: 100 MS
QT INTERVAL: 372 MS
QTC CALCULATION(BAZETT): 467 MS
QTC FREDERICIA: 433 MS
R AXIS: 61 DEGREES
T AXIS: 31 DEGREES
T OFFSET: 404 MS
VENTRICULAR RATE: 95 BPM

## 2025-01-05 NOTE — ED PROVIDER NOTES
EMERGENCY DEPARTMENT ENCOUNTER      Pt Name: Christiano Prieto  MRN: [id number]  Birthdate 1995  Date of evaluation: 1/4/2025    HISTORY OF PRESENT ILLNESS    Christiano Prieto is an 29 y.o. female with history including asthma, possible migraines, preeclampsia, anxiety presenting to the emergency department for chest tightness, neck pain, pain in the left side of her body dizziness.  She has a 7-week-old son with known COVID.  She states she does not have a headache, she does have increased shortness of breath has been using her inhaler more frequently.  Denies any vomiting, constipation/diarrhea, dysuria.       PAST MEDICAL HISTORY     Past Medical History:   Diagnosis Date    ETOH abuse     HTN (hypertension)     Moderate asthma (HHS-HCC)     Panic anxiety syndrome        SURGICAL HISTORY     No past surgical history on file.    CURRENT MEDICATIONS       Discharge Medication List as of 1/4/2025 11:02 PM        CONTINUE these medications which have NOT CHANGED    Details   acetaminophen (Tylenol) 325 mg tablet Take 2 tablets (650 mg) by mouth every 6 hours if needed for mild pain (1 - 3) for up to 10 days., Starting Wed 1/1/2025, Until Sat 1/11/2025 at 2359, Normal      albuterol 90 mcg/actuation inhaler Inhale 1-2 puffs every 6 hours if needed for wheezing., Starting Wed 1/1/2025, Until Fri 1/31/2025 at 2359, Normal      ALPRAZolam (Xanax) 0.25 mg tablet Take 1 tablet (0.25 mg) by mouth once daily as needed for anxiety., Starting Tue 12/3/2024, Normal      ARIPiprazole (Abilify) 5 mg tablet Take 1 tablet (5 mg) by mouth once daily., Starting Tue 12/3/2024, Normal      azithromycin (Zithromax) 250 mg tablet Take 1 tablet (250 mg) by mouth once daily for 4 doses. Do not fill before January 2, 2025., Starting Thu 1/2/2025, Until Mon 1/6/2025, Normal      diphenhydrAMINE (Sominex) 25 mg tablet Take 1 tablet (25 mg) by mouth every 6 hours if needed for other (Headache, take with reglan)., Starting Mon 10/28/2024, Until  Wed 11/27/2024 at 2359, Normal      ferrous gluconate 324 (38 Fe) mg tablet Take 1 tablet (38 mg of iron) by mouth once daily with breakfast., Starting Wed 10/9/2024, Until Thu 10/9/2025, Normal      guaiFENesin (Mucinex) 600 mg 12 hr tablet Take 1 tablet (600 mg) by mouth 2 times a day for 7 days. Do not crush, chew, or split., Starting Wed 1/1/2025, Until Wed 1/8/2025, Normal      hydrOXYzine HCL (Atarax) 50 mg tablet Take 1 tablet (50 mg) by mouth every 8 hours if needed for anxiety., Starting Tue 11/19/2024, Normal      ibuprofen 600 mg tablet Take 1 tablet (600 mg) by mouth every 6 hours., Starting Tue 12/3/2024, Normal      metoclopramide (Reglan) 10 mg tablet Take 1 tablet (10 mg) by mouth every 6 hours if needed (Headache, take with benadryl)., Starting Mon 10/28/2024, Until Wed 11/27/2024 at 2359, Normal      miscellaneous medical supply (Blood Pressure Cuff) misc 1 Device 2 times a day., Starting Wed 5/29/2024, Normal      NIFEdipine ER (Adalat CC) 60 mg 24 hr tablet Take 1 tablet (60 mg) by mouth 2 times a day. Do not crush, chew, or split., Starting Tue 12/3/2024, Normal      predniSONE (Deltasone) 50 mg tablet Take 1 tablet (50 mg) by mouth once daily for 5 days., Starting Wed 1/1/2025, Until Mon 1/6/2025, Normal      PRENATAL VIT 10-IRON-FOLIC-DHA ORAL Take 1 tablet by mouth once daily., Historical Med      propranolol (Inderal) 10 mg tablet Take 1 tablet (10 mg) by mouth 3 times a day as needed (anxiety)., Starting Wed 10/23/2024, Until Thu 10/23/2025 at 2359, Normal      sertraline (Zoloft) 25 mg tablet Multiple Dosages:Starting Wed 11/27/2024, Until Mon 12/2/2024 at 2359, THEN Starting Tue 12/3/2024, Until Mon 12/16/2024 at 2359, THEN Starting Tue 12/17/2024, Until Wed 1/15/2025 at 2359Take 1 tablet (25 mg) by mouth once daily for 6 days, THEN 2 ta blets (50 mg) once daily for 14 days, THEN 4 tablets (100 mg) once daily., Normal             ALLERGIES     Patient has no known allergies.    FAMILY  HISTORY     No family history on file.     SOCIAL HISTORY       Social History     Socioeconomic History    Marital status: Single   Tobacco Use    Smoking status: Former     Types: Cigarettes    Smokeless tobacco: Never   Vaping Use    Vaping status: Never Used   Substance and Sexual Activity    Alcohol use: Never    Drug use: Never     Social Drivers of Health     Financial Resource Strain: Low Risk  (12/1/2024)    Overall Financial Resource Strain (CARDIA)     Difficulty of Paying Living Expenses: Not hard at all   Food Insecurity: No Food Insecurity (12/1/2024)    Hunger Vital Sign     Worried About Running Out of Food in the Last Year: Never true     Ran Out of Food in the Last Year: Never true   Transportation Needs: No Transportation Needs (12/1/2024)    PRAPARE - Transportation     Lack of Transportation (Medical): No     Lack of Transportation (Non-Medical): No   Physical Activity: Inactive (11/15/2024)    Exercise Vital Sign     Days of Exercise per Week: 2 days     Minutes of Exercise per Session: 0 min   Stress: No Stress Concern Present (12/1/2024)    [employer] - Occupational Stress Questionnaire     Feeling of Stress : Only a little   Social Connections: Moderately Isolated (12/1/2024)    Social Connection and Isolation Panel [NHANES]     Frequency of Communication with Friends and Family: Three times a week     Frequency of Social Gatherings with Friends and Family: Three times a week     Attends Cheondo[employer] Services: Never     Active Member of Clubs or Organizations: No     Attends Club or Organization Meetings: Never     Marital Status: Living with partner   Intimate Partner Violence: Not At Risk (12/1/2024)    Humiliation, Afraid, Rape, and Kick questionnaire     Fear of Current or Ex-Partner: No     Emotionally Abused: No     Physically Abused: No     Sexually Abused: No       PHYSICAL EXAM       ED Triage Vitals [01/04/25 1858]   Temperature Heart Rate Respirations BP    36.5 °C (97.7 °F) 96 18 123/81      Pulse Ox Temp Source Heart Rate Source Patient Position   98 % Temporal Monitor Sitting      BP Location FiO2 (%)     Right arm --       Physical Exam  Constitutional:       Appearance: She is well-developed.   HENT:      Head: Normocephalic and atraumatic.   Eyes:      Pupils: Pupils are equal, round, and reactive to light.   Cardiovascular:      Rate and Rhythm: Normal rate and regular rhythm.      Heart sounds: Normal heart sounds.   Pulmonary:      Effort: Pulmonary effort is normal.      Breath sounds: Normal breath sounds.   Chest:      Chest wall: No mass.   Abdominal:      General: Bowel sounds are normal.      Palpations: Abdomen is soft.   Musculoskeletal:         General: Normal range of motion.      Cervical back: Normal range of motion.   Skin:     General: Skin is warm.      Capillary Refill: Capillary refill takes less than 2 seconds.   Neurological:      Mental Status: She is alert.      GCS: GCS eye subscore is 4. GCS verbal subscore is 5. GCS motor subscore is 6.      Cranial Nerves: Cranial nerves 2-12 are intact.      Sensory: Sensation is intact.      Motor: Weakness present.      Coordination: Coordination is intact.      Comments: Patient has weakness related to pain on the right upper extremity.          DIAGNOSTIC RESULTS     LABS:  Labs Reviewed   COMPREHENSIVE METABOLIC PANEL - Abnormal       Result Value    Glucose 84      Sodium 137      Potassium 3.4 (*)     Chloride 105      Bicarbonate 24      Anion Gap 11      Urea Nitrogen 12      Creatinine 0.67      eGFR >90      Calcium 8.4 (*)     Albumin 4.2      Alkaline Phosphatase 61      Total Protein 6.7      AST 26      Bilirubin, Total 0.3      ALT 41     SARS-COV-2 PCR - Normal    Coronavirus 2019, PCR Not Detected      Narrative:     This assay has received FDA Emergency Use Authorization (EUA) and is only authorized for the duration of time that circumstances exist to justify the authorization of  the emergency use of in vitro diagnostic tests for the detection of SARS-CoV-2 virus and/or diagnosis of COVID-19 infection under section 564(b)(1) of the Act, 21 U.S.C. 360bbb-3(b)(1). This assay is an in vitro diagnostic nucleic acid amplification test for the qualitative detection of SARS-CoV-2 from nasopharyngeal specimens and has been validated for use at [facility]. Negative results do not preclude COVID-19 infections and should not be used as the sole basis for diagnosis, treatment, or other management decisions.     INFLUENZA A AND B PCR - Normal    Flu A Result Not Detected      Flu B Result Not Detected      Narrative:     This assay is an in vitro diagnostic multiplex nucleic acid amplification test for the detection and discrimination of Influenza A & B from nasopharyngeal specimens, and has been validated for use at [facility]. Negative results do not preclude Influenza A/B infections, and should not be used as the sole basis for diagnosis, treatment, or other management decisions. If Influenza A/B and RSV PCR results are negative, testing for Parainfluenza virus, Adenovirus and Metapneumovirus is routinely performed for [facility]ology and intensive care inpatients, and is available on other patients by placing an add-on request.   PHOSPHORUS - Normal    Phosphorus 4.0     MAGNESIUM - Normal    Magnesium 1.89     SERIAL TROPONIN-INITIAL - Normal    Troponin I, High Sensitivity <3      Narrative:     Less than 99th percentile of normal range cutoff-  Female and children under 18 years old <14 ng/L; Male <21 ng/L: Negative  Repeat testing should be performed if clinically indicated.     Female and children under 18 years old 14-50 ng/L; Male 21-50 ng/L:  Consistent with possible cardiac damage and possible increased clinical   risk. Serial measurements may help to assess extent of myocardial damage.     >50 ng/L: Consistent with cardiac damage,  increased clinical risk and  myocardial infarction. Serial measurements may help assess extent of   myocardial damage.      NOTE: Children less than 1 year old may have higher baseline troponin   levels and results should be interpreted in conjunction with the overall   clinical context.     NOTE: Troponin I testing is performed using a different   testing methodology at [facility] than at other   [facility]. Direct result comparisons should only   be made within the same method.   SERIAL TROPONIN, 1 HOUR - Normal    Troponin I, High Sensitivity <3      Narrative:     Less than 99th percentile of normal range cutoff-  Female and children under 18 years old <14 ng/L; Male <21 ng/L: Negative  Repeat testing should be performed if clinically indicated.     Female and children under 18 years old 14-50 ng/L; Male 21-50 ng/L:  Consistent with possible cardiac damage and possible increased clinical   risk. Serial measurements may help to assess extent of myocardial damage.     >50 ng/L: Consistent with cardiac damage, increased clinical risk and  myocardial infarction. Serial measurements may help assess extent of   myocardial damage.      NOTE: Children less than 1 year old may have higher baseline troponin   levels and results should be interpreted in conjunction with the overall   clinical context.     NOTE: Troponin I testing is performed using a different   testing methodology at [facility] than at other   [facility]. Direct result comparisons should only   be made within the same method.   CBC WITH AUTO DIFFERENTIAL    WBC 6.4      nRBC 0.0      RBC 4.65      Hemoglobin 12.3      Hematocrit 37.0      MCV 80      MCH 26.5      MCHC 33.2      RDW 12.5      Platelets 295      Neutrophils % 51.6      Immature Granulocytes %, Automated 0.5      Lymphocytes % 38.3      Monocytes % 8.1      Eosinophils % 0.9      Basophils % 0.6      Neutrophils Absolute 3.30      Immature  Granulocytes Absolute, Automated 0.03      Lymphocytes Absolute 2.45      Monocytes Absolute 0.52      Eosinophils Absolute 0.06      Basophils Absolute 0.04     TROPONIN SERIES- (INITIAL, 1 HR)    Narrative:     The following orders were created for panel order Troponin I Series, High Sensitivity (0, 1 HR).  Procedure                               Abnormality         Status                     ---------                               -----------         ------                     Troponin I, High Sensiti...[[id number]]  Normal              Final result               Troponin, High Sensitivi...[[id number]]  Normal              Final result                 Please view results for these tests on the individual orders.       All other labs were within normal range or not returned as of this dictation.    Imaging  XR chest 1 view   Final Result   1. No acute cardiopulmonary process.        MACRO:   None.        Signed by: Khushi Zepeda 1/4/2025 9:23 PM   Dictation workstation:   [id number]           Procedures  Procedures     EMERGENCY DEPARTMENT COURSE/MDM:   Medical Decision Making  Patient is a 29-year-old female presenting to the emergency department with her 7-week-old son who has known COVID.  She is complaining of bodyaches all over especially over the left side as well as increasing shortness of breath and chest tightness.  Will get a cardiac workup including CBC, CMP, troponin, chest x-ray, and EKG.  Will also get COVID and flu swabs as well as mag and Phos.  For symptomatic treatment will give Toradol for pain, Reglan for nausea, lactated Ringer's for dizziness and feeling of dehydration.  Workup unremarkable with influenza and COVID-negative.  Troponin at 01-hour negative, Phos, mag, CBC, CMP within normal limits other than a mild hypokalemia.  Chest x-ray showing no acute cardiopulmonary process.  Patient feeling some mild relief with fluids Toradol and Reglan.  Will give 10 of Flexeril as well to help with  continuing complaint of neck soreness/pain.  Patient feeling much better after Flexeril and agreeable for dispo home.  Patient is appropriate for discharge at this time will send home with Flexeril and advised to continue take Tylenol and ibuprofen as needed prior likely viral illness and myalgias.  Patient to follow-up with psychiatry for her anxiety and neurology for her possible migraines.    ED Course as of 01/05/25 0032   Sat Jan 04, 2025   2149 XR chest 1 view  IMPRESSION:  1. No acute cardiopulmonary process.   [IS]      ED Course User Index  [IS] Sudeep Dave MD         Diagnoses as of 01/05/25 0032   Viral upper respiratory tract infection with cough   Body aches        External records reviewed: recent inpatient, clinic, and prior ED notes  Labs and Diagnostic imaging independently reviewed/interpreted by me.    Patient plan, care, lab results and imaging were all discussed with attending.    ED Medications administered this visit:    Medications   ketorolac (Toradol) injection 15 mg (15 mg intravenous Given 1/4/25 2054)   lactated Ringer's bolus 1,000 mL (0 mL intravenous Stopped 1/4/25 2307)   metoclopramide (Reglan) injection 10 mg (10 mg intravenous Given 1/4/25 2054)   cyclobenzaprine (Flexeril) tablet 10 mg (10 mg oral Given 1/4/25 2229)     New Prescriptions from this visit:    Discharge Medication List as of 1/4/2025 11:02 PM        START taking these medications    Details   cyclobenzaprine (Flexeril) 5 mg tablet Take 1 tablet (5 mg) by mouth 3 times a day as needed for muscle spasms (Neck pain) for up to 5 days., Starting Sat 1/4/2025, Until Thu 1/9/2025 at 2359, Normal             (Please note that portions of this note were completed with a voice recognition program.  Efforts were made to edit the dictations but occasionally words are mis-transcribed.)     Sudeep Dave MD  Resident  01/05/25 0032

## 2025-01-05 NOTE — ED NOTES
Patient seems to be anxious. She states she is still having neck pain and is not feeling better after medications. Dr. Dave and Kumar made aware via chat.     Leatha Velazco RN  01/04/25 2133       Leatha Velazco RN  01/04/25 2132

## 2025-01-05 NOTE — DISCHARGE INSTRUCTIONS
Please take 600 mg of ibuprofen every 8 hours, take 1000 mg of Tylenol every 8 hours, take Flexeril as needed for severe pain.  Please follow-up with your psychiatrist for anxiety.  Follow-up with your neurologist about migraines.    Please return to the ER or seek immediate medical attention if you experience new or worsening chest pain, shortness of breath, fever of 38C (100.4) or higher, persistent vomiting, weakness, numbness, tingling, excessive sweating,  loss of motion in your arms or legs, fainting, vision changes, or any new or worsening symptoms.    You are welcome back any time. Thank you for entrusting your care to us, I hope we made your visit as pleasant as possible. Wishing you well!    Dr. Dave

## 2025-01-10 LAB
ATRIAL RATE: 105 BPM
ATRIAL RATE: 97 BPM
P AXIS: 49 DEGREES
P AXIS: 58 DEGREES
P OFFSET: 195 MS
P OFFSET: 203 MS
P ONSET: 138 MS
P ONSET: 138 MS
PR INTERVAL: 160 MS
PR INTERVAL: 162 MS
Q ONSET: 218 MS
Q ONSET: 219 MS
QRS COUNT: 16 BEATS
QRS COUNT: 18 BEATS
QRS DURATION: 100 MS
QRS DURATION: 92 MS
QT INTERVAL: 348 MS
QT INTERVAL: 368 MS
QTC CALCULATION(BAZETT): 459 MS
QTC CALCULATION(BAZETT): 467 MS
QTC FREDERICIA: 419 MS
QTC FREDERICIA: 431 MS
R AXIS: 41 DEGREES
R AXIS: 85 DEGREES
T AXIS: 17 DEGREES
T AXIS: 5 DEGREES
T OFFSET: 393 MS
T OFFSET: 402 MS
VENTRICULAR RATE: 105 BPM
VENTRICULAR RATE: 97 BPM

## 2025-01-22 ENCOUNTER — TELEMEDICINE (OUTPATIENT)
Dept: BEHAVIORAL HEALTH | Facility: CLINIC | Age: 30
End: 2025-01-22
Payer: COMMERCIAL

## 2025-01-22 DIAGNOSIS — F41.1 GENERALIZED ANXIETY DISORDER: Primary | ICD-10-CM

## 2025-01-22 DIAGNOSIS — F41.0 PANIC DISORDER WITHOUT AGORAPHOBIA: ICD-10-CM

## 2025-01-22 NOTE — PROGRESS NOTES
"Outpatient Psychiatry    Subjective   Christiano Prieto, a 29 y.o. female, presenting to Psychiatry for evaluation.    28 YO  with anxiety, EtOH use disorder, panic, HTN, and severe preeclampsia, presenting for followup after last appt on . At that time, recommended starting low-dose sertraline, continuing abilify 5 mg daily that was initiated in 2024 hospitalization for severe preeclampsia.     Interval Update:  Current meds: lamotrigine 25 mg daily, nifedipene, xanax 0.25 mg BID-TID PRN   -No longer taking abilify or hydroxyzine  -Feeling \"pretty shitty\" with multiple panic attacks/day. Panic attacks feel like chest pain   -Recommended CBT for panic attacks  -Saw psychiatrist Dr. Concepcion at [facility], who started her on lamotrigene 25 mg for the past three weeks. She's found this helpful for the migraines, although they're still daily. Previously migraines were associated with blurred vision and pain that prevented her from lying down, but since she started lamotrigine, the pain has decreased and blurred vision has decreased. Christiano plans to continue psychiatric care with her new psychiatrist because it's easier for them to schedule close followup   -\"Constantly exhausted\", sleeping ok   -Continues to feel OCD and endorses spending all day cleaning  -Taking xanax 2-3x/day  -Back to working full time, very stressed  - hospitalized for post-partum eclampsia and PNA, started on nifedipine   - ED visit for a cough, diagnosed with bronchitis and discharged on abx  -not breastfeeding?  -Denies suicidal thoughts      Current Medications:    Current Outpatient Medications:     albuterol 90 mcg/actuation inhaler, Inhale 1-2 puffs every 6 hours if needed for wheezing., Disp: 18 g, Rfl: 0    ALPRAZolam (Xanax) 0.25 mg tablet, Take 1 tablet (0.25 mg) by mouth once daily as needed for anxiety., Disp: 10 tablet, Rfl: 0    ARIPiprazole (Abilify) 5 mg tablet, Take 1 tablet (5 mg) by mouth once daily., " Disp: 30 tablet, Rfl: 1    diphenhydrAMINE (Sominex) 25 mg tablet, Take 1 tablet (25 mg) by mouth every 6 hours if needed for other (Headache, take with reglan)., Disp: 30 tablet, Rfl: 1    ferrous gluconate 324 (38 Fe) mg tablet, Take 1 tablet (38 mg of iron) by mouth once daily with breakfast., Disp: 30 tablet, Rfl: 11    hydrOXYzine HCL (Atarax) 25 mg tablet, Take 1 tablet (25 mg) by mouth 2 times a day as needed for anxiety. For severe anxiety, Disp: 60 tablet, Rfl: 1    hydrOXYzine HCL (Atarax) 50 mg tablet, Take 1 tablet (50 mg) by mouth every 8 hours if needed for anxiety., Disp: 90 tablet, Rfl: 1    ibuprofen 600 mg tablet, Take 1 tablet (600 mg) by mouth every 6 hours., Disp: 20 tablet, Rfl: 3    metoclopramide (Reglan) 10 mg tablet, Take 1 tablet (10 mg) by mouth every 6 hours if needed (Headache, take with benadryl)., Disp: 30 tablet, Rfl: 1    miscellaneous medical supply (Blood Pressure Cuff) misc, 1 Device 2 times a day., Disp: 1 each, Rfl: 0    NIFEdipine ER (Adalat CC) 60 mg 24 hr tablet, Take 1 tablet (60 mg) by mouth 2 times a day. Do not crush, chew, or split., Disp: 90 tablet, Rfl: 11    PRENATAL VIT 10-IRON-FOLIC-DHA ORAL, Take 1 tablet by mouth once daily., Disp: , Rfl:     propranolol (Inderal) 10 mg tablet, Take 1 tablet (10 mg) by mouth 3 times a day as needed (anxiety)., Disp: 90 tablet, Rfl: 1    sertraline (Zoloft) 25 mg tablet, Take 1 tablet (25 mg) by mouth once daily for 6 days, THEN 2 tablets (50 mg) once daily for 14 days, THEN 4 tablets (100 mg) once daily., Disp: 154 tablet, Rfl: 0    Medical History:  Past Medical History:   Diagnosis Date    ETOH abuse     HTN (hypertension)     Moderate asthma (HHS-HCC)     Panic anxiety syndrome        Past Psychiatric History:   Diagnoses: anxiety, depression (diagnosed in 2017, after dad's death)  Previous Psychiatrist: Katelyn Dorado   Therapy: has tried it before, not impressed   Current psychiatric medications: hydroxyzine   Past psychiatric  "medications:   -sertraline recently restarted at 25 mg, only took if for three days because \"it gave me panic attacks\"; previously took it at 100 mg (before pregnancy-- still didn't find it helpful).  -lexapro  -low-dose benzo when initially diagnosed with anxiety   -Feels like none of these medications have been helpful   Hospitalizations: denies  Suicide attempts: denies   Family psychiatric history: anxiety (grandmother--on sertraline--, mom & brother)     Social History:   Currently lives: with two kids and boyfriend   Work/Finances: in sales  Current stressors: work   Social support: mom, grandma, boyfriend   Legal History: denies   History: denies  History of violence: denies    Substance Use History:  Tobacco use: quit smoking prior to pregnancy  Use of alcohol:  denies any EtOH since early February; \"used to be a full-blown alcoholic when my dad passed away\"  Use of other substances: denies  Legal consequences of substance use: denies  Substance use disorder treatment: denies    Record Review: brief     Medical Review Of Systems:  Pertinent items are noted in HPI.    OARRS:  No data recorded  I have personally reviewed the OARRS report for Christiano Prieto. I have considered the risks of abuse, dependence, addiction and diversion    Objective   Mental Status Exam  Appearance: at home for virtual appt, long dark hair  Attitude: Tense, friendly  Behavior: Appropriate eye contact.   Motor Activity: No psychomotor agitation or retardation. No abnormal movements, tremors or tics. No evidence of extrapyramidal symptoms or tardive dyskinesia.  Speech: fast but not pressured   Mood: \"shitty\", anxious   Affect: mood-congruent   Thought Process: Linear, logical, and goal-directed. No loose associations or gross thought disorganization.  Thought Content: Denied current suicidal ideation or thoughts of harm to self, denied homicidal ideation or thoughts of harm to others. No delusional thinking elicited. No " perseverations or obsessions identified.   Perception: Did not endorse auditory or visual hallucinations, did not appear to be responding to hallucinatory stimuli.   Cognition: Alert, oriented to prior meds and situation. Responded appropriately to questions.   Insight: Fair, in regards to understanding mental health condition  Judgement: Fair    Vitals:  There were no vitals filed for this visit.    Other Objective Information:  No recent TSH    Risk Assessment:  Risk of harm to self: Low Risk -- Risk factors include: Severe anxiety Protective factors include:Denies current suicidal ideation, Denies history of suicide attempts , Future-oriented talk , Willingness to seek help and support , Skills in problem solving, conflict resolution, and nonviolent handling of disputes, Access to a variety of clinical interventions , Receiving and engaged in care for mental, physical, and substance use disorders , Support through ongoing medical and mental healthcare relationships , Interpersonal relationships and supports, e.g., family, friends, peers, community , and Restricted access to firearms or other lethal means of suicide     Risk of harm to others: Low Risk - Risk factors include: No significant risk factors identified on screening. Protective factors include: Lack of known history of harm to others , Lack of known history of violent ideation , Lack of known access to firearms , Sense of community, availability/access to resources and support , Sense of optimism, hope , Interpersonal competence , Affect regulation , Sense of self-efficacy, internal locus of control , and Positive, pro-social family/peer network       There are no diagnoses linked to this encounter.       28 YO with anxiety, EtOH use disorder, panic, HTN, and asthma (reports this is an old diagnosis; no recent asthma) currently at 33wga (induction planned for 11/30), presenting to discuss worsening anxiety. Her anxiety and panic attacks have become so  bad that she recently had to take premature maternity leave, since the additional stress of work was too much to manage. While she finds PRN hydroxyzine helpful for panic attacks, she reports that prior SSRIs have been either useless or counterproductive: 25 mg sertraline resumed for just three days a few weeks ago triggered worsening panic attacks, in spite of previously having taking 100 mg prior to pregnancy. Given prior ineffectiveness of SSRIs and the fact that her panic attacks are often related to high blood pressure reads, recommend starting PRN propranolol for panic attacks.     11/27/24: was started on abilify 5 mg daily while at [facility] after 11/16 delivery of baby Jude (delivered early for preeclampisa). Initially felt abilify was helpful for anxiety but wonders whether she was actually reassured by the hospital environment. Very tense on interview and reports that she almost went to the hospital this morning for a panic attack. Reports worsening anxiety since she delivered. Interested in resuming zoloft, which she only ever took at 100 mg maximum. She's optimistic this might help at a higher dose (targeting ~150 or 200 mg daily), especially since this medication has helped her grandmother. Given history of worse anxiety after starting sertraline, providing with short-term dose of alprazolam.     1/22/15: Christiano saw another psychiatrist at [facility] during the holidays, when it wasn't possible to schedule her in this clinic. She was started on lamotrigine 25 mg daily for migraines with and aura, discontinued abilify and sertraline (which made her more anxious), and she's still taking PRN xanax 2-3 times/day. She endorses ongoing panic attacks and I recommended seeking CBT for treatment on panic attacks. Deferring any medication changes to her new psychiatrist, since she's switching over care. We did discuss long-term recommendation to give SSRIs another try.     Plan/Recommendations:  Discussed recommendation  to try an SSRI again but will defer medication changes because Christiano is switching to work with another psychiatrist   Follow up on TSH  Recommend starting therapy.   Pt will call to schedule followup if she decides to return to this clinic.   Call  Psychiatry at [phone] with issues.  For [facility] residents, [employer] is a 24/7 hotline you can call for assistance [[phone]]. Please call [phone] or go to your closest Emergency Room if you feel unsafe. This includes thoughts of hurting yourself or anyone else, or having other troubles such as hearing voices, seeing visions, or having new and scary thoughts about the people around you.    I will discuss this pt with Dr. Ricketts.     Kavita Girard MD

## 2025-01-30 ENCOUNTER — HOSPITAL ENCOUNTER (EMERGENCY)
Facility: HOSPITAL | Age: 30
Discharge: HOME | End: 2025-01-30
Attending: EMERGENCY MEDICINE
Payer: COMMERCIAL

## 2025-01-30 ENCOUNTER — APPOINTMENT (OUTPATIENT)
Dept: RADIOLOGY | Facility: HOSPITAL | Age: 30
End: 2025-01-30
Payer: COMMERCIAL

## 2025-01-30 VITALS
RESPIRATION RATE: 17 BRPM | DIASTOLIC BLOOD PRESSURE: 82 MMHG | OXYGEN SATURATION: 97 % | TEMPERATURE: 97.7 F | HEIGHT: 63 IN | WEIGHT: 180 LBS | SYSTOLIC BLOOD PRESSURE: 135 MMHG | BODY MASS INDEX: 31.89 KG/M2 | HEART RATE: 90 BPM

## 2025-01-30 DIAGNOSIS — R10.11 RIGHT UPPER QUADRANT ABDOMINAL PAIN: Primary | ICD-10-CM

## 2025-01-30 LAB
ALBUMIN SERPL BCP-MCNC: 5 G/DL (ref 3.4–5)
ALP SERPL-CCNC: 77 U/L (ref 33–110)
ALT SERPL W P-5'-P-CCNC: 82 U/L (ref 7–45)
ANION GAP SERPL CALC-SCNC: 15 MMOL/L (ref 10–20)
APPEARANCE UR: CLEAR
AST SERPL W P-5'-P-CCNC: 44 U/L (ref 9–39)
B-HCG SERPL-ACNC: <2 MIU/ML
BASOPHILS # BLD AUTO: 0.03 X10*3/UL (ref 0–0.1)
BASOPHILS NFR BLD AUTO: 0.6 %
BILIRUB DIRECT SERPL-MCNC: 0 MG/DL (ref 0–0.3)
BILIRUB SERPL-MCNC: 0.3 MG/DL (ref 0–1.2)
BILIRUB UR STRIP.AUTO-MCNC: NEGATIVE MG/DL
BUN SERPL-MCNC: 9 MG/DL (ref 6–23)
CALCIUM SERPL-MCNC: 10.1 MG/DL (ref 8.6–10.3)
CHLORIDE SERPL-SCNC: 104 MMOL/L (ref 98–107)
CO2 SERPL-SCNC: 22 MMOL/L (ref 21–32)
COLOR UR: COLORLESS
CREAT SERPL-MCNC: 0.72 MG/DL (ref 0.5–1.05)
EGFRCR SERPLBLD CKD-EPI 2021: >90 ML/MIN/1.73M*2
EOSINOPHIL # BLD AUTO: 0.11 X10*3/UL (ref 0–0.7)
EOSINOPHIL NFR BLD AUTO: 2.2 %
ERYTHROCYTE [DISTWIDTH] IN BLOOD BY AUTOMATED COUNT: 12.5 % (ref 11.5–14.5)
GLUCOSE SERPL-MCNC: 106 MG/DL (ref 74–99)
GLUCOSE UR STRIP.AUTO-MCNC: NORMAL MG/DL
HCT VFR BLD AUTO: 34.6 % (ref 36–46)
HGB BLD-MCNC: 11.5 G/DL (ref 12–16)
HOLD SPECIMEN: NORMAL
HOLD SPECIMEN: NORMAL
IMM GRANULOCYTES # BLD AUTO: 0.01 X10*3/UL (ref 0–0.7)
IMM GRANULOCYTES NFR BLD AUTO: 0.2 % (ref 0–0.9)
KETONES UR STRIP.AUTO-MCNC: NEGATIVE MG/DL
LEUKOCYTE ESTERASE UR QL STRIP.AUTO: NEGATIVE
LIPASE SERPL-CCNC: 24 U/L (ref 9–82)
LYMPHOCYTES # BLD AUTO: 1.83 X10*3/UL (ref 1.2–4.8)
LYMPHOCYTES NFR BLD AUTO: 37.3 %
MCH RBC QN AUTO: 26.3 PG (ref 26–34)
MCHC RBC AUTO-ENTMCNC: 33.2 G/DL (ref 32–36)
MCV RBC AUTO: 79 FL (ref 80–100)
MONOCYTES # BLD AUTO: 0.47 X10*3/UL (ref 0.1–1)
MONOCYTES NFR BLD AUTO: 9.6 %
NEUTROPHILS # BLD AUTO: 2.45 X10*3/UL (ref 1.2–7.7)
NEUTROPHILS NFR BLD AUTO: 50.1 %
NITRITE UR QL STRIP.AUTO: NEGATIVE
NRBC BLD-RTO: 0 /100 WBCS (ref 0–0)
PH UR STRIP.AUTO: 5.5 [PH]
PLATELET # BLD AUTO: 335 X10*3/UL (ref 150–450)
POTASSIUM SERPL-SCNC: 3.8 MMOL/L (ref 3.5–5.3)
PROT SERPL-MCNC: 7.8 G/DL (ref 6.4–8.2)
PROT UR STRIP.AUTO-MCNC: NEGATIVE MG/DL
RBC # BLD AUTO: 4.37 X10*6/UL (ref 4–5.2)
RBC # UR STRIP.AUTO: NEGATIVE /UL
SODIUM SERPL-SCNC: 137 MMOL/L (ref 136–145)
SP GR UR STRIP.AUTO: 1.01
UROBILINOGEN UR STRIP.AUTO-MCNC: NORMAL MG/DL
WBC # BLD AUTO: 4.9 X10*3/UL (ref 4.4–11.3)

## 2025-01-30 PROCEDURE — 82435 ASSAY OF BLOOD CHLORIDE: CPT | Performed by: EMERGENCY MEDICINE

## 2025-01-30 PROCEDURE — 36415 COLL VENOUS BLD VENIPUNCTURE: CPT | Performed by: EMERGENCY MEDICINE

## 2025-01-30 PROCEDURE — 84702 CHORIONIC GONADOTROPIN TEST: CPT | Performed by: EMERGENCY MEDICINE

## 2025-01-30 PROCEDURE — 81003 URINALYSIS AUTO W/O SCOPE: CPT | Performed by: EMERGENCY MEDICINE

## 2025-01-30 PROCEDURE — 74177 CT ABD & PELVIS W/CONTRAST: CPT

## 2025-01-30 PROCEDURE — 96374 THER/PROPH/DIAG INJ IV PUSH: CPT | Mod: 59

## 2025-01-30 PROCEDURE — 96361 HYDRATE IV INFUSION ADD-ON: CPT

## 2025-01-30 PROCEDURE — 99285 EMERGENCY DEPT VISIT HI MDM: CPT | Mod: 25 | Performed by: EMERGENCY MEDICINE

## 2025-01-30 PROCEDURE — 85025 COMPLETE CBC W/AUTO DIFF WBC: CPT | Performed by: EMERGENCY MEDICINE

## 2025-01-30 PROCEDURE — 2550000001 HC RX 255 CONTRASTS: Performed by: EMERGENCY MEDICINE

## 2025-01-30 PROCEDURE — 96375 TX/PRO/DX INJ NEW DRUG ADDON: CPT

## 2025-01-30 PROCEDURE — 74177 CT ABD & PELVIS W/CONTRAST: CPT | Performed by: RADIOLOGY

## 2025-01-30 PROCEDURE — 83690 ASSAY OF LIPASE: CPT | Performed by: EMERGENCY MEDICINE

## 2025-01-30 PROCEDURE — 84075 ASSAY ALKALINE PHOSPHATASE: CPT | Performed by: EMERGENCY MEDICINE

## 2025-01-30 PROCEDURE — 2500000004 HC RX 250 GENERAL PHARMACY W/ HCPCS (ALT 636 FOR OP/ED): Performed by: NURSE PRACTITIONER

## 2025-01-30 PROCEDURE — 2500000004 HC RX 250 GENERAL PHARMACY W/ HCPCS (ALT 636 FOR OP/ED): Performed by: EMERGENCY MEDICINE

## 2025-01-30 RX ORDER — NAPROXEN 500 MG/1
500 TABLET ORAL 2 TIMES DAILY PRN
Qty: 30 TABLET | Refills: 0 | Status: SHIPPED | OUTPATIENT
Start: 2025-01-30

## 2025-01-30 RX ORDER — ONDANSETRON HYDROCHLORIDE 2 MG/ML
4 INJECTION, SOLUTION INTRAVENOUS ONCE
Status: COMPLETED | OUTPATIENT
Start: 2025-01-30 | End: 2025-01-30

## 2025-01-30 RX ORDER — MORPHINE SULFATE 4 MG/ML
4 INJECTION, SOLUTION INTRAMUSCULAR; INTRAVENOUS ONCE
Status: COMPLETED | OUTPATIENT
Start: 2025-01-30 | End: 2025-01-30

## 2025-01-30 RX ORDER — FAMOTIDINE 20 MG/1
20 TABLET, FILM COATED ORAL 2 TIMES DAILY
Qty: 10 TABLET | Refills: 0 | Status: SHIPPED | OUTPATIENT
Start: 2025-01-30 | End: 2025-02-04

## 2025-01-30 RX ORDER — PANTOPRAZOLE SODIUM 40 MG/1
40 TABLET, DELAYED RELEASE ORAL DAILY
Qty: 30 TABLET | Refills: 0 | Status: SHIPPED | OUTPATIENT
Start: 2025-01-30 | End: 2025-03-01

## 2025-01-30 RX ORDER — LORAZEPAM 2 MG/ML
0.5 INJECTION INTRAMUSCULAR ONCE
Status: COMPLETED | OUTPATIENT
Start: 2025-01-30 | End: 2025-01-30

## 2025-01-30 RX ADMIN — IOHEXOL 75 ML: 350 INJECTION, SOLUTION INTRAVENOUS at 13:01

## 2025-01-30 RX ADMIN — SODIUM CHLORIDE 1000 ML: 9 INJECTION, SOLUTION INTRAVENOUS at 12:53

## 2025-01-30 RX ADMIN — ONDANSETRON 4 MG: 2 INJECTION INTRAMUSCULAR; INTRAVENOUS at 12:53

## 2025-01-30 RX ADMIN — MORPHINE SULFATE 4 MG: 4 INJECTION, SOLUTION INTRAMUSCULAR; INTRAVENOUS at 12:51

## 2025-01-30 RX ADMIN — LORAZEPAM 0.5 MG: 2 INJECTION INTRAMUSCULAR; INTRAVENOUS at 13:25

## 2025-01-30 ASSESSMENT — LIFESTYLE VARIABLES
EVER HAD A DRINK FIRST THING IN THE MORNING TO STEADY YOUR NERVES TO GET RID OF A HANGOVER: NO
TOTAL SCORE: 0
HAVE PEOPLE ANNOYED YOU BY CRITICIZING YOUR DRINKING: NO
HAVE YOU EVER FELT YOU SHOULD CUT DOWN ON YOUR DRINKING: NO
EVER FELT BAD OR GUILTY ABOUT YOUR DRINKING: NO

## 2025-01-30 ASSESSMENT — PAIN DESCRIPTION - LOCATION
LOCATION: ABDOMEN
LOCATION: ABDOMEN

## 2025-01-30 ASSESSMENT — PAIN DESCRIPTION - ORIENTATION
ORIENTATION: RIGHT
ORIENTATION: RIGHT

## 2025-01-30 ASSESSMENT — PAIN SCALES - PAIN ASSESSMENT IN ADVANCED DEMENTIA (PAINAD)
CONSOLABILITY: NO NEED TO CONSOLE
TOTALSCORE: MEDICATION (SEE MAR)
BREATHING: NORMAL
BODYLANGUAGE: RELAXED

## 2025-01-30 ASSESSMENT — PAIN DESCRIPTION - PAIN TYPE: TYPE: ACUTE PAIN

## 2025-01-30 ASSESSMENT — PAIN - FUNCTIONAL ASSESSMENT: PAIN_FUNCTIONAL_ASSESSMENT: 0-10

## 2025-01-30 ASSESSMENT — PAIN DESCRIPTION - DESCRIPTORS
DESCRIPTORS: ACHING;CRAMPING
DESCRIPTORS: ACHING;CRAMPING;CRUSHING

## 2025-01-30 ASSESSMENT — PAIN DESCRIPTION - FREQUENCY: FREQUENCY: CONSTANT/CONTINUOUS

## 2025-01-30 ASSESSMENT — PAIN SCALES - GENERAL
PAINLEVEL_OUTOF10: 3
PAINLEVEL_OUTOF10: 3
PAINLEVEL_OUTOF10: 7
PAINLEVEL_OUTOF10: 7

## 2025-01-30 NOTE — ED PROVIDER NOTES
HPI   Chief Complaint   Patient presents with    Abdominal Pain     Pt states that she had routine blood work and liver enzymes are elevated and has hx of pancreatitis. Pt states that she has right quadrant pain with nausea.          29-year-old female presents emergency department, patient states has been having pain right upper quadrant for some time.  States it started during her pregnancy, has continued she is now 2 months postpartum.  She did have labs early January that were unremarkable, repeat labs recently have shown to uptrending levels of LFTs.    States history of alcohol abuse, states has been sober for a year.  Has had pancreatitis but states this feels quite different.  States feels nauseated but no vomiting, diarrhea, fevers.      History provided by:  Patient   used: No            Patient History   Past Medical History:   Diagnosis Date    ETOH abuse     HTN (hypertension)     Moderate asthma (HHS-HCC)     Panic anxiety syndrome      No past surgical history on file.  No family history on file.  Social History     Tobacco Use    Smoking status: Former     Types: Cigarettes    Smokeless tobacco: Never   Vaping Use    Vaping status: Never Used   Substance Use Topics    Alcohol use: Never    Drug use: Never       Physical Exam   ED Triage Vitals [01/30/25 1209]   Temperature Heart Rate Respirations BP   36.3 °C (97.3 °F) 99 18 133/78      Pulse Ox Temp Source Heart Rate Source Patient Position   95 % Temporal Monitor Sitting      BP Location FiO2 (%)     Left arm --       Physical Exam  Constitutional: Vitals noted, no distress. Afebrile.   Cardiovascular: Regular, rate, rhythm, no murmur.   Pulmonary: Lungs clear bilaterally with good aeration. No adventitious breath sounds.   Gastrointestinal: Soft, nonsurgical.  Tender right upper quadrant. No peritoneal signs. Normoactive bowel sounds.   Musculoskeletal: No peripheral edema. Negative Homans bilaterally, no cords.   Skin: No  rash.   Neuro: No focal neurologic deficits, NIH score of 0.    ED Course & MDM   Diagnoses as of 01/30/25 1409   Right upper quadrant abdominal pain          Labs Reviewed   BASIC METABOLIC PANEL - Abnormal       Result Value    Glucose 106 (*)     Sodium 137      Potassium 3.8      Chloride 104      Bicarbonate 22      Anion Gap 15      Urea Nitrogen 9      Creatinine 0.72      eGFR >90      Calcium 10.1     HEPATIC FUNCTION PANEL - Abnormal    Albumin 5.0      Bilirubin, Total 0.3      Bilirubin, Direct 0.0      Alkaline Phosphatase 77      ALT 82 (*)     AST 44 (*)     Total Protein 7.8     URINALYSIS WITH REFLEX CULTURE AND MICROSCOPIC - Abnormal    Color, Urine Colorless (*)     Appearance, Urine Clear      Specific Gravity, Urine 1.006      pH, Urine 5.5      Protein, Urine NEGATIVE      Glucose, Urine Normal      Blood, Urine NEGATIVE      Ketones, Urine NEGATIVE      Bilirubin, Urine NEGATIVE      Urobilinogen, Urine Normal      Nitrite, Urine NEGATIVE      Leukocyte Esterase, Urine NEGATIVE     LIPASE - Normal    Lipase 24      Narrative:     Venipuncture immediately after or during the administration of Metamizole may lead to falsely low results. Testing should be performed immediately prior to Metamizole dosing.   HUMAN CHORIONIC GONADOTROPIN, SERUM QUANTITATIVE - Normal    HCG, Beta-Quantitative <2      Narrative:      Total HCG measurement is performed using the [employer]   Immunoassay which detects intact HCG and free beta HCG subunit.    This test is not indicated for use as a tumor marker.   HCG testing is performed using a different test methodology at [facility] than other [facility]. Direct result comparison   should only be made within the same method.       CBC WITH AUTO DIFFERENTIAL   URINALYSIS WITH REFLEX CULTURE AND MICROSCOPIC    Narrative:     The following orders were created for panel order Urinalysis with Reflex Culture and Microscopic.  Procedure                                Abnormality         Status                     ---------                               -----------         ------                     Urinalysis with Reflex C...[[id number]]  Abnormal            Final result               Extra Urine Gray Tube[[id number]]                            In process                   Please view results for these tests on the individual orders.   EXTRA URINE GRAY TUBE        CT abdomen pelvis w IV contrast   Final Result   No CT evidence for acute pathology within the abdomen or pelvis.        Signed by: Rafael Calix 1/30/2025 1:43 PM   Dictation workstation:   [id number]                 No data recorded                       Medical Decision Making    Patient presents, complains of right upper quadrant pain has been going on for several months, states she recently had outpatient labs that are showing uptrending LFTs.  States she was scheduled for an ultrasound of her gallbladder but pain is getting worse.  States it makes her nauseated.    CBC unremarkable, metabolic panel shows an ALT of 82 and AST of 44.  Normal lipase and lactate, negative pregnancy.  Urinalysis unremarkable.    CT imaging of the abdomen/pelvis unremarkable.    Carefully discussed results with the patient, discussed anti-inflammatories for the discomfort, will also start her on PPI as well in case of a small gastric ulcer.  Discussed continue with plan of care for the right upper quadrant ultrasound, and follow-up with primary care.  Discussed return with worsening symptoms or additional concerns.    Shared GRETTA Attestation:    I personally saw the patient and made/approved the management plan and take responsibility for the patient management.     History: 29-year-old female presents with right upper abdominal pain.    Exam: Regular rate and rhythm cardiac exam with clear breath sounds bilaterally.  Abdomen is soft with tenderness to palpation of the right upper abdomen.  Neurological exam  is grossly intact.    MDM: Cholecystitis, biliary colic, pancreatitis, colitis    Labs Reviewed   URINALYSIS WITH REFLEX CULTURE AND MICROSCOPIC - Abnormal       Result Value    Color, Urine Colorless (*)     Appearance, Urine Clear      Specific Gravity, Urine 1.006      pH, Urine 5.5      Protein, Urine NEGATIVE      Glucose, Urine Normal      Blood, Urine NEGATIVE      Ketones, Urine NEGATIVE      Bilirubin, Urine NEGATIVE      Urobilinogen, Urine Normal      Nitrite, Urine NEGATIVE      Leukocyte Esterase, Urine NEGATIVE     CBC WITH AUTO DIFFERENTIAL   LIPASE   URINALYSIS WITH REFLEX CULTURE AND MICROSCOPIC    Narrative:     The following orders were created for panel order Urinalysis with Reflex Culture and Microscopic.  Procedure                               Abnormality         Status                     ---------                               -----------         ------                     Urinalysis with Reflex C...[[id number]]  Abnormal            Final result               Extra Urine Gray Tube[[id number]]                            In process                   Please view results for these tests on the individual orders.   BASIC METABOLIC PANEL   HEPATIC FUNCTION PANEL   EXTRA URINE GRAY TUBE   HUMAN CHORIONIC GONADOTROPIN, SERUM QUANTITATIVE       CT abdomen pelvis w IV contrast    (Results Pending)           Azar Luke MD      Procedure  Procedures     Gaye Harper, VANNA-CNP  01/30/25 1411

## 2025-02-04 ENCOUNTER — APPOINTMENT (OUTPATIENT)
Dept: NEUROLOGY | Facility: CLINIC | Age: 30
End: 2025-02-04
Payer: COMMERCIAL

## 2025-02-07 ENCOUNTER — APPOINTMENT (OUTPATIENT)
Dept: RADIOLOGY | Facility: HOSPITAL | Age: 30
End: 2025-02-07
Payer: COMMERCIAL

## 2025-02-07 ENCOUNTER — APPOINTMENT (OUTPATIENT)
Dept: CARDIOLOGY | Facility: HOSPITAL | Age: 30
End: 2025-02-07
Payer: COMMERCIAL

## 2025-02-07 ENCOUNTER — HOSPITAL ENCOUNTER (EMERGENCY)
Facility: HOSPITAL | Age: 30
Discharge: HOME | End: 2025-02-07
Attending: EMERGENCY MEDICINE
Payer: COMMERCIAL

## 2025-02-07 VITALS
OXYGEN SATURATION: 98 % | HEART RATE: 76 BPM | HEIGHT: 63 IN | RESPIRATION RATE: 17 BRPM | WEIGHT: 180 LBS | DIASTOLIC BLOOD PRESSURE: 70 MMHG | SYSTOLIC BLOOD PRESSURE: 140 MMHG | BODY MASS INDEX: 31.89 KG/M2 | TEMPERATURE: 97.3 F

## 2025-02-07 DIAGNOSIS — R07.9 CHEST PAIN, UNSPECIFIED TYPE: Primary | ICD-10-CM

## 2025-02-07 DIAGNOSIS — U07.1 COVID-19: ICD-10-CM

## 2025-02-07 DIAGNOSIS — R10.11 RIGHT UPPER QUADRANT ABDOMINAL PAIN: ICD-10-CM

## 2025-02-07 LAB
ALBUMIN SERPL BCP-MCNC: 4.7 G/DL (ref 3.4–5)
ALP SERPL-CCNC: 68 U/L (ref 33–110)
ALT SERPL W P-5'-P-CCNC: 44 U/L (ref 7–45)
AMPHETAMINES UR QL SCN: ABNORMAL
ANION GAP SERPL CALC-SCNC: 12 MMOL/L (ref 10–20)
APPEARANCE UR: CLEAR
AST SERPL W P-5'-P-CCNC: 24 U/L (ref 9–39)
ATRIAL RATE: 67 BPM
ATRIAL RATE: 73 BPM
B-HCG SERPL-ACNC: <2 MIU/ML
BARBITURATES UR QL SCN: ABNORMAL
BASOPHILS # BLD AUTO: 0.03 X10*3/UL (ref 0–0.1)
BASOPHILS NFR BLD AUTO: 0.6 %
BENZODIAZ UR QL SCN: ABNORMAL
BILIRUB SERPL-MCNC: 0.2 MG/DL (ref 0–1.2)
BILIRUB UR STRIP.AUTO-MCNC: NEGATIVE MG/DL
BNP SERPL-MCNC: 9 PG/ML (ref 0–99)
BUN SERPL-MCNC: 14 MG/DL (ref 6–23)
BZE UR QL SCN: ABNORMAL
CALCIUM SERPL-MCNC: 9.5 MG/DL (ref 8.6–10.3)
CANNABINOIDS UR QL SCN: ABNORMAL
CARDIAC TROPONIN I PNL SERPL HS: <3 NG/L (ref 0–13)
CARDIAC TROPONIN I PNL SERPL HS: <3 NG/L (ref 0–13)
CHLORIDE SERPL-SCNC: 105 MMOL/L (ref 98–107)
CO2 SERPL-SCNC: 25 MMOL/L (ref 21–32)
COLOR UR: NORMAL
CREAT SERPL-MCNC: 0.77 MG/DL (ref 0.5–1.05)
D DIMER PPP FEU-MCNC: <215 NG/ML FEU
EGFRCR SERPLBLD CKD-EPI 2021: >90 ML/MIN/1.73M*2
EOSINOPHIL # BLD AUTO: 0.12 X10*3/UL (ref 0–0.7)
EOSINOPHIL NFR BLD AUTO: 2.4 %
ERYTHROCYTE [DISTWIDTH] IN BLOOD BY AUTOMATED COUNT: 13.3 % (ref 11.5–14.5)
FENTANYL+NORFENTANYL UR QL SCN: ABNORMAL
FLUAV RNA RESP QL NAA+PROBE: NOT DETECTED
FLUBV RNA RESP QL NAA+PROBE: NOT DETECTED
GLUCOSE SERPL-MCNC: 85 MG/DL (ref 74–99)
GLUCOSE UR STRIP.AUTO-MCNC: NORMAL MG/DL
HCT VFR BLD AUTO: 36.6 % (ref 36–46)
HGB BLD-MCNC: 12.4 G/DL (ref 12–16)
IMM GRANULOCYTES # BLD AUTO: 0.01 X10*3/UL (ref 0–0.7)
IMM GRANULOCYTES NFR BLD AUTO: 0.2 % (ref 0–0.9)
INR PPP: 1 (ref 0.9–1.1)
KETONES UR STRIP.AUTO-MCNC: NEGATIVE MG/DL
LACTATE SERPL-SCNC: 0.8 MMOL/L (ref 0.4–2)
LEUKOCYTE ESTERASE UR QL STRIP.AUTO: NEGATIVE
LYMPHOCYTES # BLD AUTO: 1.56 X10*3/UL (ref 1.2–4.8)
LYMPHOCYTES NFR BLD AUTO: 30.6 %
MAGNESIUM SERPL-MCNC: 1.83 MG/DL (ref 1.6–2.4)
MCH RBC QN AUTO: 26.4 PG (ref 26–34)
MCHC RBC AUTO-ENTMCNC: 33.9 G/DL (ref 32–36)
MCV RBC AUTO: 78 FL (ref 80–100)
METHADONE UR QL SCN: ABNORMAL
MONOCYTES # BLD AUTO: 0.51 X10*3/UL (ref 0.1–1)
MONOCYTES NFR BLD AUTO: 10 %
NEUTROPHILS # BLD AUTO: 2.86 X10*3/UL (ref 1.2–7.7)
NEUTROPHILS NFR BLD AUTO: 56.2 %
NITRITE UR QL STRIP.AUTO: NEGATIVE
NRBC BLD-RTO: 0 /100 WBCS (ref 0–0)
OPIATES UR QL SCN: ABNORMAL
OXYCODONE+OXYMORPHONE UR QL SCN: ABNORMAL
P AXIS: 26 DEGREES
P AXIS: 68 DEGREES
P OFFSET: 191 MS
P OFFSET: 192 MS
P ONSET: 128 MS
P ONSET: 143 MS
PCP UR QL SCN: ABNORMAL
PH UR STRIP.AUTO: 5.5 [PH]
PLATELET # BLD AUTO: 315 X10*3/UL (ref 150–450)
POTASSIUM SERPL-SCNC: 4.1 MMOL/L (ref 3.5–5.3)
PR INTERVAL: 152 MS
PR INTERVAL: 174 MS
PROT SERPL-MCNC: 7.2 G/DL (ref 6.4–8.2)
PROT UR STRIP.AUTO-MCNC: NEGATIVE MG/DL
PROTHROMBIN TIME: 11.5 SECONDS (ref 9.8–12.8)
Q ONSET: 215 MS
Q ONSET: 219 MS
QRS COUNT: 11 BEATS
QRS COUNT: 12 BEATS
QRS DURATION: 96 MS
QRS DURATION: 98 MS
QT INTERVAL: 386 MS
QT INTERVAL: 422 MS
QTC CALCULATION(BAZETT): 425 MS
QTC CALCULATION(BAZETT): 445 MS
QTC FREDERICIA: 412 MS
QTC FREDERICIA: 438 MS
R AXIS: 56 DEGREES
R AXIS: 62 DEGREES
RBC # BLD AUTO: 4.69 X10*6/UL (ref 4–5.2)
RBC # UR STRIP.AUTO: NEGATIVE MG/DL
RSV RNA RESP QL NAA+PROBE: NOT DETECTED
SARS-COV-2 RNA RESP QL NAA+PROBE: DETECTED
SODIUM SERPL-SCNC: 138 MMOL/L (ref 136–145)
SP GR UR STRIP.AUTO: 1.02
T AXIS: 36 DEGREES
T AXIS: 40 DEGREES
T OFFSET: 412 MS
T OFFSET: 426 MS
UROBILINOGEN UR STRIP.AUTO-MCNC: NORMAL MG/DL
VENTRICULAR RATE: 67 BPM
VENTRICULAR RATE: 73 BPM
WBC # BLD AUTO: 5.1 X10*3/UL (ref 4.4–11.3)

## 2025-02-07 PROCEDURE — 2500000004 HC RX 250 GENERAL PHARMACY W/ HCPCS (ALT 636 FOR OP/ED): Performed by: PHYSICIAN ASSISTANT

## 2025-02-07 PROCEDURE — 80346 BENZODIAZEPINES1-12: CPT | Mod: ELYLAB | Performed by: PHYSICIAN ASSISTANT

## 2025-02-07 PROCEDURE — 84702 CHORIONIC GONADOTROPIN TEST: CPT | Performed by: PHYSICIAN ASSISTANT

## 2025-02-07 PROCEDURE — 80307 DRUG TEST PRSMV CHEM ANLYZR: CPT | Performed by: PHYSICIAN ASSISTANT

## 2025-02-07 PROCEDURE — 80053 COMPREHEN METABOLIC PANEL: CPT | Performed by: PHYSICIAN ASSISTANT

## 2025-02-07 PROCEDURE — 36415 COLL VENOUS BLD VENIPUNCTURE: CPT | Performed by: PHYSICIAN ASSISTANT

## 2025-02-07 PROCEDURE — 93005 ELECTROCARDIOGRAM TRACING: CPT

## 2025-02-07 PROCEDURE — 85025 COMPLETE CBC W/AUTO DIFF WBC: CPT | Performed by: PHYSICIAN ASSISTANT

## 2025-02-07 PROCEDURE — 84484 ASSAY OF TROPONIN QUANT: CPT | Performed by: PHYSICIAN ASSISTANT

## 2025-02-07 PROCEDURE — 96374 THER/PROPH/DIAG INJ IV PUSH: CPT

## 2025-02-07 PROCEDURE — 99285 EMERGENCY DEPT VISIT HI MDM: CPT | Mod: 25 | Performed by: EMERGENCY MEDICINE

## 2025-02-07 PROCEDURE — 71045 X-RAY EXAM CHEST 1 VIEW: CPT

## 2025-02-07 PROCEDURE — 80361 OPIATES 1 OR MORE: CPT | Mod: ELYLAB | Performed by: PHYSICIAN ASSISTANT

## 2025-02-07 PROCEDURE — 76705 ECHO EXAM OF ABDOMEN: CPT

## 2025-02-07 PROCEDURE — 96375 TX/PRO/DX INJ NEW DRUG ADDON: CPT

## 2025-02-07 PROCEDURE — 87637 SARSCOV2&INF A&B&RSV AMP PRB: CPT | Performed by: PHYSICIAN ASSISTANT

## 2025-02-07 PROCEDURE — 83880 ASSAY OF NATRIURETIC PEPTIDE: CPT | Performed by: PHYSICIAN ASSISTANT

## 2025-02-07 PROCEDURE — 83605 ASSAY OF LACTIC ACID: CPT | Performed by: PHYSICIAN ASSISTANT

## 2025-02-07 PROCEDURE — 83735 ASSAY OF MAGNESIUM: CPT | Performed by: PHYSICIAN ASSISTANT

## 2025-02-07 PROCEDURE — 85379 FIBRIN DEGRADATION QUANT: CPT | Performed by: PHYSICIAN ASSISTANT

## 2025-02-07 PROCEDURE — 76705 ECHO EXAM OF ABDOMEN: CPT | Mod: FOREIGN READ | Performed by: RADIOLOGY

## 2025-02-07 PROCEDURE — 81003 URINALYSIS AUTO W/O SCOPE: CPT | Performed by: PHYSICIAN ASSISTANT

## 2025-02-07 PROCEDURE — 85610 PROTHROMBIN TIME: CPT | Performed by: PHYSICIAN ASSISTANT

## 2025-02-07 PROCEDURE — 71045 X-RAY EXAM CHEST 1 VIEW: CPT | Mod: FOREIGN READ | Performed by: RADIOLOGY

## 2025-02-07 RX ORDER — MORPHINE SULFATE 4 MG/ML
4 INJECTION, SOLUTION INTRAMUSCULAR; INTRAVENOUS ONCE
Status: COMPLETED | OUTPATIENT
Start: 2025-02-07 | End: 2025-02-07

## 2025-02-07 RX ORDER — NAPROXEN SODIUM 220 MG/1
324 TABLET, FILM COATED ORAL ONCE
Status: DISCONTINUED | OUTPATIENT
Start: 2025-02-07 | End: 2025-02-07 | Stop reason: HOSPADM

## 2025-02-07 RX ORDER — ONDANSETRON HYDROCHLORIDE 2 MG/ML
4 INJECTION, SOLUTION INTRAVENOUS ONCE
Status: COMPLETED | OUTPATIENT
Start: 2025-02-07 | End: 2025-02-07

## 2025-02-07 RX ORDER — PREDNISONE 50 MG/1
50 TABLET ORAL DAILY
Qty: 5 TABLET | Refills: 0 | Status: SHIPPED | OUTPATIENT
Start: 2025-02-07 | End: 2025-02-12

## 2025-02-07 RX ORDER — LORAZEPAM 2 MG/ML
1 INJECTION INTRAMUSCULAR ONCE
Status: COMPLETED | OUTPATIENT
Start: 2025-02-07 | End: 2025-02-07

## 2025-02-07 RX ADMIN — MORPHINE SULFATE 4 MG: 4 INJECTION, SOLUTION INTRAMUSCULAR; INTRAVENOUS at 16:41

## 2025-02-07 RX ADMIN — LORAZEPAM 1 MG: 2 INJECTION INTRAMUSCULAR; INTRAVENOUS at 19:34

## 2025-02-07 RX ADMIN — ONDANSETRON 4 MG: 2 INJECTION INTRAMUSCULAR; INTRAVENOUS at 16:40

## 2025-02-07 ASSESSMENT — LIFESTYLE VARIABLES
TOTAL SCORE: 0
EVER HAD A DRINK FIRST THING IN THE MORNING TO STEADY YOUR NERVES TO GET RID OF A HANGOVER: NO
HAVE PEOPLE ANNOYED YOU BY CRITICIZING YOUR DRINKING: NO
EVER FELT BAD OR GUILTY ABOUT YOUR DRINKING: NO
HAVE YOU EVER FELT YOU SHOULD CUT DOWN ON YOUR DRINKING: NO

## 2025-02-07 ASSESSMENT — HEART SCORE
AGE: <45
HEART SCORE: 1
HISTORY: SLIGHTLY SUSPICIOUS
TROPONIN: LESS THAN OR EQUAL TO NORMAL LIMIT
ECG: NORMAL
RISK FACTORS: 1-2 RISK FACTORS

## 2025-02-07 ASSESSMENT — PAIN SCALES - GENERAL
PAINLEVEL_OUTOF10: 2
PAINLEVEL_OUTOF10: 5 - MODERATE PAIN

## 2025-02-07 ASSESSMENT — PAIN DESCRIPTION - PROGRESSION: CLINICAL_PROGRESSION: NOT CHANGED

## 2025-02-07 ASSESSMENT — PAIN DESCRIPTION - ORIENTATION: ORIENTATION: MID

## 2025-02-07 ASSESSMENT — PAIN - FUNCTIONAL ASSESSMENT: PAIN_FUNCTIONAL_ASSESSMENT: 0-10

## 2025-02-07 ASSESSMENT — PAIN DESCRIPTION - PAIN TYPE: TYPE: ACUTE PAIN

## 2025-02-07 ASSESSMENT — PAIN DESCRIPTION - LOCATION: LOCATION: CHEST

## 2025-02-07 NOTE — ED PROVIDER NOTES
HPI   Chief Complaint   Patient presents with    Chest Pain       A 29-year-old female patient comes in the emergency department today with complaints of left-sided chest pain radiating to the left arm.  She describes it as a sharp pain.  Rates it a 6 out of 10 on the pain scale currently.  Denies any cardiac history.  States she does have history of hypertension.  Also states she has had persistent right flank pain that has been evaluated for in the emergency department and does have follow-up with a specialist this coming week regarding.  For this purpose comes in the emergency department today for the evaluation.  Otherwise no complaints present time.              Patient History   Past Medical History:   Diagnosis Date    ETOH abuse     HTN (hypertension)     Moderate asthma (HHS-HCC)     Panic anxiety syndrome      No past surgical history on file.  No family history on file.  Social History     Tobacco Use    Smoking status: Former     Types: Cigarettes    Smokeless tobacco: Never   Vaping Use    Vaping status: Never Used   Substance Use Topics    Alcohol use: Never    Drug use: Never       Physical Exam   ED Triage Vitals   Temp Pulse Resp BP   -- -- -- --      SpO2 Temp src Heart Rate Source Patient Position   -- -- -- --      BP Location FiO2 (%)     -- --       Physical Exam  Constitutional:       General: She is in acute distress.      Appearance: Normal appearance. She is not ill-appearing or diaphoretic.   HENT:      Head: Normocephalic and atraumatic.      Nose: Nose normal.   Eyes:      Extraocular Movements: Extraocular movements intact.      Conjunctiva/sclera: Conjunctivae normal.      Pupils: Pupils are equal, round, and reactive to light.   Cardiovascular:      Rate and Rhythm: Normal rate and regular rhythm.   Pulmonary:      Effort: Pulmonary effort is normal. No respiratory distress.      Breath sounds: Normal breath sounds. No stridor. No wheezing.   Musculoskeletal:         General: Normal  range of motion.      Cervical back: Normal range of motion.   Skin:     General: Skin is warm and dry.   Neurological:      General: No focal deficit present.      Mental Status: She is alert and oriented to person, place, and time. Mental status is at baseline.   Psychiatric:         Mood and Affect: Mood normal.           ED Course & MDM   Diagnoses as of 02/07/25 1928   Chest pain, unspecified type   Right upper quadrant abdominal pain   COVID-19                 No data recorded       HEART Score: 1 (02/07/25 1927 : Samuel Jane PA-C)                         Medical Decision Making  A 29-year-old female patient comes in the emergency department today with complaints of left-sided chest pain radiating to the left arm.  She describes it as a sharp pain.  Rates it a 6 out of 10 on the pain scale currently.  Denies any cardiac history.  States she does have history of hypertension.  Also states she has had persistent right flank pain that has been evaluated for in the emergency department and does have follow-up with a specialist this coming week regarding.  For this purpose comes in the emergency department today for the evaluation.  Otherwise no complaints present time.    EKG, chest x-ray, laboratory studies ordered rule out ACS, arrhythmias, lecture abnormalities, leukocytosis, acute kidney injury, pneumonia, thorax, pulm congestion, pleural effusions.  Testing COVID-19, influenza, RSV, strep pharyngitis.    Patient continues to complain about right flank right upper quadrant pain that is worse after eating.  I did review patient's prior charting from recent visit had a CT study so we will order an ultrasound study of the gallbladder to rule out any acute cholecystitis, cholelithiasis or choledocholithiasis    Patient's troponin is negative, urinalysis negative, positive for COVID-19 negative for RSV, pregnancy, BNP negative D-dimer negative, metabolic panel within normal is magnesium within normal limits  lactate negative, no leukocytosis or left shift.  Ultrasound gallbladder shows hepatic steatosis but otherwise unremarkable.  Chest x-ray negative as well.    Patient feeling very anxious takes Xanax at home also seeing a specialist.  I will give her some Ativan here and discharge patient home p.o. prednisone for her aches and pains as well as for her asthma.  Patient agrees with this plan expressed verbal understanding.  Questions were answered.    Historians patient    Diagnosis: Chest discomfort, upper quadrant abdominal pain      Labs Reviewed   CBC WITH AUTO DIFFERENTIAL - Abnormal       Result Value    WBC 5.1      nRBC 0.0      RBC 4.69      Hemoglobin 12.4      Hematocrit 36.6      MCV 78 (*)     MCH 26.4      MCHC 33.9      RDW 13.3      Platelets 315      Neutrophils % 56.2      Immature Granulocytes %, Automated 0.2      Lymphocytes % 30.6      Monocytes % 10.0      Eosinophils % 2.4      Basophils % 0.6      Neutrophils Absolute 2.86      Immature Granulocytes Absolute, Automated 0.01      Lymphocytes Absolute 1.56      Monocytes Absolute 0.51      Eosinophils Absolute 0.12      Basophils Absolute 0.03     DRUG SCREEN, URINE WITH REFLEX TO CONFIRMATION - Abnormal    Amphetamine Screen, Urine Presumptive Negative      Barbiturate Screen, Urine Presumptive Negative      Benzodiazepines Screen, Urine Presumptive Positive (*)     Cannabinoid Screen, Urine Presumptive Negative      Cocaine Metabolite Screen, Urine Presumptive Negative      Fentanyl Screen, Urine Presumptive Negative      Opiate Screen, Urine Presumptive Positive (*)     Oxycodone Screen, Urine Presumptive Negative      PCP Screen, Urine Presumptive Negative      Methadone Screen, Urine Presumptive Negative      Narrative:     Drug screen results are presumptive and should not be used to assess   compliance with prescribed medication. Definitive confirmatory drug testing   has been added to this sample for any positive screen result and will  be  reported separately.     Toxicology screening results are reported qualitatively. The concentration must  be greater than or equal to the cutoff to be reported as positive. The concentration   at which the screening test can detect an individual drug or metabolite varies.   The absence of expected drug(s) and/or drug metabolite(s) may indicate non-compliance,   inappropriate timing of specimen collection relative to drug administration, poor drug   absorption, diluted/adulterated urine, or limitations of testing. For medical purposes   only; not valid for forensic use.    Interpretive questions should be directed to the laboratory medical directors.   SARS-COV-2 AND INFLUENZA A/B PCR - Abnormal    Flu A Result Not Detected      Flu B Result Not Detected      Coronavirus 2019, PCR Detected (*)     Narrative:     This assay is an FDA-cleared, in vitro diagnostic nucleic acid amplification test for the qualitative detection and differentiation of SARS CoV-2/ Influenza A/B from nasopharyngeal specimens collected from individuals with signs and symptoms of respiratory tract infections, and has been validated for use at [facility]. Negative results do not preclude COVID-19/ Influenza A/B infections and should not be used as the sole basis for diagnosis, treatment, or other management decisions. Testing for SARS CoV-2 is recommended only for patients who meet current clinical and/or epidemiological criteria defined by federal, state, or local public health directives.   COMPREHENSIVE METABOLIC PANEL - Normal    Glucose 85      Sodium 138      Potassium 4.1      Chloride 105      Bicarbonate 25      Anion Gap 12      Urea Nitrogen 14      Creatinine 0.77      eGFR >90      Calcium 9.5      Albumin 4.7      Alkaline Phosphatase 68      Total Protein 7.2      AST 24      Bilirubin, Total 0.2      ALT 44     MAGNESIUM - Normal    Magnesium 1.83     LACTATE - Normal    Lactate 0.8      Narrative:      Venipuncture immediately after or during the administration of Metamizole may lead to falsely low results. Testing should be performed immediately prior to Metamizole dosing.   PROTIME-INR - Normal    Protime 11.5      INR 1.0     B-TYPE NATRIURETIC PEPTIDE - Normal    BNP 9      Narrative:        <100 pg/mL - Heart failure unlikely  100-299 pg/mL - Intermediate probability of acute heart                  failure exacerbation. Correlate with clinical                  context and patient history.    >=300 pg/mL - Heart Failure likely. Correlate with clinical                  context and patient history.    BNP testing is performed using different testing methodology at [facility] than at other [facility]. Direct result comparisons should only be made within the same method.      D-DIMER, VTE EXCLUSION - Normal    D-Dimer, Quantitative VTE Exclusion <215      Narrative:     The VTE Exclusion D-Dimer assay is reported in ng/mL Fibrinogen Equivalent Units (FEU).    Per 's instructions for use, a value of less than 500 ng/mL (FEU) may help to exclude DVT or PE in outpatients when the assay is used with a clinical pretest probability assessment.(AE must utilize and document eCalc 'Wells Score Deep Vein Thrombosis Risk' for DVT exclusion only. Emergency Department should utilize  Guidelines for Emergency Department Use of the VTE Exclusion D-Dimer and Clinical Pretest probability assessment model for DVT or PE exclusion.)   HUMAN CHORIONIC GONADOTROPIN, SERUM QUANTITATIVE - Normal    HCG, Beta-Quantitative <2      Narrative:      Total HCG measurement is performed using the [employer] Access   Immunoassay which detects intact HCG and free beta HCG subunit.    This test is not indicated for use as a tumor marker.   HCG testing is performed using a different test methodology at [facility] than other [facility]. Direct result comparison   should only be made  within the same method.       RSV PCR - Normal    RSV PCR Not Detected      Narrative:     This assay is an FDA-cleared, in vitro diagnostic nucleic acid amplification test for the detection of RSV from nasopharyngeal specimens, and has been validated for use at [facility]. Negative results do not preclude RSV infections, and should not be used as the sole basis for diagnosis, treatment, or other management decisions. If Influenza A/B and RSV PCR results are negative, testing for Parainfluenza virus, Adenovirus and Metapneumovirus is routinely performed for pediatric oncology and intensive care inpatients at [facility], and is available on other patients by placing an add-on request.       SERIAL TROPONIN-INITIAL - Normal    Troponin I, High Sensitivity <3      Narrative:     Less than 99th percentile of normal range cutoff-  Female and children under 18 years old <14 ng/L; Male <21 ng/L: Negative  Repeat testing should be performed if clinically indicated.     Female and children under 18 years old 14-50 ng/L; Male 21-50 ng/L:  Consistent with possible cardiac damage and possible increased clinical   risk. Serial measurements may help to assess extent of myocardial damage.     >50 ng/L: Consistent with cardiac damage, increased clinical risk and  myocardial infarction. Serial measurements may help assess extent of   myocardial damage.      NOTE: Children less than 1 year old may have higher baseline troponin   levels and results should be interpreted in conjunction with the overall   clinical context.     NOTE: Troponin I testing is performed using a different   testing methodology at [facility] than at [facility]. Direct result comparisons should only   be made within the same method.   URINALYSIS WITH REFLEX CULTURE AND MICROSCOPIC - Normal    Color, Urine Light-Yellow      Appearance, Urine Clear      Specific Gravity, Urine 1.017      pH, Urine 5.5      Protein,  Urine NEGATIVE      Glucose, Urine Normal      Blood, Urine NEGATIVE      Ketones, Urine NEGATIVE      Bilirubin, Urine NEGATIVE      Urobilinogen, Urine Normal      Nitrite, Urine NEGATIVE      Leukocyte Esterase, Urine NEGATIVE     SERIAL TROPONIN, 1 HOUR - Normal    Troponin I, High Sensitivity <3      Narrative:     Less than 99th percentile of normal range cutoff-  Female and children under 18 years old <14 ng/L; Male <21 ng/L: Negative  Repeat testing should be performed if clinically indicated.     Female and children under 18 years old 14-50 ng/L; Male 21-50 ng/L:  Consistent with possible cardiac damage and possible increased clinical   risk. Serial measurements may help to assess extent of myocardial damage.     >50 ng/L: Consistent with cardiac damage, increased clinical risk and  myocardial infarction. Serial measurements may help assess extent of   myocardial damage.      NOTE: Children less than 1 year old may have higher baseline troponin   levels and results should be interpreted in conjunction with the overall   clinical context.     NOTE: Troponin I testing is performed using a different   testing methodology at [facility] than at other   [facility]. Direct result comparisons should only   be made within the same method.   TROPONIN SERIES- (INITIAL, 1 HR)    Narrative:     The following orders were created for panel order Troponin I Series, High Sensitivity (0, 1 HR).  Procedure                               Abnormality         Status                     ---------                               -----------         ------                     Troponin I, High Sensiti...[[id number]]  Normal              Final result               Troponin, High Sensitivi...[[id number]]  Normal              Final result                 Please view results for these tests on the individual orders.   URINALYSIS WITH REFLEX CULTURE AND MICROSCOPIC    Narrative:     The following orders were created for  panel order Urinalysis with Reflex Culture and Microscopic.  Procedure                               Abnormality         Status                     ---------                               -----------         ------                     Urinalysis with Reflex C...[[id number]]  Normal              Final result               Extra Urine Gray Tube[[id number]]                            In process                   Please view results for these tests on the individual orders.   EXTRA URINE GRAY TUBE   OOB INTERNAL TRACKING   BENZODIAZEPINES CONFIRMATION, URINE   OPIATE CONFIRMATION, URINE        US gallbladder   Final Result   Diffuse hepatic steatosis, otherwise unremarkable ultrasound of the   right upper quadrant.  No cholelithiasis, gallbladder wall thickening,   or biliary dilatation is appreciated.   Signed by Gerardo Chang MD      XR chest 1 view   Final Result   No acute cardiopulmonary disease.   Signed by Gerardo Chang MD              Shared GRETTA Attestation:    I personally saw the patient and made/approved the management plan and take responsibility for the patient management.     History: 29-year-old female presents with chest pain.    Exam: Regular rate and rhythm cardiac exam with clear breath sounds bilaterally.  Abdomen is soft and nontender.  Neurological exam is grossly intact.  Negative Homans' sign is negative.    MDM: ACS, arrhythmia, pneumonia, viral syndrome    Labs Reviewed   CBC WITH AUTO DIFFERENTIAL - Abnormal       Result Value    WBC 5.1      nRBC 0.0      RBC 4.69      Hemoglobin 12.4      Hematocrit 36.6      MCV 78 (*)     MCH 26.4      MCHC 33.9      RDW 13.3      Platelets 315      Neutrophils % 56.2      Immature Granulocytes %, Automated 0.2      Lymphocytes % 30.6      Monocytes % 10.0      Eosinophils % 2.4      Basophils % 0.6      Neutrophils Absolute 2.86      Immature Granulocytes Absolute, Automated 0.01      Lymphocytes Absolute 1.56      Monocytes Absolute 0.51      Eosinophils  Absolute 0.12      Basophils Absolute 0.03     DRUG SCREEN, URINE WITH REFLEX TO CONFIRMATION - Abnormal    Amphetamine Screen, Urine Presumptive Negative      Barbiturate Screen, Urine Presumptive Negative      Benzodiazepines Screen, Urine Presumptive Positive (*)     Cannabinoid Screen, Urine Presumptive Negative      Cocaine Metabolite Screen, Urine Presumptive Negative      Fentanyl Screen, Urine Presumptive Negative      Opiate Screen, Urine Presumptive Positive (*)     Oxycodone Screen, Urine Presumptive Negative      PCP Screen, Urine Presumptive Negative      Methadone Screen, Urine Presumptive Negative      Narrative:     Drug screen results are presumptive and should not be used to assess   compliance with prescribed medication. Definitive confirmatory drug testing   has been added to this sample for any positive screen result and will be  reported separately.     Toxicology screening results are reported qualitatively. The concentration must  be greater than or equal to the cutoff to be reported as positive. The concentration   at which the screening test can detect an individual drug or metabolite varies.   The absence of expected drug(s) and/or drug metabolite(s) may indicate non-compliance,   inappropriate timing of specimen collection relative to drug administration, poor drug   absorption, diluted/adulterated urine, or limitations of testing. For medical purposes   only; not valid for forensic use.    Interpretive questions should be directed to the laboratory medical directors.   SARS-COV-2 AND INFLUENZA A/B PCR - Abnormal    Flu A Result Not Detected      Flu B Result Not Detected      Coronavirus 2019, PCR Detected (*)     Narrative:     This assay is an FDA-cleared, in vitro diagnostic nucleic acid amplification test for the qualitative detection and differentiation of SARS CoV-2/ Influenza A/B from nasopharyngeal specimens collected from individuals with signs and symptoms of respiratory tract  infections, and has been validated for use at [facility]. Negative results do not preclude COVID-19/ Influenza A/B infections and should not be used as the sole basis for diagnosis, treatment, or other management decisions. Testing for SARS CoV-2 is recommended only for patients who meet current clinical and/or epidemiological criteria defined by federal, state, or local public health directives.   COMPREHENSIVE METABOLIC PANEL - Normal    Glucose 85      Sodium 138      Potassium 4.1      Chloride 105      Bicarbonate 25      Anion Gap 12      Urea Nitrogen 14      Creatinine 0.77      eGFR >90      Calcium 9.5      Albumin 4.7      Alkaline Phosphatase 68      Total Protein 7.2      AST 24      Bilirubin, Total 0.2      ALT 44     MAGNESIUM - Normal    Magnesium 1.83     LACTATE - Normal    Lactate 0.8      Narrative:     Venipuncture immediately after or during the administration of Metamizole may lead to falsely low results. Testing should be performed immediately prior to Metamizole dosing.   PROTIME-INR - Normal    Protime 11.5      INR 1.0     B-TYPE NATRIURETIC PEPTIDE - Normal    BNP 9      Narrative:        <100 pg/mL - Heart failure unlikely  100-299 pg/mL - Intermediate probability of acute heart                  failure exacerbation. Correlate with clinical                  context and patient history.    >=300 pg/mL - Heart Failure likely. Correlate with clinical                  context and patient history.    BNP testing is performed using different testing methodology at [facility] than at [facility]. Direct result comparisons should only be made within the same method.      D-DIMER, VTE EXCLUSION - Normal    D-Dimer, Quantitative VTE Exclusion <215      Narrative:     The VTE Exclusion D-Dimer assay is reported in ng/mL Fibrinogen Equivalent Units (FEU).    Per 's instructions for use, a value of less than 500 ng/mL (FEU) may help to  exclude DVT or PE in outpatients when the assay is used with a clinical pretest probability assessment.([facility] must utilize and document eCalc 'Wells Score Deep Vein Thrombosis Risk' for DVT exclusion only. Emergency Department should utilize  Guidelines for Emergency Department Use of the VTE Exclusion D-Dimer and Clinical Pretest probability assessment model for DVT or PE exclusion.)   HUMAN CHORIONIC GONADOTROPIN, SERUM QUANTITATIVE - Normal    HCG, Beta-Quantitative <2      Narrative:      Total HCG measurement is performed using the [employer] Access   Immunoassay which detects intact HCG and free beta HCG subunit.    This test is not indicated for use as a tumor marker.   HCG testing is performed using a different test methodology at [facility] than other [facility]. Direct result comparison   should only be made within the same method.       RSV PCR - Normal    RSV PCR Not Detected      Narrative:     This assay is an FDA-cleared, in vitro diagnostic nucleic acid amplification test for the detection of RSV from nasopharyngeal specimens, and has been validated for use at [facility]. Negative results do not preclude RSV infections, and should not be used as the sole basis for diagnosis, treatment, or other management decisions. If Influenza A/B and RSV PCR results are negative, testing for Parainfluenza virus, Adenovirus and Metapneumovirus is routinely performed for pediatric oncology and intensive care inpatients at [facility], and is available on other patients by placing an add-on request.       SERIAL TROPONIN-INITIAL - Normal    Troponin I, High Sensitivity <3      Narrative:     Less than 99th percentile of normal range cutoff-  Female and children under 18 years old <14 ng/L; Male <21 ng/L: Negative  Repeat testing should be performed if clinically indicated.     Female and children under 18 years old 14-50 ng/L; Male 21-50 ng/L:  Consistent with possible  cardiac damage and possible increased clinical   risk. Serial measurements may help to assess extent of myocardial damage.     >50 ng/L: Consistent with cardiac damage, increased clinical risk and  myocardial infarction. Serial measurements may help assess extent of   myocardial damage.      NOTE: Children less than 1 year old may have higher baseline troponin   levels and results should be interpreted in conjunction with the overall   clinical context.     NOTE: Troponin I testing is performed using a different   testing methodology at [facility] than at other   [facility]. Direct result comparisons should only   be made within the same method.   URINALYSIS WITH REFLEX CULTURE AND MICROSCOPIC - Normal    Color, Urine Light-Yellow      Appearance, Urine Clear      Specific Gravity, Urine 1.017      pH, Urine 5.5      Protein, Urine NEGATIVE      Glucose, Urine Normal      Blood, Urine NEGATIVE      Ketones, Urine NEGATIVE      Bilirubin, Urine NEGATIVE      Urobilinogen, Urine Normal      Nitrite, Urine NEGATIVE      Leukocyte Esterase, Urine NEGATIVE     SERIAL TROPONIN, 1 HOUR - Normal    Troponin I, High Sensitivity <3      Narrative:     Less than 99th percentile of normal range cutoff-  Female and children under 18 years old <14 ng/L; Male <21 ng/L: Negative  Repeat testing should be performed if clinically indicated.     Female and children under 18 years old 14-50 ng/L; Male 21-50 ng/L:  Consistent with possible cardiac damage and possible increased clinical   risk. Serial measurements may help to assess extent of myocardial damage.     >50 ng/L: Consistent with cardiac damage, increased clinical risk and  myocardial infarction. Serial measurements may help assess extent of   myocardial damage.      NOTE: Children less than 1 year old may have higher baseline troponin   levels and results should be interpreted in conjunction with the overall   clinical context.     NOTE: Troponin I testing  is performed using a different   testing methodology at [facility] than at other   [facility]. Direct result comparisons should only   be made within the same method.   TROPONIN SERIES- (INITIAL, 1 HR)    Narrative:     The following orders were created for panel order Troponin I Series, High Sensitivity (0, 1 HR).  Procedure                               Abnormality         Status                     ---------                               -----------         ------                     Troponin I, High Sensiti...[[id number]]  Normal              Final result               Troponin, High Sensitivi...[[id number]]  Normal              Final result                 Please view results for these tests on the individual orders.   URINALYSIS WITH REFLEX CULTURE AND MICROSCOPIC    Narrative:     The following orders were created for panel order Urinalysis with Reflex Culture and Microscopic.  Procedure                               Abnormality         Status                     ---------                               -----------         ------                     Urinalysis with Reflex C...[[id number]]  Normal              Final result               Extra Urine Gray Tube[[id number]]                            In process                   Please view results for these tests on the individual orders.   EXTRA URINE GRAY TUBE   OOB INTERNAL TRACKING   BENZODIAZEPINES CONFIRMATION, URINE   OPIATE CONFIRMATION, URINE       US gallbladder   Final Result   Diffuse hepatic steatosis, otherwise unremarkable ultrasound of the   right upper quadrant.  No cholelithiasis, gallbladder wall thickening,   or biliary dilatation is appreciated.   Signed by Gerardo Chang MD      XR chest 1 view   Final Result   No acute cardiopulmonary disease.   Signed by MD Azar Martinez MD      Procedure  ECG 12 lead    Performed by: Samuel Jane PA-C  Authorized by: Samuel Jane PA-C     Interpretation:     Details:  My EKG interpretation  Rate:     ECG rate:  73    ECG rate assessment: normal    Rhythm:     Rhythm: sinus rhythm    ST segments:     ST segments:  Normal  T waves:     T waves: normal         Samuel Jane PA-C  02/07/25 1927

## 2025-02-07 NOTE — Clinical Note
Christiano Prieto was seen and treated in our emergency department on 2/7/2025.  She may return to work on 02/12/2025.       If you have any questions or concerns, please don't hesitate to call.      Samuel Jane PA-C

## 2025-02-08 LAB — HOLD SPECIMEN: NORMAL

## 2025-02-11 LAB
1OH-MIDAZOLAM UR CFM-MCNC: <25 NG/ML
6MAM UR CFM-MCNC: <25 NG/ML
7AMINOCLONAZEPAM UR CFM-MCNC: <25 NG/ML
A-OH ALPRAZ UR CFM-MCNC: 382 NG/ML
ALPRAZ UR CFM-MCNC: 134 NG/ML
CHLORDIAZEP UR CFM-MCNC: <25 NG/ML
CLONAZEPAM UR CFM-MCNC: <25 NG/ML
CODEINE UR CFM-MCNC: <50 NG/ML
DIAZEPAM UR CFM-MCNC: <25 NG/ML
HYDROCODONE CTO UR CFM-MCNC: <25 NG/ML
HYDROMORPHONE UR CFM-MCNC: <25 NG/ML
LORAZEPAM UR CFM-MCNC: <25 NG/ML
MIDAZOLAM UR CFM-MCNC: <25 NG/ML
MORPHINE UR CFM-MCNC: 996 NG/ML
NORDIAZEPAM UR CFM-MCNC: <25 NG/ML
NORHYDROCODONE UR CFM-MCNC: <25 NG/ML
NOROXYCODONE UR CFM-MCNC: <25 NG/ML
OXAZEPAM UR CFM-MCNC: <25 NG/ML
OXYCODONE UR CFM-MCNC: <25 NG/ML
OXYMORPHONE UR CFM-MCNC: <25 NG/ML
TEMAZEPAM UR CFM-MCNC: <25 NG/ML

## 2025-02-20 ENCOUNTER — APPOINTMENT (OUTPATIENT)
Dept: CARDIOLOGY | Facility: HOSPITAL | Age: 30
End: 2025-02-20
Payer: COMMERCIAL

## 2025-02-20 ENCOUNTER — HOSPITAL ENCOUNTER (EMERGENCY)
Facility: HOSPITAL | Age: 30
Discharge: HOME | End: 2025-02-20
Attending: STUDENT IN AN ORGANIZED HEALTH CARE EDUCATION/TRAINING PROGRAM
Payer: COMMERCIAL

## 2025-02-20 ENCOUNTER — APPOINTMENT (OUTPATIENT)
Dept: RADIOLOGY | Facility: HOSPITAL | Age: 30
End: 2025-02-20
Payer: COMMERCIAL

## 2025-02-20 VITALS
RESPIRATION RATE: 20 BRPM | SYSTOLIC BLOOD PRESSURE: 118 MMHG | HEIGHT: 63 IN | TEMPERATURE: 97.3 F | HEART RATE: 70 BPM | WEIGHT: 180 LBS | OXYGEN SATURATION: 99 % | BODY MASS INDEX: 31.89 KG/M2 | DIASTOLIC BLOOD PRESSURE: 79 MMHG

## 2025-02-20 DIAGNOSIS — R10.84 ABDOMINAL PAIN, GENERALIZED: Primary | ICD-10-CM

## 2025-02-20 LAB
ALBUMIN SERPL BCP-MCNC: 4.5 G/DL (ref 3.4–5)
ALP SERPL-CCNC: 70 U/L (ref 33–110)
ALT SERPL W P-5'-P-CCNC: 25 U/L (ref 7–45)
ANION GAP SERPL CALC-SCNC: 13 MMOL/L (ref 10–20)
APPEARANCE UR: CLEAR
AST SERPL W P-5'-P-CCNC: 17 U/L (ref 9–39)
B-HCG SERPL-ACNC: <2 MIU/ML
BASOPHILS # BLD AUTO: 0.04 X10*3/UL (ref 0–0.1)
BASOPHILS NFR BLD AUTO: 0.8 %
BILIRUB SERPL-MCNC: 0.2 MG/DL (ref 0–1.2)
BILIRUB UR STRIP.AUTO-MCNC: NEGATIVE MG/DL
BUN SERPL-MCNC: 12 MG/DL (ref 6–23)
CALCIUM SERPL-MCNC: 8.8 MG/DL (ref 8.6–10.3)
CARDIAC TROPONIN I PNL SERPL HS: <3 NG/L (ref 0–13)
CHLORIDE SERPL-SCNC: 103 MMOL/L (ref 98–107)
CO2 SERPL-SCNC: 25 MMOL/L (ref 21–32)
COLOR UR: COLORLESS
CREAT SERPL-MCNC: 0.83 MG/DL (ref 0.5–1.05)
EGFRCR SERPLBLD CKD-EPI 2021: >90 ML/MIN/1.73M*2
EOSINOPHIL # BLD AUTO: 0.24 X10*3/UL (ref 0–0.7)
EOSINOPHIL NFR BLD AUTO: 4.6 %
ERYTHROCYTE [DISTWIDTH] IN BLOOD BY AUTOMATED COUNT: 13.6 % (ref 11.5–14.5)
GLUCOSE SERPL-MCNC: 84 MG/DL (ref 74–99)
GLUCOSE UR STRIP.AUTO-MCNC: NORMAL MG/DL
HCT VFR BLD AUTO: 35.6 % (ref 36–46)
HGB BLD-MCNC: 11.7 G/DL (ref 12–16)
IMM GRANULOCYTES # BLD AUTO: 0.01 X10*3/UL (ref 0–0.7)
IMM GRANULOCYTES NFR BLD AUTO: 0.2 % (ref 0–0.9)
KETONES UR STRIP.AUTO-MCNC: NEGATIVE MG/DL
LACTATE SERPL-SCNC: 1.3 MMOL/L (ref 0.4–2)
LEUKOCYTE ESTERASE UR QL STRIP.AUTO: NEGATIVE
LIPASE SERPL-CCNC: 27 U/L (ref 9–82)
LYMPHOCYTES # BLD AUTO: 2.06 X10*3/UL (ref 1.2–4.8)
LYMPHOCYTES NFR BLD AUTO: 39.8 %
MAGNESIUM SERPL-MCNC: 1.65 MG/DL (ref 1.6–2.4)
MCH RBC QN AUTO: 25.9 PG (ref 26–34)
MCHC RBC AUTO-ENTMCNC: 32.9 G/DL (ref 32–36)
MCV RBC AUTO: 79 FL (ref 80–100)
MONOCYTES # BLD AUTO: 0.43 X10*3/UL (ref 0.1–1)
MONOCYTES NFR BLD AUTO: 8.3 %
NEUTROPHILS # BLD AUTO: 2.39 X10*3/UL (ref 1.2–7.7)
NEUTROPHILS NFR BLD AUTO: 46.3 %
NITRITE UR QL STRIP.AUTO: NEGATIVE
NRBC BLD-RTO: 0 /100 WBCS (ref 0–0)
PH UR STRIP.AUTO: 5.5 [PH]
PLATELET # BLD AUTO: 301 X10*3/UL (ref 150–450)
POTASSIUM SERPL-SCNC: 4 MMOL/L (ref 3.5–5.3)
PROT SERPL-MCNC: 7 G/DL (ref 6.4–8.2)
PROT UR STRIP.AUTO-MCNC: NEGATIVE MG/DL
RBC # BLD AUTO: 4.51 X10*6/UL (ref 4–5.2)
RBC # UR STRIP.AUTO: NEGATIVE MG/DL
SODIUM SERPL-SCNC: 137 MMOL/L (ref 136–145)
SP GR UR STRIP.AUTO: 1
UROBILINOGEN UR STRIP.AUTO-MCNC: NORMAL MG/DL
WBC # BLD AUTO: 5.2 X10*3/UL (ref 4.4–11.3)

## 2025-02-20 PROCEDURE — 84702 CHORIONIC GONADOTROPIN TEST: CPT

## 2025-02-20 PROCEDURE — 93005 ELECTROCARDIOGRAM TRACING: CPT

## 2025-02-20 PROCEDURE — 83690 ASSAY OF LIPASE: CPT

## 2025-02-20 PROCEDURE — 74177 CT ABD & PELVIS W/CONTRAST: CPT | Performed by: RADIOLOGY

## 2025-02-20 PROCEDURE — 2500000004 HC RX 250 GENERAL PHARMACY W/ HCPCS (ALT 636 FOR OP/ED)

## 2025-02-20 PROCEDURE — 84484 ASSAY OF TROPONIN QUANT: CPT

## 2025-02-20 PROCEDURE — 83605 ASSAY OF LACTIC ACID: CPT

## 2025-02-20 PROCEDURE — 96374 THER/PROPH/DIAG INJ IV PUSH: CPT | Mod: 59

## 2025-02-20 PROCEDURE — 99285 EMERGENCY DEPT VISIT HI MDM: CPT | Mod: 25 | Performed by: STUDENT IN AN ORGANIZED HEALTH CARE EDUCATION/TRAINING PROGRAM

## 2025-02-20 PROCEDURE — 96375 TX/PRO/DX INJ NEW DRUG ADDON: CPT

## 2025-02-20 PROCEDURE — 2550000001 HC RX 255 CONTRASTS

## 2025-02-20 PROCEDURE — 74177 CT ABD & PELVIS W/CONTRAST: CPT

## 2025-02-20 PROCEDURE — 81003 URINALYSIS AUTO W/O SCOPE: CPT

## 2025-02-20 PROCEDURE — 36415 COLL VENOUS BLD VENIPUNCTURE: CPT

## 2025-02-20 PROCEDURE — 85025 COMPLETE CBC W/AUTO DIFF WBC: CPT

## 2025-02-20 PROCEDURE — 80053 COMPREHEN METABOLIC PANEL: CPT

## 2025-02-20 PROCEDURE — 83735 ASSAY OF MAGNESIUM: CPT

## 2025-02-20 RX ORDER — LORAZEPAM 2 MG/ML
0.5 INJECTION INTRAMUSCULAR ONCE
Status: COMPLETED | OUTPATIENT
Start: 2025-02-20 | End: 2025-02-20

## 2025-02-20 RX ORDER — MORPHINE SULFATE 4 MG/ML
4 INJECTION, SOLUTION INTRAMUSCULAR; INTRAVENOUS ONCE
Status: COMPLETED | OUTPATIENT
Start: 2025-02-20 | End: 2025-02-20

## 2025-02-20 RX ORDER — DICYCLOMINE HYDROCHLORIDE 20 MG/1
20 TABLET ORAL 2 TIMES DAILY PRN
Qty: 20 TABLET | Refills: 0 | Status: SHIPPED | OUTPATIENT
Start: 2025-02-20 | End: 2025-03-02

## 2025-02-20 RX ORDER — ONDANSETRON HYDROCHLORIDE 2 MG/ML
4 INJECTION, SOLUTION INTRAVENOUS ONCE
Status: COMPLETED | OUTPATIENT
Start: 2025-02-20 | End: 2025-02-20

## 2025-02-20 RX ADMIN — MORPHINE SULFATE 4 MG: 4 INJECTION, SOLUTION INTRAMUSCULAR; INTRAVENOUS at 21:59

## 2025-02-20 RX ADMIN — ONDANSETRON 4 MG: 2 INJECTION INTRAMUSCULAR; INTRAVENOUS at 21:59

## 2025-02-20 RX ADMIN — LORAZEPAM 0.5 MG: 2 INJECTION INTRAMUSCULAR; INTRAVENOUS at 23:32

## 2025-02-20 RX ADMIN — IOHEXOL 75 ML: 350 INJECTION, SOLUTION INTRAVENOUS at 21:38

## 2025-02-20 ASSESSMENT — PAIN DESCRIPTION - ORIENTATION: ORIENTATION: RIGHT;LEFT

## 2025-02-20 ASSESSMENT — PAIN SCALES - GENERAL
PAINLEVEL_OUTOF10: 8
PAINLEVEL_OUTOF10: 3

## 2025-02-20 ASSESSMENT — LIFESTYLE VARIABLES
EVER FELT BAD OR GUILTY ABOUT YOUR DRINKING: NO
HAVE PEOPLE ANNOYED YOU BY CRITICIZING YOUR DRINKING: NO
EVER HAD A DRINK FIRST THING IN THE MORNING TO STEADY YOUR NERVES TO GET RID OF A HANGOVER: NO
HAVE YOU EVER FELT YOU SHOULD CUT DOWN ON YOUR DRINKING: NO
TOTAL SCORE: 0

## 2025-02-20 ASSESSMENT — PAIN - FUNCTIONAL ASSESSMENT
PAIN_FUNCTIONAL_ASSESSMENT: 0-10
PAIN_FUNCTIONAL_ASSESSMENT: 0-10

## 2025-02-20 ASSESSMENT — PAIN DESCRIPTION - FREQUENCY: FREQUENCY: CONSTANT/CONTINUOUS

## 2025-02-20 ASSESSMENT — PAIN DESCRIPTION - LOCATION
LOCATION: ABDOMEN
LOCATION: ABDOMEN

## 2025-02-20 ASSESSMENT — PAIN DESCRIPTION - PAIN TYPE
TYPE: ACUTE PAIN
TYPE: ACUTE PAIN

## 2025-02-21 LAB — HOLD SPECIMEN: NORMAL

## 2025-02-21 NOTE — ED PROVIDER NOTES
HPI   Chief Complaint   Patient presents with    Abdominal Pain     I feel very bloated and swelled I think it might be my pancreatitis flare up I stop drinking so I am confused,   I have really bad diarrhea  all night  with pain  and then today all pain  no diarrhea          29-year-old female with past medical history significant for pancreatitis, GERD, alcohol abuse, hypertension, asthma, anxiety, panic attacks presents emergency department for evaluation of upper abdominal pain x 1 day.  Patient states she was here 2 weeks ago for similar issue with negative findings, but it was only in her right upper abdomen, she is not experiencing pain in her her right and left upper abdomen.  She states she has a GI follow-up, but not until April.  Patient describes her pain as 8 out of 10, constant, sharp, radiating to her back, worse with coughing, using the bathroom.  Reports taking ibuprofen about 4 hours prior to presentation, with no significant improvement in her symptoms.  She is also been experiencing increased bloating.  She states she has had pancreatitis in the past, and that this feels similar except she has not vomiting.  Denies any other symptoms or complaints.    Review of Systems      Constitutional: no fever, chills, body aches, sweating  Eyes: no redness, discharge, pain  HENT: no sore throat, nose bleeds, congestion, rhinorrhea   Cardiovascular: no chest pain, leg edema, palpitations  Respiratory: no shortness of breath, cough, wheezing  GI: Upper abdominal pain.  No nausea, diarrhea, vomiting, constipation, BRBPR, melena  : no dysuria, frequency, hematuria  Musculoskeletal: no neck pain, stiffness,  no joint deformity, swelling  Skin: no rash, erythema, wounds  Neurological: no headache, dizziness, lightheadedness, weakness, numbness, or tingling  Psychiatric: Anxiety.  No suicidal thoughts, confusion, agitation  Metabolic: no polyuria or polydipsia  Hematologic: no increased bleeding or  bruising  Immunology: No immunocompromise state      History provided by:  Patient and medical records   used: No        Patient History   Past Medical History:   Diagnosis Date    ETOH abuse     HTN (hypertension)     Moderate asthma (HHS-HCC)     Panic anxiety syndrome      No past surgical history on file.  No family history on file.  Social History     Tobacco Use    Smoking status: Former     Types: Cigarettes    Smokeless tobacco: Never   Vaping Use    Vaping status: Never Used   Substance Use Topics    Alcohol use: Never    Drug use: Never       Physical Exam   ED Triage Vitals [02/20/25 1950]   Temperature Heart Rate Respirations BP   36.3 °C (97.3 °F) 71 16 135/78      Pulse Ox Temp Source Heart Rate Source Patient Position   98 % Temporal Monitor Sitting      BP Location FiO2 (%)     Right arm --       Physical Exam  Nursing notes reviewed and confirmed by me.  Chart review performed including medications, allergies, and medical, surgical, and family history      Constitutional: Vital signs per nursing notes.  Well developed, well nourished.  No acute distress.    Psychiatric: no abnormalities of mood or affect   Eyes: PERRL; conjunctivae and lids normal; EOMI  HENT: Head is normocephalic, atraumatic. External ears normal in appearance without drainage.  Nose is without deformity or drainage.  Posterior oropharynx without edema or erythema.  Moist mucous membranes.  Neck: neck supple, no meningismus signs or rigidity.  trachea midline without deviation.   Respiratory: Breath sounds clear bilaterally no wheezes rales or rhonchi.  No respiratory distress.  Normal respiratory rate/effort.    Cardiovascular: regular rate and rhythm; no murmurs.   distal pulses intact b/l radial  Neurological: normal speech patient is alert and oriented x3.  Patient able to move extremities.  Grossly intact strength and sensation of upper and lower extremities.  No focal neurologic deficits appreciated on  exam.  GI: Abdomen is soft, with mild right upper and left upper quadrant tenderness to palpation.  Negative Sandoval sign.  No rebound, rigidity, or guarding.  No masses or hernias appreciated.  No organomegaly.  Lymphatic: no significant lymphadenopathy appreciated  Musculoskeletal: Patient able to move all extremities.  No deformities or swelling appreciated.  No calf tenderness or edema.  Skin:  no rash or erythema.  No wounds. Normal capillary refill.    ED Course & MDM   Diagnoses as of 02/20/25 2321   Abdominal pain, generalized     Labs Reviewed   CBC WITH AUTO DIFFERENTIAL - Abnormal       Result Value    WBC 5.2      nRBC 0.0      RBC 4.51      Hemoglobin 11.7 (*)     Hematocrit 35.6 (*)     MCV 79 (*)     MCH 25.9 (*)     MCHC 32.9      RDW 13.6      Platelets 301      Neutrophils % 46.3      Immature Granulocytes %, Automated 0.2      Lymphocytes % 39.8      Monocytes % 8.3      Eosinophils % 4.6      Basophils % 0.8      Neutrophils Absolute 2.39      Immature Granulocytes Absolute, Automated 0.01      Lymphocytes Absolute 2.06      Monocytes Absolute 0.43      Eosinophils Absolute 0.24      Basophils Absolute 0.04     URINALYSIS WITH REFLEX CULTURE AND MICROSCOPIC - Abnormal    Color, Urine Colorless (*)     Appearance, Urine Clear      Specific Gravity, Urine 1.004 (*)     pH, Urine 5.5      Protein, Urine NEGATIVE      Glucose, Urine Normal      Blood, Urine NEGATIVE      Ketones, Urine NEGATIVE      Bilirubin, Urine NEGATIVE      Urobilinogen, Urine Normal      Nitrite, Urine NEGATIVE      Leukocyte Esterase, Urine NEGATIVE     MAGNESIUM - Normal    Magnesium 1.65     COMPREHENSIVE METABOLIC PANEL - Normal    Glucose 84      Sodium 137      Potassium 4.0      Chloride 103      Bicarbonate 25      Anion Gap 13      Urea Nitrogen 12      Creatinine 0.83      eGFR >90      Calcium 8.8      Albumin 4.5      Alkaline Phosphatase 70      Total Protein 7.0      AST 17      Bilirubin, Total 0.2      ALT 25      LIPASE - Normal    Lipase 27      Narrative:     Venipuncture immediately after or during the administration of Metamizole may lead to falsely low results. Testing should be performed immediately prior to Metamizole dosing.   LACTATE - Normal    Lactate 1.3      Narrative:     Venipuncture immediately after or during the administration of Metamizole may lead to falsely low results. Testing should be performed immediately prior to Metamizole dosing.   TROPONIN I, HIGH SENSITIVITY - Normal    Troponin I, High Sensitivity <3      Narrative:     Less than 99th percentile of normal range cutoff-  Female and children under 18 years old <14 ng/L; Male <21 ng/L: Negative  Repeat testing should be performed if clinically indicated.     Female and children under 18 years old 14-50 ng/L; Male 21-50 ng/L:  Consistent with possible cardiac damage and possible increased clinical   risk. Serial measurements may help to assess extent of myocardial damage.     >50 ng/L: Consistent with cardiac damage, increased clinical risk and  myocardial infarction. Serial measurements may help assess extent of   myocardial damage.      NOTE: Children less than 1 year old may have higher baseline troponin   levels and results should be interpreted in conjunction with the overall   clinical context.     NOTE: Troponin I testing is performed using a different   testing methodology at [facility] than at other   [facility]. Direct result comparisons should only   be made within the same method.   HUMAN CHORIONIC GONADOTROPIN, SERUM QUANTITATIVE - Normal    HCG, Beta-Quantitative <2      Narrative:      Total HCG measurement is performed using the [employer]   Immunoassay which detects intact HCG and free beta HCG subunit.    This test is not indicated for use as a tumor marker.   HCG testing is performed using a different test methodology at [facility] than other [facility]. Direct result  comparison   should only be made within the same method.       URINALYSIS WITH REFLEX CULTURE AND MICROSCOPIC    Narrative:     The following orders were created for panel order Urinalysis with Reflex Culture and Microscopic.  Procedure                               Abnormality         Status                     ---------                               -----------         ------                     Urinalysis with Reflex C...[[id number]]  Abnormal            Final result               Extra Urine Gray Tube[[id number]]                            In process                   Please view results for these tests on the individual orders.   EXTRA URINE GRAY TUBE        CT abdomen pelvis w IV contrast   Final Result   No acute abdominal or pelvic process.        Scattered colonic diverticula without evidence for acute   diverticulitis.        Additional findings as noted above.        MACRO:   None        Signed by: Conrad Bess 2/20/2025 10:12 PM   Dictation workstation:   [id number]            No data recorded     Ejnny Coma Scale Score: 15 (02/20/25 2232 : Ruthy Monte RN)                     Medical Decision Making  Ddx: Pancreatitis, Gastritis, Cholecystitis, Viral syndrome  29-year-old female with past medical history significant for pancreatitis, GERD, alcohol abuse, hypertension, asthma, anxiety, panic attacks presents emergency department for evaluation of upper abdominal pain x 1 day.     Previous notes were reviewed.  Patient is nontoxic-appearing, in no acute distress.  Physical exam as documented above.  Initially treated with morphine and Zofran, with improvement in her symptoms.  CMP unremarkable with normal electrolytes, normal kidney function.  Lactate 1.3.  Magnesium 1.65.  Lipase 27.  Serum hCG quantitative <2.  CBC with no leukocytosis, RBCs 4.51, hemoglobin 11.7, hematocrit 35.6, MCV 79, similar to patient's previous labs.  High-sensitivity troponin <3 with no acute finding on EKG giving low  suspicion for ACS.  Urinalysis showed no signs of urinary tract infection. CT abdomen pelvis with IV contrast revealed no acute abdominal or pelvic process.  There was scattered colonic diverticula with no evidence of acute diverticulitis.  Prior to discharge patient requested Ativan as she is feeling extremely anxious, she was given this as she is a bring home.  Patient was given a diagnosis of generalized abdominal pain, and given a prescription for Bentyl and educated on its usage.  She was advised to follow-up with her PCP, as well as her appointment with GI.    As a result of the work-up, the patient was discharged home.  she was informed of her diagnosis, educated on lab and imaging findings, I explained reasons for the patient to return to the Emergency Department and instructed to come back with any concerns or worsening of condition.  she demonstrated verbal understanding and were in agreement with the plan of care.  I emphasized the importance of follow up with the physician I referred them to in the timeframe recommended.  she was given the opportunity to ask questions.  All of the patient's questions were answered.     This visit was staffed with onsite attending physician Dr. Tim Anderson.    Amount and/or Complexity of Data Reviewed  External Data Reviewed: labs, radiology, ECG and notes.  Labs: ordered. Decision-making details documented in ED Course.  Radiology: ordered. Decision-making details documented in ED Course.  ECG/medicine tests: ordered.     Details: EKG at 2229, with ventricular rate of 70, as interpreted by me, shows a normal rate and rhythm, normal axis, normal intervals. And normal ST and T wave pattern with no evidence of acute ischemia or other acute findings           Samuel Rawls PA-C  02/21/25 0133

## 2025-02-22 LAB
ATRIAL RATE: 70 BPM
P AXIS: 49 DEGREES
P OFFSET: 186 MS
P ONSET: 137 MS
PR INTERVAL: 166 MS
Q ONSET: 220 MS
QRS COUNT: 12 BEATS
QRS DURATION: 96 MS
QT INTERVAL: 432 MS
QTC CALCULATION(BAZETT): 466 MS
QTC FREDERICIA: 455 MS
R AXIS: 60 DEGREES
T AXIS: 49 DEGREES
T OFFSET: 436 MS
VENTRICULAR RATE: 70 BPM

## 2025-02-26 LAB
ATRIAL RATE: 67 BPM
ATRIAL RATE: 73 BPM
P AXIS: 26 DEGREES
P AXIS: 68 DEGREES
P OFFSET: 191 MS
P OFFSET: 192 MS
P ONSET: 128 MS
P ONSET: 143 MS
PR INTERVAL: 152 MS
PR INTERVAL: 174 MS
Q ONSET: 215 MS
Q ONSET: 219 MS
QRS COUNT: 11 BEATS
QRS COUNT: 12 BEATS
QRS DURATION: 96 MS
QRS DURATION: 98 MS
QT INTERVAL: 386 MS
QT INTERVAL: 422 MS
QTC CALCULATION(BAZETT): 425 MS
QTC CALCULATION(BAZETT): 445 MS
QTC FREDERICIA: 412 MS
QTC FREDERICIA: 438 MS
R AXIS: 56 DEGREES
R AXIS: 62 DEGREES
T AXIS: 36 DEGREES
T AXIS: 40 DEGREES
T OFFSET: 412 MS
T OFFSET: 426 MS
VENTRICULAR RATE: 67 BPM
VENTRICULAR RATE: 73 BPM

## 2025-03-06 ENCOUNTER — APPOINTMENT (OUTPATIENT)
Dept: CARDIOLOGY | Facility: HOSPITAL | Age: 30
End: 2025-03-06
Payer: COMMERCIAL

## 2025-03-06 ENCOUNTER — HOSPITAL ENCOUNTER (EMERGENCY)
Facility: HOSPITAL | Age: 30
Discharge: HOME | End: 2025-03-06
Attending: EMERGENCY MEDICINE
Payer: COMMERCIAL

## 2025-03-06 ENCOUNTER — APPOINTMENT (OUTPATIENT)
Dept: RADIOLOGY | Facility: HOSPITAL | Age: 30
End: 2025-03-06
Payer: COMMERCIAL

## 2025-03-06 VITALS
HEART RATE: 72 BPM | BODY MASS INDEX: 31.89 KG/M2 | HEIGHT: 63 IN | WEIGHT: 180 LBS | RESPIRATION RATE: 16 BRPM | DIASTOLIC BLOOD PRESSURE: 71 MMHG | OXYGEN SATURATION: 99 % | SYSTOLIC BLOOD PRESSURE: 135 MMHG | TEMPERATURE: 97.7 F

## 2025-03-06 DIAGNOSIS — R07.9 CHEST PAIN, UNSPECIFIED TYPE: Primary | ICD-10-CM

## 2025-03-06 LAB
ALBUMIN SERPL BCP-MCNC: 4.8 G/DL (ref 3.4–5)
ALP SERPL-CCNC: 69 U/L (ref 33–110)
ALT SERPL W P-5'-P-CCNC: 18 U/L (ref 7–45)
ANION GAP SERPL CALC-SCNC: 14 MMOL/L (ref 10–20)
AST SERPL W P-5'-P-CCNC: 18 U/L (ref 9–39)
ATRIAL RATE: 71 BPM
ATRIAL RATE: 75 BPM
B-HCG SERPL-ACNC: <2 MIU/ML
BASOPHILS # BLD AUTO: 0.03 X10*3/UL (ref 0–0.1)
BASOPHILS NFR BLD AUTO: 0.6 %
BILIRUB SERPL-MCNC: 0.4 MG/DL (ref 0–1.2)
BNP SERPL-MCNC: 22 PG/ML (ref 0–99)
BUN SERPL-MCNC: 13 MG/DL (ref 6–23)
CALCIUM SERPL-MCNC: 9.8 MG/DL (ref 8.6–10.3)
CARDIAC TROPONIN I PNL SERPL HS: <3 NG/L (ref 0–13)
CARDIAC TROPONIN I PNL SERPL HS: <3 NG/L (ref 0–13)
CHLORIDE SERPL-SCNC: 103 MMOL/L (ref 98–107)
CO2 SERPL-SCNC: 23 MMOL/L (ref 21–32)
CREAT SERPL-MCNC: 0.76 MG/DL (ref 0.5–1.05)
D DIMER PPP FEU-MCNC: <215 NG/ML FEU
EGFRCR SERPLBLD CKD-EPI 2021: >90 ML/MIN/1.73M*2
EOSINOPHIL # BLD AUTO: 0.11 X10*3/UL (ref 0–0.7)
EOSINOPHIL NFR BLD AUTO: 2.1 %
ERYTHROCYTE [DISTWIDTH] IN BLOOD BY AUTOMATED COUNT: 14.2 % (ref 11.5–14.5)
GLUCOSE SERPL-MCNC: 121 MG/DL (ref 74–99)
HCT VFR BLD AUTO: 38.1 % (ref 36–46)
HGB BLD-MCNC: 13 G/DL (ref 12–16)
HOLD SPECIMEN: NORMAL
IMM GRANULOCYTES # BLD AUTO: 0.01 X10*3/UL (ref 0–0.7)
IMM GRANULOCYTES NFR BLD AUTO: 0.2 % (ref 0–0.9)
LYMPHOCYTES # BLD AUTO: 1.59 X10*3/UL (ref 1.2–4.8)
LYMPHOCYTES NFR BLD AUTO: 30.1 %
MAGNESIUM SERPL-MCNC: 1.8 MG/DL (ref 1.6–2.4)
MCH RBC QN AUTO: 26.6 PG (ref 26–34)
MCHC RBC AUTO-ENTMCNC: 34.1 G/DL (ref 32–36)
MCV RBC AUTO: 78 FL (ref 80–100)
MONOCYTES # BLD AUTO: 0.58 X10*3/UL (ref 0.1–1)
MONOCYTES NFR BLD AUTO: 11 %
NEUTROPHILS # BLD AUTO: 2.97 X10*3/UL (ref 1.2–7.7)
NEUTROPHILS NFR BLD AUTO: 56 %
NRBC BLD-RTO: 0 /100 WBCS (ref 0–0)
P AXIS: 24 DEGREES
P AXIS: 28 DEGREES
P OFFSET: 190 MS
P OFFSET: 196 MS
P ONSET: 141 MS
P ONSET: 144 MS
PLATELET # BLD AUTO: 307 X10*3/UL (ref 150–450)
POTASSIUM SERPL-SCNC: 3.9 MMOL/L (ref 3.5–5.3)
PR INTERVAL: 152 MS
PR INTERVAL: 158 MS
PROT SERPL-MCNC: 7.7 G/DL (ref 6.4–8.2)
Q ONSET: 220 MS
Q ONSET: 220 MS
QRS COUNT: 11 BEATS
QRS COUNT: 12 BEATS
QRS DURATION: 92 MS
QRS DURATION: 98 MS
QT INTERVAL: 382 MS
QT INTERVAL: 412 MS
QTC CALCULATION(BAZETT): 426 MS
QTC CALCULATION(BAZETT): 447 MS
QTC FREDERICIA: 411 MS
QTC FREDERICIA: 436 MS
R AXIS: 48 DEGREES
R AXIS: 53 DEGREES
RBC # BLD AUTO: 4.88 X10*6/UL (ref 4–5.2)
SODIUM SERPL-SCNC: 136 MMOL/L (ref 136–145)
T AXIS: 26 DEGREES
T AXIS: 37 DEGREES
T OFFSET: 411 MS
T OFFSET: 426 MS
VENTRICULAR RATE: 71 BPM
VENTRICULAR RATE: 75 BPM
WBC # BLD AUTO: 5.3 X10*3/UL (ref 4.4–11.3)

## 2025-03-06 PROCEDURE — 84484 ASSAY OF TROPONIN QUANT: CPT | Performed by: EMERGENCY MEDICINE

## 2025-03-06 PROCEDURE — 93005 ELECTROCARDIOGRAM TRACING: CPT

## 2025-03-06 PROCEDURE — 85025 COMPLETE CBC W/AUTO DIFF WBC: CPT | Performed by: EMERGENCY MEDICINE

## 2025-03-06 PROCEDURE — 85379 FIBRIN DEGRADATION QUANT: CPT | Performed by: EMERGENCY MEDICINE

## 2025-03-06 PROCEDURE — 99285 EMERGENCY DEPT VISIT HI MDM: CPT | Mod: 25 | Performed by: EMERGENCY MEDICINE

## 2025-03-06 PROCEDURE — 96374 THER/PROPH/DIAG INJ IV PUSH: CPT

## 2025-03-06 PROCEDURE — 71045 X-RAY EXAM CHEST 1 VIEW: CPT

## 2025-03-06 PROCEDURE — 96375 TX/PRO/DX INJ NEW DRUG ADDON: CPT

## 2025-03-06 PROCEDURE — 84075 ASSAY ALKALINE PHOSPHATASE: CPT | Performed by: EMERGENCY MEDICINE

## 2025-03-06 PROCEDURE — 36415 COLL VENOUS BLD VENIPUNCTURE: CPT | Performed by: EMERGENCY MEDICINE

## 2025-03-06 PROCEDURE — 83735 ASSAY OF MAGNESIUM: CPT | Performed by: EMERGENCY MEDICINE

## 2025-03-06 PROCEDURE — 83880 ASSAY OF NATRIURETIC PEPTIDE: CPT | Performed by: EMERGENCY MEDICINE

## 2025-03-06 PROCEDURE — 71045 X-RAY EXAM CHEST 1 VIEW: CPT | Performed by: RADIOLOGY

## 2025-03-06 PROCEDURE — 2500000004 HC RX 250 GENERAL PHARMACY W/ HCPCS (ALT 636 FOR OP/ED): Performed by: EMERGENCY MEDICINE

## 2025-03-06 PROCEDURE — 84702 CHORIONIC GONADOTROPIN TEST: CPT | Performed by: EMERGENCY MEDICINE

## 2025-03-06 RX ORDER — DIPHENHYDRAMINE HYDROCHLORIDE 50 MG/ML
25 INJECTION INTRAMUSCULAR; INTRAVENOUS ONCE
Status: COMPLETED | OUTPATIENT
Start: 2025-03-06 | End: 2025-03-06

## 2025-03-06 RX ORDER — FAMOTIDINE 20 MG/1
20 TABLET, FILM COATED ORAL 2 TIMES DAILY
Qty: 10 TABLET | Refills: 0 | Status: SHIPPED | OUTPATIENT
Start: 2025-03-06 | End: 2025-03-11

## 2025-03-06 RX ORDER — KETOROLAC TROMETHAMINE 30 MG/ML
15 INJECTION, SOLUTION INTRAMUSCULAR; INTRAVENOUS ONCE
Status: COMPLETED | OUTPATIENT
Start: 2025-03-06 | End: 2025-03-06

## 2025-03-06 RX ORDER — HYDROCODONE BITARTRATE AND ACETAMINOPHEN 5; 325 MG/1; MG/1
1 TABLET ORAL EVERY 4 HOURS PRN
Qty: 18 TABLET | Refills: 0 | Status: SHIPPED | OUTPATIENT
Start: 2025-03-06 | End: 2025-03-09

## 2025-03-06 RX ORDER — METHYLPREDNISOLONE 4 MG/1
TABLET ORAL
Qty: 21 TABLET | Refills: 0 | Status: SHIPPED | OUTPATIENT
Start: 2025-03-06

## 2025-03-06 RX ORDER — LORAZEPAM 2 MG/ML
1 INJECTION INTRAMUSCULAR ONCE
Status: COMPLETED | OUTPATIENT
Start: 2025-03-06 | End: 2025-03-06

## 2025-03-06 RX ADMIN — DIPHENHYDRAMINE HYDROCHLORIDE 25 MG: 50 INJECTION, SOLUTION INTRAMUSCULAR; INTRAVENOUS at 14:19

## 2025-03-06 RX ADMIN — KETOROLAC TROMETHAMINE 15 MG: 30 INJECTION, SOLUTION INTRAMUSCULAR at 14:19

## 2025-03-06 RX ADMIN — LORAZEPAM 1 MG: 2 INJECTION INTRAMUSCULAR; INTRAVENOUS at 15:09

## 2025-03-06 ASSESSMENT — LIFESTYLE VARIABLES
TOTAL SCORE: 0
EVER FELT BAD OR GUILTY ABOUT YOUR DRINKING: NO
HAVE PEOPLE ANNOYED YOU BY CRITICIZING YOUR DRINKING: NO
EVER HAD A DRINK FIRST THING IN THE MORNING TO STEADY YOUR NERVES TO GET RID OF A HANGOVER: NO
HAVE YOU EVER FELT YOU SHOULD CUT DOWN ON YOUR DRINKING: NO

## 2025-03-06 ASSESSMENT — PAIN SCALES - GENERAL
PAINLEVEL_OUTOF10: 6
PAINLEVEL_OUTOF10: 7
PAINLEVEL_OUTOF10: 5 - MODERATE PAIN

## 2025-03-06 ASSESSMENT — HEART SCORE
RISK FACTORS: NO KNOWN RISK FACTORS
HEART SCORE: 0
TROPONIN: LESS THAN OR EQUAL TO NORMAL LIMIT
HISTORY: SLIGHTLY SUSPICIOUS
AGE: <45
ECG: NORMAL

## 2025-03-06 ASSESSMENT — PAIN - FUNCTIONAL ASSESSMENT: PAIN_FUNCTIONAL_ASSESSMENT: 0-10

## 2025-03-06 NOTE — ED PROVIDER NOTES
HPI   Chief Complaint   Patient presents with    Chest Pain     Pt c/o chest pain while sitting at her desk today and states bilateral side pain she had ultra sound a week ago for her gall bladder         History provided by:  Patient    Chief Complaint   Patient presents with    Chest Pain     Pt c/o chest pain while sitting at her desk today and states bilateral side pain she had ultra sound a week ago for her gall bladder       History of Present Illness:  Christiano Prieto is a 29 y.o. female presents with chest pain at rest today.  She describes it as sharp and stabbing.  No fever or chills.  She states she has a cough.  No back or flank pain.  No abdominal pain.  No nausea or vomiting.  No diarrhea.  No leg swelling.  No sick contacts.  Nothing seems to make her symptoms worse or better.  No trauma or travel.  No sick contacts.  She did have COVID in early February.      PMFSH:   As per HPI, otherwise nurses notes reviewed in EMR.    Past Medical History:   Past Medical History:   Diagnosis Date    ETOH abuse     HTN (hypertension)     Moderate asthma (HHS-HCC)     Panic anxiety syndrome       Past Surgical History: History reviewed. No pertinent surgical history.   Family History: No family history on file.   Social History:    Social History     Tobacco Use    Smoking status: Former     Types: Cigarettes    Smokeless tobacco: Never   Vaping Use    Vaping status: Never Used   Substance Use Topics    Alcohol use: Never    Drug use: Never     Allergies: No Known Allergies  Current Outpatient Medications   Medication Instructions    albuterol 90 mcg/actuation inhaler 1-2 puffs, inhalation, Every 6 hours PRN    ALPRAZolam (XANAX) 0.25 mg, oral, Daily PRN    ARIPiprazole (ABILIFY) 5 mg, oral, Daily    cetirizine (ZYRTEC) 10 mg, oral, Daily    diphenhydrAMINE (SOMINEX) 25 mg, oral, Every 6 hours PRN    famotidine (PEPCID) 20 mg, oral, 2 times daily    famotidine (PEPCID) 20 mg, oral, 2 times daily    ferrous  gluconate 324 (38 Fe) mg tablet 38 mg of iron, oral, Daily with breakfast    HYDROcodone-acetaminophen (Norco) 5-325 mg tablet 1 tablet, oral, Every 4 hours PRN    hydrOXYzine HCL (ATARAX) 25 mg, oral, 2 times daily PRN, For severe anxiety    hydrOXYzine HCL (ATARAX) 50 mg, oral, Every 8 hours PRN    ibuprofen 600 mg, oral, Every 6 hours    methylPREDNISolone (Medrol Dospak) 4 mg tablets Follow schedule on package instructions    metoclopramide (REGLAN) 10 mg, oral, Every 6 hours PRN    miscellaneous medical supply (Blood Pressure Cuff) misc 1 Device, miscellaneous, 2 times daily    naproxen (NAPROSYN) 500 mg, oral, 2 times daily PRN    NIFEdipine ER (ADALAT CC) 60 mg, oral, 2 times daily, Do not crush, chew, or split.    pantoprazole (PROTONIX) 40 mg, oral, Daily, Do not crush, chew, or split.    PRENATAL VIT 10-IRON-FOLIC-DHA ORAL 1 tablet, Daily    propranolol (INDERAL) 10 mg, oral, 3 times daily PRN    sertraline (Zoloft) 25 mg tablet Take 1 tablet (25 mg) by mouth once daily for 6 days, THEN 2 tablets (50 mg) once daily for 14 days, THEN 4 tablets (100 mg) once daily.              Patient History   Past Medical History:   Diagnosis Date    ETOH abuse     HTN (hypertension)     Moderate asthma (HHS-HCC)     Panic anxiety syndrome      History reviewed. No pertinent surgical history.  No family history on file.  Social History     Tobacco Use    Smoking status: Former     Types: Cigarettes    Smokeless tobacco: Never   Vaping Use    Vaping status: Never Used   Substance Use Topics    Alcohol use: Never    Drug use: Never       Physical Exam   ED Triage Vitals [03/06/25 1341]   Temperature Heart Rate Respirations BP   36.5 °C (97.7 °F) 77 16 136/76      Pulse Ox Temp src Heart Rate Source Patient Position   96 % -- -- --      BP Location FiO2 (%)     -- --       Physical Exam  Physical Exam:    ED Triage Vitals [03/06/25 1341]   Temperature Heart Rate Respirations BP   36.5 °C (97.7 °F) 77 16 136/76      Pulse Ox  "Temp src Heart Rate Source Patient Position   96 % -- -- --      BP Location FiO2 (%)     -- --         Patient Vitals for the past 24 hrs:   BP Temp Pulse Resp SpO2 Height Weight   03/06/25 1451 136/74 -- 78 18 99 % -- --   03/06/25 1427 176/67 -- 72 16 98 % -- --   03/06/25 1341 136/76 36.5 °C (97.7 °F) 77 16 96 % 1.6 m (5' 3\") 81.6 kg (180 lb)       Constitutional: Vital signs per nursing notes.  Well developed, well nourished.  No acute distress.    Psychiatric: alert and oriented to person, place, and time; no abnormalities of mood or affect; memory intact  Eyes: PERRL; conjunctivae and lids normal; EOMI  ENT: otoscopic exam of external canal and TM´s normal; nasal mucosa, turbinates, and septum normal; mouth, tongue, and pharynx normal; pharynx without edema, exudate, or injection  Respiratory: normal respiratory effort and excursion; no rales, rhonchi, or wheezes; equal air entry  Cardiovascular: regular rate and rhythm; no murmurs, rubs or gallops; symmetric pulses; no edema; normal capillary refill; distal pulses present  Neurological: normal speech; CN II-XII grossly intact; normal motor and sensory function; no nystagmus  GI: no masses, tenderness, rebound or guarding; no palpable, pulsatile mass; no organomegaly; no hernia; normal bowel sounds; (-) Sandoval´s sign; (-) McBurney´s sign; (-) CVA tenderness  Lymphatic: no adenopathy of neck  Musculoskeletal: normal gait and station; normal digits and nails; no gross tendon or ligament injury; normal to palpation; normal strength/tone; neurovascular status intact; (-) Dax´s sign  Skin: normal to inspection; normal to palpation; no rash  GCS: 15      ED Course & MDM   Diagnoses as of 03/06/25 1536   Chest pain, unspecified type                 No data recorded     Jenny Coma Scale Score: 15 (03/06/25 1345 : Estefania Azul RN) HEART Score: 0 (03/06/25 1438 : Azar JARA MD)                         Medical Decision Making  Medical Decision " Making:    EKG: My interpretation of EKG is normal sinus rhythm at 75 bpm with no acute ST-T changes             My interpretation of second EKG is normal sinus rhythm at 71 bpm with no acute ST-T changes.    Labs:   Labs Reviewed   CBC WITH AUTO DIFFERENTIAL - Abnormal       Result Value    WBC 5.3      nRBC 0.0      RBC 4.88      Hemoglobin 13.0      Hematocrit 38.1      MCV 78 (*)     MCH 26.6      MCHC 34.1      RDW 14.2      Platelets 307      Neutrophils % 56.0      Immature Granulocytes %, Automated 0.2      Lymphocytes % 30.1      Monocytes % 11.0      Eosinophils % 2.1      Basophils % 0.6      Neutrophils Absolute 2.97      Immature Granulocytes Absolute, Automated 0.01      Lymphocytes Absolute 1.59      Monocytes Absolute 0.58      Eosinophils Absolute 0.11      Basophils Absolute 0.03     COMPREHENSIVE METABOLIC PANEL - Abnormal    Glucose 121 (*)     Sodium 136      Potassium 3.9      Chloride 103      Bicarbonate 23      Anion Gap 14      Urea Nitrogen 13      Creatinine 0.76      eGFR >90      Calcium 9.8      Albumin 4.8      Alkaline Phosphatase 69      Total Protein 7.7      AST 18      Bilirubin, Total 0.4      ALT 18     MAGNESIUM - Normal    Magnesium 1.80     B-TYPE NATRIURETIC PEPTIDE - Normal    BNP 22      Narrative:        <100 pg/mL - Heart failure unlikely  100-299 pg/mL - Intermediate probability of acute heart                  failure exacerbation. Correlate with clinical                  context and patient history.    >=300 pg/mL - Heart Failure likely. Correlate with clinical                  context and patient history.    BNP testing is performed using different testing methodology at [facility] than at other [facility] hospitals. Direct result comparisons should only be made within the same method.      HUMAN CHORIONIC GONADOTROPIN, SERUM QUANTITATIVE - Normal    HCG, Beta-Quantitative <2      Narrative:      Total HCG measurement is performed using the [employer]  Access   Immunoassay which detects intact HCG and free beta HCG subunit.    This test is not indicated for use as a tumor marker.   HCG testing is performed using a different test methodology at [facility] than other [facility]. Direct result comparison   should only be made within the same method.       D-DIMER, VTE EXCLUSION - Normal    D-Dimer, Quantitative VTE Exclusion <215      Narrative:     The VTE Exclusion D-Dimer assay is reported in ng/mL Fibrinogen Equivalent Units (FEU).    Per 's instructions for use, a value of less than 500 ng/mL (FEU) may help to exclude DVT or PE in outpatients when the assay is used with a clinical pretest probability assessment.(AE must utilize and document eCalc 'Wells Score Deep Vein Thrombosis Risk' for DVT exclusion only. Emergency Department should utilize  Guidelines for Emergency Department Use of the VTE Exclusion D-Dimer and Clinical Pretest probability assessment model for DVT or PE exclusion.)   SERIAL TROPONIN-INITIAL - Normal    Troponin I, High Sensitivity <3      Narrative:     Less than 99th percentile of normal range cutoff-  Female and children under 18 years old <14 ng/L; Male <21 ng/L: Negative  Repeat testing should be performed if clinically indicated.     Female and children under 18 years old 14-50 ng/L; Male 21-50 ng/L:  Consistent with possible cardiac damage and possible increased clinical   risk. Serial measurements may help to assess extent of myocardial damage.     >50 ng/L: Consistent with cardiac damage, increased clinical risk and  myocardial infarction. Serial measurements may help assess extent of   myocardial damage.      NOTE: Children less than 1 year old may have higher baseline troponin   levels and results should be interpreted in conjunction with the overall   clinical context.     NOTE: Troponin I testing is performed using a different   testing methodology at [facility] than at other    [facility]. Direct result comparisons should only   be made within the same method.   SERIAL TROPONIN, 1 HOUR - Normal    Troponin I, High Sensitivity <3      Narrative:     Less than 99th percentile of normal range cutoff-  Female and children under 18 years old <14 ng/L; Male <21 ng/L: Negative  Repeat testing should be performed if clinically indicated.     Female and children under 18 years old 14-50 ng/L; Male 21-50 ng/L:  Consistent with possible cardiac damage and possible increased clinical   risk. Serial measurements may help to assess extent of myocardial damage.     >50 ng/L: Consistent with cardiac damage, increased clinical risk and  myocardial infarction. Serial measurements may help assess extent of   myocardial damage.      NOTE: Children less than 1 year old may have higher baseline troponin   levels and results should be interpreted in conjunction with the overall   clinical context.     NOTE: Troponin I testing is performed using a different   testing methodology at [facility] than at other   [facility]. Direct result comparisons should only   be made within the same method.   TROPONIN SERIES- (INITIAL, 1 HR)    Narrative:     The following orders were created for panel order Troponin I Series, High Sensitivity (0, 1 HR).  Procedure                               Abnormality         Status                     ---------                               -----------         ------                     Troponin I, High Sensiti...[[id number]]  Normal              Final result               Troponin, High Sensitivi...[[id number]]  Normal              Final result                 Please view results for these tests on the individual orders.   GRAY TOP    Extra Tube Hold for add-ons.         Diagnostic Imaging:   XR chest 1 view   Final Result   1.  No evidence of acute cardiopulmonary process.                  MACRO:   None        Signed by: Fabrizio Gore 3/6/2025 2:20 PM    Dictation workstation:   [id number]          ED Medication Administration:   Medications   ketorolac (Toradol) injection 15 mg (15 mg intravenous Given 3/6/25 1419)   diphenhydrAMINE (BENADryl) injection 25 mg (25 mg intravenous Given 3/6/25 1419)   LORazepam (Ativan) injection 1 mg (1 mg intravenous Given 3/6/25 1509)       ED Course:     Christiano Prieto is a 29 y.o. female presents with chest pain   .    Differential Diagnoses Considered:  ACS, arrhythmia, pneumonia, PE, pleurisy      Diagnoses as of 03/06/25 1536   Chest pain, unspecified type       Abnormal Labs Reviewed   CBC WITH AUTO DIFFERENTIAL - Abnormal; Notable for the following components:       Result Value    MCV 78 (*)     All other components within normal limits   COMPREHENSIVE METABOLIC PANEL - Abnormal; Notable for the following components:    Glucose 121 (*)     All other components within normal limits       /74 (03/06/25 1451)    Temp      Pulse 78 (03/06/25 1451)   Resp 18 (03/06/25 1451)    SpO2 99 % (03/06/25 1451)      Patient presents with chest pain.     Chest x-ray to evaluate for evidence of pneumonia/pneumothorax/CHF.     EKG/troponin to evaluate for evidence of arrhythmia/ACS/AMI.    Lab work to evaluate for evidence of anemia or significant electrolyte abnormality including hypokalemia, hyperkalemia, hyponatremia, hypernatremia, hyperglycemia or hypoglycemia.     D-dimer to evaluate for possible PE.     Considered CT chest, but no CT chest indicated as I considered but do not suspect aortic dissection/pulmonary embolus.    HEART score 0    I reviewed the patient's most recent laboratory evaluation performed February 20, 2025.  At that time her urinalysis was clear.  CT of the abdomen pelvis showed no acute abdominal or pelvic process.  Scattered colonic diverticula without evidence for acute diverticulitis.  She had an ultrasound of the gallbladder performed on 2/26/2025 which showed no evidence of cholelithiasis or  biliary ductal dilatation.  Findings suggestive of mild hepatic steatosis.      Diagnostic evaluation was completed.  Pregnancy is negative.  Troponin is in the normal range.  Second troponin was also in the normal range so I do not suspect acute coronary syndrome.  BNP is in the normal range so do not suspect CHF.  Metabolic panel shows an elevated glucose at 121.  Sodium potassium in the normal range.  Renal and liver function are in the normal range.  D-dimer is negative so I do not suspect PE or dissection.  CBC shows normal white blood cell count and no evidence of anemia.  Platelets are in the normal range.  Chest x-ray shows no evidence of acute cardiopulmonary process.    Re-evaluation: Repeat evaluation at 1529 shows the patient is feeling better.  Vital signs are stable.    Medications administered improved the patient's condition.    The exact cause of the patient's symptoms are unclear at this time.  However no urgent or emergent conditions have been identified.  She will be instructed to follow-up with her primary care doctor.  She will be given medications for symptoms.    Patient was instructed to have follow up with primary doctor or specialist within 1-3 days.      I discussed the results and discharge plan with the patient and/or family/friend if present.  I emphasized the importance of follow up with the physician I referred them to in the timeframe recommended.  I explained reasons for the patient to return to the Emergency Department.  Questions were addressed.  The patient and/or family/friend expressed understanding.      Shared decision making made with patient, and/or family, who agrees with plan.              Prescription Medication Consideration/Given:   ED Prescriptions       Medication Sig Dispense Start Date End Date Auth. Provider    cetirizine (ZYRTEC) 10 mg capsule Take 1 capsule (10 mg) by mouth early in the morning. for 10 doses. 10 capsule 3/6/2025 3/16/2025 Azar JARA MD  "   famotidine (Pepcid) 20 mg tablet Take 1 tablet (20 mg) by mouth 2 times a day for 5 days. 10 tablet 3/6/2025 3/11/2025 Azar JARA MD    methylPREDNISolone (Medrol Dospak) 4 mg tablets Follow schedule on package instructions 21 tablet 3/6/2025 -- Azar JARA MD    HYDROcodone-acetaminophen (Norco) 5-325 mg tablet Take 1 tablet by mouth every 4 hours if needed for severe pain (7 - 10) for up to 3 days. 18 tablet 3/6/2025 3/9/2025 Azar JARA MD            Diagnosis:   1. Chest pain, unspecified type          I utilized an evidence-based risk rating tool (CMT) along with my training and experience to weigh the risk of discharge against the risks of further testing, imaging, or hospitalization. At this time I estimate the risks of additional testing, imaging, or hospitalization to be equal to or greater than the risk of discharge. I discussed my risk assessment with the patient and the patient consents to the risk of discharge as well as the risk of uncertainty in estimating outcomes.    The patient's HEART Score is <4. In rare cases, I give patients with HEART Score of 4 the option of discharge, but only when they meet criteria for \"Low 4,\" meaning that HST was used, and the 4 is not from a highly suspicious story, highly suspicious EKG, or positive cardiac enzymes. In these selected cases, the risk of a \"Low 4\" is still most likely lower than the risk of admission and further testing/imaging. [id number]                Procedure  Procedures     Azar JARA MD  03/06/25 1536    "

## 2025-03-06 NOTE — Clinical Note
Christiano Prieto was seen and treated in our emergency department on 3/6/2025.  She may return to work on 03/07/2025.       If you have any questions or concerns, please don't hesitate to call.      Azar JARA MD

## 2025-03-10 ENCOUNTER — APPOINTMENT (OUTPATIENT)
Dept: GASTROENTEROLOGY | Facility: CLINIC | Age: 30
End: 2025-03-10
Payer: COMMERCIAL

## 2025-03-10 VITALS
SYSTOLIC BLOOD PRESSURE: 120 MMHG | HEART RATE: 74 BPM | OXYGEN SATURATION: 98 % | DIASTOLIC BLOOD PRESSURE: 88 MMHG | HEIGHT: 63 IN | WEIGHT: 183.2 LBS | RESPIRATION RATE: 18 BRPM | BODY MASS INDEX: 32.46 KG/M2

## 2025-03-10 DIAGNOSIS — R14.0 BLOATING: ICD-10-CM

## 2025-03-10 DIAGNOSIS — R10.84 GENERALIZED ABDOMINAL PAIN: Primary | ICD-10-CM

## 2025-03-10 PROCEDURE — 99204 OFFICE O/P NEW MOD 45 MIN: CPT | Performed by: INTERNAL MEDICINE

## 2025-03-10 PROCEDURE — 3008F BODY MASS INDEX DOCD: CPT | Performed by: INTERNAL MEDICINE

## 2025-03-10 PROCEDURE — 1036F TOBACCO NON-USER: CPT | Performed by: INTERNAL MEDICINE

## 2025-03-10 PROCEDURE — 3079F DIAST BP 80-89 MM HG: CPT | Performed by: INTERNAL MEDICINE

## 2025-03-10 PROCEDURE — 3074F SYST BP LT 130 MM HG: CPT | Performed by: INTERNAL MEDICINE

## 2025-03-10 RX ORDER — GABAPENTIN 100 MG/1
100 CAPSULE ORAL EVERY 12 HOURS PRN
COMMUNITY
Start: 2025-02-14 | End: 2025-03-16

## 2025-03-10 RX ORDER — LAMOTRIGINE 25 MG/1
25 TABLET ORAL
COMMUNITY
Start: 2025-01-07

## 2025-03-10 NOTE — PATIENT INSTRUCTIONS
Generalized abdominal pain  -     Esophagogastroduodenoscopy (EGD); Future    Continue pantoprazole daily.  Follow-up will be based on endoscopy results.

## 2025-03-10 NOTE — PROGRESS NOTES
Subjective   Patient ID: Christiano Prieto is a 29 y.o. female who presents for Abdominal Pain (5 ED visits since  for abdominal pain. Pt reports having pancreatitis in  and the current pain is similar to the pancreatitis pain. Symptoms include diarrhea/nausea/vomiting/abdominal pain. Reports having symptoms since November after she had her son. ).  HPI  Patient is a 29-year-old woman with past medical history significant for anxiety and panic disorder.  In the last 2 months she has had 5 ER visits for abdominal pain.  So further testing has included CT scan of abdominal pelvis started in 2025 chronic plan in 2025  BOWEL: Stomach is underdistended with apparent wall thickening.  Visualized loops of bowel are without evidence for obstruction.  Moderate stool burden. Scattered colonic diverticula without evidence  for acute diverticulitis.  Normal appendix. PERITONEUM: No ascites or  free air, no fluid collection.    Ultrasound of the gallbladder  2025  Mild hepatic steatosis, no gallstones no biliary duct dilation  Labs from February to  were reviewed lipase is normal CRP normal, CMP normal, CBC showed small MCV.  Quite borderline.    Patient has significant anxiety takes propranolol, Xanax, lamotrigine.  On her medication list she is on Pepcid as well as pantoprazole.  Abdominal pain started acutely is on the right side upper region.  Pain is quite severe 8 out of 10 constant sharp radiating to her back which makes her bloated that this could be pancreatitis.  In the emergency room she has been given Bentyl, Reglan.  Pepcid.  She has not been taking any of these medications because they are not helping.  In addition to abdominal pain patient has been seen in the ER multiple times for left-sided chest pain radiating to left arm.    Also has a psychiatrist.  30 YO  with anxiety, EtOH use disorder, panic, HTN, and severe preeclampsia, presenting for followup after last appt on  11/27. At that time, recommended starting low-dose sertraline, continuing abilify 5 mg daily that was initiated in Nov 2024 hospitalization for severe preeclampsia.    Current Outpatient Medications on File Prior to Visit   Medication Sig Dispense Refill    ALPRAZolam (Xanax) 0.25 mg tablet Take 1 tablet (0.25 mg) by mouth once daily as needed for anxiety. 10 tablet 0    gabapentin (Neurontin) 100 mg capsule Take 1 capsule (100 mg) by mouth every 12 hours if needed.      lamoTRIgine (LaMICtal) 25 mg tablet Take 1 tablet (25 mg) by mouth once daily.      miscellaneous medical supply (Blood Pressure Cuff) misc 1 Device 2 times a day. 1 each 0    naproxen (Naprosyn) 500 mg tablet Take 1 tablet (500 mg) by mouth 2 times a day as needed for mild pain (1 - 3). 30 tablet 0    NIFEdipine ER (Adalat CC) 60 mg 24 hr tablet Take 1 tablet (60 mg) by mouth 2 times a day. Do not crush, chew, or split. 90 tablet 11    PRENATAL VIT 10-IRON-FOLIC-DHA ORAL Take 1 tablet by mouth once daily.      propranolol (Inderal) 10 mg tablet Take 1 tablet (10 mg) by mouth 3 times a day as needed (anxiety). 90 tablet 1    albuterol 90 mcg/actuation inhaler Inhale 1-2 puffs every 6 hours if needed for wheezing. 18 g 0    ARIPiprazole (Abilify) 5 mg tablet Take 1 tablet (5 mg) by mouth once daily. (Patient not taking: Reported on 3/10/2025) 30 tablet 1    cetirizine (ZYRTEC) 10 mg capsule Take 1 capsule (10 mg) by mouth early in the morning. for 10 doses. (Patient not taking: Reported on 3/10/2025) 10 capsule 0    diphenhydrAMINE (Sominex) 25 mg tablet Take 1 tablet (25 mg) by mouth every 6 hours if needed for other (Headache, take with reglan). 30 tablet 1    famotidine (Pepcid) 20 mg tablet Take 1 tablet (20 mg) by mouth 2 times a day for 5 days. 10 tablet 0    famotidine (Pepcid) 20 mg tablet Take 1 tablet (20 mg) by mouth 2 times a day for 5 days. (Patient not taking: Reported on 3/10/2025) 10 tablet 0    ferrous gluconate 324 (38 Fe) mg tablet  Take 1 tablet (38 mg of iron) by mouth once daily with breakfast. (Patient not taking: Reported on 3/10/2025) 30 tablet 11    [] HYDROcodone-acetaminophen (Norco) 5-325 mg tablet Take 1 tablet by mouth every 4 hours if needed for severe pain (7 - 10) for up to 3 days. 18 tablet 0    hydrOXYzine HCL (Atarax) 25 mg tablet Take 1 tablet (25 mg) by mouth 2 times a day as needed for anxiety. For severe anxiety 60 tablet 1    hydrOXYzine HCL (Atarax) 50 mg tablet Take 1 tablet (50 mg) by mouth every 8 hours if needed for anxiety. (Patient not taking: Reported on 3/10/2025) 90 tablet 1    ibuprofen 600 mg tablet Take 1 tablet (600 mg) by mouth every 6 hours. (Patient not taking: Reported on 3/10/2025) 20 tablet 3    methylPREDNISolone (Medrol Dospak) 4 mg tablets Follow schedule on package instructions (Patient not taking: Reported on 3/10/2025) 21 tablet 0    metoclopramide (Reglan) 10 mg tablet Take 1 tablet (10 mg) by mouth every 6 hours if needed (Headache, take with benadryl). 30 tablet 1    pantoprazole (ProtoNix) 40 mg EC tablet Take 1 tablet (40 mg) by mouth once daily. Do not crush, chew, or split. 30 tablet 0    sertraline (Zoloft) 25 mg tablet Take 1 tablet (25 mg) by mouth once daily for 6 days, THEN 2 tablets (50 mg) once daily for 14 days, THEN 4 tablets (100 mg) once daily. 154 tablet 0     No current facility-administered medications on file prior to visit.        Review of Systems   Constitutional: Negative.    Respiratory: Negative.     Cardiovascular:  Positive for chest pain.   Gastrointestinal:  Positive for abdominal distention and abdominal pain.   Endocrine: Negative.    Genitourinary: Negative.    Skin: Negative.    Neurological: Negative.        Objective   Physical Exam  Constitutional:       Appearance: Normal appearance.   HENT:      Head: Normocephalic and atraumatic.   Cardiovascular:      Rate and Rhythm: Normal rate and regular rhythm.      Pulses: Normal pulses.      Heart sounds:  "Normal heart sounds.   Pulmonary:      Effort: Pulmonary effort is normal.      Breath sounds: Normal breath sounds.   Abdominal:      General: Abdomen is flat. Bowel sounds are normal.      Palpations: Abdomen is soft.      Tenderness: There is no abdominal tenderness.   Musculoskeletal:         General: Normal range of motion.      Cervical back: Normal range of motion.   Skin:     General: Skin is warm.   Neurological:      Mental Status: She is alert.       /88 (BP Location: Left arm, Patient Position: Sitting, BP Cuff Size: Adult)   Pulse 74   Resp 18   Ht 1.6 m (5' 3\")   Wt 83.1 kg (183 lb 3.2 oz)   LMP 03/07/2024   SpO2 98%   BMI 32.45 kg/m²      Lab Results   Component Value Date    WBC 6.7 03/12/2025    HGB 12.1 03/12/2025    HCT 36.1 03/12/2025    MCV 81 03/12/2025     03/12/2025     Results from last 7 days   Lab Units 03/12/25  2130   SODIUM mmol/L 137   POTASSIUM mmol/L 4.0   CHLORIDE mmol/L 104   CO2 mmol/L 26   BUN mg/dL 9   CREATININE mg/dL 0.71   CALCIUM mg/dL 8.9   PROTEIN TOTAL g/dL 7.0   BILIRUBIN TOTAL mg/dL 0.2   ALK PHOS U/L 63   ALT U/L 17   AST U/L 17   GLUCOSE mg/dL 108*       No results found for: \"AFP\"  Lab Results   Component Value Date    TSH 1.29 12/01/2024      gallbladder  Order: [id number]  Impression    IMPRESSION:  1.  No evidence of cholelithiasis or biliary ductal dilatation.    2.  Findings suggestive of mild hepatic steatosis.                : JOSE G    Transcribe Date/Time: Feb 26 2025  2:14P    Dictated by : FRANCOISE CANSECO MD    This examination was interpreted and the report reviewed and  electronically signed by:  FRANCOISE CANSECO MD on Feb 26 2025  2:19PM  EST  Narrative    * * *Final Report* * *    DATE OF EXAM: Feb 26 2025  2:00PM      U   1032  -  US ABD RIGHT UPPER QUADRANT  / ACCESSION #  [id number]    PROCEDURE REASON: Abn liver function tests (LFTs)        * * * * Physician Interpretation * * * *     EXAMINATION:   RIGHT UPPER " QUADRANT ULTRASOUND    CLINICAL HISTORY: Abnormal liver function tests    TECHNIQUE:  Sonography of the right upper quadrant  was performed.    Images were obtained and stored in a permanent archive.  MQ:  [id number]    COMPARISON: None.    RESULT:    Pancreas: Normal sonographic appearance.     Portions obscured: tail    Liver:       Echotexture:  Normal, homogeneous.       Echogenicity:  Slightly increased       Surface contour:  Smooth       Lesions:  None.    Biliary: No intrahepatic biliary duct dilation.       CBD: 0.2 cm at the hilum.       Gallbladder: Collapsed            -Contents:  No cholelithiasis            -Wall:  The gallbladder wall is mildly thickened measuring 3.5  mm; however, the gallbladder appears contracted which may be related to  the patient's nonfasting state.            -Other:  No pericholecystic fluid.    Right Kidney: No hydronephrosis.    Ascites: None.    CT abdomen pelvis w IV contrast  Status: Final result     PACS Images     Show images for CT abdomen pelvis w IV contrast  Signed by    Signed Time Phone Pager   Conrad Bess MD 2/20/2025 22:12 [phone]     Exam Information    Status Exam Begun Exam Ended   Final 2/20/2025 21:38 2/20/2025 21:45     Study Result    Narrative & Impression   Interpreted By:  Conrad Bess,   STUDY:  CT ABDOMEN PELVIS W IV CONTRAST;  2/20/2025 9:45 pm      INDICATION:  Signs/Symptoms:Abdominal pain.      COMPARISON:  CT scan of the abdomen pelvis 01/30/2025      ACCESSION NUMBER(S):  [id number]      ORDERING CLINICIAN:  EH IVEY      TECHNIQUE:  Axial CT images of the abdomen and pelvis with coronal and sagittal  reconstructed images obtained after intravenous administration of 75  mL of Omnipaque 350.      FINDINGS:  LOWER CHEST: Bibasilar atelectasis and or scarring.      ABDOMEN:      LIVER: Within normal limits.  BILE DUCTS: Normal caliber.  GALLBLADDER: No calcified gallstones. No wall thickening.  PANCREAS: Within normal  limits.  SPLEEN: Within normal limits.  ADRENALS: Within normal limits.  KIDNEYS and URETERS: Symmetric renal enhancement. No hydronephrosis  or perinephric fluid collection.      VESSELS:   No aortic aneurysm.  RETROPERITONEUM: Nonenlarged periaortic lymph nodes noted.      PELVIS:      REPRODUCTIVE ORGANS: Uterus is present. Follicular changes of the  ovaries BLADDER: Within normal limits.      BOWEL: Stomach is underdistended with apparent wall thickening.  Visualized loops of bowel are without evidence for obstruction.  Moderate stool burden. Scattered colonic diverticula without evidence  for acute diverticulitis.  Normal appendix. PERITONEUM: No ascites or  free air, no fluid collection.      ABDOMINAL WALL: Within normal limits.  BONES: No acute osseous abnormality.      IMPRESSION:  No acute abdominal or pelvic process.      Scattered colonic diverticula without evidence for acute  diverticulitis.      Additional findings as noted above.      MACRO:  None      Signed by: Conrad Bess 2/20/2025 10:12 PM  Dictation workstation:   [id number]     Patient with past medical history significant for anxiety, depression, migraine headaches.  Recent multiple ER visits for left-sided chest pain as well as right flank pain.  She has underwent cross-sectional imaging, ultrasound of the gallbladder, blood work which have been nonrevealing.  She has been provided prescription of PPI, Bentyl, Pepcid and Reglan she has not been taking any 1 of those because they have not helped.  We we will recommend upper endoscopy but if that is normal that this is likely either muscular musculoskeletal versus functional.  Assessment/Plan   Diagnoses and all orders for this visit:  Generalized abdominal pain  -     Esophagogastroduodenoscopy (EGD); Future  -     pantoprazole (ProtoNix) 40 mg EC tablet; Take 1 tablet (40 mg) by mouth once daily in the morning. Take before meals. Do not crush, chew, or split.  Bloating    Continue  pantoprazole daily.  Follow-up will be based on endoscopy results.

## 2025-03-11 ENCOUNTER — ANESTHESIA EVENT (OUTPATIENT)
Dept: GASTROENTEROLOGY | Facility: EXTERNAL LOCATION | Age: 30
End: 2025-03-11

## 2025-03-12 ENCOUNTER — HOSPITAL ENCOUNTER (EMERGENCY)
Facility: HOSPITAL | Age: 30
Discharge: HOME | End: 2025-03-13
Attending: EMERGENCY MEDICINE
Payer: COMMERCIAL

## 2025-03-12 ENCOUNTER — APPOINTMENT (OUTPATIENT)
Dept: RADIOLOGY | Facility: HOSPITAL | Age: 30
End: 2025-03-12
Payer: COMMERCIAL

## 2025-03-12 ENCOUNTER — APPOINTMENT (OUTPATIENT)
Dept: CARDIOLOGY | Facility: HOSPITAL | Age: 30
End: 2025-03-12
Payer: COMMERCIAL

## 2025-03-12 DIAGNOSIS — R07.9 ACUTE CHEST PAIN: Primary | ICD-10-CM

## 2025-03-12 DIAGNOSIS — G43.909 MIGRAINE WITHOUT STATUS MIGRAINOSUS, NOT INTRACTABLE, UNSPECIFIED MIGRAINE TYPE: ICD-10-CM

## 2025-03-12 LAB
ALBUMIN SERPL BCP-MCNC: 4.5 G/DL (ref 3.4–5)
ALP SERPL-CCNC: 63 U/L (ref 33–110)
ALT SERPL W P-5'-P-CCNC: 17 U/L (ref 7–45)
ANION GAP SERPL CALC-SCNC: 11 MMOL/L (ref 10–20)
AST SERPL W P-5'-P-CCNC: 17 U/L (ref 9–39)
B-HCG SERPL-ACNC: <2 MIU/ML
BASOPHILS # BLD AUTO: 0.03 X10*3/UL (ref 0–0.1)
BASOPHILS NFR BLD AUTO: 0.4 %
BILIRUB SERPL-MCNC: 0.2 MG/DL (ref 0–1.2)
BUN SERPL-MCNC: 9 MG/DL (ref 6–23)
CALCIUM SERPL-MCNC: 8.9 MG/DL (ref 8.6–10.3)
CARDIAC TROPONIN I PNL SERPL HS: <3 NG/L (ref 0–13)
CARDIAC TROPONIN I PNL SERPL HS: <3 NG/L (ref 0–13)
CHLORIDE SERPL-SCNC: 104 MMOL/L (ref 98–107)
CO2 SERPL-SCNC: 26 MMOL/L (ref 21–32)
CREAT SERPL-MCNC: 0.71 MG/DL (ref 0.5–1.05)
D DIMER PPP FEU-MCNC: <215 NG/ML FEU
EGFRCR SERPLBLD CKD-EPI 2021: >90 ML/MIN/1.73M*2
EOSINOPHIL # BLD AUTO: 0.11 X10*3/UL (ref 0–0.7)
EOSINOPHIL NFR BLD AUTO: 1.6 %
ERYTHROCYTE [DISTWIDTH] IN BLOOD BY AUTOMATED COUNT: 14.3 % (ref 11.5–14.5)
GLUCOSE SERPL-MCNC: 108 MG/DL (ref 74–99)
HCT VFR BLD AUTO: 36.1 % (ref 36–46)
HGB BLD-MCNC: 12.1 G/DL (ref 12–16)
IMM GRANULOCYTES # BLD AUTO: 0.01 X10*3/UL (ref 0–0.7)
IMM GRANULOCYTES NFR BLD AUTO: 0.1 % (ref 0–0.9)
LIPASE SERPL-CCNC: 37 U/L (ref 9–82)
LYMPHOCYTES # BLD AUTO: 1.98 X10*3/UL (ref 1.2–4.8)
LYMPHOCYTES NFR BLD AUTO: 29.7 %
MCH RBC QN AUTO: 27.1 PG (ref 26–34)
MCHC RBC AUTO-ENTMCNC: 33.5 G/DL (ref 32–36)
MCV RBC AUTO: 81 FL (ref 80–100)
MONOCYTES # BLD AUTO: 0.44 X10*3/UL (ref 0.1–1)
MONOCYTES NFR BLD AUTO: 6.6 %
NEUTROPHILS # BLD AUTO: 4.1 X10*3/UL (ref 1.2–7.7)
NEUTROPHILS NFR BLD AUTO: 61.6 %
NRBC BLD-RTO: 0 /100 WBCS (ref 0–0)
PLATELET # BLD AUTO: 269 X10*3/UL (ref 150–450)
POTASSIUM SERPL-SCNC: 4 MMOL/L (ref 3.5–5.3)
PROT SERPL-MCNC: 7 G/DL (ref 6.4–8.2)
RBC # BLD AUTO: 4.47 X10*6/UL (ref 4–5.2)
SODIUM SERPL-SCNC: 137 MMOL/L (ref 136–145)
WBC # BLD AUTO: 6.7 X10*3/UL (ref 4.4–11.3)

## 2025-03-12 PROCEDURE — 80053 COMPREHEN METABOLIC PANEL: CPT | Performed by: EMERGENCY MEDICINE

## 2025-03-12 PROCEDURE — 2500000001 HC RX 250 WO HCPCS SELF ADMINISTERED DRUGS (ALT 637 FOR MEDICARE OP): Performed by: STUDENT IN AN ORGANIZED HEALTH CARE EDUCATION/TRAINING PROGRAM

## 2025-03-12 PROCEDURE — 71045 X-RAY EXAM CHEST 1 VIEW: CPT

## 2025-03-12 PROCEDURE — 93005 ELECTROCARDIOGRAM TRACING: CPT

## 2025-03-12 PROCEDURE — 36415 COLL VENOUS BLD VENIPUNCTURE: CPT | Performed by: EMERGENCY MEDICINE

## 2025-03-12 PROCEDURE — 96374 THER/PROPH/DIAG INJ IV PUSH: CPT | Mod: 59

## 2025-03-12 PROCEDURE — 96375 TX/PRO/DX INJ NEW DRUG ADDON: CPT

## 2025-03-12 PROCEDURE — 83690 ASSAY OF LIPASE: CPT | Performed by: EMERGENCY MEDICINE

## 2025-03-12 PROCEDURE — 85025 COMPLETE CBC W/AUTO DIFF WBC: CPT | Performed by: EMERGENCY MEDICINE

## 2025-03-12 PROCEDURE — 84484 ASSAY OF TROPONIN QUANT: CPT | Performed by: EMERGENCY MEDICINE

## 2025-03-12 PROCEDURE — 99285 EMERGENCY DEPT VISIT HI MDM: CPT | Mod: 25 | Performed by: EMERGENCY MEDICINE

## 2025-03-12 PROCEDURE — 96372 THER/PROPH/DIAG INJ SC/IM: CPT | Performed by: STUDENT IN AN ORGANIZED HEALTH CARE EDUCATION/TRAINING PROGRAM

## 2025-03-12 PROCEDURE — 85379 FIBRIN DEGRADATION QUANT: CPT | Performed by: EMERGENCY MEDICINE

## 2025-03-12 PROCEDURE — 2500000004 HC RX 250 GENERAL PHARMACY W/ HCPCS (ALT 636 FOR OP/ED): Performed by: STUDENT IN AN ORGANIZED HEALTH CARE EDUCATION/TRAINING PROGRAM

## 2025-03-12 PROCEDURE — 2500000004 HC RX 250 GENERAL PHARMACY W/ HCPCS (ALT 636 FOR OP/ED): Performed by: EMERGENCY MEDICINE

## 2025-03-12 PROCEDURE — 84702 CHORIONIC GONADOTROPIN TEST: CPT | Performed by: EMERGENCY MEDICINE

## 2025-03-12 PROCEDURE — 71045 X-RAY EXAM CHEST 1 VIEW: CPT | Performed by: STUDENT IN AN ORGANIZED HEALTH CARE EDUCATION/TRAINING PROGRAM

## 2025-03-12 RX ORDER — DIPHENHYDRAMINE HYDROCHLORIDE 50 MG/ML
25 INJECTION, SOLUTION INTRAMUSCULAR; INTRAVENOUS ONCE
Status: COMPLETED | OUTPATIENT
Start: 2025-03-12 | End: 2025-03-12

## 2025-03-12 RX ORDER — SUMATRIPTAN 6 MG/.5ML
6 INJECTION, SOLUTION SUBCUTANEOUS ONCE
Status: COMPLETED | OUTPATIENT
Start: 2025-03-12 | End: 2025-03-12

## 2025-03-12 RX ORDER — METOCLOPRAMIDE HYDROCHLORIDE 5 MG/ML
10 INJECTION INTRAMUSCULAR; INTRAVENOUS ONCE
Status: DISCONTINUED | OUTPATIENT
Start: 2025-03-12 | End: 2025-03-12

## 2025-03-12 RX ORDER — MAGNESIUM SULFATE HEPTAHYDRATE 40 MG/ML
2 INJECTION, SOLUTION INTRAVENOUS ONCE
Status: DISCONTINUED | OUTPATIENT
Start: 2025-03-12 | End: 2025-03-12

## 2025-03-12 RX ORDER — ALPRAZOLAM 0.5 MG/1
0.5 TABLET ORAL ONCE
Status: COMPLETED | OUTPATIENT
Start: 2025-03-12 | End: 2025-03-12

## 2025-03-12 RX ORDER — KETOROLAC TROMETHAMINE 30 MG/ML
15 INJECTION, SOLUTION INTRAMUSCULAR; INTRAVENOUS ONCE
Status: COMPLETED | OUTPATIENT
Start: 2025-03-12 | End: 2025-03-12

## 2025-03-12 RX ORDER — ACETAMINOPHEN 325 MG/1
975 TABLET ORAL ONCE
Status: COMPLETED | OUTPATIENT
Start: 2025-03-12 | End: 2025-03-12

## 2025-03-12 RX ORDER — METOCLOPRAMIDE HYDROCHLORIDE 5 MG/ML
10 INJECTION INTRAMUSCULAR; INTRAVENOUS ONCE
Status: COMPLETED | OUTPATIENT
Start: 2025-03-12 | End: 2025-03-12

## 2025-03-12 RX ADMIN — ACETAMINOPHEN 975 MG: 325 TABLET ORAL at 22:49

## 2025-03-12 RX ADMIN — DIPHENHYDRAMINE HYDROCHLORIDE 25 MG: 50 INJECTION, SOLUTION INTRAMUSCULAR; INTRAVENOUS at 21:45

## 2025-03-12 RX ADMIN — ALPRAZOLAM 0.5 MG: 0.5 TABLET ORAL at 22:49

## 2025-03-12 RX ADMIN — SUMATRIPTAN 6 MG: 6 INJECTION, SOLUTION SUBCUTANEOUS at 22:50

## 2025-03-12 RX ADMIN — KETOROLAC TROMETHAMINE 15 MG: 30 INJECTION, SOLUTION INTRAMUSCULAR at 21:45

## 2025-03-12 RX ADMIN — METOCLOPRAMIDE HYDROCHLORIDE 10 MG: 5 INJECTION INTRAMUSCULAR; INTRAVENOUS at 22:07

## 2025-03-12 ASSESSMENT — PAIN SCALES - GENERAL
PAINLEVEL_OUTOF10: 8
PAINLEVEL_OUTOF10: 8
PAINLEVEL_OUTOF10: 5 - MODERATE PAIN
PAINLEVEL_OUTOF10: 8
PAINLEVEL_OUTOF10: 3
PAINLEVEL_OUTOF10: 5 - MODERATE PAIN
PAINLEVEL_OUTOF10: 8
PAINLEVEL_OUTOF10: 8

## 2025-03-12 ASSESSMENT — LIFESTYLE VARIABLES
EVER HAD A DRINK FIRST THING IN THE MORNING TO STEADY YOUR NERVES TO GET RID OF A HANGOVER: NO
EVER FELT BAD OR GUILTY ABOUT YOUR DRINKING: NO
HAVE PEOPLE ANNOYED YOU BY CRITICIZING YOUR DRINKING: NO
TOTAL SCORE: 0
HAVE YOU EVER FELT YOU SHOULD CUT DOWN ON YOUR DRINKING: NO

## 2025-03-12 ASSESSMENT — PAIN DESCRIPTION - ONSET: ONSET: ONGOING

## 2025-03-12 ASSESSMENT — PAIN DESCRIPTION - LOCATION
LOCATION: CHEST
LOCATION_2: ABDOMEN
LOCATION: ABDOMEN
LOCATION: ABDOMEN

## 2025-03-12 ASSESSMENT — PAIN DESCRIPTION - ORIENTATION
ORIENTATION_2: RIGHT;UPPER
ORIENTATION: LEFT
ORIENTATION: RIGHT
ORIENTATION: LEFT

## 2025-03-12 ASSESSMENT — PAIN - FUNCTIONAL ASSESSMENT: PAIN_FUNCTIONAL_ASSESSMENT: 0-10

## 2025-03-12 ASSESSMENT — PAIN DESCRIPTION - DESCRIPTORS
DESCRIPTORS_2: SHARP
DESCRIPTORS: SQUEEZING
DESCRIPTORS: SQUEEZING

## 2025-03-12 ASSESSMENT — PAIN DESCRIPTION - PAIN TYPE: TYPE: ACUTE PAIN

## 2025-03-13 VITALS
HEIGHT: 63 IN | BODY MASS INDEX: 31.89 KG/M2 | HEART RATE: 72 BPM | WEIGHT: 180 LBS | RESPIRATION RATE: 17 BRPM | OXYGEN SATURATION: 98 % | SYSTOLIC BLOOD PRESSURE: 163 MMHG | TEMPERATURE: 98.8 F | DIASTOLIC BLOOD PRESSURE: 87 MMHG

## 2025-03-13 NOTE — ED PROVIDER NOTES
"HPI   Chief Complaint   Patient presents with    Chest Pain    Abdominal Pain       HPI  patient is a 29-year-old female who presents with complaints of midsternal chest pressure that has been constant but worsening at times throughout today.  She also reports having a bilateral ocular migraine.  She states that she gets these fairly often and she tried her home medications Reglan and Benadryl but she vomited and was unable to keep them down.  She states she has had this same migraine headache for 3 days consistently now.  She reports that the headache pain is exactly similar to her previous migraine symptoms that she has had in the past.  She reports that she is being worked up for GI issues and has an endoscopy scheduled for the end of this week.  She and her GI doctor have concerns that her GI symptoms are what are potentially causing her chest pain symptoms.  She has been seen here several times in the recent weeks for chest pain with negative or unremarkable evaluations.  She denies feeling short of breath, abdominal pain nausea vomiting diarrhea, cough, congestion, recent illness, fevers or chills.  She denies any visual changes, head trauma, focal deficits.        Patient History   Past Medical History:   Diagnosis Date    ETOH abuse     HTN (hypertension)     Moderate asthma (HHS-HCC)     Panic anxiety syndrome      History reviewed. No pertinent surgical history.  No family history on file.  Social History     Tobacco Use    Smoking status: Former     Types: Cigarettes    Smokeless tobacco: Never   Vaping Use    Vaping status: Every Day   Substance Use Topics    Alcohol use: Yes     Comment: \"once a month\"    Drug use: Never       Physical Exam   ED Triage Vitals   Temp Pulse Resp BP   -- -- -- --      SpO2 Temp src Heart Rate Source Patient Position   -- -- -- --      BP Location FiO2 (%)     -- --       Physical Exam    General: Awake, alert, in no acute distress  Eyes: Gaze conjugate.  No scleral icterus " or injection  HENT: Normo-cephalic, atraumatic. No stridor.   TMs clear.  No nasal congestion.  CV: Regular rate, regular rhythm. Radial pulses 2+ bilaterally.   Equal pulses in all extremities.  No edema appreciated.  Unable to reproduce chest pain symptoms with palpation.  Resp: Breathing non-labored, speaking in full sentences.  Clear to auscultation bilaterally  GI:   Mild tenderness in the epigastric region.  Abdomen is otherwise soft and nontender.  There is no rebound or guarding.  Bowel sounds are present  :  no CVA tenderness.  MSK/Extremities: No gross bony deformities. Moving all extremities  Skin: Warm. Appropriate color  Neuro: Alert. Oriented. Face symmetric. Speech is fluent.  Gross strength and sensation intact in b/l UE and Les.   No focal motor or sensory deficits appreciated.  Cranial nerves II through XII are grossly intact.  Psych: Appropriate mood and affect    ED Course & MDM   Diagnoses as of 03/12/25 2129   Acute chest pain   Migraine without status migrainosus, not intractable, unspecified migraine type       EKG interpreted by me  at 2010: Normal sinus rhythm at 74 bpm.  Occasional PVCs noted.  No acute ST-T wave changes.  There is some artifact obscuring interpretation otherwise normal EKG.    Labs Reviewed   CBC WITH AUTO DIFFERENTIAL   COMPREHENSIVE METABOLIC PANEL   LIPASE   TROPONIN SERIES- (INITIAL, 1 HR)    Narrative:     The following orders were created for panel order Troponin I Series, High Sensitivity (0, 1 HR).  Procedure                               Abnormality         Status                     ---------                               -----------         ------                     Troponin I, High Sensiti...[[id number]]                                                   Please view results for these tests on the individual orders.   D-DIMER, VTE EXCLUSION   HUMAN CHORIONIC GONADOTROPIN, SERUM QUANTITATIVE   SERIAL TROPONIN-INITIAL     XR chest 1 view   Final Result    Apparent new airspace opacity medial right lung base difficult to   localize the AP dimension due to single view. This may relate to   pneumonia in the appropriate context and clinical correlation is   recommended.        MACRO:   None.        Signed by: Karlyoly Brannon 3/12/2025 8:35 PM   Dictation workstation:   [id number]                    No data recorded                                 Medical Decision Making     I have ordered a D-dimer to rule out PE though my suspicion is low.  I have also ordered troponin's,  chest x-ray and EKGs to evaluate for any type of cardiac cause of her symptoms.  I also agree that her GI symptoms may be causing this recurrent chest pain.  I also do not feel that she needs to have any CT imaging of the brain at this time since she states this pain feels similar to previous episodes.   I have ordered her a migraine cocktail as she has requested.  She has had it here previously and states that it works really well.  She is not complaining of significant chest pain at this time does not want anything for pain for that.  I explained that the medications we treat her migraine may also help with the chest pain.  But we will reevaluate shortly as well.     patient is a tough IV stick, RN has tried multiple times.  We are awaiting the rapid response nurse to come down to place an IV.  Patient's chest x-ray showed that she had some opacification in the medial right lung base questionable for pneumonia.  However like to correlate this with her laboratory evaluation.  Clinically she does not sound like she has an infiltrate there and nor does she have cough or infectious type symptoms.    I will sign this patient out to Dr. Anderson pending patient's laboratory evaluation including a D-dimer.  And also recheck after she receives her migraine cocktail.  Procedure  Procedures     Alma Rosa Dia MD  03/12/25 2008       Alma Rosa Dia MD  03/12/25 2013       Alma Rosa Dia MD  03/12/25 [id number]

## 2025-03-13 NOTE — ED PROVIDER NOTES
Emergency Medicine Transition of Care Note.    I received Christiano Prieto in signout from Dr. Dia.  Please see the previous ED provider note for all HPI, PE and MDM up to the time of signout. This is in addition to the primary record.    In brief Christiano Prieto is an 29 y.o. female presenting for   Chief Complaint   Patient presents with    Chest Pain    Abdominal Pain     At the time of signout we were awaiting: workup    Diagnoses as of 03/12/25 6592   Acute chest pain   Migraine without status migrainosus, not intractable, unspecified migraine type       Medical Decision Making  Patient is signed out to me by the outgoing provider.  She presented with migraine headache but was also having some chest discomfort.  A headache cocktail has been ordered as well as a cardiac workup.    Patient continued to have a headache so she was also given a dose of Imitrex and Tylenol.    Workup is coming back overall reassuring.  There is mention of a airspace opacity in the right lung on the x-ray however patient's lungs are clear to auscultation she does not have any respiratory symptoms and has a normal white count some suspicion for pneumonia is very low.    Her heart score is less than 4.  On reassessment she is feeling much better and resting comfortably.  She is comfortable the plan of discharge with follow-up with her primary care physician.        Final diagnoses:   [R07.9] Acute chest pain   [G43.909] Migraine without status migrainosus, not intractable, unspecified migraine type           Procedure  Procedures    DO Tim Gilbert DO  03/12/25 2761

## 2025-03-14 ENCOUNTER — ANESTHESIA (OUTPATIENT)
Dept: GASTROENTEROLOGY | Facility: EXTERNAL LOCATION | Age: 30
End: 2025-03-14

## 2025-03-14 ENCOUNTER — TELEPHONE (OUTPATIENT)
Dept: GASTROENTEROLOGY | Facility: CLINIC | Age: 30
End: 2025-03-14

## 2025-03-14 ENCOUNTER — HOSPITAL ENCOUNTER (OUTPATIENT)
Dept: GASTROENTEROLOGY | Facility: EXTERNAL LOCATION | Age: 30
Discharge: HOME | End: 2025-03-14
Payer: COMMERCIAL

## 2025-03-14 ENCOUNTER — APPOINTMENT (OUTPATIENT)
Dept: GASTROENTEROLOGY | Facility: EXTERNAL LOCATION | Age: 30
End: 2025-03-14
Payer: COMMERCIAL

## 2025-03-14 VITALS
HEART RATE: 68 BPM | OXYGEN SATURATION: 100 % | TEMPERATURE: 98.2 F | DIASTOLIC BLOOD PRESSURE: 84 MMHG | RESPIRATION RATE: 18 BRPM | SYSTOLIC BLOOD PRESSURE: 125 MMHG

## 2025-03-14 DIAGNOSIS — R10.84 GENERALIZED ABDOMINAL PAIN: Primary | ICD-10-CM

## 2025-03-14 LAB
ATRIAL RATE: 74 BPM
P AXIS: 24 DEGREES
P OFFSET: 195 MS
P ONSET: 141 MS
PR INTERVAL: 154 MS
PREGNANCY TEST URINE, POC: NEGATIVE
Q ONSET: 218 MS
QRS COUNT: 12 BEATS
QRS DURATION: 96 MS
QT INTERVAL: 418 MS
QTC CALCULATION(BAZETT): 463 MS
QTC FREDERICIA: 448 MS
R AXIS: 43 DEGREES
T AXIS: 22 DEGREES
T OFFSET: 427 MS
VENTRICULAR RATE: 74 BPM

## 2025-03-14 PROCEDURE — 43239 EGD BIOPSY SINGLE/MULTIPLE: CPT | Performed by: INTERNAL MEDICINE

## 2025-03-14 PROCEDURE — 88305 TISSUE EXAM BY PATHOLOGIST: CPT | Mod: TC,ELYLAB | Performed by: INTERNAL MEDICINE

## 2025-03-14 PROCEDURE — 88305 TISSUE EXAM BY PATHOLOGIST: CPT | Performed by: PATHOLOGY

## 2025-03-14 PROCEDURE — 81025 URINE PREGNANCY TEST: CPT | Performed by: INTERNAL MEDICINE

## 2025-03-14 RX ORDER — SODIUM CHLORIDE 9 MG/ML
INJECTION, SOLUTION INTRAVENOUS CONTINUOUS PRN
Status: DISCONTINUED | OUTPATIENT
Start: 2025-03-14 | End: 2025-03-14

## 2025-03-14 RX ORDER — PANTOPRAZOLE SODIUM 40 MG/1
40 TABLET, DELAYED RELEASE ORAL
Qty: 30 TABLET | Refills: 1 | Status: SHIPPED | OUTPATIENT
Start: 2025-03-14 | End: 2025-05-13

## 2025-03-14 RX ORDER — LIDOCAINE HYDROCHLORIDE 20 MG/ML
INJECTION, SOLUTION INFILTRATION; PERINEURAL AS NEEDED
Status: DISCONTINUED | OUTPATIENT
Start: 2025-03-14 | End: 2025-03-14

## 2025-03-14 RX ORDER — MIDAZOLAM HYDROCHLORIDE 1 MG/ML
INJECTION, SOLUTION INTRAMUSCULAR; INTRAVENOUS AS NEEDED
Status: COMPLETED | OUTPATIENT
Start: 2025-03-14 | End: 2025-03-14

## 2025-03-14 RX ORDER — PROPOFOL 10 MG/ML
INJECTION, EMULSION INTRAVENOUS AS NEEDED
Status: DISCONTINUED | OUTPATIENT
Start: 2025-03-14 | End: 2025-03-14

## 2025-03-14 RX ADMIN — MIDAZOLAM HYDROCHLORIDE 2 MG: 1 INJECTION, SOLUTION INTRAMUSCULAR; INTRAVENOUS at 13:34

## 2025-03-14 RX ADMIN — PROPOFOL 50 MG: 10 INJECTION, EMULSION INTRAVENOUS at 13:39

## 2025-03-14 RX ADMIN — PROPOFOL 100 MG: 10 INJECTION, EMULSION INTRAVENOUS at 13:36

## 2025-03-14 RX ADMIN — MIDAZOLAM HYDROCHLORIDE 2 MG: 1 INJECTION, SOLUTION INTRAMUSCULAR; INTRAVENOUS at 13:16

## 2025-03-14 RX ADMIN — SODIUM CHLORIDE: 9 INJECTION, SOLUTION INTRAVENOUS at 13:36

## 2025-03-14 RX ADMIN — LIDOCAINE HYDROCHLORIDE 2.5 ML: 20 INJECTION, SOLUTION INFILTRATION; PERINEURAL at 13:36

## 2025-03-14 SDOH — HEALTH STABILITY: MENTAL HEALTH: CURRENT SMOKER: 0

## 2025-03-14 ASSESSMENT — ENCOUNTER SYMPTOMS
RESPIRATORY NEGATIVE: 1
ENDOCRINE NEGATIVE: 1
RESPIRATORY NEGATIVE: 1
ABDOMINAL DISTENTION: 1
NEUROLOGICAL NEGATIVE: 1
CARDIOVASCULAR NEGATIVE: 1
CONSTITUTIONAL NEGATIVE: 1
CONSTITUTIONAL NEGATIVE: 1
ENDOCRINE NEGATIVE: 1
ABDOMINAL PAIN: 1
NEUROLOGICAL NEGATIVE: 1

## 2025-03-14 ASSESSMENT — PAIN SCALES - GENERAL
PAIN_LEVEL: 0
PAINLEVEL_OUTOF10: 0 - NO PAIN

## 2025-03-14 ASSESSMENT — PAIN - FUNCTIONAL ASSESSMENT
PAIN_FUNCTIONAL_ASSESSMENT: 0-10

## 2025-03-14 NOTE — TELEPHONE ENCOUNTER
"Spoke with patient in regards to mychart message pt sent office. Pt explained that she called the oncall last night and figured out that she \"somehow cancelled\" her procedure on accident. On Call was able to get pt back on the schedule with Dr. Robles on 03/814/2025. Pt expressed gratitude for office calling to help her this morning.   "

## 2025-03-14 NOTE — H&P (VIEW-ONLY)
History Of Present Illness  Christiano Prieto is a 29 y.o. female presenting with generalized abdominal pain     Past Medical History  Past Medical History:   Diagnosis Date    Anxiety     ETOH abuse     HTN (hypertension)     Moderate asthma (HHS-HCC)     Panic anxiety syndrome      Surgical History  History reviewed. No pertinent surgical history.  Social History  She reports that she has quit smoking. Her smoking use included cigarettes. She has never used smokeless tobacco. She reports current alcohol use. She reports that she does not use drugs.    Family History  No family history on file.     Allergies  No Known Allergies  Review of Systems   Constitutional: Negative.    Respiratory: Negative.     Cardiovascular: Negative.    Endocrine: Negative.    Genitourinary: Negative.    Skin: Negative.    Neurological: Negative.         Physical Exam  Constitutional:       Appearance: Normal appearance.   HENT:      Head: Normocephalic and atraumatic.   Cardiovascular:      Rate and Rhythm: Normal rate and regular rhythm.      Pulses: Normal pulses.      Heart sounds: Normal heart sounds.   Pulmonary:      Effort: Pulmonary effort is normal.      Breath sounds: Normal breath sounds.   Abdominal:      General: Abdomen is flat. Bowel sounds are normal.      Palpations: Abdomen is soft.      Tenderness: There is abdominal tenderness.   Musculoskeletal:         General: Normal range of motion.      Cervical back: Normal range of motion.   Skin:     General: Skin is warm.   Neurological:      Mental Status: She is alert.          Last Recorded Vitals  Blood pressure 110/73, pulse 68, temperature 36.8 °C (98.2 °F), resp. rate 12, last menstrual period 03/14/2024, SpO2 98%, unknown if currently breastfeeding.    Assessment/Plan   Generalized abdominal pain.   EGD     Farshad Robles MD

## 2025-03-14 NOTE — DISCHARGE INSTRUCTIONS

## 2025-03-14 NOTE — ANESTHESIA PREPROCEDURE EVALUATION
Patient: Christiano Prieto    Procedure Information       Date/Time: 03/14/25 1300    Scheduled providers: Farshad Robles MD    Procedure: EGD    Location: [facility]            Relevant Problems   Anesthesia (within normal limits)      Cardiac   (+) Severe pre-eclampsia, postpartum (HHS-HCC)      Pulmonary   (+) Asthma   (+) Community acquired pneumonia      Neuro   (+) Panic disorder without agoraphobia      GI   (+) Gastroesophageal reflux disease      /Renal (within normal limits)      Liver (within normal limits)      Endocrine (within normal limits)      Hematology   (+) Iron deficiency anemia      Musculoskeletal (within normal limits)      HEENT   (+) Conductive hearing loss of left ear with unrestricted hearing of right ear      ID   (+) Community acquired pneumonia      Skin (within normal limits)      GYN (within normal limits)       Clinical information reviewed:   Tobacco  Allergies  Meds   Med Hx  Surg Hx  OB Status  Fam Hx  Soc   Hx        NPO Detail:  NPO/Void Status  Date of Last Liquid: 03/13/25  Time of Last Liquid: 2200  Date of Last Solid: 03/13/25  Time of Last Solid: 2200         Physical Exam    Airway  Mallampati: II  TM distance: >3 FB  Neck ROM: full     Cardiovascular   Rhythm: regular  Rate: normal     Dental    Pulmonary   Breath sounds clear to auscultation     Abdominal   Abdomen: soft  Bowel sounds: normal           Anesthesia Plan    History of general anesthesia?: yes  History of complications of general anesthesia?: no    ASA 3     MAC     The patient is not a current smoker.  Patient was not previously instructed to abstain from smoking on day of procedure.  Education provided regarding risk of obstructive sleep apnea.  intravenous induction   Anesthetic plan and risks discussed with patient.    Plan discussed with CRNA.

## 2025-03-14 NOTE — ANESTHESIA POSTPROCEDURE EVALUATION
Patient: Christiano Prieto    Procedure Summary       Date: 03/14/25 Room / Location: [facility]    Anesthesia Start: 1334 Anesthesia Stop:     Procedure: EGD Diagnosis:       Generalized abdominal pain      Generalized abdominal pain    Scheduled Providers: Farshad Robles MD Responsible Provider: TERRA Dangelo    Anesthesia Type: MAC ASA Status: 3            Anesthesia Type: MAC    Vitals Value Taken Time   /68 03/14/25 1346   Temp 36.9 03/14/25 1346   Pulse 71 03/14/25 1346   Resp 202 03/14/25 1346   SpO2 96   03/14/25 1346       Anesthesia Post Evaluation    Patient location during evaluation: bedside  Patient participation: complete - patient participated  Level of consciousness: sleepy but conscious and sedated  Pain score: 0  Pain management: adequate  Airway patency: patent  Cardiovascular status: acceptable  Respiratory status: acceptable  Hydration status: acceptable  Postoperative Nausea and Vomiting: none        No notable events documented.

## 2025-03-26 ENCOUNTER — TELEPHONE (OUTPATIENT)
Dept: GASTROENTEROLOGY | Facility: CLINIC | Age: 30
End: 2025-03-26
Payer: COMMERCIAL

## 2025-03-26 NOTE — TELEPHONE ENCOUNTER
Pt left voicemail, wanting to discuss the results of EGD, stating the results were disturbing. Please review and advise when able, thank you!

## 2025-03-28 ENCOUNTER — TELEPHONE (OUTPATIENT)
Dept: GASTROENTEROLOGY | Facility: CLINIC | Age: 30
End: 2025-03-28
Payer: COMMERCIAL

## 2025-03-28 DIAGNOSIS — K22.81 ESOPHAGEAL POLYP: Primary | ICD-10-CM

## 2025-03-28 NOTE — TELEPHONE ENCOUNTER
Spoke with patient. I offered her 4/3 with Dr. Gonzalez at [place]. I told her there is a small polyp that will be removed and this will be outpatient. She will receive a call between 2 and 6 pm 4/2 from [facility] to inform her of arrival time.     Patient instructed nothing to eat or drink after 12 pm. Instructions to be sent via [employer].     Patient verbalized understanding.

## 2025-03-28 NOTE — TELEPHONE ENCOUNTER
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  So far based on multiple CAT scan and endoscopy evaluation we have not found anything significantly wrong with the GI tract.  She has a small polyp in the esophagus which will need resection by advanced endoscopist at [place].  I will enter the order today.  Again this polyp is small enough that it will not cause any mechanical symptoms.

## 2025-03-28 NOTE — TELEPHONE ENCOUNTER
Patient called in and left message stating she is experiencing some symptoms and would like to speak with someone about them; patient did not state specifics of what is going on. She would like a call back at [phone].  Thank you.

## 2025-03-31 ENCOUNTER — APPOINTMENT (OUTPATIENT)
Dept: RADIOLOGY | Facility: HOSPITAL | Age: 30
End: 2025-03-31
Payer: COMMERCIAL

## 2025-03-31 ENCOUNTER — APPOINTMENT (OUTPATIENT)
Dept: CARDIOLOGY | Facility: HOSPITAL | Age: 30
End: 2025-03-31
Payer: COMMERCIAL

## 2025-03-31 ENCOUNTER — HOSPITAL ENCOUNTER (EMERGENCY)
Facility: HOSPITAL | Age: 30
Discharge: HOME | End: 2025-03-31
Payer: COMMERCIAL

## 2025-03-31 VITALS
HEIGHT: 63 IN | OXYGEN SATURATION: 98 % | HEART RATE: 74 BPM | WEIGHT: 180 LBS | TEMPERATURE: 97.5 F | BODY MASS INDEX: 31.89 KG/M2 | DIASTOLIC BLOOD PRESSURE: 72 MMHG | RESPIRATION RATE: 18 BRPM | SYSTOLIC BLOOD PRESSURE: 114 MMHG

## 2025-03-31 DIAGNOSIS — F41.0 PANIC ATTACK: Primary | ICD-10-CM

## 2025-03-31 LAB
ALBUMIN SERPL BCP-MCNC: 4.1 G/DL (ref 3.4–5)
ALP SERPL-CCNC: 60 U/L (ref 33–110)
ALT SERPL W P-5'-P-CCNC: 13 U/L (ref 7–45)
ANION GAP SERPL CALC-SCNC: 12 MMOL/L (ref 10–20)
AST SERPL W P-5'-P-CCNC: 15 U/L (ref 9–39)
B-HCG SERPL-ACNC: <2 MIU/ML
BASOPHILS # BLD AUTO: 0.04 X10*3/UL (ref 0–0.1)
BASOPHILS NFR BLD AUTO: 0.9 %
BILIRUB SERPL-MCNC: 0.3 MG/DL (ref 0–1.2)
BUN SERPL-MCNC: 14 MG/DL (ref 6–23)
CALCIUM SERPL-MCNC: 8.2 MG/DL (ref 8.6–10.3)
CARDIAC TROPONIN I PNL SERPL HS: <3 NG/L (ref 0–13)
CARDIAC TROPONIN I PNL SERPL HS: <3 NG/L (ref 0–13)
CHLORIDE SERPL-SCNC: 104 MMOL/L (ref 98–107)
CO2 SERPL-SCNC: 24 MMOL/L (ref 21–32)
CREAT SERPL-MCNC: 0.8 MG/DL (ref 0.5–1.05)
D DIMER PPP FEU-MCNC: <215 NG/ML FEU
EGFRCR SERPLBLD CKD-EPI 2021: >90 ML/MIN/1.73M*2
EOSINOPHIL # BLD AUTO: 0.06 X10*3/UL (ref 0–0.7)
EOSINOPHIL NFR BLD AUTO: 1.4 %
ERYTHROCYTE [DISTWIDTH] IN BLOOD BY AUTOMATED COUNT: 14.1 % (ref 11.5–14.5)
GLUCOSE SERPL-MCNC: 99 MG/DL (ref 74–99)
HCT VFR BLD AUTO: 37.4 % (ref 36–46)
HGB BLD-MCNC: 12.1 G/DL (ref 12–16)
HOLD SPECIMEN: NORMAL
HOLD SPECIMEN: NORMAL
IMM GRANULOCYTES # BLD AUTO: 0.01 X10*3/UL (ref 0–0.7)
IMM GRANULOCYTES NFR BLD AUTO: 0.2 % (ref 0–0.9)
LYMPHOCYTES # BLD AUTO: 1.46 X10*3/UL (ref 1.2–4.8)
LYMPHOCYTES NFR BLD AUTO: 34.6 %
MAGNESIUM SERPL-MCNC: 1.67 MG/DL (ref 1.6–2.4)
MCH RBC QN AUTO: 26.4 PG (ref 26–34)
MCHC RBC AUTO-ENTMCNC: 32.4 G/DL (ref 32–36)
MCV RBC AUTO: 82 FL (ref 80–100)
MONOCYTES # BLD AUTO: 0.37 X10*3/UL (ref 0.1–1)
MONOCYTES NFR BLD AUTO: 8.8 %
NEUTROPHILS # BLD AUTO: 2.28 X10*3/UL (ref 1.2–7.7)
NEUTROPHILS NFR BLD AUTO: 54.1 %
NRBC BLD-RTO: 0 /100 WBCS (ref 0–0)
PLATELET # BLD AUTO: 309 X10*3/UL (ref 150–450)
POTASSIUM SERPL-SCNC: 3.8 MMOL/L (ref 3.5–5.3)
PROT SERPL-MCNC: 6.9 G/DL (ref 6.4–8.2)
RBC # BLD AUTO: 4.58 X10*6/UL (ref 4–5.2)
SODIUM SERPL-SCNC: 136 MMOL/L (ref 136–145)
WBC # BLD AUTO: 4.2 X10*3/UL (ref 4.4–11.3)

## 2025-03-31 PROCEDURE — 2500000001 HC RX 250 WO HCPCS SELF ADMINISTERED DRUGS (ALT 637 FOR MEDICARE OP)

## 2025-03-31 PROCEDURE — 85025 COMPLETE CBC W/AUTO DIFF WBC: CPT

## 2025-03-31 PROCEDURE — 99285 EMERGENCY DEPT VISIT HI MDM: CPT | Mod: 25

## 2025-03-31 PROCEDURE — 36415 COLL VENOUS BLD VENIPUNCTURE: CPT

## 2025-03-31 PROCEDURE — 2500000004 HC RX 250 GENERAL PHARMACY W/ HCPCS (ALT 636 FOR OP/ED)

## 2025-03-31 PROCEDURE — 85379 FIBRIN DEGRADATION QUANT: CPT

## 2025-03-31 PROCEDURE — 93005 ELECTROCARDIOGRAM TRACING: CPT

## 2025-03-31 PROCEDURE — 84484 ASSAY OF TROPONIN QUANT: CPT

## 2025-03-31 PROCEDURE — 71045 X-RAY EXAM CHEST 1 VIEW: CPT | Performed by: RADIOLOGY

## 2025-03-31 PROCEDURE — 80053 COMPREHEN METABOLIC PANEL: CPT

## 2025-03-31 PROCEDURE — 84702 CHORIONIC GONADOTROPIN TEST: CPT

## 2025-03-31 PROCEDURE — 83735 ASSAY OF MAGNESIUM: CPT

## 2025-03-31 PROCEDURE — 71045 X-RAY EXAM CHEST 1 VIEW: CPT

## 2025-03-31 RX ORDER — ONDANSETRON 4 MG/1
4 TABLET, ORALLY DISINTEGRATING ORAL ONCE
Status: COMPLETED | OUTPATIENT
Start: 2025-03-31 | End: 2025-03-31

## 2025-03-31 RX ORDER — ONDANSETRON 4 MG/1
4 TABLET, ORALLY DISINTEGRATING ORAL EVERY 8 HOURS PRN
Qty: 20 TABLET | Refills: 0 | Status: SHIPPED | OUTPATIENT
Start: 2025-03-31 | End: 2025-04-07

## 2025-03-31 RX ORDER — LORAZEPAM 1 MG/1
1 TABLET ORAL ONCE
Status: COMPLETED | OUTPATIENT
Start: 2025-03-31 | End: 2025-03-31

## 2025-03-31 RX ORDER — ONDANSETRON HYDROCHLORIDE 2 MG/ML
4 INJECTION, SOLUTION INTRAVENOUS ONCE
Status: DISCONTINUED | OUTPATIENT
Start: 2025-03-31 | End: 2025-03-31

## 2025-03-31 RX ADMIN — ONDANSETRON 4 MG: 4 TABLET, ORALLY DISINTEGRATING ORAL at 09:33

## 2025-03-31 RX ADMIN — LORAZEPAM 1 MG: 1 TABLET ORAL at 08:22

## 2025-03-31 ASSESSMENT — PAIN - FUNCTIONAL ASSESSMENT: PAIN_FUNCTIONAL_ASSESSMENT: 0-10

## 2025-03-31 ASSESSMENT — LIFESTYLE VARIABLES
EVER HAD A DRINK FIRST THING IN THE MORNING TO STEADY YOUR NERVES TO GET RID OF A HANGOVER: NO
EVER FELT BAD OR GUILTY ABOUT YOUR DRINKING: NO
TOTAL SCORE: 0
HAVE PEOPLE ANNOYED YOU BY CRITICIZING YOUR DRINKING: NO
HAVE YOU EVER FELT YOU SHOULD CUT DOWN ON YOUR DRINKING: NO

## 2025-03-31 ASSESSMENT — PAIN DESCRIPTION - FREQUENCY: FREQUENCY: CONSTANT/CONTINUOUS

## 2025-03-31 ASSESSMENT — PAIN DESCRIPTION - PAIN TYPE: TYPE: ACUTE PAIN

## 2025-03-31 ASSESSMENT — PAIN DESCRIPTION - PROGRESSION: CLINICAL_PROGRESSION: NOT CHANGED

## 2025-03-31 ASSESSMENT — PAIN SCALES - GENERAL: PAINLEVEL_OUTOF10: 6

## 2025-03-31 ASSESSMENT — PAIN DESCRIPTION - ONSET: ONSET: SUDDEN

## 2025-03-31 ASSESSMENT — PAIN DESCRIPTION - DESCRIPTORS: DESCRIPTORS: ACHING

## 2025-03-31 ASSESSMENT — PAIN DESCRIPTION - LOCATION: LOCATION: CHEST

## 2025-03-31 NOTE — Clinical Note
Christiano Prieto was seen and treated in our emergency department on 3/31/2025.  She may return to work on 04/02/2025.       If you have any questions or concerns, please don't hesitate to call.      Tim Baires PA-C

## 2025-03-31 NOTE — ED PROVIDER NOTES
HPI   Chief Complaint   Patient presents with    Panic Attack     States woke up at 4am feeling extremely anxious.       History provided by: Patient    Limitations to history: None    CC: Panic attack    HPI: 29-year-old female with a history of anxiety, asthma, hypertension, panic attacks presents the emergency department to be evaluated for a panic attack.  Patient states that she woke up at 4 AM feeling that she could not breathe and was having chest pain.  She states that she typically feels this way whenever she has a panic attack.  She tried taking gabapentin and Xanax with little to no relief.  She still feels very anxious.  She denies anything in particular makes her feel this way.  Denies SI HI and hallucinations.  She is unable to characterize the chest pain.  Denies cough and a mopped assist.  Denies pleuritic pain.  She states that she will intermittently feel short of breath and wake up gasping for air whenever she lays flat on her back.  She has a history of asthma and uses an inhaler as needed but denies any other heart or lung history including ACS, redness, heart failure, DVT or PE.  Denies recent plane flights, recent surgeries, history of cancer.  Denies headache and neck pain.  Denies abdominal pain or flank pain.  Denies nausea vomiting, diarrhea or constipation.  Denies any blood in the urine or stool.  Denies any urinary complaints.  Denies all other systemic symptoms.    ROS: Negative unless mentioned in HPI    Medical Hx: Allergies reviewed.  Immunizations up-to-date.    Physical exam:    Constitutional: Patient is well-nourished and well-developed.  Sitting comfortably in the room and in no distress.  Oriented to person, place, time, and situation.    HEENT: Head is normocephalic, atraumatic. Patient's airway is patent.  Tympanic membranes are clear bilaterally.  Nasal mucosa clear.  Mouth with normal mucosa.  Throat is not erythematous and there are no oropharyngeal exudates, uvula is  midline.  No obvious facial deformities.    Eyes: Clear bilaterally.  Pupils are equal round and reactive to light and accommodation.  Extraocular movements intact.      Cardiac: Regular rate, regular rhythm.  Heart sounds S1, S2.  No murmurs, rubs, or gallops.  PMI nondisplaced.  No JVD.    Respiratory: Regular respiratory rate and effort.  Breath sounds are clear and equal bilaterally, no adventitious lung sounds.  Patient is speaking in full sentences and is in no apparent respiratory distress. No use of accessory muscles.      Gastrointestinal: Abdomen is soft, nondistended, and nontender.  There are no obvious deformities.  No rebound tenderness or guarding.  Bowel sounds are normal active.    Genitourinary: No CVA or flank tenderness.    Musculoskeletal: No reproducible tenderness.  No obvious skin or bony deformities.  Patient has equal range of motion in all extremities and no strength deficiencies.  No muscle or joint tenderness. No back or neck tenderness.  Capillary refill less than 3 seconds.  Strong peripheral pulses.  No sensory deficits.    Neurological: Patient is alert and oriented.  No focal deficits.  5/5 strength in all extremities.  Cranial nerves II through XII intact. GCS15.     Skin: Skin is normal color for race and is warm, dry, and intact.  No evidence of trauma.  No lesions, rashes, bruising, jaundice, or masses.    Psych: Appropriate mood and affect.  No apparent risk to self or others.    Heme/lymph: No adenopathy, lymphadenopathy, or splenomegaly    Physical exam is otherwise negative unless stated above or in history of present illness.                  Patient History   Past Medical History:   Diagnosis Date    Anxiety     ETOH abuse     HTN (hypertension)     Moderate asthma (HHS-HCC)     Panic anxiety syndrome      History reviewed. No pertinent surgical history.  No family history on file.  Social History     Tobacco Use    Smoking status: Former     Types: Cigarettes    Smokeless  "tobacco: Never   Vaping Use    Vaping status: Every Day    Substances: Nicotine   Substance Use Topics    Alcohol use: Yes     Comment: \"once a month\"    Drug use: Never       Physical Exam   ED Triage Vitals [03/31/25 0759]   Temperature Heart Rate Respirations BP   36.5 °C (97.7 °F) 87 20 145/70      Pulse Ox Temp Source Heart Rate Source Patient Position   98 % Temporal Monitor Sitting      BP Location FiO2 (%)     Right arm --       Physical Exam      ED Course & MDM   Diagnoses as of 03/31/25 1143   Panic attack     Patient updated on plan for lab testing, IV insertion, radiology imaging, and medications to be administered while in the ER (if indicated). Patient updated on expected wait times for testing and results. Patient provided my name and told to ask any staff member for questions or concerns if they should arise. Electronic medical record reviewed.     MDM    Patient presented to the emergency department with the chief complaint anxiety attacks.  Examination of the patient's heart and lungs are unremarkable.  On arrival to the emergency department, vital signs were within normal limits    Will obtain basic blood work, EKG and troponin, chest x-ray, D-dimer, pregnancy test.  Will give the patient oral Ativan for her anxiety.    EKG was performed today 13 and interpreted by me.  Normal sinus rhythm 83 beats minute.  Normal axis.  No ST elevation or depression.  No prolonged QT.  Patient has a chronic right bundle branch block.      Patient is feeling better after medications.  She was resting and in no acute distress.  I updated her on her results.  Her troponins are negative making ACS unlikely.  Her heart score is 1.  Pregnancy test is negative.  D-dimer is negative making a PE unlikely.  Magnesium is within normal limits.  CBC reveals no leukocytosis or anemia.  CMP reveals hypocalcemia 8.2.  Chest x-ray is unremarkable.  I discussed differentials with the patient.  She feels well and feels comfortable " going home.  She does have a psychiatrist that she can follow-up with.  I also recommended that she touch base with her primary care provider, I do believe she would benefit from a sleep study in case she does have underlying sleep apnea.  All questions and concerns addressed.  Reasons to return to ER discussed.  Patient verbalized understanding and agreement with the treatment plan and they remained hemodynamically stable in the ER.    This note was dictated using a speech recognition program.  While an attempt was made at proof-reading to minimize errors, minor errors in transcription may be present            No data recorded     Jenny Coma Scale Score: 15 (03/31/25 0757 : August Ellis RN)                           Medical Decision Making      Procedure  Procedures     Tim Baires PA-C  03/31/25 1146

## 2025-04-03 ENCOUNTER — HOSPITAL ENCOUNTER (OUTPATIENT)
Dept: GASTROENTEROLOGY | Facility: HOSPITAL | Age: 30
Discharge: HOME | End: 2025-04-03
Payer: COMMERCIAL

## 2025-04-03 ENCOUNTER — ANESTHESIA EVENT (OUTPATIENT)
Dept: GASTROENTEROLOGY | Facility: HOSPITAL | Age: 30
End: 2025-04-03
Payer: COMMERCIAL

## 2025-04-03 ENCOUNTER — ANESTHESIA (OUTPATIENT)
Dept: GASTROENTEROLOGY | Facility: HOSPITAL | Age: 30
End: 2025-04-03
Payer: COMMERCIAL

## 2025-04-03 VITALS
SYSTOLIC BLOOD PRESSURE: 139 MMHG | OXYGEN SATURATION: 97 % | TEMPERATURE: 96.8 F | HEIGHT: 63 IN | WEIGHT: 180 LBS | RESPIRATION RATE: 16 BRPM | BODY MASS INDEX: 31.89 KG/M2 | DIASTOLIC BLOOD PRESSURE: 67 MMHG | HEART RATE: 76 BPM

## 2025-04-03 DIAGNOSIS — K22.81 ESOPHAGEAL POLYP: Primary | ICD-10-CM

## 2025-04-03 LAB
ATRIAL RATE: 83 BPM
HCG UR QL IA.RAPID: NEGATIVE
P AXIS: 20 DEGREES
P OFFSET: 195 MS
P ONSET: 142 MS
PR INTERVAL: 154 MS
Q ONSET: 219 MS
QRS COUNT: 13 BEATS
QRS DURATION: 98 MS
QT INTERVAL: 382 MS
QTC CALCULATION(BAZETT): 448 MS
QTC FREDERICIA: 425 MS
R AXIS: 22 DEGREES
T AXIS: 19 DEGREES
T OFFSET: 410 MS
VENTRICULAR RATE: 83 BPM

## 2025-04-03 PROCEDURE — 2500000004 HC RX 250 GENERAL PHARMACY W/ HCPCS (ALT 636 FOR OP/ED): Performed by: ANESTHESIOLOGIST ASSISTANT

## 2025-04-03 PROCEDURE — 43251 EGD REMOVE LESION SNARE: CPT | Performed by: INTERNAL MEDICINE

## 2025-04-03 PROCEDURE — 81025 URINE PREGNANCY TEST: CPT | Performed by: INTERNAL MEDICINE

## 2025-04-03 PROCEDURE — 3700000002 HC GENERAL ANESTHESIA TIME - EACH INCREMENTAL 1 MINUTE

## 2025-04-03 PROCEDURE — 7100000010 HC PHASE TWO TIME - EACH INCREMENTAL 1 MINUTE

## 2025-04-03 PROCEDURE — A43251 PR EDG REMOVAL TUMOR POLYP/OTHER LESION SNARE TECH: Performed by: ANESTHESIOLOGIST ASSISTANT

## 2025-04-03 PROCEDURE — 7100000009 HC PHASE TWO TIME - INITIAL BASE CHARGE

## 2025-04-03 PROCEDURE — 2720000007 HC OR 272 NO HCPCS

## 2025-04-03 PROCEDURE — 3700000001 HC GENERAL ANESTHESIA TIME - INITIAL BASE CHARGE

## 2025-04-03 PROCEDURE — 2500000004 HC RX 250 GENERAL PHARMACY W/ HCPCS (ALT 636 FOR OP/ED): Mod: JZ | Performed by: ANESTHESIOLOGY

## 2025-04-03 PROCEDURE — A43251 PR EDG REMOVAL TUMOR POLYP/OTHER LESION SNARE TECH: Performed by: ANESTHESIOLOGY

## 2025-04-03 RX ORDER — MIDAZOLAM HYDROCHLORIDE 1 MG/ML
INJECTION, SOLUTION INTRAMUSCULAR; INTRAVENOUS AS NEEDED
Status: DISCONTINUED | OUTPATIENT
Start: 2025-04-03 | End: 2025-04-03

## 2025-04-03 RX ORDER — LIDOCAINE HYDROCHLORIDE 20 MG/ML
INJECTION, SOLUTION INFILTRATION; PERINEURAL AS NEEDED
Status: DISCONTINUED | OUTPATIENT
Start: 2025-04-03 | End: 2025-04-03

## 2025-04-03 RX ORDER — MIDAZOLAM HYDROCHLORIDE 1 MG/ML
1 INJECTION, SOLUTION INTRAMUSCULAR; INTRAVENOUS
Status: DISCONTINUED | OUTPATIENT
Start: 2025-04-03 | End: 2025-04-04 | Stop reason: HOSPADM

## 2025-04-03 RX ORDER — HYDROMORPHONE HYDROCHLORIDE 1 MG/ML
1 INJECTION, SOLUTION INTRAMUSCULAR; INTRAVENOUS; SUBCUTANEOUS EVERY 5 MIN PRN
Status: DISCONTINUED | OUTPATIENT
Start: 2025-04-03 | End: 2025-04-04 | Stop reason: HOSPADM

## 2025-04-03 RX ORDER — PROCHLORPERAZINE EDISYLATE 5 MG/ML
5 INJECTION INTRAMUSCULAR; INTRAVENOUS ONCE AS NEEDED
Status: DISCONTINUED | OUTPATIENT
Start: 2025-04-03 | End: 2025-04-04 | Stop reason: HOSPADM

## 2025-04-03 RX ORDER — ACETAMINOPHEN 325 MG/1
975 TABLET ORAL ONCE
Status: DISCONTINUED | OUTPATIENT
Start: 2025-04-03 | End: 2025-04-04 | Stop reason: HOSPADM

## 2025-04-03 RX ORDER — ONDANSETRON HYDROCHLORIDE 2 MG/ML
4 INJECTION, SOLUTION INTRAVENOUS ONCE AS NEEDED
Status: DISCONTINUED | OUTPATIENT
Start: 2025-04-03 | End: 2025-04-04 | Stop reason: HOSPADM

## 2025-04-03 RX ORDER — ALBUTEROL SULFATE 0.83 MG/ML
2.5 SOLUTION RESPIRATORY (INHALATION) ONCE AS NEEDED
Status: DISCONTINUED | OUTPATIENT
Start: 2025-04-03 | End: 2025-04-04 | Stop reason: HOSPADM

## 2025-04-03 RX ORDER — OXYCODONE HYDROCHLORIDE 10 MG/1
10 TABLET ORAL EVERY 4 HOURS PRN
Status: DISCONTINUED | OUTPATIENT
Start: 2025-04-03 | End: 2025-04-04 | Stop reason: HOSPADM

## 2025-04-03 RX ORDER — SODIUM CHLORIDE, SODIUM LACTATE, POTASSIUM CHLORIDE, CALCIUM CHLORIDE 600; 310; 30; 20 MG/100ML; MG/100ML; MG/100ML; MG/100ML
125 INJECTION, SOLUTION INTRAVENOUS CONTINUOUS
Status: ACTIVE | OUTPATIENT
Start: 2025-04-03 | End: 2025-04-03

## 2025-04-03 RX ORDER — HYDRALAZINE HYDROCHLORIDE 20 MG/ML
5 INJECTION INTRAMUSCULAR; INTRAVENOUS EVERY 30 MIN PRN
Status: DISCONTINUED | OUTPATIENT
Start: 2025-04-03 | End: 2025-04-04 | Stop reason: HOSPADM

## 2025-04-03 RX ORDER — OXYCODONE AND ACETAMINOPHEN 5; 325 MG/1; MG/1
1 TABLET ORAL EVERY 4 HOURS PRN
Status: DISCONTINUED | OUTPATIENT
Start: 2025-04-03 | End: 2025-04-04 | Stop reason: HOSPADM

## 2025-04-03 RX ORDER — PROPOFOL 10 MG/ML
INJECTION, EMULSION INTRAVENOUS CONTINUOUS PRN
Status: DISCONTINUED | OUTPATIENT
Start: 2025-04-03 | End: 2025-04-03

## 2025-04-03 RX ORDER — HYDROMORPHONE HYDROCHLORIDE 0.2 MG/ML
0.1 INJECTION INTRAMUSCULAR; INTRAVENOUS; SUBCUTANEOUS EVERY 5 MIN PRN
Status: DISCONTINUED | OUTPATIENT
Start: 2025-04-03 | End: 2025-04-04 | Stop reason: HOSPADM

## 2025-04-03 RX ORDER — ACETAMINOPHEN 325 MG/1
650 TABLET ORAL EVERY 4 HOURS PRN
Status: DISCONTINUED | OUTPATIENT
Start: 2025-04-03 | End: 2025-04-04 | Stop reason: HOSPADM

## 2025-04-03 RX ADMIN — HYDROMORPHONE HYDROCHLORIDE 0.5 MG: 1 INJECTION, SOLUTION INTRAMUSCULAR; INTRAVENOUS; SUBCUTANEOUS at 11:14

## 2025-04-03 RX ADMIN — PROPOFOL 400 MCG/KG/MIN: 10 INJECTION, EMULSION INTRAVENOUS at 10:04

## 2025-04-03 RX ADMIN — PROPOFOL 30 MG: 10 INJECTION, EMULSION INTRAVENOUS at 10:09

## 2025-04-03 RX ADMIN — LIDOCAINE HYDROCHLORIDE 100 MG: 20 INJECTION, SOLUTION INFILTRATION; PERINEURAL at 10:04

## 2025-04-03 RX ADMIN — SODIUM CHLORIDE, POTASSIUM CHLORIDE, SODIUM LACTATE AND CALCIUM CHLORIDE: 600; 310; 30; 20 INJECTION, SOLUTION INTRAVENOUS at 09:56

## 2025-04-03 RX ADMIN — MIDAZOLAM 2 MG: 1 INJECTION INTRAMUSCULAR; INTRAVENOUS at 10:01

## 2025-04-03 RX ADMIN — MIDAZOLAM 2 MG: 1 INJECTION INTRAMUSCULAR; INTRAVENOUS at 09:58

## 2025-04-03 SDOH — HEALTH STABILITY: MENTAL HEALTH: CURRENT SMOKER: 0

## 2025-04-03 ASSESSMENT — PAIN SCALES - GENERAL
PAINLEVEL_OUTOF10: 0 - NO PAIN
PAINLEVEL_OUTOF10: 7
PAIN_LEVEL: 0
PAINLEVEL_OUTOF10: 2
PAINLEVEL_OUTOF10: 4

## 2025-04-03 ASSESSMENT — PAIN - FUNCTIONAL ASSESSMENT
PAIN_FUNCTIONAL_ASSESSMENT: 0-10
PAIN_FUNCTIONAL_ASSESSMENT: 0-10

## 2025-04-03 ASSESSMENT — PAIN DESCRIPTION - LOCATION: LOCATION: THROAT

## 2025-04-03 ASSESSMENT — PAIN DESCRIPTION - ORIENTATION: ORIENTATION: RIGHT

## 2025-04-03 NOTE — DISCHARGE INSTRUCTIONS

## 2025-04-03 NOTE — ANESTHESIA POSTPROCEDURE EVALUATION
Patient: Christiano Prieto    Procedure Summary       Date: 04/03/25 Room / Location: [facility] [place]    Anesthesia Start: 0958 Anesthesia Stop: 1026    Procedure: EGD Diagnosis: Esophageal polyp    Scheduled Providers: Carrie Gonzalez MD Responsible Provider: Mishel Landa MD    Anesthesia Type: general, MAC ASA Status: 2            Anesthesia Type: general, MAC    Vitals Value Taken Time   /80 04/03/25 1027   Temp 36 04/03/25 1027   Pulse 80 04/03/25 1027   Resp 14 04/03/25 1027   SpO2 98 04/03/25 1027       Anesthesia Post Evaluation    Patient location during evaluation: PACU  Patient participation: complete - patient participated  Level of consciousness: awake and alert  Pain score: 0  Pain management: adequate  Airway patency: patent  Cardiovascular status: acceptable, blood pressure returned to baseline, hemodynamically stable and stable  Respiratory status: acceptable, spontaneous ventilation, nonlabored ventilation and room air  Hydration status: acceptable  Postoperative Nausea and Vomiting: none        There were no known notable events for this encounter.

## 2025-04-03 NOTE — ANESTHESIA PREPROCEDURE EVALUATION
Patient: Christiano Prieto    Procedure Information       Date/Time: 04/03/25 0950    Scheduled providers: Carrie Gonzalez MD    Procedure: EGD    [place] [facility] [place] Problems   Cardiac   (+) Severe pre-eclampsia, postpartum (HHS-HCC)      Pulmonary   (+) Asthma   (+) Community acquired pneumonia      Neuro   (+) Panic disorder without agoraphobia      GI   (+) Gastroesophageal reflux disease      Hematology   (+) Iron deficiency anemia      HEENT   (+) Conductive hearing loss of left ear with unrestricted hearing of right ear      ID   (+) Community acquired pneumonia       Clinical information reviewed:   Tobacco  Allergies  Meds   Med Hx  Surg Hx  OB Status  Fam Hx  Soc   Hx        NPO Detail:  NPO/Void Status  Date of Last Liquid: 04/03/25  Time of Last Liquid: 0700  Date of Last Solid: 04/03/25  Time of Last Solid: 2000  Last Intake Type: Clear fluids  Time of Last Void: 0815         Physical Exam    Airway  Mallampati: II  TM distance: >3 FB  Neck ROM: full     Cardiovascular - normal exam  Rhythm: regular  Rate: normal     Dental - normal exam     Pulmonary - normal exam  Breath sounds clear to auscultation     Abdominal   (+) obese  Abdomen: soft  Bowel sounds: normal           Anesthesia Plan    History of general anesthesia?: unknown/emergency  History of complications of general anesthesia?: unknown/emergency    ASA 2     general and MAC   (MAC or TIVA)  The patient is not a current smoker.  Patient was previously instructed to abstain from smoking on day of procedure.  Patient did not smoke on day of procedure.  Education provided regarding risk of obstructive sleep apnea.  intravenous induction   Anesthetic plan and risks discussed with patient.  Use of blood products discussed with patient who consented to blood products.    Plan discussed with [facility].

## 2025-04-08 ENCOUNTER — APPOINTMENT (OUTPATIENT)
Dept: CARDIOLOGY | Facility: HOSPITAL | Age: 30
End: 2025-04-08
Payer: COMMERCIAL

## 2025-04-08 ENCOUNTER — HOSPITAL ENCOUNTER (EMERGENCY)
Facility: HOSPITAL | Age: 30
Discharge: HOME | End: 2025-04-08
Attending: EMERGENCY MEDICINE
Payer: COMMERCIAL

## 2025-04-08 ENCOUNTER — APPOINTMENT (OUTPATIENT)
Dept: RADIOLOGY | Facility: HOSPITAL | Age: 30
End: 2025-04-08
Payer: COMMERCIAL

## 2025-04-08 VITALS
DIASTOLIC BLOOD PRESSURE: 86 MMHG | HEART RATE: 78 BPM | BODY MASS INDEX: 31.89 KG/M2 | HEIGHT: 63 IN | WEIGHT: 180 LBS | OXYGEN SATURATION: 100 % | TEMPERATURE: 97.5 F | SYSTOLIC BLOOD PRESSURE: 131 MMHG | RESPIRATION RATE: 16 BRPM

## 2025-04-08 DIAGNOSIS — M50.20 CERVICAL DISC HERNIATION: ICD-10-CM

## 2025-04-08 DIAGNOSIS — R07.9 CHEST PAIN, UNSPECIFIED TYPE: ICD-10-CM

## 2025-04-08 DIAGNOSIS — R20.2 PARESTHESIA: Primary | ICD-10-CM

## 2025-04-08 DIAGNOSIS — R06.01 ORTHOPNEA: ICD-10-CM

## 2025-04-08 LAB
ALBUMIN SERPL BCP-MCNC: 4.3 G/DL (ref 3.4–5)
ALP SERPL-CCNC: 57 U/L (ref 33–110)
ALT SERPL W P-5'-P-CCNC: 12 U/L (ref 7–45)
ANION GAP SERPL CALC-SCNC: 15 MMOL/L (ref 10–20)
AST SERPL W P-5'-P-CCNC: 15 U/L (ref 9–39)
B-HCG SERPL-ACNC: <2 MIU/ML
BASOPHILS # BLD AUTO: 0.03 X10*3/UL (ref 0–0.1)
BASOPHILS NFR BLD AUTO: 0.6 %
BILIRUB SERPL-MCNC: 0.3 MG/DL (ref 0–1.2)
BUN SERPL-MCNC: 8 MG/DL (ref 6–23)
CALCIUM SERPL-MCNC: 8.6 MG/DL (ref 8.6–10.3)
CARDIAC TROPONIN I PNL SERPL HS: <3 NG/L (ref 0–13)
CARDIAC TROPONIN I PNL SERPL HS: <3 NG/L (ref 0–13)
CHLORIDE SERPL-SCNC: 105 MMOL/L (ref 98–107)
CO2 SERPL-SCNC: 22 MMOL/L (ref 21–32)
CREAT SERPL-MCNC: 0.66 MG/DL (ref 0.5–1.05)
EGFRCR SERPLBLD CKD-EPI 2021: >90 ML/MIN/1.73M*2
EOSINOPHIL # BLD AUTO: 0.12 X10*3/UL (ref 0–0.7)
EOSINOPHIL NFR BLD AUTO: 2.4 %
ERYTHROCYTE [DISTWIDTH] IN BLOOD BY AUTOMATED COUNT: 13.8 % (ref 11.5–14.5)
GLUCOSE SERPL-MCNC: 104 MG/DL (ref 74–99)
HCT VFR BLD AUTO: 36.4 % (ref 36–46)
HGB BLD-MCNC: 11.9 G/DL (ref 12–16)
IMM GRANULOCYTES # BLD AUTO: 0.01 X10*3/UL (ref 0–0.7)
IMM GRANULOCYTES NFR BLD AUTO: 0.2 % (ref 0–0.9)
INR PPP: 0.9 (ref 0.9–1.1)
LYMPHOCYTES # BLD AUTO: 1.32 X10*3/UL (ref 1.2–4.8)
LYMPHOCYTES NFR BLD AUTO: 26.3 %
MCH RBC QN AUTO: 26.8 PG (ref 26–34)
MCHC RBC AUTO-ENTMCNC: 32.7 G/DL (ref 32–36)
MCV RBC AUTO: 82 FL (ref 80–100)
MONOCYTES # BLD AUTO: 0.35 X10*3/UL (ref 0.1–1)
MONOCYTES NFR BLD AUTO: 7 %
NEUTROPHILS # BLD AUTO: 3.18 X10*3/UL (ref 1.2–7.7)
NEUTROPHILS NFR BLD AUTO: 63.5 %
NRBC BLD-RTO: 0 /100 WBCS (ref 0–0)
PLATELET # BLD AUTO: 252 X10*3/UL (ref 150–450)
POTASSIUM SERPL-SCNC: 3.7 MMOL/L (ref 3.5–5.3)
PROT SERPL-MCNC: 6.8 G/DL (ref 6.4–8.2)
PROTHROMBIN TIME: 10.1 SECONDS (ref 9.8–12.4)
RBC # BLD AUTO: 4.44 X10*6/UL (ref 4–5.2)
SODIUM SERPL-SCNC: 138 MMOL/L (ref 136–145)
WBC # BLD AUTO: 5 X10*3/UL (ref 4.4–11.3)

## 2025-04-08 PROCEDURE — 85025 COMPLETE CBC W/AUTO DIFF WBC: CPT | Performed by: EMERGENCY MEDICINE

## 2025-04-08 PROCEDURE — 84484 ASSAY OF TROPONIN QUANT: CPT | Performed by: EMERGENCY MEDICINE

## 2025-04-08 PROCEDURE — 72125 CT NECK SPINE W/O DYE: CPT | Performed by: RADIOLOGY

## 2025-04-08 PROCEDURE — 84702 CHORIONIC GONADOTROPIN TEST: CPT

## 2025-04-08 PROCEDURE — 84075 ASSAY ALKALINE PHOSPHATASE: CPT | Performed by: EMERGENCY MEDICINE

## 2025-04-08 PROCEDURE — 85610 PROTHROMBIN TIME: CPT | Performed by: EMERGENCY MEDICINE

## 2025-04-08 PROCEDURE — 99285 EMERGENCY DEPT VISIT HI MDM: CPT | Mod: 25 | Performed by: EMERGENCY MEDICINE

## 2025-04-08 PROCEDURE — 2500000004 HC RX 250 GENERAL PHARMACY W/ HCPCS (ALT 636 FOR OP/ED)

## 2025-04-08 PROCEDURE — 93005 ELECTROCARDIOGRAM TRACING: CPT

## 2025-04-08 PROCEDURE — 96374 THER/PROPH/DIAG INJ IV PUSH: CPT

## 2025-04-08 PROCEDURE — 71046 X-RAY EXAM CHEST 2 VIEWS: CPT

## 2025-04-08 PROCEDURE — 36415 COLL VENOUS BLD VENIPUNCTURE: CPT | Performed by: EMERGENCY MEDICINE

## 2025-04-08 PROCEDURE — 96361 HYDRATE IV INFUSION ADD-ON: CPT

## 2025-04-08 PROCEDURE — 80053 COMPREHEN METABOLIC PANEL: CPT | Performed by: EMERGENCY MEDICINE

## 2025-04-08 PROCEDURE — 96375 TX/PRO/DX INJ NEW DRUG ADDON: CPT

## 2025-04-08 PROCEDURE — 71046 X-RAY EXAM CHEST 2 VIEWS: CPT | Performed by: RADIOLOGY

## 2025-04-08 PROCEDURE — 72125 CT NECK SPINE W/O DYE: CPT

## 2025-04-08 RX ORDER — MAGNESIUM SULFATE 1 G/100ML
1 INJECTION INTRAVENOUS ONCE
Status: DISCONTINUED | OUTPATIENT
Start: 2025-04-08 | End: 2025-04-08

## 2025-04-08 RX ORDER — KETOROLAC TROMETHAMINE 15 MG/ML
15 INJECTION, SOLUTION INTRAMUSCULAR; INTRAVENOUS ONCE
Status: COMPLETED | OUTPATIENT
Start: 2025-04-08 | End: 2025-04-08

## 2025-04-08 RX ORDER — FAMOTIDINE 10 MG/ML
20 INJECTION, SOLUTION INTRAVENOUS ONCE
Status: DISCONTINUED | OUTPATIENT
Start: 2025-04-08 | End: 2025-04-08

## 2025-04-08 RX ORDER — DIPHENHYDRAMINE HYDROCHLORIDE 50 MG/ML
25 INJECTION, SOLUTION INTRAMUSCULAR; INTRAVENOUS ONCE
Status: COMPLETED | OUTPATIENT
Start: 2025-04-08 | End: 2025-04-08

## 2025-04-08 RX ORDER — ALUMINUM HYDROXIDE, MAGNESIUM HYDROXIDE, AND SIMETHICONE 1200; 120; 1200 MG/30ML; MG/30ML; MG/30ML
30 SUSPENSION ORAL ONCE
Status: DISCONTINUED | OUTPATIENT
Start: 2025-04-08 | End: 2025-04-08

## 2025-04-08 RX ORDER — METOCLOPRAMIDE HYDROCHLORIDE 5 MG/ML
5 INJECTION INTRAMUSCULAR; INTRAVENOUS ONCE
Status: COMPLETED | OUTPATIENT
Start: 2025-04-08 | End: 2025-04-08

## 2025-04-08 RX ADMIN — METOCLOPRAMIDE 5 MG: 5 INJECTION, SOLUTION INTRAMUSCULAR; INTRAVENOUS at 16:00

## 2025-04-08 RX ADMIN — DIPHENHYDRAMINE HYDROCHLORIDE 25 MG: 50 INJECTION, SOLUTION INTRAMUSCULAR; INTRAVENOUS at 16:00

## 2025-04-08 RX ADMIN — SODIUM CHLORIDE, SODIUM LACTATE, POTASSIUM CHLORIDE, AND CALCIUM CHLORIDE 1000 ML: .6; .31; .03; .02 INJECTION, SOLUTION INTRAVENOUS at 16:00

## 2025-04-08 RX ADMIN — KETOROLAC TROMETHAMINE 15 MG: 15 INJECTION, SOLUTION INTRAMUSCULAR; INTRAVENOUS at 16:00

## 2025-04-08 ASSESSMENT — PAIN SCALES - GENERAL
PAINLEVEL_OUTOF10: 6
PAINLEVEL_OUTOF10: 6
PAINLEVEL_OUTOF10: 8

## 2025-04-08 ASSESSMENT — LIFESTYLE VARIABLES
HAVE YOU EVER FELT YOU SHOULD CUT DOWN ON YOUR DRINKING: NO
TOTAL SCORE: 0
EVER FELT BAD OR GUILTY ABOUT YOUR DRINKING: NO
EVER HAD A DRINK FIRST THING IN THE MORNING TO STEADY YOUR NERVES TO GET RID OF A HANGOVER: NO
HAVE PEOPLE ANNOYED YOU BY CRITICIZING YOUR DRINKING: NO

## 2025-04-08 ASSESSMENT — PAIN DESCRIPTION - LOCATION
LOCATION: HEAD
LOCATION: CHEST

## 2025-04-08 ASSESSMENT — HEART SCORE
RISK FACTORS: 1-2 RISK FACTORS
HEART SCORE: 2
AGE: <45
TROPONIN: LESS THAN OR EQUAL TO NORMAL LIMIT
ECG: NON-SPECIFIC REPOLARIZATION DISTURBANCE
HISTORY: SLIGHTLY SUSPICIOUS

## 2025-04-08 ASSESSMENT — PAIN - FUNCTIONAL ASSESSMENT: PAIN_FUNCTIONAL_ASSESSMENT: 0-10

## 2025-04-08 ASSESSMENT — PAIN DESCRIPTION - ORIENTATION: ORIENTATION: POSTERIOR;ANTERIOR

## 2025-04-08 ASSESSMENT — PAIN DESCRIPTION - PAIN TYPE: TYPE: ACUTE PAIN

## 2025-04-08 ASSESSMENT — PAIN DESCRIPTION - DESCRIPTORS: DESCRIPTORS: ACHING

## 2025-04-08 NOTE — DISCHARGE INSTRUCTIONS
We found a small disc herniation in your neck.  This could be the cause of your neck pain and symptoms in your arm.  Please follow-up with the spine surgeon regarding this.  We have also referred you to neurology so follow-up with them as well.  Please also follow-up with a cardiologist regarding your chest pain.  Return to the emergency department for worsening of symptoms or any questions or concerns.

## 2025-04-09 NOTE — ED PROVIDER NOTES
Emergency Department Provider Note        History of Present Illness     History provided by: Patient  Limitations to History: None  External Records Reviewed with Brief Summary: None    HPI:  Christiano Prieto is a 29 y.o. female with migraines presenting chest pain with left arm numbness and tingling, chest pain, headache.  The symptoms occurred today around 11 AM.  She has numbness of her left forearm and tingling in all of her fingers on the left.  She does have history of migraines.  However, this started after her chest pain and numbness started.  She has had this chest pain and this numbness in the past.  Most notably, she had similar symptoms while in the labor and delivery unit in October 2024.  At that time, her symptoms were more severe with paralysis of the left side of her body.  CT scan and MRI at that time did not show a stroke and she recovered.  She also presented to an outside ED in January with headaches and she received a CT of her head at that time which was also negative.  She does express frustration that her medical providers have been telling her that the symptoms are related to a complex migraine.  However, she is not satisfied because she does not always have a headache with the symptoms.  She had an appointment with a neurologist previously.  However, her previous primary care doctor had told her that they were not do anything for her besides Botox so she canceled her appointment and her primary care doctor started her lamotrigine.  Her chest pain is in the left upper chest near her shoulder.  Does not radiate.  It improves when she brings her arms together as if she is hugging herself.  Does not seem to worsen with protraction of the scapulas and arms.  She does report that she sometimes wakes up in the melanite gasping for air as well.  She knows that she should have a sleep study but has not gone for it yet.  She also reports some tingling in her left leg that has improved.  The pain in  her left jaw has also improved.  She does not have any family history of cardiac disease.  Does not smoke tobacco but vapes.  Does not drink alcohol or use recreational drugs.  She does not use any exogenous hormones.  Had a recent lesion removed from her vocal cords but was discharged on the same day.  She has not had any other procedures or surgeries.  She does not have a leg pain or swelling, recent travel, history of blood clots, history of cancer.  She does not have any bladder or bowel incontinence or retention.  No saddle paresthesias.  No difficulty walking.    Physical Exam   Triage vitals:  T 36.4 °C (97.5 °F)  HR 78  /82  RR 16  O2 97 % None (Room air)    Physical Exam  Vitals and nursing note reviewed.   Constitutional:       General: She is not in acute distress.     Appearance: She is well-developed.   HENT:      Head: Normocephalic and atraumatic.      Right Ear: External ear normal.      Left Ear: External ear normal.      Nose: Nose normal.      Mouth/Throat:      Mouth: Mucous membranes are moist.   Eyes:      General: No scleral icterus.     Extraocular Movements: Extraocular movements intact.      Conjunctiva/sclera: Conjunctivae normal.      Pupils: Pupils are equal, round, and reactive to light.   Cardiovascular:      Rate and Rhythm: Normal rate and regular rhythm.      Heart sounds: No murmur heard.  Pulmonary:      Effort: Pulmonary effort is normal. No respiratory distress.      Breath sounds: Normal breath sounds.   Abdominal:      Palpations: Abdomen is soft.      Tenderness: There is no abdominal tenderness.   Musculoskeletal:         General: No swelling.      Cervical back: Neck supple.   Skin:     General: Skin is warm and dry.   Neurological:      General: No focal deficit present.      Mental Status: She is alert and oriented to person, place, and time.      Cranial Nerves: No cranial nerve deficit.      Sensory: Sensory deficit present.      Motor: No weakness.       Coordination: Coordination normal.      Gait: Gait normal.      Comments: Subjective numbness and paresthesias of the left forearm and all digits of the left hand.  No paresthesias or numbness of the face or other extremities.   Psychiatric:         Mood and Affect: Mood normal.          Medical Decision Making & ED Course   Medical Decision Makin y.o. female presenting for chest pain, headache, left arm numbness.  On arrival, she is afebrile and hemodynamically stable.  Her headache is similar to previous migraines.  She is also had this same left arm numbness and tingling in the past, although more severe previously.  The most severe was during her stay in the hospital in 2024 when she was in labor and delivery.  She received CT imaging of her head and MRI and neurology evaluation at that time.  These were negative for stroke.  She also had CT imaging about 3 months ago in the emergency department for a migraine.  This was also negative at that time.  Given that the symptoms are recurring, less severe at this time, and has had multiple evaluations that are negative, lower suspicion for stroke.  She does not have any weakness of her extremities, no abnormal movements on finger-nose and heel-to-shin testing.  Upon chart review, she has not had imaging of her cervical spine in the past.  We will obtain CT imaging of the cervical spine to evaluate for stenosis or other acute pathology that may be contributing to her arm numbness.  She is denying any red flag symptoms such as saddle paresthesias, urinary fecal incontinence/retention, difficulty walking.  We will obtain labwork and imaging to evaluate for myocardial infarction, pneumothorax, esophageal rupture, myocarditis/pericarditis, pneumonia.  PERC negative, low suspicion for pulmonary embolism.  She is given migraine cocktail of Toradol, Reglan, Benadryl, IV fluids.    CBC negative for leukocytosis or anemia.  Chemistry panel is unremarkable.   Troponin negative x 2.  Negative pregnancy.  Chest x-ray is unremarkable.  EKG does show a T wave inversion in lead III which is new compared to prior EKGs, however it is isolated, nonspecific, and not contiguous as the preceding and following leads do not have this T wave inversion.  No STEMI.  CT of the cervical spine shows possible C4-C5 causing thecal sac effacement.    Findings are discussed with the patient and she is reevaluated.  Patient reports that she still has the headache.  We offered second line treatments including Decadron, magnesium, valproate.  However, she continues to express frustration and does not feel that she is receiving good care here.  We have ruled out emergent conditions such as cardiac injury, pneumothorax, pneumonia, spinal cord compression, stroke.  She is given follow-up with neurology, cardiology, sleep medicine (for follow-up after sleep study through her primary care doctor), and neurosurgery-spine.  Home care and return instructions discussed. Patient expressed understanding and agreement. Patient discharged in stable condition.    Karl Ny DO, PGY-4  Emergency Medicine Resident     Social Determinants of Health which Significantly Impact Care: None identified     EKG Independent Interpretation: EKG interpreted by myself. Please see ED Course for full interpretation.  EKG at 1201 on 4/8/2025 as interpreted by myself: Ventricular rate 74, , QRS 96, QTc 437.  Normal sinus rhythm.  No acute injury pattern.  New T wave inversion in lead V3 is isolated, nonspecific, and not contiguous.  EKG at 1400 on 4/8/2025 as interpreted by myself: Ventricular rate 79, , QRS 94, QTc 450.  Normal sinus rhythm.  No acute injury pattern.    Independent Result Review and Interpretation: Relevant laboratory and radiographic results were reviewed and independently interpreted by myself.  As necessary, they are commented on in the ED Course.    Chronic conditions affecting the patient's  care: As documented above in [facility]    The patient was discussed with the following consultants/services: None    Care Considerations: As documented above in [facility]    ED Course:  Diagnoses as of 04/08/25 2209   Paresthesia   Orthopnea   Cervical disc herniation   Chest pain, unspecified type     Disposition   As a result of the work-up, the patient was discharged home.  she was informed of her diagnosis and instructed to come back with any concerns or worsening of condition.  she and was agreeable to the plan as discussed above.  she was given the opportunity to ask questions.  All of the patient's questions were answered.    Procedures   Procedures    Patient seen and discussed with ED attending physician.    Karl Ny DO  Emergency Medicine     Karl Ny DO  Resident  04/08/25 2209

## 2025-04-11 LAB
ATRIAL RATE: 74 BPM
ATRIAL RATE: 79 BPM
P AXIS: 21 DEGREES
P AXIS: 29 DEGREES
P OFFSET: 187 MS
P OFFSET: 191 MS
P ONSET: 138 MS
P ONSET: 140 MS
PR INTERVAL: 154 MS
PR INTERVAL: 162 MS
Q ONSET: 217 MS
Q ONSET: 219 MS
QRS COUNT: 12 BEATS
QRS COUNT: 13 BEATS
QRS DURATION: 94 MS
QRS DURATION: 96 MS
QT INTERVAL: 394 MS
QT INTERVAL: 400 MS
QTC CALCULATION(BAZETT): 437 MS
QTC CALCULATION(BAZETT): 458 MS
QTC FREDERICIA: 422 MS
QTC FREDERICIA: 438 MS
R AXIS: 39 DEGREES
R AXIS: 47 DEGREES
T AXIS: 27 DEGREES
T AXIS: 32 DEGREES
T OFFSET: 414 MS
T OFFSET: 419 MS
VENTRICULAR RATE: 74 BPM
VENTRICULAR RATE: 79 BPM

## 2025-04-29 ENCOUNTER — APPOINTMENT (OUTPATIENT)
Dept: CARDIOLOGY | Facility: HOSPITAL | Age: 30
End: 2025-04-29
Payer: COMMERCIAL

## 2025-04-29 ENCOUNTER — APPOINTMENT (OUTPATIENT)
Dept: RADIOLOGY | Facility: HOSPITAL | Age: 30
End: 2025-04-29
Payer: COMMERCIAL

## 2025-04-29 ENCOUNTER — HOSPITAL ENCOUNTER (EMERGENCY)
Facility: HOSPITAL | Age: 30
Discharge: HOME | End: 2025-04-29
Attending: EMERGENCY MEDICINE
Payer: COMMERCIAL

## 2025-04-29 VITALS
TEMPERATURE: 97.2 F | OXYGEN SATURATION: 97 % | HEART RATE: 78 BPM | SYSTOLIC BLOOD PRESSURE: 123 MMHG | RESPIRATION RATE: 16 BRPM | HEIGHT: 62 IN | DIASTOLIC BLOOD PRESSURE: 74 MMHG | BODY MASS INDEX: 35.62 KG/M2 | WEIGHT: 193.56 LBS

## 2025-04-29 DIAGNOSIS — R20.2 PARESTHESIA: ICD-10-CM

## 2025-04-29 DIAGNOSIS — R51.9 ACUTE NONINTRACTABLE HEADACHE, UNSPECIFIED HEADACHE TYPE: Primary | ICD-10-CM

## 2025-04-29 LAB
ALBUMIN SERPL BCP-MCNC: 4.5 G/DL (ref 3.4–5)
ALP SERPL-CCNC: 60 U/L (ref 33–110)
ALT SERPL W P-5'-P-CCNC: 16 U/L (ref 7–45)
ANION GAP SERPL CALC-SCNC: 13 MMOL/L (ref 10–20)
APTT PPP: 38 SECONDS (ref 26–36)
AST SERPL W P-5'-P-CCNC: 17 U/L (ref 9–39)
B-HCG SERPL-ACNC: <2 MIU/ML
BASOPHILS # BLD AUTO: 0.03 X10*3/UL (ref 0–0.1)
BASOPHILS NFR BLD AUTO: 0.6 %
BILIRUB SERPL-MCNC: 0.3 MG/DL (ref 0–1.2)
BUN SERPL-MCNC: 9 MG/DL (ref 6–23)
CALCIUM SERPL-MCNC: 9.2 MG/DL (ref 8.6–10.3)
CARDIAC TROPONIN I PNL SERPL HS: <3 NG/L (ref 0–13)
CHLORIDE SERPL-SCNC: 108 MMOL/L (ref 98–107)
CO2 SERPL-SCNC: 21 MMOL/L (ref 21–32)
CREAT SERPL-MCNC: 0.63 MG/DL (ref 0.5–1.05)
EGFRCR SERPLBLD CKD-EPI 2021: >90 ML/MIN/1.73M*2
EOSINOPHIL # BLD AUTO: 0.09 X10*3/UL (ref 0–0.7)
EOSINOPHIL NFR BLD AUTO: 1.9 %
ERYTHROCYTE [DISTWIDTH] IN BLOOD BY AUTOMATED COUNT: 13.4 % (ref 11.5–14.5)
FLUAV RNA RESP QL NAA+PROBE: NOT DETECTED
FLUBV RNA RESP QL NAA+PROBE: NOT DETECTED
GLUCOSE BLD MANUAL STRIP-MCNC: 93 MG/DL (ref 74–99)
GLUCOSE SERPL-MCNC: 99 MG/DL (ref 74–99)
HCT VFR BLD AUTO: 36.3 % (ref 36–46)
HGB BLD-MCNC: 12 G/DL (ref 12–16)
HOLD SPECIMEN: NORMAL
HOLD SPECIMEN: NORMAL
IMM GRANULOCYTES # BLD AUTO: 0.01 X10*3/UL (ref 0–0.7)
IMM GRANULOCYTES NFR BLD AUTO: 0.2 % (ref 0–0.9)
INR PPP: 1.1 (ref 0.9–1.1)
LYMPHOCYTES # BLD AUTO: 1.28 X10*3/UL (ref 1.2–4.8)
LYMPHOCYTES NFR BLD AUTO: 27.2 %
MCH RBC QN AUTO: 26.5 PG (ref 26–34)
MCHC RBC AUTO-ENTMCNC: 33.1 G/DL (ref 32–36)
MCV RBC AUTO: 80 FL (ref 80–100)
MONOCYTES # BLD AUTO: 0.53 X10*3/UL (ref 0.1–1)
MONOCYTES NFR BLD AUTO: 11.3 %
NEUTROPHILS # BLD AUTO: 2.77 X10*3/UL (ref 1.2–7.7)
NEUTROPHILS NFR BLD AUTO: 58.8 %
NRBC BLD-RTO: 0 /100 WBCS (ref 0–0)
PLATELET # BLD AUTO: 295 X10*3/UL (ref 150–450)
POTASSIUM SERPL-SCNC: 3.8 MMOL/L (ref 3.5–5.3)
PROT SERPL-MCNC: 7.1 G/DL (ref 6.4–8.2)
PROTHROMBIN TIME: 12 SECONDS (ref 9.8–12.4)
RBC # BLD AUTO: 4.53 X10*6/UL (ref 4–5.2)
RSV RNA RESP QL NAA+PROBE: NOT DETECTED
SARS-COV-2 RNA RESP QL NAA+PROBE: NOT DETECTED
SODIUM SERPL-SCNC: 138 MMOL/L (ref 136–145)
WBC # BLD AUTO: 4.7 X10*3/UL (ref 4.4–11.3)

## 2025-04-29 PROCEDURE — 99285 EMERGENCY DEPT VISIT HI MDM: CPT | Mod: 25

## 2025-04-29 PROCEDURE — 85025 COMPLETE CBC W/AUTO DIFF WBC: CPT | Performed by: EMERGENCY MEDICINE

## 2025-04-29 PROCEDURE — 85730 THROMBOPLASTIN TIME PARTIAL: CPT | Performed by: EMERGENCY MEDICINE

## 2025-04-29 PROCEDURE — 96361 HYDRATE IV INFUSION ADD-ON: CPT

## 2025-04-29 PROCEDURE — 84484 ASSAY OF TROPONIN QUANT: CPT | Performed by: EMERGENCY MEDICINE

## 2025-04-29 PROCEDURE — G0426 INPT/ED TELECONSULT50: HCPCS | Performed by: STUDENT IN AN ORGANIZED HEALTH CARE EDUCATION/TRAINING PROGRAM

## 2025-04-29 PROCEDURE — 82947 ASSAY GLUCOSE BLOOD QUANT: CPT

## 2025-04-29 PROCEDURE — 99291 CRITICAL CARE FIRST HOUR: CPT | Performed by: EMERGENCY MEDICINE

## 2025-04-29 PROCEDURE — 84075 ASSAY ALKALINE PHOSPHATASE: CPT | Performed by: EMERGENCY MEDICINE

## 2025-04-29 PROCEDURE — 70450 CT HEAD/BRAIN W/O DYE: CPT

## 2025-04-29 PROCEDURE — 93005 ELECTROCARDIOGRAM TRACING: CPT

## 2025-04-29 PROCEDURE — 87637 SARSCOV2&INF A&B&RSV AMP PRB: CPT | Performed by: EMERGENCY MEDICINE

## 2025-04-29 PROCEDURE — 84702 CHORIONIC GONADOTROPIN TEST: CPT | Performed by: EMERGENCY MEDICINE

## 2025-04-29 PROCEDURE — 96374 THER/PROPH/DIAG INJ IV PUSH: CPT

## 2025-04-29 PROCEDURE — 2500000001 HC RX 250 WO HCPCS SELF ADMINISTERED DRUGS (ALT 637 FOR MEDICARE OP): Performed by: EMERGENCY MEDICINE

## 2025-04-29 PROCEDURE — 85610 PROTHROMBIN TIME: CPT | Performed by: EMERGENCY MEDICINE

## 2025-04-29 PROCEDURE — 96375 TX/PRO/DX INJ NEW DRUG ADDON: CPT

## 2025-04-29 PROCEDURE — 2500000004 HC RX 250 GENERAL PHARMACY W/ HCPCS (ALT 636 FOR OP/ED): Performed by: EMERGENCY MEDICINE

## 2025-04-29 PROCEDURE — 36415 COLL VENOUS BLD VENIPUNCTURE: CPT | Performed by: EMERGENCY MEDICINE

## 2025-04-29 RX ORDER — METOCLOPRAMIDE HYDROCHLORIDE 5 MG/ML
10 INJECTION INTRAMUSCULAR; INTRAVENOUS ONCE
Status: COMPLETED | OUTPATIENT
Start: 2025-04-29 | End: 2025-04-29

## 2025-04-29 RX ORDER — NAPROXEN SODIUM 220 MG/1
324 TABLET, FILM COATED ORAL ONCE
Status: COMPLETED | OUTPATIENT
Start: 2025-04-29 | End: 2025-04-29

## 2025-04-29 RX ORDER — KETOROLAC TROMETHAMINE 30 MG/ML
15 INJECTION, SOLUTION INTRAMUSCULAR; INTRAVENOUS ONCE
Status: COMPLETED | OUTPATIENT
Start: 2025-04-29 | End: 2025-04-29

## 2025-04-29 RX ORDER — DIPHENHYDRAMINE HYDROCHLORIDE 50 MG/ML
25 INJECTION, SOLUTION INTRAMUSCULAR; INTRAVENOUS ONCE
Status: COMPLETED | OUTPATIENT
Start: 2025-04-29 | End: 2025-04-29

## 2025-04-29 RX ADMIN — DIPHENHYDRAMINE HYDROCHLORIDE 25 MG: 50 INJECTION, SOLUTION INTRAMUSCULAR; INTRAVENOUS at 14:52

## 2025-04-29 RX ADMIN — METOCLOPRAMIDE HYDROCHLORIDE 10 MG: 5 INJECTION INTRAMUSCULAR; INTRAVENOUS at 14:52

## 2025-04-29 RX ADMIN — ASPIRIN 324 MG: 81 TABLET, CHEWABLE ORAL at 14:52

## 2025-04-29 RX ADMIN — KETOROLAC TROMETHAMINE 15 MG: 30 INJECTION, SOLUTION INTRAMUSCULAR at 15:54

## 2025-04-29 RX ADMIN — SODIUM CHLORIDE 500 ML: 0.9 INJECTION, SOLUTION INTRAVENOUS at 14:53

## 2025-04-29 ASSESSMENT — PAIN DESCRIPTION - FREQUENCY: FREQUENCY: CONSTANT/CONTINUOUS

## 2025-04-29 ASSESSMENT — PAIN SCALES - GENERAL
PAINLEVEL_OUTOF10: 7
PAINLEVEL_OUTOF10: 3

## 2025-04-29 ASSESSMENT — PAIN DESCRIPTION - LOCATION
LOCATION: HEAD
LOCATION: HEAD

## 2025-04-29 ASSESSMENT — PAIN DESCRIPTION - PAIN TYPE: TYPE: ACUTE PAIN

## 2025-04-29 ASSESSMENT — PAIN DESCRIPTION - DESCRIPTORS: DESCRIPTORS: HEADACHE

## 2025-04-29 ASSESSMENT — PAIN - FUNCTIONAL ASSESSMENT
PAIN_FUNCTIONAL_ASSESSMENT: 0-10
PAIN_FUNCTIONAL_ASSESSMENT: 0-10

## 2025-04-29 ASSESSMENT — PAIN DESCRIPTION - ONSET: ONSET: SUDDEN

## 2025-04-29 NOTE — DISCHARGE INSTRUCTIONS
Follow-up with your primary care doctor and neurology.  Return to the emergency department if symptoms worsen or change.  You can take ibuprofen or Tylenol at home as directed for further relief of your symptoms.

## 2025-04-29 NOTE — ED PROVIDER NOTES
29-year-old female presents emergency department via EMS with report of headache and numbness/tingling.  Patient tells me that she has had a headache for the past 2 days.  She reports since awaking from her nap her headache is much worse.  She does admit to history of migraines and states that this is starting to feel similar to that.  She denies any fevers, coughing, or congestion.  No chest pain or difficulty breathing.  Patient denies abdominal pain, nausea, or vomiting.  No dysuria, diarrhea, constipation, black or bloody stools.  Patient reports since waking up from her nap she has been having numbness and tingling in the right face and arm.  She reports in the past she has had the symptoms in the left face and arm with her migraines, but never like this.  Patient reports she was last without numbness tingling at 11 AM when she laid down to take a nap.  She reports she took Excedrin for her symptoms with some relief.      History provided by:  Patient   used: No           ------------------------------------------------------------------------------------------------------------------------------------------    VS: As documented in the triage note and EMR flowsheet from this visit were reviewed.    Review of Systems  Constitutional: no fever, chills  Eyes: no redness, discharge, pain  HENT: no sore throat, nose bleeds, congestion, rhinorrhea   Cardiovascular: no chest pain, leg edema, palpitations  Respiratory: no shortness of breath, cough, wheezing  GI: no nausea, diarrhea, pain, vomiting, constipation, BRBPR, melena  : no dysuria, frequency, hematuria  Musculoskeletal: no neck pain, stiffness,  no joint deformity, swelling  Skin: no rash, erythema, wounds  Neurological: no dizziness, lightheadedness, weakness, reports headache as well as numbness tingling of the right side.  Psychiatric: no suicidal thoughts, confusion, agitation  Metabolic: no polyuria or polydipsia  Hematologic: no  increased bleeding or bruising  Immunology: No immunocompromise state    [facility]  Nursing notes reviewed and confirmed by me.  Chart review performed including medications, allergies, and medical, surgical, and family history  Visit Vitals  /74   Pulse 78   Temp 36.2 °C (97.2 °F)   Resp 16     Physical Exam  Vitals and nursing note reviewed.   Constitutional:       General: She is not in acute distress.     Appearance: Normal appearance. She is not ill-appearing.   HENT:      Head: Normocephalic and atraumatic.      Right Ear: External ear normal.      Left Ear: External ear normal.      Nose: Nose normal. No congestion or rhinorrhea.      Mouth/Throat:      Mouth: Mucous membranes are moist.      Pharynx: No oropharyngeal exudate or posterior oropharyngeal erythema.   Eyes:      Extraocular Movements: Extraocular movements intact.      Conjunctiva/sclera: Conjunctivae normal.      Pupils: Pupils are equal, round, and reactive to light.   Neck:      Comments: Patient able to range neck without difficulty.   Cardiovascular:      Rate and Rhythm: Normal rate and regular rhythm.      Pulses: Normal pulses.      Heart sounds: Normal heart sounds.   Pulmonary:      Effort: Pulmonary effort is normal. No respiratory distress.      Breath sounds: Normal breath sounds. No stridor. No wheezing, rhonchi or rales.   Abdominal:      General: There is no distension.      Palpations: Abdomen is soft.      Tenderness: There is no abdominal tenderness. There is no guarding or rebound.   Musculoskeletal:         General: No swelling or deformity. Normal range of motion.      Cervical back: Normal range of motion and neck supple. No rigidity.      Right lower leg: No edema.      Left lower leg: No edema.      Comments: No calf tenderness    Skin:     General: Skin is warm and dry.      Capillary Refill: Capillary refill takes less than 2 seconds.      Coloration: Skin is not jaundiced.      Findings: No rash.   Neurological:       Mental Status: She is alert and oriented to person, place, and time.      Sensory: Sensory deficit present.      Motor: No weakness.      Comments: Patient has equal strength in upper and lower extremities.  No facial droop.  Denies any vision change.  She is noted to have subjective decrease sensation in the right face and arm.  NIH stroke score is 1.   Psychiatric:         Mood and Affect: Mood normal.         Behavior: Behavior normal.        Medical History[1]   Surgical History[2]   Social History[3]   ------------------------------------------------------------------------------------------------------------------------------------------  CT brain attack head wo IV contrast   Final Result   No CT evidence for acute intracranial pathology.                  Findings discussed with Rosendo Carrizales of the emergency room via   telephone on 04/29/2025 at 2:37 p.m.        Signed by: Rafael Calix 4/29/2025 2:51 PM   Dictation workstation:   [id number]         Labs Reviewed   COMPREHENSIVE METABOLIC PANEL - Abnormal       Result Value    Glucose 99      Sodium 138      Potassium 3.8      Chloride 108 (*)     Bicarbonate 21      Anion Gap 13      Urea Nitrogen 9      Creatinine 0.63      eGFR >90      Calcium 9.2      Albumin 4.5      Alkaline Phosphatase 60      Total Protein 7.1      AST 17      Bilirubin, Total 0.3      ALT 16     APTT - Abnormal    aPTT 38 (*)     Narrative:     The APTT is no longer used for monitoring Unfractionated Heparin Therapy. For monitoring Heparin Therapy, use the Heparin Assay.   TROPONIN I, HIGH SENSITIVITY - Normal    Troponin I, High Sensitivity <3      Narrative:     Less than 99th percentile of normal range cutoff-  Female and children under 18 years old <14 ng/L; Male <21 ng/L: Negative  Repeat testing should be performed if clinically indicated.     Female and children under 18 years old 14-50 ng/L; Male 21-50 ng/L:  Consistent with possible cardiac damage and possible  increased clinical   risk. Serial measurements may help to assess extent of myocardial damage.     >50 ng/L: Consistent with cardiac damage, increased clinical risk and  myocardial infarction. Serial measurements may help assess extent of   myocardial damage.      NOTE: Children less than 1 year old may have higher baseline troponin   levels and results should be interpreted in conjunction with the overall   clinical context.     NOTE: Troponin I testing is performed using a different   testing methodology at [facility] than at other   [facility]. Direct result comparisons should only   be made within the same method.   PROTIME-INR - Normal    Protime 12.0      INR 1.1     HUMAN CHORIONIC GONADOTROPIN, SERUM QUANTITATIVE - Normal    HCG, Beta-Quantitative <2      Narrative:      Total HCG measurement is performed using the [employer] Access   Immunoassay which detects intact HCG and free beta HCG subunit.    This test is not indicated for use as a tumor marker.   HCG testing is performed using a different test methodology at [facility] than other [facility]. Direct result comparison   should only be made within the same method.       SARS-COV-2, INFLUENZA A/B AND RSV PCR - Normal    Coronavirus 2019, PCR Not Detected      Flu A Result Not Detected      Flu B Result Not Detected      RSV PCR Not Detected      Narrative:     This assay is an FDA-cleared, in vitro diagnostic nucleic acid amplification test for the qualitative detection and differentiation of SARS CoV-2/ Influenza A/B/ RSV from nasopharyngeal specimens collected from individuals with signs and symptoms of respiratory tract infections, and has been validated for use at [facility]. Negative results do not preclude COVID-19/ Influenza A/B/ RSV infections and should not be used as the sole basis for diagnosis, treatment, or other management decisions. Testing for SARS CoV-2 is  recommended only for patients who meet current clinical and/or epidemiological criteria defined by federal, state, or local public health directives.   POCT GLUCOSE - Normal    POCT Glucose 93     CBC WITH AUTO DIFFERENTIAL    WBC 4.7      nRBC 0.0      RBC 4.53      Hemoglobin 12.0      Hematocrit 36.3      MCV 80      MCH 26.5      MCHC 33.1      RDW 13.4      Platelets 295      Neutrophils % 58.8      Immature Granulocytes %, Automated 0.2      Lymphocytes % 27.2      Monocytes % 11.3      Eosinophils % 1.9      Basophils % 0.6      Neutrophils Absolute 2.77      Immature Granulocytes Absolute, Automated 0.01      Lymphocytes Absolute 1.28      Monocytes Absolute 0.53      Eosinophils Absolute 0.09      Basophils Absolute 0.03     GRAY TOP    Extra Tube Hold for add-ons.     POCT GLUCOSE METER        Medical Decision Making    Last Known Well Time: 11a  Interval: Baseline  Time: 2:20 PM  Person Administering Scale: Rosendo Carrizales DO     1a  Level of consciousness: 0=alert; keenly responsive  1b. LOC questions:  0=Performs both tasks correctly  1c. LOC commands: 0=Performs both tasks correctly  2.  Best Gaze: 0=normal  3. Visual: 0=No visual loss  4. Facial Palsy: 0=Normal symmetric movement  5a. Motor left arm: 0=No drift, limb holds 90 (or 45) degrees for full 10 seconds  5b.  Motor right arm: 0=No drift, limb holds 90 (or 45) degrees for full 10 seconds  6a. motor left le=No drift, limb holds 90 (or 45) degrees for full 10 seconds  6b  Motor right le=No drift, limb holds 90 (or 45) degrees for full 10 seconds  7. Limb Ataxia: 0=Absent  8.  Sensory: 1=Mild to moderate sensory loss; patient feels pinprick is less sharp or is dull on the affected side; there is a loss of superficial pain with pinprick but patient is aware She is being touched  9. Best Language:  0=No aphasia, normal  10. Dysarthria: 0=Normal  11. Extinction and Inattention: 0=No abnormality    Total:   1        VAN: VAN: Negative        EKG interpreted by ED physician: Normal sinus rhythm rate of 72.  LA, QRS, QTc intervals all within normal limits.  No significant ST elevations or depressions.  No significant Q waves.  Good R wave progression.  Normal axis.         29-year-old female presents emergency department via EMS with report of headache and numbness tingling of the right face and arm.  She reports last known normal of 11 AM.  Due to her focal symptoms she is activated as a stroke alert.  I evaluated patient upon arrival with EMS in the hallway.  After my evaluation patient went to CT scan.  Head CT showed no acute intracranial process such as hemorrhage or mass effect.  Patient given aspirin.  Patient has NIH stroke scale of 1 based on paresthesias.  I do not feel she is a candidate for TNK because of this.  In addition, patient does have history of complex migraines in the past though they usually affect her left side.  We discussed that this is a more likely diagnosis for her.  Patient voiced understanding of this.  I do not have concern for large vessel occlusion given low NIH stroke scale.  Case discussed with neuro stroke  who did evaluate patient via telestroke and is agreeable with no TNK and that this is much less likely a stroke and more likely a migraine.  She reports of the patient's symptoms resolved there is no need for further stroke evaluation.  Pregnancy testing is negative.  COVID, flu, and RSV testing are all negative.  Cardiac enzyme and EKG do not show acute ACS or significant arrhythmia.  CMP shows no significant metabolic abnormality.  CBC does not show significant leukocytosis or anemia.  Patient was treated symptomatically with IV fluids, Toradol, Reglan, and Benadryl.  On my reevaluation she is resting in bed comfortably.  Patient tells me that her symptoms have resolved.  She denies current headache and states that her paresthesias are now gone.  Discussed with patient that this is likely complex  migraine causing her symptoms.  She is provided with neurology follow-up appointment.  Patient also advised on need for primary care follow-up.  Patient advised on reasons to return to the ED.  She is comfortable returning home.       Diagnoses as of 04/30/25 0011   Acute nonintractable headache, unspecified headache type   Paresthesia      1. Acute nonintractable headache, unspecified headache type  Referral to Neurology      2. Paresthesia  Referral to Neurology         Critical Care    Performed by: Rosendo Carrizales DO  Authorized by: Rosendo Carrizales DO    Critical care provider statement:     Critical care time (minutes):  32    Critical care time was exclusive of:  Separately billable procedures and treating other patients    Critical care was necessary to treat or prevent imminent or life-threatening deterioration of the following conditions:  CNS failure or compromise    Critical care was time spent personally by me on the following activities:  Development of treatment plan with patient or surrogate, discussions with consultants, evaluation of patient's response to treatment, examination of patient, re-evaluation of patient's condition, pulse oximetry, ordering and review of radiographic studies, ordering and review of laboratory studies, ordering and performing treatments and interventions and review of old charts       This note was dictated using dragon software and may contain errors related to dictation interpretation errors.          [1]   Past Medical History:  Diagnosis Date    Acid reflux     Anxiety     ETOH abuse     Gastritis     HTN (hypertension)     Migraine     Moderate asthma (HHS-HCC)     Panic anxiety syndrome    [2]   Past Surgical History:  Procedure Laterality Date    OTHER SURGICAL HISTORY      Left Ear Surgery to Repair Ear Drum    UPPER GASTROINTESTINAL ENDOSCOPY     [3]   Social History  Socioeconomic History    Marital status: Single   Tobacco Use    Smoking status: Former     Current  "packs/day: 0.00     Types: Cigarettes     Quit date: 2024     Years since quittin.4    Smokeless tobacco: Never   Vaping Use    Vaping status: Every Day    Substances: Nicotine   Substance and Sexual Activity    Alcohol use: Yes     Comment: \"once a month\"    Drug use: Never     Social Drivers of Health     Financial Resource Strain: Medium Risk (3/5/2025)    Received from [facility]    Overall Financial Resource Strain (CARDIA)     Difficulty of Paying Living Expenses: Somewhat hard   Food Insecurity: No Food Insecurity (3/5/2025)    Received from [facility]    Hunger Vital Sign     Worried About Running Out of Food in the Last Year: Never true     Ran Out of Food in the Last Year: Never true   Transportation Needs: Unmet Transportation Needs (3/5/2025)    Received from [facility]    PRAPARE - Transportation     Lack of Transportation (Medical): Yes     Lack of Transportation (Non-Medical): Yes   Physical Activity: Insufficiently Active (3/5/2025)    Received from [facility]    Exercise Vital Sign     Days of Exercise per Week: 2 days     Minutes of Exercise per Session: 20 min   Stress: Stress Concern Present (3/5/2025)    Received from [facility]    [employer] - Occupational Stress Questionnaire     Feeling of Stress : Very much   Social Connections: Moderately Isolated (3/5/2025)    Received from [facility]    Social Connection and Isolation Panel [NHANES]     Frequency of Communication with Friends and Family: More than three times a week     Frequency of Social Gatherings with Friends and Family: Once a week     Attends Christianity Services: Never     Active Member of Clubs or Organizations: No     Attends Club or Organization Meetings: Never     Marital Status: Living with partner   Intimate Partner Violence: Not At Risk (2024)    Humiliation, Afraid, Rape, and Kick questionnaire     Fear of Current or Ex-Partner: No     Emotionally " Abused: No     Physically Abused: No     Sexually Abused: No        Rosendo Carrizales DO  04/30/25 0011

## 2025-04-30 ENCOUNTER — APPOINTMENT (OUTPATIENT)
Dept: RADIOLOGY | Facility: HOSPITAL | Age: 30
End: 2025-04-30
Payer: COMMERCIAL

## 2025-05-01 LAB
ATRIAL RATE: 72 BPM
P AXIS: 47 DEGREES
P OFFSET: 190 MS
P ONSET: 137 MS
PR INTERVAL: 164 MS
Q ONSET: 219 MS
QRS COUNT: 12 BEATS
QRS DURATION: 94 MS
QT INTERVAL: 404 MS
QTC CALCULATION(BAZETT): 442 MS
QTC FREDERICIA: 429 MS
R AXIS: 67 DEGREES
T AXIS: 40 DEGREES
T OFFSET: 421 MS
VENTRICULAR RATE: 72 BPM

## 2025-06-03 ENCOUNTER — APPOINTMENT (OUTPATIENT)
Dept: GASTROENTEROLOGY | Facility: HOSPITAL | Age: 30
End: 2025-06-03
Payer: COMMERCIAL

## 2025-06-04 ENCOUNTER — HOSPITAL ENCOUNTER (EMERGENCY)
Facility: HOSPITAL | Age: 30
Discharge: HOME | End: 2025-06-04
Payer: COMMERCIAL

## 2025-06-04 VITALS
DIASTOLIC BLOOD PRESSURE: 97 MMHG | WEIGHT: 180 LBS | HEIGHT: 63 IN | OXYGEN SATURATION: 100 % | BODY MASS INDEX: 31.89 KG/M2 | HEART RATE: 87 BPM | TEMPERATURE: 98.2 F | RESPIRATION RATE: 18 BRPM | SYSTOLIC BLOOD PRESSURE: 172 MMHG

## 2025-06-04 DIAGNOSIS — R51.9 NONINTRACTABLE HEADACHE, UNSPECIFIED CHRONICITY PATTERN, UNSPECIFIED HEADACHE TYPE: Primary | ICD-10-CM

## 2025-06-04 LAB
ALBUMIN SERPL BCP-MCNC: 4.4 G/DL (ref 3.4–5)
ALP SERPL-CCNC: 66 U/L (ref 33–110)
ALT SERPL W P-5'-P-CCNC: 12 U/L (ref 7–45)
ANION GAP SERPL CALC-SCNC: 12 MMOL/L (ref 10–20)
AST SERPL W P-5'-P-CCNC: 15 U/L (ref 9–39)
BASOPHILS # BLD AUTO: 0.02 X10*3/UL (ref 0–0.1)
BASOPHILS NFR BLD AUTO: 0.4 %
BILIRUB SERPL-MCNC: 0.2 MG/DL (ref 0–1.2)
BUN SERPL-MCNC: 11 MG/DL (ref 6–23)
CALCIUM SERPL-MCNC: 9.5 MG/DL (ref 8.6–10.3)
CHLORIDE SERPL-SCNC: 104 MMOL/L (ref 98–107)
CO2 SERPL-SCNC: 24 MMOL/L (ref 21–32)
CREAT SERPL-MCNC: 0.57 MG/DL (ref 0.5–1.05)
EGFRCR SERPLBLD CKD-EPI 2021: >90 ML/MIN/1.73M*2
EOSINOPHIL # BLD AUTO: 0.15 X10*3/UL (ref 0–0.7)
EOSINOPHIL NFR BLD AUTO: 3.2 %
ERYTHROCYTE [DISTWIDTH] IN BLOOD BY AUTOMATED COUNT: 13.2 % (ref 11.5–14.5)
ERYTHROCYTE [SEDIMENTATION RATE] IN BLOOD BY WESTERGREN METHOD: 4 MM/H (ref 0–20)
GLUCOSE SERPL-MCNC: 88 MG/DL (ref 74–99)
HCT VFR BLD AUTO: 36.3 % (ref 36–46)
HGB BLD-MCNC: 12.1 G/DL (ref 12–16)
IMM GRANULOCYTES # BLD AUTO: 0.01 X10*3/UL (ref 0–0.7)
IMM GRANULOCYTES NFR BLD AUTO: 0.2 % (ref 0–0.9)
LYMPHOCYTES # BLD AUTO: 1.26 X10*3/UL (ref 1.2–4.8)
LYMPHOCYTES NFR BLD AUTO: 27 %
MCH RBC QN AUTO: 26.2 PG (ref 26–34)
MCHC RBC AUTO-ENTMCNC: 33.3 G/DL (ref 32–36)
MCV RBC AUTO: 79 FL (ref 80–100)
MONOCYTES # BLD AUTO: 0.43 X10*3/UL (ref 0.1–1)
MONOCYTES NFR BLD AUTO: 9.2 %
NEUTROPHILS # BLD AUTO: 2.79 X10*3/UL (ref 1.2–7.7)
NEUTROPHILS NFR BLD AUTO: 60 %
NRBC BLD-RTO: 0 /100 WBCS (ref 0–0)
PLATELET # BLD AUTO: 288 X10*3/UL (ref 150–450)
POTASSIUM SERPL-SCNC: 4.3 MMOL/L (ref 3.5–5.3)
PROT SERPL-MCNC: 6.9 G/DL (ref 6.4–8.2)
RBC # BLD AUTO: 4.61 X10*6/UL (ref 4–5.2)
SODIUM SERPL-SCNC: 136 MMOL/L (ref 136–145)
WBC # BLD AUTO: 4.7 X10*3/UL (ref 4.4–11.3)

## 2025-06-04 PROCEDURE — 85025 COMPLETE CBC W/AUTO DIFF WBC: CPT | Performed by: PHYSICIAN ASSISTANT

## 2025-06-04 PROCEDURE — 85652 RBC SED RATE AUTOMATED: CPT | Performed by: PHYSICIAN ASSISTANT

## 2025-06-04 PROCEDURE — 36415 COLL VENOUS BLD VENIPUNCTURE: CPT | Performed by: PHYSICIAN ASSISTANT

## 2025-06-04 PROCEDURE — 96365 THER/PROPH/DIAG IV INF INIT: CPT

## 2025-06-04 PROCEDURE — 80053 COMPREHEN METABOLIC PANEL: CPT | Performed by: PHYSICIAN ASSISTANT

## 2025-06-04 PROCEDURE — 96375 TX/PRO/DX INJ NEW DRUG ADDON: CPT

## 2025-06-04 PROCEDURE — 2500000004 HC RX 250 GENERAL PHARMACY W/ HCPCS (ALT 636 FOR OP/ED): Mod: JZ | Performed by: PHYSICIAN ASSISTANT

## 2025-06-04 PROCEDURE — 99284 EMERGENCY DEPT VISIT MOD MDM: CPT | Mod: 25

## 2025-06-04 RX ORDER — METOCLOPRAMIDE HYDROCHLORIDE 5 MG/ML
10 INJECTION INTRAMUSCULAR; INTRAVENOUS ONCE
Status: COMPLETED | OUTPATIENT
Start: 2025-06-04 | End: 2025-06-04

## 2025-06-04 RX ORDER — DIPHENHYDRAMINE HYDROCHLORIDE 50 MG/ML
25 INJECTION, SOLUTION INTRAMUSCULAR; INTRAVENOUS ONCE
Status: COMPLETED | OUTPATIENT
Start: 2025-06-04 | End: 2025-06-04

## 2025-06-04 RX ORDER — MAGNESIUM SULFATE HEPTAHYDRATE 40 MG/ML
2 INJECTION, SOLUTION INTRAVENOUS ONCE
Status: COMPLETED | OUTPATIENT
Start: 2025-06-04 | End: 2025-06-04

## 2025-06-04 RX ADMIN — SODIUM CHLORIDE 1000 ML: 0.9 INJECTION, SOLUTION INTRAVENOUS at 18:14

## 2025-06-04 RX ADMIN — DIPHENHYDRAMINE HYDROCHLORIDE 25 MG: 50 INJECTION, SOLUTION INTRAMUSCULAR; INTRAVENOUS at 18:24

## 2025-06-04 RX ADMIN — METOCLOPRAMIDE 10 MG: 5 INJECTION, SOLUTION INTRAMUSCULAR; INTRAVENOUS at 18:24

## 2025-06-04 RX ADMIN — MAGNESIUM SULFATE HEPTAHYDRATE 2 G: 40 INJECTION, SOLUTION INTRAVENOUS at 18:24

## 2025-06-04 RX ADMIN — DEXAMETHASONE SODIUM PHOSPHATE 4 MG: 4 INJECTION, SOLUTION INTRAMUSCULAR; INTRAVENOUS at 18:24

## 2025-06-04 ASSESSMENT — PAIN - FUNCTIONAL ASSESSMENT: PAIN_FUNCTIONAL_ASSESSMENT: 0-10

## 2025-06-04 ASSESSMENT — PAIN DESCRIPTION - PROGRESSION: CLINICAL_PROGRESSION: NOT CHANGED

## 2025-06-04 ASSESSMENT — LIFESTYLE VARIABLES
HAVE YOU EVER FELT YOU SHOULD CUT DOWN ON YOUR DRINKING: NO
HAVE PEOPLE ANNOYED YOU BY CRITICIZING YOUR DRINKING: NO
EVER FELT BAD OR GUILTY ABOUT YOUR DRINKING: NO
TOTAL SCORE: 0
EVER HAD A DRINK FIRST THING IN THE MORNING TO STEADY YOUR NERVES TO GET RID OF A HANGOVER: NO

## 2025-06-04 ASSESSMENT — PAIN SCALES - GENERAL: PAINLEVEL_OUTOF10: 5 - MODERATE PAIN

## 2025-06-04 NOTE — Clinical Note
Christiano Prieto was seen and treated in our emergency department on 6/4/2025.  She may return to work on 06/07/2025.       If you have any questions or concerns, please don't hesitate to call.      Samuel Jane PA-C

## 2025-06-04 NOTE — ED PROVIDER NOTES
HPI   Chief Complaint   Patient presents with    Headache       A 29-year-old female patient comes into the emergency department today with complaints of severe migraine over the last 4 days.  States she has tried Benadryl and Reglan as well as Excedrin over-the-counter without any relief.  States she is losing sleep.  States she is associated nausea and vomiting.  She rates the pain an 8 out of 10 on the pain scale.  For this purpose comes in the emergency department today for the evaluation.  Otherwise no complaints at present time.              Patient History   Medical History[1]  Surgical History[2]  Family History[3]  Social History[4]    Physical Exam   ED Triage Vitals [06/04/25 1737]   Temperature Heart Rate Respirations BP   36.8 °C (98.2 °F) 87 18 (!) 172/97      Pulse Ox Temp src Heart Rate Source Patient Position   100 % -- -- --      BP Location FiO2 (%)     -- --       Physical Exam  Constitutional:       General: She is in acute distress.      Appearance: Normal appearance. She is well-developed. She is not diaphoretic.   HENT:      Head: Normocephalic and atraumatic.      Nose: Nose normal.   Eyes:      Extraocular Movements: Extraocular movements intact.      Conjunctiva/sclera: Conjunctivae normal.      Pupils: Pupils are equal, round, and reactive to light.   Cardiovascular:      Rate and Rhythm: Normal rate and regular rhythm.   Pulmonary:      Effort: Pulmonary effort is normal. No respiratory distress.      Breath sounds: Normal breath sounds. No stridor. No wheezing.   Musculoskeletal:         General: Normal range of motion.      Cervical back: Normal range of motion.   Skin:     General: Skin is warm and dry.   Neurological:      General: No focal deficit present.      Mental Status: She is alert and oriented to person, place, and time. Mental status is at baseline.   Psychiatric:         Mood and Affect: Mood normal.           ED Course & MDM   Diagnoses as of 06/04/25 6879    Nonintractable headache, unspecified chronicity pattern, unspecified headache type                 No data recorded     [place] Coma Scale Score: 15 (06/04/25 1736 : Olivier Gamez RN)                           Medical Decision Making  A 29-year-old female patient comes into the emergency department today with complaints of severe migraine over the last 4 days.  States she has tried Benadryl and Reglan as well as Excedrin over-the-counter without any relief.  States she is losing sleep.  States she is associated nausea and vomiting.  She rates the pain an 8 out of 10 on the pain scale.  For this purpose comes in the emergency department today for the evaluation.  Otherwise no complaints at present time.    Laboratory studies ordered rule leukocytosis, acute kidney injury, lecture abnormalities.  IV fluids IV Reglan IV Benadryl IV magnesium and Decadron ordered for the patient.    Patient's metabolic panel within normal limits sed rate negative no leukocytosis or left shift.  Patient is feeling significantly better.  Feels comfortable being discharged home.  Patient agrees with this plan expressed verbal understanding.  Questions were answered.    Historian is the patient    Diagnosis: Cephalgia      Labs Reviewed   CBC WITH AUTO DIFFERENTIAL - Abnormal       Result Value    WBC 4.7      nRBC 0.0      RBC 4.61      Hemoglobin 12.1      Hematocrit 36.3      MCV 79 (*)     MCH 26.2      MCHC 33.3      RDW 13.2      Platelets 288      Neutrophils % 60.0      Immature Granulocytes %, Automated 0.2      Lymphocytes % 27.0      Monocytes % 9.2      Eosinophils % 3.2      Basophils % 0.4      Neutrophils Absolute 2.79      Immature Granulocytes Absolute, Automated 0.01      Lymphocytes Absolute 1.26      Monocytes Absolute 0.43      Eosinophils Absolute 0.15      Basophils Absolute 0.02     COMPREHENSIVE METABOLIC PANEL - Normal    Glucose 88      Sodium 136      Potassium 4.3      Chloride 104      Bicarbonate 24       "Anion Gap 12      Urea Nitrogen 11      Creatinine 0.57      eGFR >90      Calcium 9.5      Albumin 4.4      Alkaline Phosphatase 66      Total Protein 6.9      AST 15      Bilirubin, Total 0.2      ALT 12     SEDIMENTATION RATE, AUTOMATED - Normal    Sedimentation Rate 4          No orders to display         Procedure  Procedures         [1]   Past Medical History:  Diagnosis Date    Acid reflux     Anxiety     ETOH abuse     Gastritis     HTN (hypertension)     Migraine     Moderate asthma (HHS-HCC)     Panic anxiety syndrome    [2]   Past Surgical History:  Procedure Laterality Date    OTHER SURGICAL HISTORY      Left Ear Surgery to Repair Ear Drum    UPPER GASTROINTESTINAL ENDOSCOPY     [3] No family history on file.  [4]   Social History  Tobacco Use    Smoking status: Former     Current packs/day: 0.00     Types: Cigarettes     Quit date: 2024     Years since quittin.5    Smokeless tobacco: Never   Vaping Use    Vaping status: Every Day    Substances: Nicotine   Substance Use Topics    Alcohol use: Yes     Comment: \"once a month\"    Drug use: Never        Samuel Jane PA-C  25 [id number]    "

## 2025-06-09 ENCOUNTER — ANESTHESIA EVENT (OUTPATIENT)
Dept: GASTROENTEROLOGY | Facility: EXTERNAL LOCATION | Age: 30
End: 2025-06-09

## 2025-06-19 ENCOUNTER — APPOINTMENT (OUTPATIENT)
Dept: NEUROLOGY | Facility: CLINIC | Age: 30
End: 2025-06-19
Payer: COMMERCIAL

## 2025-06-23 ENCOUNTER — APPOINTMENT (OUTPATIENT)
Dept: GASTROENTEROLOGY | Facility: EXTERNAL LOCATION | Age: 30
End: 2025-06-23
Payer: COMMERCIAL

## 2025-06-23 ENCOUNTER — ANESTHESIA (OUTPATIENT)
Dept: GASTROENTEROLOGY | Facility: EXTERNAL LOCATION | Age: 30
End: 2025-06-23

## 2025-06-23 VITALS
SYSTOLIC BLOOD PRESSURE: 120 MMHG | TEMPERATURE: 97.7 F | OXYGEN SATURATION: 98 % | BODY MASS INDEX: 33.13 KG/M2 | HEIGHT: 62 IN | WEIGHT: 180 LBS | HEART RATE: 89 BPM | DIASTOLIC BLOOD PRESSURE: 71 MMHG | RESPIRATION RATE: 16 BRPM

## 2025-06-23 DIAGNOSIS — K22.81 ESOPHAGEAL POLYP: Primary | ICD-10-CM

## 2025-06-23 DIAGNOSIS — K21.9 GASTROESOPHAGEAL REFLUX DISEASE WITHOUT ESOPHAGITIS: ICD-10-CM

## 2025-06-23 PROBLEM — Z72.0 VAPES NICOTINE CONTAINING SUBSTANCE: Status: ACTIVE | Noted: 2025-06-23

## 2025-06-23 LAB — PREGNANCY TEST URINE, POC: NEGATIVE

## 2025-06-23 PROCEDURE — 43235 EGD DIAGNOSTIC BRUSH WASH: CPT | Performed by: INTERNAL MEDICINE

## 2025-06-23 RX ORDER — MIDAZOLAM HYDROCHLORIDE 1 MG/ML
INJECTION, SOLUTION INTRAMUSCULAR; INTRAVENOUS AS NEEDED
Status: DISCONTINUED | OUTPATIENT
Start: 2025-06-23 | End: 2025-06-23

## 2025-06-23 RX ORDER — PROPOFOL 10 MG/ML
INJECTION, EMULSION INTRAVENOUS AS NEEDED
Status: DISCONTINUED | OUTPATIENT
Start: 2025-06-23 | End: 2025-06-23

## 2025-06-23 RX ORDER — LIDOCAINE HYDROCHLORIDE 20 MG/ML
INJECTION, SOLUTION INFILTRATION; PERINEURAL AS NEEDED
Status: DISCONTINUED | OUTPATIENT
Start: 2025-06-23 | End: 2025-06-23

## 2025-06-23 RX ORDER — PANTOPRAZOLE SODIUM 40 MG/1
40 TABLET, DELAYED RELEASE ORAL DAILY
Qty: 90 TABLET | Refills: 3 | Status: SHIPPED | OUTPATIENT
Start: 2025-06-23

## 2025-06-23 RX ORDER — SODIUM CHLORIDE 9 MG/ML
INJECTION, SOLUTION INTRAVENOUS CONTINUOUS PRN
Status: DISCONTINUED | OUTPATIENT
Start: 2025-06-23 | End: 2025-06-23

## 2025-06-23 RX ADMIN — PROPOFOL 200 MG: 10 INJECTION, EMULSION INTRAVENOUS at 11:27

## 2025-06-23 RX ADMIN — SODIUM CHLORIDE: 9 INJECTION, SOLUTION INTRAVENOUS at 11:24

## 2025-06-23 RX ADMIN — MIDAZOLAM HYDROCHLORIDE 2 MG: 1 INJECTION, SOLUTION INTRAMUSCULAR; INTRAVENOUS at 11:24

## 2025-06-23 RX ADMIN — LIDOCAINE HYDROCHLORIDE 100 MG: 20 INJECTION, SOLUTION INFILTRATION; PERINEURAL at 11:27

## 2025-06-23 SDOH — HEALTH STABILITY: MENTAL HEALTH: CURRENT SMOKER: 0

## 2025-06-23 ASSESSMENT — ENCOUNTER SYMPTOMS
ENDOCRINE NEGATIVE: 1
RESPIRATORY NEGATIVE: 1
CARDIOVASCULAR NEGATIVE: 1
NEUROLOGICAL NEGATIVE: 1
CONSTITUTIONAL NEGATIVE: 1

## 2025-06-23 ASSESSMENT — PAIN - FUNCTIONAL ASSESSMENT
PAIN_FUNCTIONAL_ASSESSMENT: 0-10

## 2025-06-23 ASSESSMENT — PAIN SCALES - GENERAL
PAINLEVEL_OUTOF10: 0 - NO PAIN
PAINLEVEL_OUTOF10: 0 - NO PAIN
PAIN_LEVEL: 0

## 2025-06-23 NOTE — DISCHARGE INSTRUCTIONS
Patient Instructions Post Procedure      The anesthetics, sedatives or narcotics which were given to you today will be acting in your body for the next 24 hours, so you might feel a little sleepy or groggy.  This feeling should slowly wear off. Carefully read and follow the instructions.     You received sedation today:  - Do not drive or operate any machinery or power tools of any kind.   - No alcoholic beverages today, not even beer or wine.  - Do not make any important decisions or sign any legal documents.  - No over the counter medications that contain alcohol or that may cause drowsiness.    While it is common to experience mild to moderate abdominal distention, gas, or belching after your procedure, if any of these symptoms occur following discharge from the GI Lab or within one week of having your procedure, call the [facility] to be advised whether a visit to your nearest Urgent Care or Emergency Department is indicated.  Take this paper with you if you go.   - If you develop an allergic reaction to the medications that were given during your procedure such as difficulty breathing, rash, hives, severe nausea, vomiting or lightheadedness.  - If you experience chest pain, shortness of breath, severe abdominal pain, fevers and chills.  -If you develop signs and symptoms of bleeding such as blood in your spit, if your stools turn black, tarry, or bloody  - If you have not urinated within 8 hours following your procedure.  - If your IV site becomes painful, red, inflamed, or looks infected.    Your physician recommends the additional following instructions:    -You have a contact number available for emergencies. The signs and symptoms of potential delayed complications were discussed with you. You may return to normal activities tomorrow.  -Resume your previous diet or other if specified.  -Continue your present medications.   -We are waiting for your pathology results, if applicable.  -The  "findings and recommendations have been discussed with you and/or family.  - Please see Medication Reconciliation Form for new medication/medications prescribed.     If you experience any problems or have any questions following discharge from the GI Lab, please call: [phone] from 7 am- 4:30 pm.  In the event of an emergency please go to the closest Emergency Department or call Dr. Robles : [phone]    You will receive a follow up text message tomorrow morning, if you are in need of a call back to address a non urgent question please respond with \"YES\", and we will call you the following business day Monday through Friday. If you do not need to a call back please respond with the word \"NO\" and we will remove you from the call back list.    "

## 2025-06-23 NOTE — ANESTHESIA PREPROCEDURE EVALUATION
"Patient: Christiano Prieto    Procedure Information       Date/Time: 25 1150    Scheduled providers: Farshad Robles MD; TERRA Langford    Procedure: EGD    Location: [facility]            Relevant Problems   Cardiac   (+) Severe pre-eclampsia, postpartum (HHS-HCC)      Pulmonary   (+) Asthma   (+) Community acquired pneumonia      Neuro   (+) Panic disorder without agoraphobia      GI   (+) Gastroesophageal reflux disease      Hematology   (+) Iron deficiency anemia      HEENT   (+) Conductive hearing loss of left ear with unrestricted hearing of right ear      ID   (+) Community acquired pneumonia      Tobacco   (+) Vapes nicotine containing substance       Clinical information reviewed:   Tobacco  Allergies    Med Hx  Surg Hx   Fam Hx          NPO Detail:  No data recorded     Physical Exam    Airway  Mallampati: II  TM distance: >3 FB  Mouth openin finger widths     Cardiovascular   Rhythm: regular  Rate: normal     Dental - normal exam     Pulmonary Breath sounds clear to auscultation     Abdominal (+) obese       Other findings: C/o pain in back of neck 2ndary to disc issues  Daily ETOH use \"2 white claws\"        Anesthesia Plan    History of general anesthesia?: yes  History of complications of general anesthesia?: no    ASA 2     MAC   (Preoxygenated 2L prior to procedure.  Patient positioned self to comfort prior to sedation administered; eyes closed; continuous monitoring.)  The patient is not a current smoker.    intravenous induction   Anesthetic plan and risks discussed with patient.      "

## 2025-06-23 NOTE — H&P
History Of Present Illness  Christiano Prieto is a 29 y.o. female presenting with h/o esophageal polyp.   S/p EMR.   Here for follow up EGD     Past Medical History  Medical History[1]  Surgical History  Surgical History[2]  Social History  She reports that she quit smoking about 6 months ago. Her smoking use included cigarettes. She has never used smokeless tobacco. She reports current alcohol use. She reports that she does not use drugs.    Family History  Family History[3]     Allergies  Allergies[4]  Review of Systems   Constitutional: Negative.    Respiratory: Negative.     Cardiovascular: Negative.    Endocrine: Negative.    Genitourinary: Negative.    Skin: Negative.    Neurological: Negative.         Physical Exam  Constitutional:       Appearance: Normal appearance.   HENT:      Head: Normocephalic and atraumatic.   Cardiovascular:      Rate and Rhythm: Normal rate and regular rhythm.      Pulses: Normal pulses.      Heart sounds: Normal heart sounds.   Pulmonary:      Effort: Pulmonary effort is normal.      Breath sounds: Normal breath sounds.   Abdominal:      General: Abdomen is flat. Bowel sounds are normal.      Palpations: Abdomen is soft.      Tenderness: There is no abdominal tenderness.   Musculoskeletal:         General: Normal range of motion.      Cervical back: Normal range of motion.   Skin:     General: Skin is warm.   Neurological:      Mental Status: She is alert.          Last Recorded Vitals  Last menstrual period 06/23/2025, unknown if currently breastfeeding.    Assessment/Plan   h/o esophageal polyp.   S/p EMR.   Here for follow up EGD     Farshad Robles MD       [1]   Past Medical History:  Diagnosis Date    Acid reflux     Anxiety     ETOH abuse     Gastritis     HTN (hypertension)     Migraine     Moderate asthma (HHS-HCC)     Panic anxiety syndrome    [2]   Past Surgical History:  Procedure Laterality Date    OTHER SURGICAL HISTORY      Left Ear Surgery to Repair Ear Drum    UPPER  GASTROINTESTINAL ENDOSCOPY     [3] No family history on file.  [4] No Known Allergies

## 2025-06-23 NOTE — ANESTHESIA POSTPROCEDURE EVALUATION
Patient: Christiano Prieto    Procedure Summary       Date: 06/23/25 Room / Location: [facility]    Anesthesia Start: 1124 Anesthesia Stop:     Procedure: EGD Diagnosis:       Esophageal polyp      Esophageal polyp    Scheduled Providers: Farshad Robles MD; TERRA Langford Responsible Provider: TERRA Langford    Anesthesia Type: MAC ASA Status: 2            Anesthesia Type: MAC    Vitals Value Taken Time   /64 06/23/25 11:33   Temp 36.2 06/23/25 11:33   Pulse 87 06/23/25 11:33   Resp 27 06/23/25 11:33   SpO2 97 06/23/25 11:33       Anesthesia Post Evaluation    Patient location during evaluation: bedside  Patient participation: waiting for patient participation  Level of consciousness: awake  Pain score: 0  Pain management: adequate  Airway patency: patent  Cardiovascular status: acceptable  Respiratory status: acceptable and room air  Hydration status: acceptable  Postoperative Nausea and Vomiting: none        No notable events documented.

## 2025-08-20 ENCOUNTER — APPOINTMENT (OUTPATIENT)
Dept: RADIOLOGY | Facility: HOSPITAL | Age: 30
End: 2025-08-20
Payer: COMMERCIAL

## 2025-08-20 ENCOUNTER — APPOINTMENT (OUTPATIENT)
Dept: CARDIOLOGY | Facility: HOSPITAL | Age: 30
End: 2025-08-20
Payer: COMMERCIAL

## 2025-08-20 ENCOUNTER — HOSPITAL ENCOUNTER (EMERGENCY)
Facility: HOSPITAL | Age: 30
Discharge: HOME | End: 2025-08-20
Attending: EMERGENCY MEDICINE
Payer: COMMERCIAL

## 2025-08-20 VITALS
TEMPERATURE: 97.9 F | OXYGEN SATURATION: 96 % | HEART RATE: 62 BPM | WEIGHT: 180 LBS | BODY MASS INDEX: 31.89 KG/M2 | DIASTOLIC BLOOD PRESSURE: 88 MMHG | RESPIRATION RATE: 20 BRPM | HEIGHT: 63 IN | SYSTOLIC BLOOD PRESSURE: 159 MMHG

## 2025-08-20 DIAGNOSIS — R06.00 DYSPNEA, UNSPECIFIED TYPE: ICD-10-CM

## 2025-08-20 DIAGNOSIS — R07.9 CHEST PAIN, UNSPECIFIED TYPE: Primary | ICD-10-CM

## 2025-08-20 DIAGNOSIS — R42 DIZZINESS: ICD-10-CM

## 2025-08-20 LAB
ALBUMIN SERPL BCP-MCNC: 4.3 G/DL (ref 3.4–5)
ALP SERPL-CCNC: 77 U/L (ref 33–110)
ALT SERPL W P-5'-P-CCNC: 13 U/L (ref 7–45)
ANION GAP SERPL CALC-SCNC: 11 MMOL/L (ref 10–20)
AST SERPL W P-5'-P-CCNC: 14 U/L (ref 9–39)
ATRIAL RATE: 70 BPM
B-HCG SERPL-ACNC: <2 MIU/ML
BASOPHILS # BLD AUTO: 0.03 X10*3/UL (ref 0–0.1)
BASOPHILS NFR BLD AUTO: 0.4 %
BILIRUB SERPL-MCNC: 0.2 MG/DL (ref 0–1.2)
BNP SERPL-MCNC: 136 PG/ML (ref 0–99)
BUN SERPL-MCNC: 7 MG/DL (ref 6–23)
CALCIUM SERPL-MCNC: 8.9 MG/DL (ref 8.6–10.3)
CARDIAC TROPONIN I PNL SERPL HS: 3 NG/L (ref 0–13)
CARDIAC TROPONIN I PNL SERPL HS: 3 NG/L (ref 0–13)
CHLORIDE SERPL-SCNC: 108 MMOL/L (ref 98–107)
CO2 SERPL-SCNC: 24 MMOL/L (ref 21–32)
CREAT SERPL-MCNC: 0.59 MG/DL (ref 0.5–1.05)
D DIMER PPP FEU-MCNC: 228 NG/ML FEU
EGFRCR SERPLBLD CKD-EPI 2021: >90 ML/MIN/1.73M*2
EOSINOPHIL # BLD AUTO: 0.1 X10*3/UL (ref 0–0.7)
EOSINOPHIL NFR BLD AUTO: 1.5 %
ERYTHROCYTE [DISTWIDTH] IN BLOOD BY AUTOMATED COUNT: 13.2 % (ref 11.5–14.5)
GLUCOSE SERPL-MCNC: 92 MG/DL (ref 74–99)
HCT VFR BLD AUTO: 35.8 % (ref 36–46)
HGB BLD-MCNC: 11.6 G/DL (ref 12–16)
IMM GRANULOCYTES # BLD AUTO: 0.01 X10*3/UL (ref 0–0.7)
IMM GRANULOCYTES NFR BLD AUTO: 0.1 % (ref 0–0.9)
INR PPP: 0.9 (ref 0.9–1.1)
LACTATE SERPL-SCNC: 1 MMOL/L (ref 0.4–2)
LIPASE SERPL-CCNC: 35 U/L (ref 9–82)
LYMPHOCYTES # BLD AUTO: 1.94 X10*3/UL (ref 1.2–4.8)
LYMPHOCYTES NFR BLD AUTO: 28.8 %
MAGNESIUM SERPL-MCNC: 1.7 MG/DL (ref 1.6–2.4)
MCH RBC QN AUTO: 24.6 PG (ref 26–34)
MCHC RBC AUTO-ENTMCNC: 32.4 G/DL (ref 32–36)
MCV RBC AUTO: 76 FL (ref 80–100)
MONOCYTES # BLD AUTO: 0.57 X10*3/UL (ref 0.1–1)
MONOCYTES NFR BLD AUTO: 8.5 %
NEUTROPHILS # BLD AUTO: 4.09 X10*3/UL (ref 1.2–7.7)
NEUTROPHILS NFR BLD AUTO: 60.7 %
NRBC BLD-RTO: 0 /100 WBCS (ref 0–0)
P AXIS: 34 DEGREES
P OFFSET: 189 MS
P ONSET: 141 MS
PLATELET # BLD AUTO: 316 X10*3/UL (ref 150–450)
POTASSIUM SERPL-SCNC: 3.4 MMOL/L (ref 3.5–5.3)
PR INTERVAL: 158 MS
PROT SERPL-MCNC: 7.2 G/DL (ref 6.4–8.2)
PROTHROMBIN TIME: 10 SECONDS (ref 9.8–12.4)
Q ONSET: 220 MS
QRS COUNT: 11 BEATS
QRS DURATION: 98 MS
QT INTERVAL: 408 MS
QTC CALCULATION(BAZETT): 440 MS
QTC FREDERICIA: 429 MS
R AXIS: 38 DEGREES
RBC # BLD AUTO: 4.72 X10*6/UL (ref 4–5.2)
SODIUM SERPL-SCNC: 140 MMOL/L (ref 136–145)
T AXIS: 36 DEGREES
T OFFSET: 424 MS
VENTRICULAR RATE: 70 BPM
WBC # BLD AUTO: 6.7 X10*3/UL (ref 4.4–11.3)

## 2025-08-20 PROCEDURE — 71045 X-RAY EXAM CHEST 1 VIEW: CPT

## 2025-08-20 PROCEDURE — 83690 ASSAY OF LIPASE: CPT | Performed by: EMERGENCY MEDICINE

## 2025-08-20 PROCEDURE — 2500000002 HC RX 250 W HCPCS SELF ADMINISTERED DRUGS (ALT 637 FOR MEDICARE OP, ALT 636 FOR OP/ED): Performed by: EMERGENCY MEDICINE

## 2025-08-20 PROCEDURE — 84484 ASSAY OF TROPONIN QUANT: CPT | Performed by: EMERGENCY MEDICINE

## 2025-08-20 PROCEDURE — 36415 COLL VENOUS BLD VENIPUNCTURE: CPT | Performed by: EMERGENCY MEDICINE

## 2025-08-20 PROCEDURE — 2550000001 HC RX 255 CONTRASTS: Performed by: EMERGENCY MEDICINE

## 2025-08-20 PROCEDURE — 70450 CT HEAD/BRAIN W/O DYE: CPT

## 2025-08-20 PROCEDURE — 83735 ASSAY OF MAGNESIUM: CPT | Performed by: EMERGENCY MEDICINE

## 2025-08-20 PROCEDURE — 93005 ELECTROCARDIOGRAM TRACING: CPT

## 2025-08-20 PROCEDURE — 85025 COMPLETE CBC W/AUTO DIFF WBC: CPT | Performed by: EMERGENCY MEDICINE

## 2025-08-20 PROCEDURE — 85610 PROTHROMBIN TIME: CPT | Performed by: EMERGENCY MEDICINE

## 2025-08-20 PROCEDURE — 96376 TX/PRO/DX INJ SAME DRUG ADON: CPT | Mod: 59

## 2025-08-20 PROCEDURE — 99285 EMERGENCY DEPT VISIT HI MDM: CPT | Mod: 25 | Performed by: EMERGENCY MEDICINE

## 2025-08-20 PROCEDURE — 71275 CT ANGIOGRAPHY CHEST: CPT

## 2025-08-20 PROCEDURE — 2500000001 HC RX 250 WO HCPCS SELF ADMINISTERED DRUGS (ALT 637 FOR MEDICARE OP): Performed by: EMERGENCY MEDICINE

## 2025-08-20 PROCEDURE — 96375 TX/PRO/DX INJ NEW DRUG ADDON: CPT | Mod: 59

## 2025-08-20 PROCEDURE — 83605 ASSAY OF LACTIC ACID: CPT | Performed by: EMERGENCY MEDICINE

## 2025-08-20 PROCEDURE — 85379 FIBRIN DEGRADATION QUANT: CPT | Performed by: EMERGENCY MEDICINE

## 2025-08-20 PROCEDURE — 96374 THER/PROPH/DIAG INJ IV PUSH: CPT | Mod: 59

## 2025-08-20 PROCEDURE — 70450 CT HEAD/BRAIN W/O DYE: CPT | Performed by: RADIOLOGY

## 2025-08-20 PROCEDURE — 83880 ASSAY OF NATRIURETIC PEPTIDE: CPT | Performed by: EMERGENCY MEDICINE

## 2025-08-20 PROCEDURE — 94640 AIRWAY INHALATION TREATMENT: CPT

## 2025-08-20 PROCEDURE — 84702 CHORIONIC GONADOTROPIN TEST: CPT | Performed by: EMERGENCY MEDICINE

## 2025-08-20 PROCEDURE — 2500000004 HC RX 250 GENERAL PHARMACY W/ HCPCS (ALT 636 FOR OP/ED): Performed by: EMERGENCY MEDICINE

## 2025-08-20 PROCEDURE — 71045 X-RAY EXAM CHEST 1 VIEW: CPT | Performed by: RADIOLOGY

## 2025-08-20 PROCEDURE — 96361 HYDRATE IV INFUSION ADD-ON: CPT

## 2025-08-20 PROCEDURE — 80053 COMPREHEN METABOLIC PANEL: CPT | Performed by: EMERGENCY MEDICINE

## 2025-08-20 RX ORDER — KETOROLAC TROMETHAMINE 30 MG/ML
15 INJECTION, SOLUTION INTRAMUSCULAR; INTRAVENOUS ONCE
Status: COMPLETED | OUTPATIENT
Start: 2025-08-20 | End: 2025-08-20

## 2025-08-20 RX ORDER — FUROSEMIDE 10 MG/ML
20 INJECTION INTRAMUSCULAR; INTRAVENOUS ONCE
Status: COMPLETED | OUTPATIENT
Start: 2025-08-20 | End: 2025-08-20

## 2025-08-20 RX ORDER — DEXAMETHASONE SODIUM PHOSPHATE 10 MG/ML
10 INJECTION INTRAMUSCULAR; INTRAVENOUS ONCE
Status: COMPLETED | OUTPATIENT
Start: 2025-08-20 | End: 2025-08-20

## 2025-08-20 RX ORDER — FUROSEMIDE 20 MG/1
20 TABLET ORAL DAILY
Qty: 7 TABLET | Refills: 0 | Status: SHIPPED | OUTPATIENT
Start: 2025-08-20

## 2025-08-20 RX ORDER — IPRATROPIUM BROMIDE AND ALBUTEROL SULFATE 2.5; .5 MG/3ML; MG/3ML
3 SOLUTION RESPIRATORY (INHALATION) ONCE
Status: COMPLETED | OUTPATIENT
Start: 2025-08-20 | End: 2025-08-20

## 2025-08-20 RX ORDER — ACETAMINOPHEN 325 MG/1
975 TABLET ORAL ONCE
Status: COMPLETED | OUTPATIENT
Start: 2025-08-20 | End: 2025-08-20

## 2025-08-20 RX ORDER — METOCLOPRAMIDE HYDROCHLORIDE 5 MG/ML
10 INJECTION INTRAMUSCULAR; INTRAVENOUS ONCE
Status: COMPLETED | OUTPATIENT
Start: 2025-08-20 | End: 2025-08-20

## 2025-08-20 RX ADMIN — KETOROLAC TROMETHAMINE 15 MG: 30 INJECTION, SOLUTION INTRAMUSCULAR at 19:43

## 2025-08-20 RX ADMIN — IPRATROPIUM BROMIDE AND ALBUTEROL SULFATE 3 ML: 2.5; .5 SOLUTION RESPIRATORY (INHALATION) at 19:43

## 2025-08-20 RX ADMIN — SODIUM CHLORIDE 1000 ML: 0.9 INJECTION, SOLUTION INTRAVENOUS at 21:13

## 2025-08-20 RX ADMIN — DEXAMETHASONE SODIUM PHOSPHATE 10 MG: 10 INJECTION, SOLUTION INTRAMUSCULAR; INTRAVENOUS at 21:12

## 2025-08-20 RX ADMIN — SODIUM CHLORIDE 1000 ML: 0.9 INJECTION, SOLUTION INTRAVENOUS at 18:43

## 2025-08-20 RX ADMIN — ACETAMINOPHEN 975 MG: 325 TABLET ORAL at 19:43

## 2025-08-20 RX ADMIN — KETOROLAC TROMETHAMINE 15 MG: 30 INJECTION, SOLUTION INTRAMUSCULAR at 21:12

## 2025-08-20 RX ADMIN — IOHEXOL 75 ML: 350 INJECTION, SOLUTION INTRAVENOUS at 21:42

## 2025-08-20 RX ADMIN — FUROSEMIDE 20 MG: 10 INJECTION, SOLUTION INTRAMUSCULAR; INTRAVENOUS at 22:50

## 2025-08-20 RX ADMIN — METHYLPREDNISOLONE SODIUM SUCCINATE 125 MG: 125 INJECTION, POWDER, FOR SOLUTION INTRAMUSCULAR; INTRAVENOUS at 19:42

## 2025-08-20 RX ADMIN — METOCLOPRAMIDE HYDROCHLORIDE 10 MG: 5 INJECTION INTRAMUSCULAR; INTRAVENOUS at 21:13

## 2025-08-20 ASSESSMENT — HEART SCORE
HEART SCORE: 2
TROPONIN: LESS THAN OR EQUAL TO NORMAL LIMIT
HISTORY: SLIGHTLY SUSPICIOUS
ECG: NON-SPECIFIC REPOLARIZATION DISTURBANCE
AGE: <45
RISK FACTORS: 1-2 RISK FACTORS

## 2025-08-20 ASSESSMENT — PAIN SCALES - GENERAL
PAINLEVEL_OUTOF10: 10 - WORST POSSIBLE PAIN
PAINLEVEL_OUTOF10: 5 - MODERATE PAIN
PAINLEVEL_OUTOF10: 2
PAINLEVEL_OUTOF10: 5 - MODERATE PAIN

## 2025-08-20 ASSESSMENT — LIFESTYLE VARIABLES
EVER HAD A DRINK FIRST THING IN THE MORNING TO STEADY YOUR NERVES TO GET RID OF A HANGOVER: NO
TOTAL SCORE: 0
HAVE YOU EVER FELT YOU SHOULD CUT DOWN ON YOUR DRINKING: NO
EVER FELT BAD OR GUILTY ABOUT YOUR DRINKING: NO
HAVE PEOPLE ANNOYED YOU BY CRITICIZING YOUR DRINKING: NO

## 2025-08-20 ASSESSMENT — PAIN - FUNCTIONAL ASSESSMENT
PAIN_FUNCTIONAL_ASSESSMENT: 0-10
PAIN_FUNCTIONAL_ASSESSMENT: 0-10

## 2025-08-21 LAB
ATRIAL RATE: 63 BPM
P AXIS: 37 DEGREES
P OFFSET: 190 MS
P ONSET: 139 MS
PR INTERVAL: 162 MS
Q ONSET: 220 MS
QRS COUNT: 10 BEATS
QRS DURATION: 92 MS
QT INTERVAL: 424 MS
QTC CALCULATION(BAZETT): 433 MS
QTC FREDERICIA: 431 MS
R AXIS: 49 DEGREES
T AXIS: 44 DEGREES
T OFFSET: 432 MS
VENTRICULAR RATE: 63 BPM

## 2025-08-27 LAB
ATRIAL RATE: 63 BPM
ATRIAL RATE: 70 BPM
P AXIS: 34 DEGREES
P AXIS: 37 DEGREES
P OFFSET: 189 MS
P OFFSET: 190 MS
P ONSET: 139 MS
P ONSET: 141 MS
PR INTERVAL: 158 MS
PR INTERVAL: 162 MS
Q ONSET: 220 MS
Q ONSET: 220 MS
QRS COUNT: 10 BEATS
QRS COUNT: 11 BEATS
QRS DURATION: 92 MS
QRS DURATION: 98 MS
QT INTERVAL: 408 MS
QT INTERVAL: 424 MS
QTC CALCULATION(BAZETT): 433 MS
QTC CALCULATION(BAZETT): 440 MS
QTC FREDERICIA: 429 MS
QTC FREDERICIA: 431 MS
R AXIS: 38 DEGREES
R AXIS: 49 DEGREES
T AXIS: 36 DEGREES
T AXIS: 44 DEGREES
T OFFSET: 424 MS
T OFFSET: 432 MS
VENTRICULAR RATE: 63 BPM
VENTRICULAR RATE: 70 BPM

## 2025-09-06 ENCOUNTER — HOSPITAL ENCOUNTER (EMERGENCY)
Facility: HOSPITAL | Age: 30
Discharge: HOME | End: 2025-09-06
Attending: EMERGENCY MEDICINE
Payer: COMMERCIAL

## 2025-09-06 ENCOUNTER — APPOINTMENT (OUTPATIENT)
Dept: RADIOLOGY | Facility: HOSPITAL | Age: 30
End: 2025-09-06
Payer: COMMERCIAL

## 2025-09-06 ENCOUNTER — APPOINTMENT (OUTPATIENT)
Dept: CARDIOLOGY | Facility: HOSPITAL | Age: 30
End: 2025-09-06
Payer: COMMERCIAL

## 2025-09-06 VITALS
HEIGHT: 63 IN | BODY MASS INDEX: 36.32 KG/M2 | WEIGHT: 205 LBS | OXYGEN SATURATION: 97 % | SYSTOLIC BLOOD PRESSURE: 122 MMHG | HEART RATE: 104 BPM | RESPIRATION RATE: 20 BRPM | DIASTOLIC BLOOD PRESSURE: 69 MMHG | TEMPERATURE: 98.1 F

## 2025-09-06 DIAGNOSIS — M79.89 LEG SWELLING: ICD-10-CM

## 2025-09-06 DIAGNOSIS — R06.00 DYSPNEA, UNSPECIFIED TYPE: ICD-10-CM

## 2025-09-06 DIAGNOSIS — R07.9 CHEST PAIN, UNSPECIFIED TYPE: Primary | ICD-10-CM

## 2025-09-06 LAB
ALBUMIN SERPL BCP-MCNC: 4.8 G/DL (ref 3.4–5)
ALP SERPL-CCNC: 70 U/L (ref 33–110)
ALT SERPL W P-5'-P-CCNC: 16 U/L (ref 7–45)
ANION GAP SERPL CALC-SCNC: 18 MMOL/L (ref 10–20)
AST SERPL W P-5'-P-CCNC: 16 U/L (ref 9–39)
B-HCG SERPL-ACNC: <2 MIU/ML
BASOPHILS # BLD AUTO: 0.06 X10*3/UL (ref 0–0.1)
BASOPHILS NFR BLD AUTO: 0.8 %
BILIRUB SERPL-MCNC: 0.3 MG/DL (ref 0–1.2)
BNP SERPL-MCNC: 10 PG/ML (ref 0–99)
BUN SERPL-MCNC: 9 MG/DL (ref 6–23)
CALCIUM SERPL-MCNC: 9.2 MG/DL (ref 8.6–10.3)
CARDIAC TROPONIN I PNL SERPL HS: <3 NG/L (ref 0–13)
CARDIAC TROPONIN I PNL SERPL HS: <3 NG/L (ref 0–13)
CHLORIDE SERPL-SCNC: 100 MMOL/L (ref 98–107)
CO2 SERPL-SCNC: 24 MMOL/L (ref 21–32)
CREAT SERPL-MCNC: 0.86 MG/DL (ref 0.5–1.05)
D DIMER PPP FEU-MCNC: 322 NG/ML FEU
EGFRCR SERPLBLD CKD-EPI 2021: >90 ML/MIN/1.73M*2
EOSINOPHIL # BLD AUTO: 0.14 X10*3/UL (ref 0–0.7)
EOSINOPHIL NFR BLD AUTO: 2 %
ERYTHROCYTE [DISTWIDTH] IN BLOOD BY AUTOMATED COUNT: 13.8 % (ref 11.5–14.5)
FLUAV RNA RESP QL NAA+PROBE: NOT DETECTED
FLUBV RNA RESP QL NAA+PROBE: NOT DETECTED
GLUCOSE SERPL-MCNC: 112 MG/DL (ref 74–99)
HCT VFR BLD AUTO: 36.7 % (ref 36–46)
HGB BLD-MCNC: 12.2 G/DL (ref 12–16)
IMM GRANULOCYTES # BLD AUTO: 0.03 X10*3/UL (ref 0–0.7)
IMM GRANULOCYTES NFR BLD AUTO: 0.4 % (ref 0–0.9)
INR PPP: 1 (ref 0.9–1.1)
LYMPHOCYTES # BLD AUTO: 2.03 X10*3/UL (ref 1.2–4.8)
LYMPHOCYTES NFR BLD AUTO: 28.3 %
MAGNESIUM SERPL-MCNC: 2.19 MG/DL (ref 1.6–2.4)
MCH RBC QN AUTO: 24.9 PG (ref 26–34)
MCHC RBC AUTO-ENTMCNC: 33.2 G/DL (ref 32–36)
MCV RBC AUTO: 75 FL (ref 80–100)
MONOCYTES # BLD AUTO: 0.51 X10*3/UL (ref 0.1–1)
MONOCYTES NFR BLD AUTO: 7.1 %
NEUTROPHILS # BLD AUTO: 4.4 X10*3/UL (ref 1.2–7.7)
NEUTROPHILS NFR BLD AUTO: 61.4 %
NRBC BLD-RTO: 0 /100 WBCS (ref 0–0)
PLATELET # BLD AUTO: 335 X10*3/UL (ref 150–450)
POTASSIUM SERPL-SCNC: 3.8 MMOL/L (ref 3.5–5.3)
PROT SERPL-MCNC: 7.3 G/DL (ref 6.4–8.2)
PROTHROMBIN TIME: 10.8 SECONDS (ref 9.8–12.4)
RBC # BLD AUTO: 4.9 X10*6/UL (ref 4–5.2)
SARS-COV-2 RNA RESP QL NAA+PROBE: NOT DETECTED
SODIUM SERPL-SCNC: 138 MMOL/L (ref 136–145)
WBC # BLD AUTO: 7.2 X10*3/UL (ref 4.4–11.3)

## 2025-09-06 PROCEDURE — 36415 COLL VENOUS BLD VENIPUNCTURE: CPT

## 2025-09-06 PROCEDURE — 71045 X-RAY EXAM CHEST 1 VIEW: CPT

## 2025-09-06 PROCEDURE — 2500000001 HC RX 250 WO HCPCS SELF ADMINISTERED DRUGS (ALT 637 FOR MEDICARE OP)

## 2025-09-06 PROCEDURE — 84484 ASSAY OF TROPONIN QUANT: CPT

## 2025-09-06 PROCEDURE — 83880 ASSAY OF NATRIURETIC PEPTIDE: CPT

## 2025-09-06 PROCEDURE — 85610 PROTHROMBIN TIME: CPT

## 2025-09-06 PROCEDURE — 84702 CHORIONIC GONADOTROPIN TEST: CPT

## 2025-09-06 PROCEDURE — 83735 ASSAY OF MAGNESIUM: CPT

## 2025-09-06 PROCEDURE — 87636 SARSCOV2 & INF A&B AMP PRB: CPT

## 2025-09-06 PROCEDURE — 93005 ELECTROCARDIOGRAM TRACING: CPT

## 2025-09-06 PROCEDURE — 2500000004 HC RX 250 GENERAL PHARMACY W/ HCPCS (ALT 636 FOR OP/ED): Mod: JZ

## 2025-09-06 PROCEDURE — 80053 COMPREHEN METABOLIC PANEL: CPT

## 2025-09-06 PROCEDURE — 85025 COMPLETE CBC W/AUTO DIFF WBC: CPT

## 2025-09-06 PROCEDURE — 99285 EMERGENCY DEPT VISIT HI MDM: CPT | Performed by: EMERGENCY MEDICINE

## 2025-09-06 PROCEDURE — 85379 FIBRIN DEGRADATION QUANT: CPT

## 2025-09-06 RX ORDER — DIPHENHYDRAMINE HCL 25 MG
25 TABLET ORAL ONCE
Status: COMPLETED | OUTPATIENT
Start: 2025-09-06 | End: 2025-09-06

## 2025-09-06 RX ORDER — FUROSEMIDE 20 MG/1
20 TABLET ORAL DAILY
Qty: 10 TABLET | Refills: 0 | Status: SHIPPED | OUTPATIENT
Start: 2025-09-06 | End: 2025-09-16

## 2025-09-06 RX ORDER — KETOROLAC TROMETHAMINE 15 MG/ML
15 INJECTION, SOLUTION INTRAMUSCULAR; INTRAVENOUS ONCE
Status: COMPLETED | OUTPATIENT
Start: 2025-09-06 | End: 2025-09-06

## 2025-09-06 RX ORDER — MAGNESIUM SULFATE HEPTAHYDRATE 40 MG/ML
2 INJECTION, SOLUTION INTRAVENOUS ONCE
Status: COMPLETED | OUTPATIENT
Start: 2025-09-06 | End: 2025-09-06

## 2025-09-06 RX ORDER — METOCLOPRAMIDE HYDROCHLORIDE 5 MG/ML
10 INJECTION INTRAMUSCULAR; INTRAVENOUS ONCE
Status: COMPLETED | OUTPATIENT
Start: 2025-09-06 | End: 2025-09-06

## 2025-09-06 RX ORDER — ALPRAZOLAM 0.25 MG/1
0.25 TABLET ORAL ONCE
Status: COMPLETED | OUTPATIENT
Start: 2025-09-06 | End: 2025-09-06

## 2025-09-06 RX ADMIN — DIPHENHYDRAMINE HYDROCHLORIDE 25 MG: 25 TABLET ORAL at 21:40

## 2025-09-06 RX ADMIN — METOCLOPRAMIDE 10 MG: 5 INJECTION, SOLUTION INTRAMUSCULAR; INTRAVENOUS at 21:40

## 2025-09-06 RX ADMIN — ALPRAZOLAM 0.25 MG: 0.25 TABLET ORAL at 20:26

## 2025-09-06 RX ADMIN — MAGNESIUM SULFATE HEPTAHYDRATE 2 G: 40 INJECTION, SOLUTION INTRAVENOUS at 21:40

## 2025-09-06 RX ADMIN — KETOROLAC TROMETHAMINE 15 MG: 15 INJECTION, SOLUTION INTRAMUSCULAR; INTRAVENOUS at 21:40

## 2025-09-06 ASSESSMENT — PAIN SCALES - GENERAL
PAINLEVEL_OUTOF10: 7
PAINLEVEL_OUTOF10: 2
PAINLEVEL_OUTOF10: 7
PAINLEVEL_OUTOF10: 7

## 2025-09-06 ASSESSMENT — PAIN - FUNCTIONAL ASSESSMENT: PAIN_FUNCTIONAL_ASSESSMENT: 0-10

## 2025-09-06 ASSESSMENT — PAIN DESCRIPTION - DESCRIPTORS
DESCRIPTORS: SQUEEZING
DESCRIPTORS: SQUEEZING
DESCRIPTORS: TIGHTNESS

## 2025-09-06 ASSESSMENT — PAIN DESCRIPTION - FREQUENCY: FREQUENCY: CONSTANT/CONTINUOUS

## 2025-09-06 ASSESSMENT — PAIN DESCRIPTION - ORIENTATION: ORIENTATION: MID

## 2025-09-06 ASSESSMENT — PAIN DESCRIPTION - PAIN TYPE: TYPE: ACUTE PAIN

## 2025-09-06 ASSESSMENT — PAIN DESCRIPTION - LOCATION: LOCATION: CHEST

## 2025-09-06 ASSESSMENT — PAIN DESCRIPTION - PROGRESSION: CLINICAL_PROGRESSION: NOT CHANGED

## 2025-09-06 ASSESSMENT — PAIN DESCRIPTION - ONSET: ONSET: ONGOING

## 2025-09-07 LAB
ATRIAL RATE: 105 BPM
ATRIAL RATE: 106 BPM
P AXIS: 32 DEGREES
P AXIS: 40 DEGREES
P OFFSET: 187 MS
P OFFSET: 196 MS
P ONSET: 139 MS
P ONSET: 141 MS
PR INTERVAL: 158 MS
PR INTERVAL: 162 MS
Q ONSET: 220 MS
Q ONSET: 220 MS
QRS COUNT: 17 BEATS
QRS COUNT: 17 BEATS
QRS DURATION: 90 MS
QRS DURATION: 92 MS
QT INTERVAL: 350 MS
QT INTERVAL: 368 MS
QTC CALCULATION(BAZETT): 464 MS
QTC CALCULATION(BAZETT): 486 MS
QTC FREDERICIA: 422 MS
QTC FREDERICIA: 443 MS
R AXIS: 24 DEGREES
R AXIS: 60 DEGREES
T AXIS: 14 DEGREES
T AXIS: 34 DEGREES
T OFFSET: 395 MS
T OFFSET: 404 MS
VENTRICULAR RATE: 105 BPM
VENTRICULAR RATE: 106 BPM

## 2025-09-10 ENCOUNTER — APPOINTMENT (OUTPATIENT)
Dept: CARDIOLOGY | Facility: CLINIC | Age: 30
End: 2025-09-10
Payer: COMMERCIAL

## 2025-09-15 ENCOUNTER — APPOINTMENT (OUTPATIENT)
Dept: PRIMARY CARE | Facility: CLINIC | Age: 30
End: 2025-09-15
Payer: COMMERCIAL